# Patient Record
Sex: MALE | Race: WHITE | Employment: OTHER | URBAN - METROPOLITAN AREA
[De-identification: names, ages, dates, MRNs, and addresses within clinical notes are randomized per-mention and may not be internally consistent; named-entity substitution may affect disease eponyms.]

---

## 2017-07-17 ENCOUNTER — HOSPITAL ENCOUNTER (OUTPATIENT)
Dept: CARDIAC CATH/INVASIVE PROCEDURES | Age: 72
Discharge: HOME | End: 2017-07-17
Attending: INTERNAL MEDICINE | Admitting: INTERNAL MEDICINE
Payer: MEDICARE

## 2017-07-17 VITALS
HEIGHT: 77 IN | DIASTOLIC BLOOD PRESSURE: 66 MMHG | RESPIRATION RATE: 18 BRPM | SYSTOLIC BLOOD PRESSURE: 117 MMHG | HEART RATE: 65 BPM | OXYGEN SATURATION: 92 % | TEMPERATURE: 97.6 F | BODY MASS INDEX: 36.84 KG/M2 | WEIGHT: 312 LBS

## 2017-07-17 PROBLEM — R94.39 ABNORMAL STRESS TEST: Status: ACTIVE | Noted: 2017-07-17

## 2017-07-17 LAB
GLUCOSE BLD STRIP.AUTO-MCNC: 145 MG/DL (ref 65–100)
GLUCOSE BLD STRIP.AUTO-MCNC: 152 MG/DL (ref 65–100)
SERVICE CMNT-IMP: ABNORMAL
SERVICE CMNT-IMP: ABNORMAL

## 2017-07-17 PROCEDURE — 74011250636 HC RX REV CODE- 250/636

## 2017-07-17 PROCEDURE — 77030019569 HC BND COMPR RAD TERU -B

## 2017-07-17 PROCEDURE — C1769 GUIDE WIRE: HCPCS

## 2017-07-17 PROCEDURE — 77030008543 HC TBNG MON PRSS MRTM -A

## 2017-07-17 PROCEDURE — 93458 L HRT ARTERY/VENTRICLE ANGIO: CPT

## 2017-07-17 PROCEDURE — 77030019697 HC SYR ANGI INFL MRTM -B

## 2017-07-17 PROCEDURE — 77030004549 HC CATH ANGI DX PRF MRTM -A

## 2017-07-17 PROCEDURE — C1887 CATHETER, GUIDING: HCPCS

## 2017-07-17 PROCEDURE — 74011000250 HC RX REV CODE- 250

## 2017-07-17 PROCEDURE — 77030010221 HC SPLNT WR POS TELE -B

## 2017-07-17 PROCEDURE — 77030019698 HC SYR ANGI MDLON MRTM -A

## 2017-07-17 PROCEDURE — 74011250636 HC RX REV CODE- 250/636: Performed by: INTERNAL MEDICINE

## 2017-07-17 PROCEDURE — 74011636320 HC RX REV CODE- 636/320

## 2017-07-17 PROCEDURE — 82962 GLUCOSE BLOOD TEST: CPT

## 2017-07-17 PROCEDURE — C1894 INTRO/SHEATH, NON-LASER: HCPCS

## 2017-07-17 PROCEDURE — 74011000258 HC RX REV CODE- 258: Performed by: INTERNAL MEDICINE

## 2017-07-17 RX ORDER — HEPARIN SODIUM 1000 [USP'U]/ML
1000-10000 INJECTION, SOLUTION INTRAVENOUS; SUBCUTANEOUS ONCE
Status: COMPLETED | OUTPATIENT
Start: 2017-07-17 | End: 2017-07-17

## 2017-07-17 RX ORDER — HEPARIN SODIUM 1000 [USP'U]/ML
1000-10000 INJECTION, SOLUTION INTRAVENOUS; SUBCUTANEOUS
Status: DISCONTINUED | OUTPATIENT
Start: 2017-07-17 | End: 2017-07-17

## 2017-07-17 RX ORDER — LIDOCAINE HYDROCHLORIDE 10 MG/ML
1-30 INJECTION, SOLUTION EPIDURAL; INFILTRATION; INTRACAUDAL; PERINEURAL
Status: DISCONTINUED | OUTPATIENT
Start: 2017-07-17 | End: 2017-07-17

## 2017-07-17 RX ORDER — HEPARIN SODIUM 1000 [USP'U]/ML
INJECTION, SOLUTION INTRAVENOUS; SUBCUTANEOUS
Status: COMPLETED
Start: 2017-07-17 | End: 2017-07-17

## 2017-07-17 RX ORDER — MIDAZOLAM HYDROCHLORIDE 1 MG/ML
.5-2 INJECTION, SOLUTION INTRAMUSCULAR; INTRAVENOUS
Status: DISCONTINUED | OUTPATIENT
Start: 2017-07-17 | End: 2017-07-17

## 2017-07-17 RX ORDER — VERAPAMIL HYDROCHLORIDE 2.5 MG/ML
2.5 INJECTION, SOLUTION INTRAVENOUS ONCE
Status: COMPLETED | OUTPATIENT
Start: 2017-07-17 | End: 2017-07-17

## 2017-07-17 RX ORDER — LIDOCAINE HYDROCHLORIDE 10 MG/ML
INJECTION, SOLUTION EPIDURAL; INFILTRATION; INTRACAUDAL; PERINEURAL
Status: COMPLETED
Start: 2017-07-17 | End: 2017-07-17

## 2017-07-17 RX ORDER — GUAIFENESIN 100 MG/5ML
81 LIQUID (ML) ORAL DAILY
COMMUNITY
End: 2022-07-25

## 2017-07-17 RX ORDER — HEPARIN SODIUM 200 [USP'U]/100ML
500 INJECTION, SOLUTION INTRAVENOUS ONCE
Status: COMPLETED | OUTPATIENT
Start: 2017-07-17 | End: 2017-07-17

## 2017-07-17 RX ORDER — LISINOPRIL 10 MG/1
10 TABLET ORAL DAILY
COMMUNITY
End: 2021-08-08 | Stop reason: DRUGHIGH

## 2017-07-17 RX ORDER — MIDAZOLAM HYDROCHLORIDE 1 MG/ML
INJECTION, SOLUTION INTRAMUSCULAR; INTRAVENOUS
Status: COMPLETED
Start: 2017-07-17 | End: 2017-07-17

## 2017-07-17 RX ORDER — METFORMIN HYDROCHLORIDE 500 MG/1
500 TABLET ORAL 2 TIMES DAILY WITH MEALS
COMMUNITY

## 2017-07-17 RX ORDER — VERAPAMIL HYDROCHLORIDE 2.5 MG/ML
INJECTION, SOLUTION INTRAVENOUS
Status: COMPLETED
Start: 2017-07-17 | End: 2017-07-17

## 2017-07-17 RX ORDER — ADENOSINE 3 MG/ML
INJECTION, SOLUTION INTRAVENOUS
Status: DISCONTINUED
Start: 2017-07-17 | End: 2017-07-17

## 2017-07-17 RX ORDER — FENTANYL CITRATE 50 UG/ML
25-50 INJECTION, SOLUTION INTRAMUSCULAR; INTRAVENOUS
Status: DISCONTINUED | OUTPATIENT
Start: 2017-07-17 | End: 2017-07-17

## 2017-07-17 RX ORDER — HEPARIN SODIUM 200 [USP'U]/100ML
INJECTION, SOLUTION INTRAVENOUS
Status: COMPLETED
Start: 2017-07-17 | End: 2017-07-17

## 2017-07-17 RX ORDER — FENTANYL CITRATE 50 UG/ML
INJECTION, SOLUTION INTRAMUSCULAR; INTRAVENOUS
Status: COMPLETED
Start: 2017-07-17 | End: 2017-07-17

## 2017-07-17 RX ADMIN — HEPARIN SODIUM 1000 UNITS: 200 INJECTION, SOLUTION INTRAVENOUS at 08:55

## 2017-07-17 RX ADMIN — MIDAZOLAM HYDROCHLORIDE 2 MG: 1 INJECTION, SOLUTION INTRAMUSCULAR; INTRAVENOUS at 08:55

## 2017-07-17 RX ADMIN — FENTANYL CITRATE 50 MCG: 50 INJECTION, SOLUTION INTRAMUSCULAR; INTRAVENOUS at 08:55

## 2017-07-17 RX ADMIN — VERAPAMIL HYDROCHLORIDE 2.5 MG: 2.5 INJECTION INTRAVENOUS at 09:08

## 2017-07-17 RX ADMIN — LIDOCAINE HYDROCHLORIDE 2 ML: 10 INJECTION, SOLUTION EPIDURAL; INFILTRATION; INTRACAUDAL; PERINEURAL at 09:08

## 2017-07-17 RX ADMIN — HEPARIN SODIUM 9000 UNITS: 1000 INJECTION, SOLUTION INTRAVENOUS; SUBCUTANEOUS at 09:30

## 2017-07-17 RX ADMIN — IOPAMIDOL 70 ML: 755 INJECTION, SOLUTION INTRAVENOUS at 09:53

## 2017-07-17 RX ADMIN — SODIUM CHLORIDE 250 ML: 900 INJECTION, SOLUTION INTRAVENOUS at 09:15

## 2017-07-17 RX ADMIN — VERAPAMIL HYDROCHLORIDE 2.5 MG: 2.5 INJECTION, SOLUTION INTRAVENOUS at 09:08

## 2017-07-17 RX ADMIN — FENTANYL CITRATE 50 MCG: 50 INJECTION, SOLUTION INTRAMUSCULAR; INTRAVENOUS at 09:07

## 2017-07-17 RX ADMIN — IOPAMIDOL 30 ML: 755 INJECTION, SOLUTION INTRAVENOUS at 09:15

## 2017-07-17 RX ADMIN — ADENOSINE 140 MCG/KG/MIN: 3 INJECTION, SOLUTION INTRAVENOUS at 09:47

## 2017-07-17 RX ADMIN — HEPARIN SODIUM 5000 UNITS: 1000 INJECTION, SOLUTION INTRAVENOUS; SUBCUTANEOUS at 09:08

## 2017-07-17 RX ADMIN — NITROGLYCERIN 200 MCG: 5 INJECTION, SOLUTION INTRAVENOUS at 09:08

## 2017-07-17 RX ADMIN — MIDAZOLAM HYDROCHLORIDE 2 MG: 1 INJECTION INTRAMUSCULAR; INTRAVENOUS at 08:55

## 2017-07-17 RX ADMIN — MIDAZOLAM HYDROCHLORIDE 2 MG: 1 INJECTION, SOLUTION INTRAMUSCULAR; INTRAVENOUS at 09:07

## 2017-07-17 NOTE — PROGRESS NOTES
Brief Nutrition Note    Chart reviewed. Nutrition consulted to provide nutrition therapy for heart health and weight loss. Pt was provided with verbal education as well as a handout on TLC diet and wt loss strategies. Pt and wife live at [facility] where they are provided with 3 meals per day. Pt was encouraged to reach out to RD at facility because they are more familiar with foods provided. Pt has tried other weight loss strategies and programs in the past and has had some success.      Christianne Bailey RDN  Pager: [phone]

## 2017-07-17 NOTE — PROGRESS NOTES
Pt received discharge instructions and prescriptions. Pt states understanding of follow-up care and side effects of medications.  Odette Eason RN

## 2017-07-17 NOTE — PROCEDURES
Cardiac Cathetherization Note     PreOp Diagnosis:    []       Chest Pain   []       STEMI   []       Unstable Angina   []       NSTEMI   []       Cardiomyopathy/Heart Failure   [x]       Abnormal Stress Test   []       Valve Disease   []       Pre-Operative Evaluation   [x]       Other:   Dyspnea    Findings/PostOp Diagnosis:   1. Mild diffuse CAD  2. Patent stents in the RCA  3. Normal LVEDP  4. Hemodynamically insignificant stenosis of the LPDA    Recommendations:   1. Continue ASA 81mg indefinitely   2. Routine post procedure & access site care   3. Continue aggressive medical management and risk factor modification     I have explained the nature of cardiac catheterization and possible percutaneous coronary intervention including risks and benefits of the procedure with the patient which include at least a 1:1000 risk for diagnostic procedure and 1/100 risk for percutaneous intervention. Risks include but are not limited to risk of heart attack, stroke, vascular trauma requiring surgical repair or transfusion, abnormal heart rhythm requiring defibrillation or pacemaker, need for intraaortic balloon pump support, renal dysfunction requiring dialysis, exacerbated gastrointestinal bleeding, allergic response to medications requiring ventilatory support, emergent cardiac surgery and even death. They also understand the need for medical compliance - particularly if stenting is required - mandating continued daily consumption of aspirin and plavix or other antiplatelet therapy. Differences between medicated stent versus bare metal stent reviewed with patient. The patient expresses an understanding and verbally consents. They also understand plans for either radial or femoral access - with unique risks to both vascular beds including arterial occlusion, vascular trauma, hematoma and need for vascular surgery. I have answered all of their questions regarding the procedure and they are willing to proceed. Procedures: LHC, Cors, Cineflouroscopy     Indication:  As above    Procedure status: [x]  Elective  [] Urgent  [] Emergent    Operators:  Reza Roper DO    Assistants:     Access:   [x]  RIGHT Radial  []  LEFT Radial  [] RCFA  []  LCFA  []  RCFV  []  LCFV    Catheters: 6Fr JR4, JL3.5     Closure: [x]  TR Band  []  Angioseal  []  Perclose  []  Manual Compression    Tubes/Drains:  [x] No tubes or drains remain from this procedure  [] Other:     Estimated Blood Loss: Minimal     Specimens: None     Sedation: Moderate conscious sedation with IV fentany & versed, local anesthesia with 1% lidocaine. This was performed by non-anesthesia personnel and I provided direct supervision to a trained independent observer. Time under moderate sedation: 46  Min    Patient age:  74 y.o. Contrast:   100  cc  [x]  Isovue   []  Visipaque    Complications:  [x] None  [] Other:     Patient Condition at the end of the procedure:  [x] Stable  [] Other:      Hemodynamics: Ao: 111/69/90  LV: 111/13    Cors:     Dominance: [] Right  [] Left  [x] Mixed    LM: Large caliber vessel without significant stenosis    LAD: Moderate caliber vessel that wraps around the apex with mild diffuse disease  D1: Small caliber vessel with luminal irregularities  D2: Small caliber branching vessel with mild diffuse disease    LCX: Moderate caliber codominant vessel with luminal irregularities  OM1: Small caliber vessel with mild disease and a high takeoff  OM2: Small caliber vessel with mild disease  LPDA: Moderate caliber vessel with a 50-60% stenosis in the mid vessel    RCA: Moderate caliber, codominant vessel with patent stents in the proximal.mid vessel and distal vessel extending into the PLB  RPDA: Small caliber vessel with mild ostial disease due to the RCA-PLB stent  RPLB: Small caliber vessel without significant stenosis.        LV angiography: N/A  EF:   Wall motion:   MR:      Indication for FFR:  Indeterminate stenosis of the LPDA.    Technique:    Lesion #1:  LPDA    Anticoagulation:  Heparin was used for anticoagulation. Guiding Catheter:  A 6Fr EBU 4 guiding catheter was used to engage the Left Main. Guidewire: A Pressure was used to cross the lesion without difficulty. The pressure wire was zeroed outside of the body. The pressure wire was then introduced into the guiding catheter and into the vessel. The pressure wire was then equalized and then advanced across the indeterminate stenosis. The baseline flow rate was 0.99.  140 mcg/kg/min of adenosine was then administered intravenously for a total of three minutes. The final flow rate was 0.93 indicating a hemodynamically insignificant stenosis. Post-FFR Angiogram showed SHRUTI 3 flow without dissection or perforation. The patient tolerated the procedure well without any hemodynamic instability.

## 2017-07-17 NOTE — IP AVS SNAPSHOT
[address] [facility] 
[phone] Patient: Rosaline Chau MRN: [id number] GFL:[id number] Current Discharge Medication List  
  
CONTINUE these medications which have CHANGED Dose & Instructions Dispensing Information Comments Morning Noon Evening Bedtime  
 aspirin 81 mg chewable tablet What changed:  Another medication with the same name was removed. Continue taking this medication, and follow the directions you see here. Your last dose was: Your next dose is:    
   
   
 Dose:  81 mg Take 81 mg by mouth daily. Refills:  0 CONTINUE these medications which have NOT CHANGED Dose & Instructions Dispensing Information Comments Morning Noon Evening Bedtime CHONDROITIN SULFATE Your last dose was: Your next dose is:    
   
   
 Dose:  1200 mg Take 1,200 mg by mouth daily. Refills:  0  
     
   
   
   
  
 clopidogrel 75 mg Tab Commonly known as:  PLAVIX Your last dose was: Your next dose is:    
   
   
 Dose:  75 mg Take 1 Tab by mouth daily. Quantity:  30 Tab Refills:  12  
     
   
   
   
  
 GLUCOSAMINE 750 mg Tab Generic drug:  glucosamine sulfate Your last dose was: Your next dose is:    
   
   
 Dose:  750 mg Take 750 mg by mouth daily. Refills:  0  
     
   
   
   
  
 lisinopril 10 mg tablet Commonly known as:  Mauri Shelby Your last dose was: Your next dose is:    
   
   
 Dose:  10 mg Take 10 mg by mouth daily. Refills:  0  
     
   
   
   
  
 metFORMIN 500 mg tablet Commonly known as:  GLUCOPHAGE Your last dose was: Your next dose is:    
   
   
 Dose:  500 mg Take 500 mg by mouth two (2) times daily (with meals). Refills:  0  
     
   
   
   
  
 multivitamin tablet Commonly known as:  ONE A DAY Your last dose was: Your next dose is:    
   
   
 Dose:  1 Tab Take 1 Tab by mouth daily. Refills:  0  
     
   
   
   
  
 simvastatin 20 mg tablet Commonly known as:  ZOCOR Your last dose was: Your next dose is:    
   
   
 Dose:  40 mg Take 40 mg by mouth nightly. Indications: HYPERCHOLESTEROLEMIA Refills:  0

## 2017-07-17 NOTE — IP AVS SNAPSHOT
[address] [facility] 
[phone] Patient: Feliz Nyhan MRN: [id number] VJR:7/07/[birth year]50 You are allergic to the following Allergen Reactions Adhesive Other (comments) Blisters Advil (Ibuprofen) Rash Cleocin (Clindamycin Hcl) Rash Other Medication Unknown (comments) \"Phosphate\" Recent Documentation Height Weight BMI Smoking Status 1.956 m 141.5 kg 37 kg/m2 Former Smoker Emergency Contacts Name Discharge Info Relation Home Work Mobile [place]. [address] [place] [employer] Final CAREGIVER [3] Spouse [3] [phone] Felicitas Carter CAREGIVER [3] Son [22] [phone] About your hospitalization You were admitted on:  July 17, 2017 You last received care in the:  [facility] You were discharged on:  July 17, 2017 Unit phone number:  [phone] Why you were hospitalized Your primary diagnosis was:  Not on File Your diagnoses also included:  Abnormal Stress Test  
  
  
 
  
  
Providers Seen During Your Hospitalizations Provider Role Specialty Primary office phone Alyssia Yost DO Attending Provider Cardiology [phone] Your Primary Care Physician (PCP) Primary Care Physician Office Phone Office Fax Lisa Hoff [phone] Follow-up Information Follow up With Details Comments Contact Info Miguel Angel Cloud III, DO Schedule an appointment as soon as possible for a visit in 2 weeks  [address] [facility] 
[phone] Bhumi Martinez MD   [address] [facility] [address] [phone] Current Discharge Medication List  
  
CONTINUE these medications which have CHANGED Dose & Instructions Dispensing Information Comments Morning Noon Evening Bedtime aspirin 81 mg chewable tablet What changed:  Another medication with the same name was removed. Continue taking this medication, and follow the directions you see here. Your last dose was: Your next dose is:    
   
   
 Dose:  81 mg Take 81 mg by mouth daily. Refills:  0 CONTINUE these medications which have NOT CHANGED Dose & Instructions Dispensing Information Comments Morning Noon Evening Bedtime CHONDROITIN SULFATE Your last dose was: Your next dose is:    
   
   
 Dose:  1200 mg Take 1,200 mg by mouth daily. Refills:  0  
     
   
   
   
  
 clopidogrel 75 mg Tab Commonly known as:  PLAVIX Your last dose was: Your next dose is:    
   
   
 Dose:  75 mg Take 1 Tab by mouth daily. Quantity:  30 Tab Refills:  12  
     
   
   
   
  
 GLUCOSAMINE 750 mg Tab Generic drug:  glucosamine sulfate Your last dose was: Your next dose is:    
   
   
 Dose:  750 mg Take 750 mg by mouth daily. Refills:  0  
     
   
   
   
  
 lisinopril 10 mg tablet Commonly known as:  David Little Your last dose was: Your next dose is:    
   
   
 Dose:  10 mg Take 10 mg by mouth daily. Refills:  0  
     
   
   
   
  
 metFORMIN 500 mg tablet Commonly known as:  GLUCOPHAGE Your last dose was: Your next dose is:    
   
   
 Dose:  500 mg Take 500 mg by mouth two (2) times daily (with meals). Refills:  0  
     
   
   
   
  
 multivitamin tablet Commonly known as:  ONE A DAY Your last dose was: Your next dose is:    
   
   
 Dose:  1 Tab Take 1 Tab by mouth daily. Refills:  0  
     
   
   
   
  
 simvastatin 20 mg tablet Commonly known as:  ZOCOR Your last dose was: Your next dose is:    
   
   
 Dose:  40 mg Take 40 mg by mouth nightly. Indications: HYPERCHOLESTEROLEMIA Refills:  0 Discharge Instructions [address] [facility], [address]   [phone] [place], [address] [place] [address]    [email or web address] Patient Discharge Instructions Misti Juan / [id number] : 1945 Admitted 2017 Discharged: 2017 · It is important that you take the medication exactly as they are prescribed. · Keep your medication in the bottles provided by the pharmacist and keep a list of the medication names, dosages, and times to be taken in your wallet. · Do not take other medications without consulting your doctor. BRING ALL OF YOUR MEDICINES TO YOUR OFFICE VISIT with Masha Teixeira III, DO. Follow-up with Masha Teixeira III, DO in 2 weeks. Cardiac Catheterization  Discharge Instructions Transradial Catheterization Discharge Instructions (WRIST) Discharge instructions: Your radial artery in your wrist was used for your cardiac catheterization. This site may be slightly bruised and sore following your procedure. Expect mild tingling or the hand and tenderness at the puncture site for up to 3 days. Excess movement of the wrist used should be avoided for the next 24-48 hours. 1. No lifting over 2 pounds (approximately a ½ gallon of milk) with this arm for 24 hours. 2. Keep the site of the procedure covered with a bandage for 24 hours. 3. You may shower the day after your procedure. Do not take a tub bath or submerge the puncture site in water for 48 hours. 4. No heavy impact activity/lifting > 30 pounds for 1 week. If bleeding of the wrist occurs at home: If the site on your wrist where you had the catheterization procedure begins to bleed, do not panic. 1. Place 1 or 2 fingers over the puncture site and hold pressure to stop the bleeding. You may be able to feel your pulse as you hold pressure. 2. Lift your fingers after 5 minutes to see if the bleeding has stopped. 3. Once the bleeding has stopped, gently wipe the wrist area clean and cover with a bandage. If the bleeding from your wrist does not stop after 15 minutes, or if there is a large amount of bleeding or spurting, call 911 immediately (do not drive yourself to the hospital). Other concerns: The site may be slightly bruised and sore following your procedure. Should any of the following occur, contact your physician immediately: 1. Any cool or coldness of the arm, discoloration over a large area, ongoing numbness or any abnormal sensations , moderate to severe pain or swelling in the arm. 2. Redness, soreness, swelling, chills or fever, or colored drainage at the procedure site within 3-7 days after your procedure. If you have any further questions or concerns regarding your procedure please call the Cardiac Cath Lab office at [phone]. During regular business hours ask to speak to Dr. Raj Frey. During non-business hours the answering service will answer. Ask to speak to the physician on call for [facility].  
 
Transfemoral Catheterization Discharge Instructions (GROIN) ? Do not drive, operate any machinery, or sign any legal documents for 24 hours after your procedure. You must have someone to drive you home. ? You may take a shower 24 hours after your cardiac catheterization. Be sure to get the dressing wet and then remove it; gently wash the area with warm soapy water. Pat dry and leave open to air. To help prevent infections, be sure to keep the cath site clean and dry. No lotions, creams, powders, ointments, etc. in the cath site for approximately 1 week. ? Do not take a tub bath, get in a hot tub or swimming pool for approximately 5 days or until the cath site is completely healed. ? No strenuous activity or heavy lifting over 10 lbs. for 7 days.  
 
? Drink plenty of fluids for 24-48 hours after your cath to flush the contrast dye from your kidneys. No alcoholic beverages for 24 hours. You may resume your previous diet (low fat, low cholesterol) after your cath. ? After your cath, some bruising or discomfort is common during the healing process. Tylenol, 1-2 tablets every 6 hours as needed, is recommended if you experience any discomfort. If you experience any signs or symptoms of infection such as fever, chills, or poorly healing incision, persistent tenderness or swelling in the groin, redness and/or warmth to the touch, numbness, significant tingling or pain at the groin site or affected extremity, rash, drainage from the cath site, or if the leg feels tight or swollen, call your physician right away. ? If bleeding at the cath site occurs, take a clean gauze pad and apply direct pressure to the groin just above the puncture site. Call 911 immediately, and continue to apply direct pressure until an ambulance gets to your location. ? You may return to work  2  days after your cardiac cath if no groin bleeding. Information obtained by : 
I understand that if any problems occur once I am at home I am to contact my physician. I understand and acknowledge receipt of the instructions indicated above. R.N.'s Signature                                                                  Date/Time Patient or Representative Signature                                                          Date/Time Zia Wright III, [address] [facility], [address]    [phone] [place], [address] [facility]    [email or web address] Discharge Orders None Introducing [facility] [place] [facility]! Ronny Manning introduces [employer] patient portal. Now you can access parts of your medical record, email your doctor's office, and request medication refills online. 1. In your internet browser, go to [email or web address] 2. Click on the First Time User? Click Here link in the Sign In box. You will see the New Member Sign Up page. 3. Enter your [employer] Access Code exactly as it appears below. You will not need to use this code after youve completed the sign-up process. If you do not sign up before the expiration date, you must request a new code. · [employer] Access Code: [email or web address] Expires: 10/10/2017 10:44 AM 
 
4. Enter the last four digits of your Social Security Number (xxxx) and Date of Birth (mm/dd/yyyy) as indicated and click Submit. You will be taken to the next sign-up page. 5. Create a [employer] ID. This will be your [employer] login ID and cannot be changed, so think of one that is secure and easy to remember. 6. Create a [employer] password. You can change your password at any time. 7. Enter your Password Reset Question and Answer. This can be used at a later time if you forget your password. 8. Enter your e-mail address. You will receive e-mail notification when new information is available in [address] 9. Click Sign Up. You can now view and download portions of your medical record. 10. Click the Download Summary menu link to download a portable copy of your medical information. If you have questions, please visit the Frequently Asked Questions section of the [employer] website. Remember, [employer] is NOT to be used for urgent needs. For medical emergencies, dial 911. Now available from your iPhone and Android! General Information Please provide this summary of care documentation to your next provider. Patient Signature:  ____________________________________________________________ Date:  ____________________________________________________________  
  
Claudene Born Provider Signature:  ____________________________________________________________ Date:  ____________________________________________________________

## 2017-07-17 NOTE — DISCHARGE INSTRUCTIONS
[address], [address]   [phone]  [address]    [email or web address]    Patient Discharge Instructions    Rosaline Chau / [id number] : 1945    Admitted 2017 Discharged: 2017       · It is important that you take the medication exactly as they are prescribed. · Keep your medication in the bottles provided by the pharmacist and keep a list of the medication names, dosages, and times to be taken in your wallet. · Do not take other medications without consulting your doctor. BRING ALL OF YOUR MEDICINES TO YOUR OFFICE VISIT with Carissa Higginbotham III, DO. Follow-up with Carissa Higginbotham III, DO in 2 weeks. Cardiac Catheterization  Discharge Instructions    Transradial Catheterization Discharge Instructions (WRIST)    Discharge instructions: Your radial artery in your wrist was used for your cardiac catheterization. This site may be slightly bruised and sore following your procedure. Expect mild tingling or the hand and tenderness at the puncture site for up to 3 days. Excess movement of the wrist used should be avoided for the next 24-48 hours. 1. No lifting over 2 pounds (approximately a ½ gallon of milk) with this arm for 24 hours. 2. Keep the site of the procedure covered with a bandage for 24 hours. 3. You may shower the day after your procedure. Do not take a tub bath or submerge the puncture site in water for 48 hours. 4. No heavy impact activity/lifting > 30 pounds for 1 week. If bleeding of the wrist occurs at home:   If the site on your wrist where you had the catheterization procedure begins to bleed, do not panic. 1. Place 1 or 2 fingers over the puncture site and hold pressure to stop the bleeding. You may be able to feel your pulse as you hold pressure. 2. Lift your fingers after 5 minutes to see if the bleeding has stopped.    3. Once the bleeding has stopped, gently wipe the wrist area clean and cover with a bandage. If the bleeding from your wrist does not stop after 15 minutes, or if there is a large amount of bleeding or spurting, call 911 immediately (do not drive yourself to the hospital). Other concerns: The site may be slightly bruised and sore following your procedure. Should any of the following occur, contact your physician immediately:   1. Any cool or coldness of the arm, discoloration over a large area, ongoing numbness or any abnormal sensations , moderate to severe pain or swelling in the arm. 2. Redness, soreness, swelling, chills or fever, or colored drainage at the procedure site within 3-7 days after your procedure. If you have any further questions or concerns regarding your procedure please call the Cardiac Cath Lab office at [phone]. During regular business hours ask to speak to Dr. Maria De Jesus Wilson. During non-business hours the answering service will answer. Ask to speak to the physician on call for [facility].     Transfemoral Catheterization Discharge Instructions Flakito Richardson)     Do not drive, operate any machinery, or sign any legal documents for 24 hours after your procedure. You must have someone to drive you home.  You may take a shower 24 hours after your cardiac catheterization. Be sure to get the dressing wet and then remove it; gently wash the area with warm soapy water. Pat dry and leave open to air. To help prevent infections, be sure to keep the cath site clean and dry. No lotions, creams, powders, ointments, etc. in the cath site for approximately 1 week.  Do not take a tub bath, get in a hot tub or swimming pool for approximately 5 days or until the cath site is completely healed.  No strenuous activity or heavy lifting over 10 lbs. for 7 days.  Drink plenty of fluids for 24-48 hours after your cath to flush the contrast dye from your kidneys. No alcoholic beverages for 24 hours.   You may resume your previous diet (low fat, low cholesterol) after your cath.  After your cath, some bruising or discomfort is common during the healing process. Tylenol, 1-2 tablets every 6 hours as needed, is recommended if you experience any discomfort. If you experience any signs or symptoms of infection such as fever, chills, or poorly healing incision, persistent tenderness or swelling in the groin, redness and/or warmth to the touch, numbness, significant tingling or pain at the groin site or affected extremity, rash, drainage from the cath site, or if the leg feels tight or swollen, call your physician right away.  If bleeding at the cath site occurs, take a clean gauze pad and apply direct pressure to the groin just above the puncture site. Call 911 immediately, and continue to apply direct pressure until an ambulance gets to your location.  You may return to work  2  days after your cardiac cath if no groin bleeding. Information obtained by :  I understand that if any problems occur once I am at home I am to contact my physician. I understand and acknowledge receipt of the instructions indicated above. R.N.'s Signature                                                                  Date/Time                                                                                                                                              Patient or Representative Signature                                                          Date/Time      Daryl Dunaway III, DO             [address]    [phone]  [place], [address]    [email or web address]

## 2018-10-28 ENCOUNTER — HOSPITAL ENCOUNTER (EMERGENCY)
Age: 73
Discharge: HOME | End: 2018-10-28
Attending: EMERGENCY MEDICINE
Payer: MEDICARE

## 2018-10-28 VITALS
BODY MASS INDEX: 37.02 KG/M2 | HEART RATE: 71 BPM | SYSTOLIC BLOOD PRESSURE: 120 MMHG | DIASTOLIC BLOOD PRESSURE: 61 MMHG | HEIGHT: 76 IN | TEMPERATURE: 97.7 F | WEIGHT: 304 LBS | OXYGEN SATURATION: 95 % | RESPIRATION RATE: 19 BRPM

## 2018-10-28 DIAGNOSIS — R19.7 DIARRHEA, UNSPECIFIED TYPE: ICD-10-CM

## 2018-10-28 DIAGNOSIS — R42 ORTHOSTATIC DIZZINESS: Primary | ICD-10-CM

## 2018-10-28 LAB
ALBUMIN SERPL-MCNC: 3.3 G/DL (ref 3.5–5)
ALBUMIN/GLOB SERPL: 0.7 {RATIO} (ref 1.1–2.2)
ALP SERPL-CCNC: 67 U/L (ref 45–117)
ALT SERPL-CCNC: 18 U/L (ref 12–78)
ANION GAP SERPL CALC-SCNC: 10 MMOL/L (ref 5–15)
APPEARANCE UR: CLEAR
AST SERPL-CCNC: 13 U/L (ref 15–37)
BACTERIA URNS QL MICRO: NEGATIVE /HPF
BASOPHILS # BLD: 0.1 K/UL (ref 0–0.1)
BASOPHILS NFR BLD: 1 % (ref 0–1)
BILIRUB SERPL-MCNC: 0.4 MG/DL (ref 0.2–1)
BILIRUB UR QL: NEGATIVE
BUN SERPL-MCNC: 15 MG/DL (ref 6–20)
BUN/CREAT SERPL: 15 (ref 12–20)
CALCIUM SERPL-MCNC: 9.1 MG/DL (ref 8.5–10.1)
CHLORIDE SERPL-SCNC: 106 MMOL/L (ref 97–108)
CK MB CFR SERPL CALC: ABNORMAL % (ref 0–2.5)
CK MB SERPL-MCNC: <1 NG/ML (ref 5–25)
CK SERPL-CCNC: 37 U/L (ref 39–308)
CO2 SERPL-SCNC: 22 MMOL/L (ref 21–32)
COLOR UR: NORMAL
CREAT SERPL-MCNC: 1.01 MG/DL (ref 0.7–1.3)
DIFFERENTIAL METHOD BLD: ABNORMAL
EOSINOPHIL # BLD: 0.2 K/UL (ref 0–0.4)
EOSINOPHIL NFR BLD: 3 % (ref 0–7)
EPITH CASTS URNS QL MICRO: NORMAL /LPF
ERYTHROCYTE [DISTWIDTH] IN BLOOD BY AUTOMATED COUNT: 12.6 % (ref 11.5–14.5)
GLOBULIN SER CALC-MCNC: 4.6 G/DL (ref 2–4)
GLUCOSE SERPL-MCNC: 129 MG/DL (ref 65–100)
GLUCOSE UR STRIP.AUTO-MCNC: NEGATIVE MG/DL
HCT VFR BLD AUTO: 39 % (ref 36.6–50.3)
HGB BLD-MCNC: 13.4 G/DL (ref 12.1–17)
HGB UR QL STRIP: NEGATIVE
IMM GRANULOCYTES # BLD: 0 K/UL (ref 0–0.04)
IMM GRANULOCYTES NFR BLD AUTO: 0 % (ref 0–0.5)
KETONES UR QL STRIP.AUTO: NEGATIVE MG/DL
LEUKOCYTE ESTERASE UR QL STRIP.AUTO: NEGATIVE
LYMPHOCYTES # BLD: 1.7 K/UL (ref 0.8–3.5)
LYMPHOCYTES NFR BLD: 19 % (ref 12–49)
MCH RBC QN AUTO: 32.9 PG (ref 26–34)
MCHC RBC AUTO-ENTMCNC: 34.4 G/DL (ref 30–36.5)
MCV RBC AUTO: 95.8 FL (ref 80–99)
MONOCYTES # BLD: 1.1 K/UL (ref 0–1)
MONOCYTES NFR BLD: 12 % (ref 5–13)
NEUTS SEG # BLD: 6 K/UL (ref 1.8–8)
NEUTS SEG NFR BLD: 66 % (ref 32–75)
NITRITE UR QL STRIP.AUTO: NEGATIVE
NRBC # BLD: 0 K/UL (ref 0–0.01)
NRBC BLD-RTO: 0 PER 100 WBC
PH UR STRIP: 5.5 [PH] (ref 5–8)
PLATELET # BLD AUTO: 193 K/UL (ref 150–400)
PMV BLD AUTO: 9.3 FL (ref 8.9–12.9)
POTASSIUM SERPL-SCNC: 4.1 MMOL/L (ref 3.5–5.1)
PROT SERPL-MCNC: 7.9 G/DL (ref 6.4–8.2)
PROT UR STRIP-MCNC: NEGATIVE MG/DL
RBC # BLD AUTO: 4.07 M/UL (ref 4.1–5.7)
RBC #/AREA URNS HPF: NORMAL /HPF (ref 0–5)
SODIUM SERPL-SCNC: 138 MMOL/L (ref 136–145)
SP GR UR REFRACTOMETRY: 1.01 (ref 1–1.03)
TROPONIN I SERPL-MCNC: <0.05 NG/ML
UA: UC IF INDICATED,UAUC: NORMAL
UROBILINOGEN UR QL STRIP.AUTO: 0.2 EU/DL (ref 0.2–1)
WBC # BLD AUTO: 9 K/UL (ref 4.1–11.1)
WBC URNS QL MICRO: NORMAL /HPF (ref 0–4)

## 2018-10-28 PROCEDURE — 82550 ASSAY OF CK (CPK): CPT | Performed by: EMERGENCY MEDICINE

## 2018-10-28 PROCEDURE — 85025 COMPLETE CBC W/AUTO DIFF WBC: CPT | Performed by: EMERGENCY MEDICINE

## 2018-10-28 PROCEDURE — 96361 HYDRATE IV INFUSION ADD-ON: CPT

## 2018-10-28 PROCEDURE — 80053 COMPREHEN METABOLIC PANEL: CPT | Performed by: EMERGENCY MEDICINE

## 2018-10-28 PROCEDURE — 36415 COLL VENOUS BLD VENIPUNCTURE: CPT | Performed by: EMERGENCY MEDICINE

## 2018-10-28 PROCEDURE — 74011250636 HC RX REV CODE- 250/636: Performed by: EMERGENCY MEDICINE

## 2018-10-28 PROCEDURE — 81001 URINALYSIS AUTO W/SCOPE: CPT | Performed by: EMERGENCY MEDICINE

## 2018-10-28 PROCEDURE — 96360 HYDRATION IV INFUSION INIT: CPT

## 2018-10-28 PROCEDURE — 99285 EMERGENCY DEPT VISIT HI MDM: CPT

## 2018-10-28 PROCEDURE — 84484 ASSAY OF TROPONIN QUANT: CPT | Performed by: EMERGENCY MEDICINE

## 2018-10-28 PROCEDURE — 74011250637 HC RX REV CODE- 250/637: Performed by: EMERGENCY MEDICINE

## 2018-10-28 PROCEDURE — 93005 ELECTROCARDIOGRAM TRACING: CPT

## 2018-10-28 RX ORDER — SODIUM CHLORIDE 0.9 % (FLUSH) 0.9 %
5-10 SYRINGE (ML) INJECTION AS NEEDED
Status: DISCONTINUED | OUTPATIENT
Start: 2018-10-28 | End: 2018-10-28 | Stop reason: HOSPADM

## 2018-10-28 RX ORDER — AMOXICILLIN 250 MG/1
CAPSULE ORAL AS NEEDED
COMMUNITY
End: 2019-10-13

## 2018-10-28 RX ORDER — ONDANSETRON 4 MG/1
4 TABLET, ORALLY DISINTEGRATING ORAL
Qty: 10 TAB | Refills: 0 | Status: SHIPPED | OUTPATIENT
Start: 2018-10-28 | End: 2019-10-13

## 2018-10-28 RX ORDER — SODIUM CHLORIDE 0.9 % (FLUSH) 0.9 %
5-10 SYRINGE (ML) INJECTION EVERY 8 HOURS
Status: DISCONTINUED | OUTPATIENT
Start: 2018-10-28 | End: 2018-10-28 | Stop reason: HOSPADM

## 2018-10-28 RX ORDER — DIPHENOXYLATE HYDROCHLORIDE AND ATROPINE SULFATE 2.5; .025 MG/1; MG/1
1 TABLET ORAL
Qty: 20 TAB | Refills: 0 | Status: SHIPPED | OUTPATIENT
Start: 2018-10-28 | End: 2019-10-13

## 2018-10-28 RX ORDER — DICYCLOMINE HYDROCHLORIDE 10 MG/1
10 CAPSULE ORAL 4 TIMES DAILY
Qty: 20 CAP | Refills: 0 | Status: SHIPPED | OUTPATIENT
Start: 2018-10-28 | End: 2018-11-02

## 2018-10-28 RX ORDER — DICYCLOMINE HYDROCHLORIDE 10 MG/1
10 CAPSULE ORAL
Status: COMPLETED | OUTPATIENT
Start: 2018-10-28 | End: 2018-10-28

## 2018-10-28 RX ORDER — SODIUM CHLORIDE 9 MG/ML
1000 INJECTION, SOLUTION INTRAVENOUS CONTINUOUS
Status: DISCONTINUED | OUTPATIENT
Start: 2018-10-28 | End: 2018-10-28 | Stop reason: HOSPADM

## 2018-10-28 RX ADMIN — DICYCLOMINE HYDROCHLORIDE 10 MG: 10 CAPSULE ORAL at 14:28

## 2018-10-28 RX ADMIN — SODIUM CHLORIDE 1000 ML: 900 INJECTION, SOLUTION INTRAVENOUS at 12:23

## 2018-10-28 NOTE — DISCHARGE INSTRUCTIONS

## 2018-10-28 NOTE — ED PROVIDER NOTES
EMERGENCY DEPARTMENT HISTORY AND PHYSICAL EXAM      Date: 10/28/2018  Patient Name: Elina Main    History of Presenting Illness     Chief Complaint   Patient presents with    Dizziness     lightheaded and dizzy getting out of shower getting dressed this morning; recent change with bp medicine dose Lisinopril. History Provided By: Patient    HPI: Elina Main, 74 y.o. male with PMHx significant for CAD, HLD, and DM, presents ambulatory to the ED with cc of a sudden onset of lightheadedness which began earlier this morning. Pt endorses associated SOB, fatigue, dizziness and diarrhea. Pt denies taking any medications to alleviate his sxs, nor any modifying factors. Pt notes that he has had similar dizzy spells over the past few months, but his sxs usually resolves on its own. Pt explains that his sxs began with diarrhea and fatigue 3 days which progressed into lightheadedness and dizziness today. Pt states \" I could feel my blood pressure raising\" during the onset of his sxs. Pt states that his sxs have mainly resolved, but he is still feeling slightly lightheaded. Pt notes that his last BM was earlier this morning which was loose. Of note, pt reports that he had a recent change in his Lisinopril dosage. Pt denies recent consumption of new or unusual foods. Pt denies any recent sick contact, or recent antibiotic use. Pt specifically denies fever, N/V, abdominal pain, frequency, dysuria, chest pain, bloody stools. There are no other complaints, changes, or physical findings at this time.     PSHx: cardiac stent placement  Social Hx: +(former) tobacco, +(light) EtOH, - illicit drug use    PCP: Dalia Samuel MD    Current Facility-Administered Medications   Medication Dose Route Frequency Provider Last Rate Last Dose    sodium chloride (NS) flush 5-10 mL  5-10 mL IntraVENous Q8H Madalyn Doss, DO        sodium chloride (NS) flush 5-10 mL  5-10 mL IntraVENous PRN Madalyn Doss, DO  0.9% sodium chloride infusion 1,000 mL  1,000 mL IntraVENous CONTINUOUS Madalyn Doss, DO   1,000 mL at 10/28/18 1223     Current Outpatient Medications   Medication Sig Dispense Refill    amoxicillin (AMOXIL) 250 mg capsule Take  by mouth as needed. As needed for dental work      diphenoxylate-atropine (LOMOTIL) 2.5-0.025 mg per tablet Take 1 Tab by mouth four (4) times daily as needed for Diarrhea. Max Daily Amount: 4 Tabs. 20 Tab 0    ondansetron (ZOFRAN ODT) 4 mg disintegrating tablet Take 1 Tab by mouth every eight (8) hours as needed for Nausea. 10 Tab 0    dicyclomine (BENTYL) 10 mg capsule Take 1 Cap by mouth four (4) times daily for 5 days. 20 Cap 0    aspirin 81 mg chewable tablet Take 81 mg by mouth daily.  metFORMIN (GLUCOPHAGE) 500 mg tablet Take 500 mg by mouth two (2) times daily (with meals).  lisinopril (PRINIVIL, ZESTRIL) 10 mg tablet Take 10 mg by mouth daily.  simvastatin (ZOCOR) 20 mg tablet Take 40 mg by mouth nightly. Indications: HYPERCHOLESTEROLEMIA      CHONDROITIN SULFATE Take 1,200 mg by mouth daily.  glucosamine sulfate (GLUCOSAMINE) 750 mg Tab Take 750 mg by mouth daily.  multivitamin (ONE A DAY) tablet Take 1 Tab by mouth daily. Past History     Past Medical History:  Past Medical History:   Diagnosis Date    Arthritis     bilateral hips/knees    CAD (coronary artery disease) 6/7/13    PCI    Chronic pain     DJD; SILVIO KNEES    Diabetes ([address]     \"BORDERLINE\" PER MD    GERD (gastroesophageal reflux disease)     Morbid obesity ([address]     Other and unspecified symptoms and signs involving general sensations and perceptions     Bilat. legs \"give out\" intermittently; Bilat leg cramps; macular degenration (left).  Other ill-defined conditions(799.89)     HYPERLIPIDEMIA; LT LE DVT (2008);  MORBID OBESITY    Thromboembolus ([address] 2008    left LE       Past Surgical History:  Past Surgical History:   Procedure Laterality Date    CARDIAC SURG PROCEDURE UNLIST      STENTS TO RCA    TOTAL HIP ARTHROPLASTY  2007    right    TOTAL HIP ARTHROPLASTY  2007    left       Family History:  Family History   Problem Relation Age of Onset    Heart Disease Mother     Arrhythmia Mother     Heart Disease Father     Cancer Sister     Lung Disease Sister        Social History:  Social History     Tobacco Use    Smoking status: Former Smoker     Packs/day: 1.00     Last attempt to quit: 1983     Years since quittin.7    Smokeless tobacco: Never Used   Substance Use Topics    Alcohol use: Yes     Alcohol/week: 1.5 oz     Types: 2 Glasses of wine, 1 Cans of beer per week     Comment: 0-2 X PER WK WITH MEALS OR AFTER PLAYING GOLF    Drug use: No       Allergies: Allergies   Allergen Reactions    Adhesive Other (comments)     Blisters     Advil [Ibuprofen] Rash    Cleocin [Clindamycin Hcl] Rash    Other Medication Unknown (comments)     \"Phosphate\"         Review of Systems   Review of Systems   Constitutional: Positive for fatigue. Negative for appetite change, chills and fever. HENT: Negative. Negative for congestion, rhinorrhea, sinus pressure and sore throat. Eyes: Negative. Respiratory: Positive for shortness of breath. Negative for cough, choking, chest tightness and wheezing. Cardiovascular: Negative. Negative for chest pain, palpitations and leg swelling. Gastrointestinal: Positive for diarrhea. Negative for abdominal pain, constipation, nausea and vomiting. Endocrine: Negative. Genitourinary: Negative. Negative for difficulty urinating, dysuria, flank pain and urgency. Musculoskeletal: Negative. Skin: Negative. Neurological: Positive for dizziness and light-headedness. Negative for speech difficulty, weakness, numbness and headaches. Psychiatric/Behavioral: Negative. All other systems reviewed and are negative.       Physical Exam   Physical Exam   Constitutional: He is oriented to person, place, and time. He appears well-developed and well-nourished. No distress. HENT:   Head: Normocephalic and atraumatic. Mouth/Throat: Oropharynx is clear and moist. No oropharyngeal exudate. Eyes: Conjunctivae and EOM are normal. Pupils are equal, round, and reactive to light. Neck: Normal range of motion. Neck supple. No JVD present. No tracheal deviation present. Cardiovascular: Normal rate, regular rhythm, normal heart sounds and intact distal pulses. No murmur heard. Pulmonary/Chest: Effort normal and breath sounds normal. No stridor. No respiratory distress. He has no wheezes. He has no rales. He exhibits no tenderness. Abdominal: Soft. He exhibits no distension. There is no tenderness. There is no rebound and no guarding. Morbidly obese, ABDSNT     Musculoskeletal: Normal range of motion. He exhibits no edema or tenderness. Neurological: He is alert and oriented to person, place, and time. No cranial nerve deficit. No focal motor or sensory deficits, no nystagmus, no gait ataxia   Skin: Skin is warm and dry. He is not diaphoretic. Psychiatric: He has a normal mood and affect. His behavior is normal.   Nursing note and vitals reviewed.       Diagnostic Study Results     Labs -     Recent Results (from the past 12 hour(s))   EKG, 12 LEAD, INITIAL    Collection Time: 10/28/18 10:41 AM   Result Value Ref Range    Ventricular Rate 72 BPM    Atrial Rate 72 BPM    P-R Interval 182 ms    QRS Duration 102 ms    Q-T Interval 382 ms    QTC Calculation (Bezet) 418 ms    Calculated P Axis 35 degrees    Calculated R Axis -49 degrees    Calculated T Axis 20 degrees    Diagnosis       Normal sinus rhythm  Left anterior fascicular block  Minimal voltage criteria for LVH, may be normal variant  When compared with ECG of 08-JUN-2013 03:40,  Left anterior fascicular block is now present  Nonspecific T wave abnormality no longer evident in Lateral leads     CBC WITH AUTOMATED DIFF    Collection Time: 10/28/18 11:43 AM Result Value Ref Range    WBC 9.0 4.1 - 11.1 K/uL    RBC 4.07 (L) 4.10 - 5.70 M/uL    HGB 13.4 12.1 - 17.0 g/dL    HCT 39.0 36.6 - 50.3 %    MCV 95.8 80.0 - 99.0 FL    MCH 32.9 26.0 - 34.0 PG    MCHC 34.4 30.0 - 36.5 g/dL    RDW 12.6 11.5 - 14.5 %    PLATELET 681 873 - 649 K/uL    MPV 9.3 8.9 - 12.9 FL    NRBC 0.0 0  WBC    ABSOLUTE NRBC 0.00 0.00 - 0.01 K/uL    NEUTROPHILS 66 32 - 75 %    LYMPHOCYTES 19 12 - 49 %    MONOCYTES 12 5 - 13 %    EOSINOPHILS 3 0 - 7 %    BASOPHILS 1 0 - 1 %    IMMATURE GRANULOCYTES 0 0.0 - 0.5 %    ABS. NEUTROPHILS 6.0 1.8 - 8.0 K/UL    ABS. LYMPHOCYTES 1.7 0.8 - 3.5 K/UL    ABS. MONOCYTES 1.1 (H) 0.0 - 1.0 K/UL    ABS. EOSINOPHILS 0.2 0.0 - 0.4 K/UL    ABS. BASOPHILS 0.1 0.0 - 0.1 K/UL    ABS. IMM. GRANS. 0.0 0.00 - 0.04 K/UL    DF AUTOMATED     METABOLIC PANEL, COMPREHENSIVE    Collection Time: 10/28/18 11:43 AM   Result Value Ref Range    Sodium 138 136 - 145 mmol/L    Potassium 4.1 3.5 - 5.1 mmol/L    Chloride 106 97 - 108 mmol/L    CO2 22 21 - 32 mmol/L    Anion gap 10 5 - 15 mmol/L    Glucose 129 (H) 65 - 100 mg/dL    BUN 15 6 - 20 MG/DL    Creatinine 1.01 0.70 - 1.30 MG/DL    BUN/Creatinine ratio 15 12 - 20      GFR est AA >60 >60 ml/min/1.73m2    GFR est non-AA >60 >60 ml/min/1.73m2    Calcium 9.1 8.5 - 10.1 MG/DL    Bilirubin, total 0.4 0.2 - 1.0 MG/DL    ALT (SGPT) 18 12 - 78 U/L    AST (SGOT) 13 (L) 15 - 37 U/L    Alk.  phosphatase 67 45 - 117 U/L    Protein, total 7.9 6.4 - 8.2 g/dL    Albumin 3.3 (L) 3.5 - 5.0 g/dL    Globulin 4.6 (H) 2.0 - 4.0 g/dL    A-G Ratio 0.7 (L) 1.1 - 2.2     URINALYSIS W/ REFLEX CULTURE    Collection Time: 10/28/18 11:43 AM   Result Value Ref Range    Color YELLOW/STRAW      Appearance CLEAR CLEAR      Specific gravity 1.011 1.003 - 1.030      pH (UA) 5.5 5.0 - 8.0      Protein NEGATIVE  NEG mg/dL    Glucose NEGATIVE  NEG mg/dL    Ketone NEGATIVE  NEG mg/dL    Bilirubin NEGATIVE  NEG      Blood NEGATIVE  NEG      Urobilinogen 0.2 0.2 - 1.0 EU/dL    Nitrites NEGATIVE  NEG      Leukocyte Esterase NEGATIVE  NEG      WBC 0-4 0 - 4 /hpf    RBC 0-5 0 - 5 /hpf    Epithelial cells FEW FEW /lpf    Bacteria NEGATIVE  NEG /hpf    UA:UC IF INDICATED CULTURE NOT INDICATED BY UA RESULT CNI     CK W/ CKMB & INDEX    Collection Time: 10/28/18 11:43 AM   Result Value Ref Range    CK 37 (L) 39 - 308 U/L    CK - MB <1.0 <3.6 NG/ML    CK-MB Index Cannot be calculated 0 - 2.5     TROPONIN I    Collection Time: 10/28/18 11:43 AM   Result Value Ref Range    Troponin-I, Qt. <0.05 <0.05 ng/mL     Medical Decision Making   I am the first provider for this patient. I reviewed the vital signs, available nursing notes, past medical history, past surgical history, family history and social history. Vital Signs-Reviewed the patient's vital signs. Patient Vitals for the past 12 hrs:   Temp Pulse Resp BP SpO2   10/28/18 1205 -- 71 -- 120/61 --   10/28/18 1038 97.7 °F (36.5 °C) 75 19 128/68 95 %       Pulse Oximetry Analysis - 95% on RA    Cardiac Monitor:   Rate: 74 bpm  Rhythm: Normal Sinus Rhythm      EKG interpretation: (Preliminary)  Sinus, LAFB, rate 72, normal pr/qrs, no acute ST T changes, Stephanie Costa DO      Records Reviewed: Nursing Notes, Old Medical Records, Previous electrocardiograms and Previous Laboratory Studies    Provider Notes (Medical Decision Making):   DDx: dehydration, electrolyte abnormality, enteritis    ED Course:   Initial assessment performed. The patients presenting problems have been discussed, and they are in agreement with the care plan formulated and outlined with them. I have encouraged them to ask questions as they arise throughout their visit. Pt feeling better after IV fluids, pt able to get up from lying and ambulate asymptomatic, Stephanie Costa DO    Critical Care Time:   0    Disposition:  2:15 PM  The patient has been re-evaluated and is ready for discharge. Reviewed available results with patient.  Counseled patient on diagnosis and care plan. Patient has expressed understanding, and all questions have been answered. Patient agrees with plan and agrees to follow up as recommended, or return to the ED if their symptoms worsen. Discharge instructions have been provided and explained to the patient, along with reasons to return to the ED. PLAN:  1. Current Discharge Medication List      START taking these medications    Details   diphenoxylate-atropine (LOMOTIL) 2.5-0.025 mg per tablet Take 1 Tab by mouth four (4) times daily as needed for Diarrhea. Max Daily Amount: 4 Tabs. Qty: 20 Tab, Refills: 0    Associated Diagnoses: Diarrhea, unspecified type      ondansetron (ZOFRAN ODT) 4 mg disintegrating tablet Take 1 Tab by mouth every eight (8) hours as needed for Nausea. Qty: 10 Tab, Refills: 0      dicyclomine (BENTYL) 10 mg capsule Take 1 Cap by mouth four (4) times daily for 5 days. Qty: 20 Cap, Refills: 0           2. Follow-up Information     Follow up With Specialties Details Why Contact Info    Gayatri Newell MD [facility]  As needed [address]  [address] [address] [phone]          Return to ED if worse     Diagnosis     Clinical Impression:   1. Orthostatic dizziness    2. Diarrhea, unspecified type        Attestations: This note is prepared by Yrn Cannon, acting as Scribe for Shabnam Young, [address] DO Sabina: The scribe's documentation has been prepared under my direction and personally reviewed by me in its entirety. I confirm that the note above accurately reflects all work, treatment, procedures, and medical decision making performed by me.

## 2018-10-29 LAB
ATRIAL RATE: 72 BPM
CALCULATED P AXIS, ECG09: 35 DEGREES
CALCULATED R AXIS, ECG10: -49 DEGREES
CALCULATED T AXIS, ECG11: 20 DEGREES
DIAGNOSIS, 93000: NORMAL
P-R INTERVAL, ECG05: 182 MS
Q-T INTERVAL, ECG07: 382 MS
QRS DURATION, ECG06: 102 MS
QTC CALCULATION (BEZET), ECG08: 418 MS
VENTRICULAR RATE, ECG03: 72 BPM

## 2018-11-27 ENCOUNTER — APPOINTMENT (OUTPATIENT)
Dept: ULTRASOUND IMAGING | Age: 73
End: 2018-11-27
Attending: EMERGENCY MEDICINE
Payer: MEDICARE

## 2018-11-27 ENCOUNTER — HOSPITAL ENCOUNTER (EMERGENCY)
Age: 73
Discharge: HOME | End: 2018-11-27
Attending: EMERGENCY MEDICINE
Payer: MEDICARE

## 2018-11-27 VITALS
HEART RATE: 71 BPM | WEIGHT: 305.34 LBS | DIASTOLIC BLOOD PRESSURE: 76 MMHG | TEMPERATURE: 97.7 F | OXYGEN SATURATION: 96 % | BODY MASS INDEX: 36.05 KG/M2 | RESPIRATION RATE: 16 BRPM | HEIGHT: 77 IN | SYSTOLIC BLOOD PRESSURE: 159 MMHG

## 2018-11-27 DIAGNOSIS — M71.22 BAKER'S CYST, LEFT: ICD-10-CM

## 2018-11-27 DIAGNOSIS — M79.605 LEFT LEG PAIN: Primary | ICD-10-CM

## 2018-11-27 PROCEDURE — 99282 EMERGENCY DEPT VISIT SF MDM: CPT

## 2018-11-27 PROCEDURE — 93971 EXTREMITY STUDY: CPT

## 2018-11-27 NOTE — ED PROVIDER NOTES
EMERGENCY DEPARTMENT HISTORY AND PHYSICAL EXAM      Date: 11/27/2018  Patient Name: Ricky Lyons    History of Presenting Illness     Chief Complaint   Patient presents with    Leg Pain     pt reports pain in left leg beginning Thursday, worse since last night in calf with \"knot\" in leg       History Provided By: Patient    HPI: Ricky Lyons, 74 y.o. male with PMHx significant for DVT's, presents to the ED with cc of left calf pain/swelling that started on Thanksgiving Day (5 days ago). He notes a Hx of DVT's in the left leg twice before (most recently in 2016) that have felt the same as this Sx he is experiencing at the present. He is no longer on blood thinners. He denies recent car trips, surgeries, or sedentary periods. He notes that the first time he had a clot was after stopping coumadin following hip replacement surgery. The 2nd clot was after driving to [place]. There has been no treatment PTA. There are no other complaints, changes, or physical findings at this time. Social Hx: Tobacco (former smoker; quit many years ago), EtOH (occasional beer/wine), Illicit drug use (denies)     PCP: Gayatri Newell MD    Current Outpatient Medications   Medication Sig Dispense Refill    amoxicillin (AMOXIL) 250 mg capsule Take  by mouth as needed. As needed for dental work      aspirin 81 mg chewable tablet Take 81 mg by mouth daily.  metFORMIN (GLUCOPHAGE) 500 mg tablet Take 500 mg by mouth two (2) times daily (with meals).  lisinopril (PRINIVIL, ZESTRIL) 10 mg tablet Take 10 mg by mouth daily.  simvastatin (ZOCOR) 20 mg tablet Take 40 mg by mouth nightly. Indications: HYPERCHOLESTEROLEMIA      CHONDROITIN SULFATE Take 1,200 mg by mouth daily.  glucosamine sulfate (GLUCOSAMINE) 750 mg Tab Take 750 mg by mouth daily.  multivitamin (ONE A DAY) tablet Take 1 Tab by mouth daily.       diphenoxylate-atropine (LOMOTIL) 2.5-0.025 mg per tablet Take 1 Tab by mouth four (4) times daily as needed for Diarrhea. Max Daily Amount: 4 Tabs. 20 Tab 0    ondansetron (ZOFRAN ODT) 4 mg disintegrating tablet Take 1 Tab by mouth every eight (8) hours as needed for Nausea. 10 Tab 0       Past History     Past Medical History:  Past Medical History:   Diagnosis Date    Arthritis     bilateral hips/knees    CAD (coronary artery disease) 13    PCI    Chronic pain     DJD; SILVIO KNEES    [address]     \"BORDERLINE\" PER MD    GERD (gastroesophageal reflux disease)     Morbid obesity ([address]     Other and unspecified symptoms and signs involving general sensations and perceptions     Bilat. legs \"give out\" intermittently; Bilat leg cramps; macular degenration (left).  Other ill-defined conditions(799.89)     HYPERLIPIDEMIA; LT LE DVT (); MORBID OBESITY    Thromboembolus ([facility]     left LE       Past Surgical History:  Past Surgical History:   Procedure Laterality Date    CARDIAC SURG PROCEDURE UNLIST      STENTS TO RCA    TOTAL HIP ARTHROPLASTY  2007    right    TOTAL HIP ARTHROPLASTY  2007    left       Family History:  Family History   Problem Relation Age of Onset    Heart Disease Mother     Arrhythmia Mother     Heart Disease Father     Cancer Sister     Lung Disease Sister        Social History:  Social History     Tobacco Use    Smoking status: Former Smoker     Packs/day: 1.00     Last attempt to quit: 1983     Years since quittin.8    Smokeless tobacco: Never Used   Substance Use Topics    Alcohol use: Yes     Alcohol/week: 1.5 oz     Types: 2 Glasses of wine, 1 Cans of beer per week     Comment: 0-2 X PER WK WITH MEALS OR AFTER PLAYING GOLF    Drug use: No       Allergies:   Allergies   Allergen Reactions    Adhesive Other (comments)     Blisters     Advil [Ibuprofen] Rash    Cleocin [Clindamycin Hcl] Rash    Other Medication Unknown (comments)     \"Phosphate\"         Review of Systems   Review of Systems   Constitutional: Negative for chills, diaphoresis and fever. HENT: Negative for congestion, ear pain, rhinorrhea and sore throat. Respiratory: Negative for cough and shortness of breath. Cardiovascular: Negative for chest pain. Gastrointestinal: Negative for abdominal pain, constipation, diarrhea, nausea and vomiting. Genitourinary: Negative for difficulty urinating, dysuria, frequency and hematuria. Musculoskeletal: Positive for gait problem and myalgias. Negative for arthralgias. Neurological: Negative for headaches. All other systems reviewed and are negative. Physical Exam   Physical Exam   Constitutional: He is oriented to person, place, and time. He appears well-developed and well-nourished. No distress. Pleasant 74 y.o.  male    HENT:   Head: Normocephalic and atraumatic. Eyes: Conjunctivae are normal. Right eye exhibits no discharge. Left eye exhibits no discharge. Neck: Normal range of motion. Neck supple. Cardiovascular: Normal rate, regular rhythm and normal heart sounds. No murmur heard. Pulmonary/Chest: Effort normal and breath sounds normal. No respiratory distress. Musculoskeletal:   L LEG: No swelling, ecchymosis, erythema or deformity. TTP to the medial and posterior calf. Distal NV intact. Cap refill < 2 sec. Ambulatory with the use of a cane. Lymphadenopathy:     He has no cervical adenopathy. Neurological: He is alert and oriented to person, place, and time. Skin: Skin is warm and dry. He is not diaphoretic. Psychiatric: He has a normal mood and affect. His behavior is normal.   Nursing note and vitals reviewed. Diagnostic Study Results     Labs - None    Radiologic Studies -   DUPLEX LOWER EXT VENOUS LEFT (Final result)   Result time 11/27/18 11:24:04   Final result by Jefferson Manning Results In (11/27/18 11:23:20)                Initial Result:   Impression:    IMPRESSION:  Baker's cyst. No evidence of deep venous thrombosis. Narrative:     Indication: Left leg pain and swelling for 5 days    Duplex venous Doppler examination was performed from the inguinal ligament to  the mid-calf.  Deep venous structures were well imaged and appeared compressible  throughout.  Both wave form and color flow analysis demonstrated normal flow  patterns.  Augmentation was demonstrable. There is a 4.1 x 4.2 x 2.4 cm Baker's  cyst.                  Medical Decision Making   I am the first provider for this patient. I reviewed the vital signs, available nursing notes, past medical history, past surgical history, family history and social history. Vital Signs-Reviewed the patient's vital signs. Patient Vitals for the past 12 hrs:   Temp Pulse Resp BP SpO2   11/27/18 1020 97.7 °F (36.5 °C) 71 16 159/76 96 %     Records Reviewed: Nursing Notes    Provider Notes (Medical Decision Making):   DVT, thrombophlebitis, baker's cyst    ED Course:   Initial assessment performed. The patients presenting problems have been discussed, and they are in agreement with the care plan formulated and outlined with them. I have encouraged them to ask questions as they arise throughout their visit. Critical Care Time:   None    Disposition:  DISCHARGE NOTE:  11:33 AM  The pt is ready for discharge. The pt's signs, symptoms, diagnosis, and discharge instructions have been discussed and pt has conveyed their understanding. The pt is to follow up as recommended or return to ER should their symptoms worsen. Plan has been discussed and pt is in agreement. PLAN:  1. Current Discharge Medication List      CONTINUE these medications which have NOT CHANGED    Details   amoxicillin (AMOXIL) 250 mg capsule Take  by mouth as needed. As needed for dental work      aspirin 81 mg chewable tablet Take 81 mg by mouth daily. metFORMIN (GLUCOPHAGE) 500 mg tablet Take 500 mg by mouth two (2) times daily (with meals). lisinopril (PRINIVIL, ZESTRIL) 10 mg tablet Take 10 mg by mouth daily. simvastatin (ZOCOR) 20 mg tablet Take 40 mg by mouth nightly. Indications: HYPERCHOLESTEROLEMIA      CHONDROITIN SULFATE Take 1,200 mg by mouth daily. glucosamine sulfate (GLUCOSAMINE) 750 mg Tab Take 750 mg by mouth daily. multivitamin (ONE A DAY) tablet Take 1 Tab by mouth daily. diphenoxylate-atropine (LOMOTIL) 2.5-0.025 mg per tablet Take 1 Tab by mouth four (4) times daily as needed for Diarrhea. Max Daily Amount: 4 Tabs. Qty: 20 Tab, Refills: 0    Associated Diagnoses: Diarrhea, unspecified type      ondansetron (ZOFRAN ODT) 4 mg disintegrating tablet Take 1 Tab by mouth every eight (8) hours as needed for Nausea. Qty: 10 Tab, Refills: 0           2. Follow-up Information     Follow up With Specialties Details Why Contact Info    Reena Hodgson MD [facility] In 1 week As needed [address]  [address] [address] [phone]          Return to ED if worse     Diagnosis     Clinical Impression:   1. Left leg pain    2. Baker's cyst, left      7:28 AM  I was personally available for consultation in the emergency department. I have reviewed the chart and agree with the documentation recorded by the [facility], including the assessment, treatment plan, and disposition.   Iman Tristan MD

## 2018-11-27 NOTE — DISCHARGE INSTRUCTIONS
Baker's Cyst: Care Instructions  Your Care Instructions    A Baker's cyst is a swelling behind the knee. It may cause pain or stiffness when you bend your knee or straighten it all the way. Baker's cysts are also called popliteal cysts. If you have arthritis or another condition that is the cause of the Baker's cyst, your doctor may treat that condition. A Baker's cyst may go away on its own. If not, or if it is causing a lot of discomfort, your doctor may drain the fluid that has built up behind the knee. In some cases, a Baker's cyst is removed in surgery. There are things you can do at home, such as staying off your leg, to reduce the swelling and pain. Follow-up care is a key part of your treatment and safety. Be sure to make and go to all appointments, and call your doctor if you are having problems. It's also a good idea to know your test results and keep a list of the medicines you take. How can you care for yourself at home? · Rest your knee as much as possible. · Ask your doctor if you can take an over-the-counter pain medicine, such as acetaminophen (Tylenol), ibuprofen (Advil, Motrin), or naproxen (Aleve). Be safe with medicines. Read and follow all instructions on the label. · Use a cane, a crutch, a walker, or another device if you need help to get around. These can help rest your knees. · If you have an elastic bandage, make sure it is snug but not so tight that your leg is numb, tingles, or swells below the bandage. Ask your doctor if you can loosen the bandage if it is too tight. · Follow your doctor's instructions about how much weight you can put on your knee. · Stay at a healthy weight. Being overweight puts extra strain on your knee. When should you call for help? Call 911 anytime you think you may need emergency care.  For example, call if:    · You have chest pain, are short of breath, or you cough up blood.    Call your doctor now or seek immediate medical care if:    · You have new or worse pain.     · Your foot is cool or pale or changes color.     · You have tingling, weakness, or numbness in your foot or toes.     · You have signs of a blood clot in your leg (called a deep vein thrombosis), such as:  ? Pain in your calf, back of the knee, thigh, or groin. ? Redness or swelling in your leg.    Watch closely for changes in your health, and be sure to contact your doctor if:    · You do not get better as expected. Where can you learn more? Go to http://leonel-fer.info/. Enter J260 in the search box to learn more about \"Baker's Cyst: Care Instructions. \"  Current as of: November 29, 2017  Content Version: 11.8  © 0963-7922 [employer]. Care instructions adapted under license by [employer] (which disclaims liability or warranty for this information). If you have questions about a medical condition or this instruction, always ask your healthcare professional. [place] [address] any warranty or liability for your use of this information. Leg Pain: Care Instructions  Your Care Instructions  Many things can cause leg pain. Too much exercise or overuse can cause a muscle cramp (or charley horse). You can get leg cramps from not eating a balanced diet that has enough potassium, calcium, and other minerals. If you do not drink enough fluids or are taking certain medicines, you may develop leg cramps. Other causes of leg pain include injuries, blood flow problems, nerve damage, and twisted and enlarged veins (varicose veins). You can usually ease pain with self-care. Your doctor may recommend that you rest your leg and keep it elevated. Follow-up care is a key part of your treatment and safety. Be sure to make and go to all appointments, and call your doctor if you are having problems. It's also a good idea to know your test results and keep a list of the medicines you take. How can you care for yourself at home?   · Take pain medicines exactly as directed. ? If the doctor gave you a prescription medicine for pain, take it as prescribed. ? If you are not taking a prescription pain medicine, ask your doctor if you can take an over-the-counter medicine. · Take any other medicines exactly as prescribed. Call your doctor if you think you are having a problem with your medicine. · Rest your leg while you have pain, and avoid standing for long periods of time. · Prop up your leg at or above the level of your heart when possible. · Make sure you are eating a balanced diet that is rich in calcium, potassium, and magnesium, especially if you are pregnant. · If directed by your doctor, put ice or a cold pack on the area for 10 to 20 minutes at a time. Put a thin cloth between the ice and your skin. · Your leg may be in a splint, a brace, or an elastic bandage, and you may have crutches to help you walk. Follow your doctor's directions about how long to wear supports and how to use the crutches. When should you call for help? Call 911 anytime you think you may need emergency care. For example, call if:    · You have sudden chest pain and shortness of breath, or you cough up blood.     · Your leg is cool or pale or changes color.    Call your doctor now or seek immediate medical care if:    · You have increasing or severe pain.     · Your leg suddenly feels weak and you cannot move it.     · You have signs of a blood clot, such as:  ? Pain in your calf, back of the knee, thigh, or groin. ? Redness and swelling in your leg or groin.     · You have signs of infection, such as:  ? Increased pain, swelling, warmth, or redness. ? Red streaks leading from the sore area. ? Pus draining from a place on your leg. ? A fever.     · You cannot bear weight on your leg.    Watch closely for changes in your health, and be sure to contact your doctor if:    · You do not get better as expected. Where can you learn more?   Go to http://leonel-fer.info/. Enter R520 in the search box to learn more about \"Leg Pain: Care Instructions. \"  Current as of: November 20, 2017  Content Version: 11.8  © 0978-8825 [employer]. Care instructions adapted under license by [employer] (which disclaims liability or warranty for this information). If you have questions about a medical condition or this instruction, always ask your healthcare professional. [place] [address] any warranty or liability for your use of this information.

## 2019-10-13 ENCOUNTER — HOSPITAL ENCOUNTER (EMERGENCY)
Age: 74
Discharge: HOME | End: 2019-10-13
Attending: EMERGENCY MEDICINE
Payer: MEDICARE

## 2019-10-13 ENCOUNTER — APPOINTMENT (OUTPATIENT)
Dept: GENERAL RADIOLOGY | Age: 74
End: 2019-10-13
Attending: PHYSICIAN ASSISTANT
Payer: MEDICARE

## 2019-10-13 VITALS
OXYGEN SATURATION: 100 % | HEART RATE: 76 BPM | HEIGHT: 76 IN | TEMPERATURE: 97.6 F | RESPIRATION RATE: 16 BRPM | DIASTOLIC BLOOD PRESSURE: 99 MMHG | WEIGHT: 308 LBS | BODY MASS INDEX: 37.51 KG/M2 | SYSTOLIC BLOOD PRESSURE: 112 MMHG

## 2019-10-13 DIAGNOSIS — S93.401A SPRAIN OF RIGHT ANKLE, UNSPECIFIED LIGAMENT, INITIAL ENCOUNTER: Primary | ICD-10-CM

## 2019-10-13 PROCEDURE — 73630 X-RAY EXAM OF FOOT: CPT

## 2019-10-13 PROCEDURE — 74011250637 HC RX REV CODE- 250/637: Performed by: EMERGENCY MEDICINE

## 2019-10-13 PROCEDURE — 99283 EMERGENCY DEPT VISIT LOW MDM: CPT

## 2019-10-13 RX ORDER — ACETAMINOPHEN 500 MG
1000 TABLET ORAL ONCE
Status: COMPLETED | OUTPATIENT
Start: 2019-10-13 | End: 2019-10-13

## 2019-10-13 RX ADMIN — ACETAMINOPHEN 1000 MG: 500 TABLET ORAL at 17:23

## 2019-10-13 NOTE — ED NOTES
Pt given discharge instructions by Dr. Jd Solorio. Discharged via wheelchair. No acute distress at time of discharge. Accompanied by wife.

## 2019-10-13 NOTE — ED PROVIDER NOTES
EMERGENCY DEPARTMENT HISTORY AND PHYSICAL EXAM      Date: 10/13/2019  Patient Name: Pineda Oswald  Patient Age and Sex: 66 y.o. male     History of Presenting Illness     Chief Complaint   Patient presents with    Foot Pain     R foot pain and swelling onset today, pt denies injury       History Provided By: Patient    HPI: Pineda Oswald  Is a 17-year-old male presenting today with right-sided ankle pain. He reports that he is done some physical therapy for his left knee and was exercising 2 days ago on both of his legs. He started to have pain on the lateral aspect of the right ankle today. He has difficulty bearing weight. He denies any calf swelling or tenderness, he has elevated the leg without improvement. No trauma to the area. There are no other complaints, changes, or physical findings at this time. PCP: Brittany Hernandez MD    No current facility-administered medications on file prior to encounter. Current Outpatient Medications on File Prior to Encounter   Medication Sig Dispense Refill    CELECOXIB PO Take  by mouth.  [DISCONTINUED] amoxicillin (AMOXIL) 250 mg capsule Take  by mouth as needed. As needed for dental work      [DISCONTINUED] diphenoxylate-atropine (LOMOTIL) 2.5-0.025 mg per tablet Take 1 Tab by mouth four (4) times daily as needed for Diarrhea. Max Daily Amount: 4 Tabs. 20 Tab 0    [DISCONTINUED] ondansetron (ZOFRAN ODT) 4 mg disintegrating tablet Take 1 Tab by mouth every eight (8) hours as needed for Nausea. 10 Tab 0    aspirin 81 mg chewable tablet Take 81 mg by mouth daily.  metFORMIN (GLUCOPHAGE) 500 mg tablet Take 500 mg by mouth two (2) times daily (with meals).  lisinopril (PRINIVIL, ZESTRIL) 10 mg tablet Take 10 mg by mouth daily.  simvastatin (ZOCOR) 20 mg tablet Take 40 mg by mouth nightly. Indications: HYPERCHOLESTEROLEMIA      CHONDROITIN SULFATE Take 1,200 mg by mouth daily.       glucosamine sulfate (GLUCOSAMINE) 750 mg Tab Take 750 mg by mouth daily.  multivitamin (ONE A DAY) tablet Take 1 Tab by mouth daily. Past History     Past Medical History:  Past Medical History:   Diagnosis Date    Arthritis     bilateral hips/knees    CAD (coronary artery disease) 13    PCI    Chronic pain     DJD; SILVIO KNEES    Diabetes ([facility]     \"BORDERLINE\" PER MD    GERD (gastroesophageal reflux disease)     Morbid obesity ([facility]     Other and unspecified symptoms and signs involving general sensations and perceptions     Bilat. legs \"give out\" intermittently; Bilat leg cramps; macular degenration (left).  Other ill-defined conditions(799.89)     HYPERLIPIDEMIA; LT LE DVT (); MORBID OBESITY    Thromboembolus ([facility]     left LE       Past Surgical History:  Past Surgical History:   Procedure Laterality Date    CARDIAC SURG PROCEDURE UNLIST      STENTS TO RCA    TOTAL HIP ARTHROPLASTY  2007    right    TOTAL HIP ARTHROPLASTY  2007    left       Family History:  Family History   Problem Relation Age of Onset    Heart Disease Mother     Arrhythmia Mother     Heart Disease Father     Cancer Sister     Lung Disease Sister        Social History:  Social History     Tobacco Use    Smoking status: Former Smoker     Packs/day: 1.00     Last attempt to quit: 1983     Years since quittin.7    Smokeless tobacco: Never Used   Substance Use Topics    Alcohol use: Yes     Alcohol/week: 2.5 standard drinks     Types: 2 Glasses of wine, 1 Cans of beer per week     Comment: 0-2 X PER WK WITH MEALS OR AFTER PLAYING GOLF    Drug use: No       Allergies:   Allergies   Allergen Reactions    Adhesive Other (comments)     Blisters     Advil [Ibuprofen] Rash    Cleocin [Clindamycin Hcl] Rash    Other Medication Unknown (comments)     \"Phosphate\"         Review of Systems   Constitutional: No  fever,  No  headache  GI: No vomiting,  No  diarrhea.,  No  constipation  : No dysuria,  No  hematuria  MSK: + joint pain,  No  trauma  Neuro: No numbness, No  tingling        Physical Exam     Patient Vitals for the past 12 hrs:   Temp Pulse Resp BP SpO2   10/13/19 1612 97.6 °F (36.4 °C) 76 16 (!) 112/99 100 %       General: alert, No acute distress  Eyes: EOMI, normal conjunctiva  ENT: moist mucous membranes. Neck: Active, full ROM of neck. Skin: No rashes. no jaundice              Lungs: Equal chest expansion. no respiratory distress. Heart: regular rate     no peripheral edema    Abd:  non distended soft  Back: Full ROM  MSK: Right ankle with tenderness to palpation on the lateral aspect, intact range of motion, no erythema, no significant swelling,  Neuro: alert  Person, Place, Time and Situation; normal speech;   Psych: Cooperative with exam; Appropriate mood and affect             Diagnostic Study Results     Labs -   No results found for this or any previous visit (from the past 12 hour(s)). Radiologic Studies -   XR FOOT RT MIN 3 V   Final Result   IMPRESSION: No acute bone abnormality. CT Results  (Last 48 hours)    None        CXR Results  (Last 48 hours)    None            Medical Decision Making   Differential Diagnosis: Ligamentous strain, fracture    I reviewed the vital signs, available nursing notes, past medical history, past surgical history, family history and social history and old medical records. On my interpretation of the radiology studies no evidence of fracture    Management/ED course: Patient presented with atraumatic ankle pain, no erythema or swelling to suggest joint infection or gout. Patient has tenderness along the lateral aspect. I suspect a strain after his physical therapy. Treating conservatively with Ace wrap, and oral analgesics. I encouraged patient to follow with his primary care provider. Patient is able to ambulate without difficulty after Ace wrap is placed here in the emergency department. Dispo: Discharged.  The patient has been re-evaluated and is ready for discharge. Reviewed available results with patient. Counseled patient on diagnosis and care plan. Patient has expressed understanding, and all questions have been answered. Patient agrees with plan and agrees to follow up as recommended, or to return to the ED if their symptoms worsen. Discharge instructions have been provided and explained to the patient, along with reasons to return to the ED. PLAN:  Discharge Medication List as of 10/13/2019  6:19 PM      1.   2.     Follow-up Information     Follow up With Specialties Details Why Contact Info    Michael Snow MD [facility]  As needed [address]  [address] [address] [phone]          3. Return to ED if worse     Diagnosis     Clinical Impression:   1.  Sprain of right ankle, unspecified ligament, initial encounter        Attestations:    Paxton Moore MD

## 2021-08-07 ENCOUNTER — APPOINTMENT (OUTPATIENT)
Dept: GENERAL RADIOLOGY | Age: 76
DRG: 286 | End: 2021-08-07
Attending: EMERGENCY MEDICINE
Payer: MEDICARE

## 2021-08-07 ENCOUNTER — HOSPITAL ENCOUNTER (INPATIENT)
Age: 76
LOS: 5 days | Discharge: HOME HEALTH | DRG: 286 | End: 2021-08-13
Attending: EMERGENCY MEDICINE | Admitting: INTERNAL MEDICINE
Payer: MEDICARE

## 2021-08-07 DIAGNOSIS — R07.9 CHEST PAIN, UNSPECIFIED TYPE: Primary | ICD-10-CM

## 2021-08-07 DIAGNOSIS — I20.0 UNSTABLE ANGINA (HCC): ICD-10-CM

## 2021-08-07 LAB
BASOPHILS # BLD: 0.1 K/UL (ref 0–0.1)
BASOPHILS NFR BLD: 1 % (ref 0–1)
COMMENT, HOLDF: NORMAL
DIFFERENTIAL METHOD BLD: ABNORMAL
EOSINOPHIL # BLD: 0.4 K/UL (ref 0–0.4)
EOSINOPHIL NFR BLD: 4 % (ref 0–7)
ERYTHROCYTE [DISTWIDTH] IN BLOOD BY AUTOMATED COUNT: 13 % (ref 11.5–14.5)
HCT VFR BLD AUTO: 38.7 % (ref 36.6–50.3)
HGB BLD-MCNC: 13 G/DL (ref 12.1–17)
IMM GRANULOCYTES # BLD AUTO: 0 K/UL (ref 0–0.04)
IMM GRANULOCYTES NFR BLD AUTO: 0 % (ref 0–0.5)
LYMPHOCYTES # BLD: 3.1 K/UL (ref 0.8–3.5)
LYMPHOCYTES NFR BLD: 34 % (ref 12–49)
MCH RBC QN AUTO: 33.1 PG (ref 26–34)
MCHC RBC AUTO-ENTMCNC: 33.6 G/DL (ref 30–36.5)
MCV RBC AUTO: 98.5 FL (ref 80–99)
MONOCYTES # BLD: 1 K/UL (ref 0–1)
MONOCYTES NFR BLD: 11 % (ref 5–13)
NEUTS SEG # BLD: 4.5 K/UL (ref 1.8–8)
NEUTS SEG NFR BLD: 50 % (ref 32–75)
NRBC # BLD: 0 K/UL (ref 0–0.01)
NRBC BLD-RTO: 0 PER 100 WBC
PLATELET # BLD AUTO: 149 K/UL (ref 150–400)
PMV BLD AUTO: 9.6 FL (ref 8.9–12.9)
RBC # BLD AUTO: 3.93 M/UL (ref 4.1–5.7)
SAMPLES BEING HELD,HOLD: NORMAL
WBC # BLD AUTO: 9 K/UL (ref 4.1–11.1)

## 2021-08-07 PROCEDURE — 84484 ASSAY OF TROPONIN QUANT: CPT

## 2021-08-07 PROCEDURE — 99285 EMERGENCY DEPT VISIT HI MDM: CPT

## 2021-08-07 PROCEDURE — 36415 COLL VENOUS BLD VENIPUNCTURE: CPT

## 2021-08-07 PROCEDURE — 96375 TX/PRO/DX INJ NEW DRUG ADDON: CPT

## 2021-08-07 PROCEDURE — 93005 ELECTROCARDIOGRAM TRACING: CPT

## 2021-08-07 PROCEDURE — 85025 COMPLETE CBC W/AUTO DIFF WBC: CPT

## 2021-08-07 PROCEDURE — 71046 X-RAY EXAM CHEST 2 VIEWS: CPT

## 2021-08-07 PROCEDURE — 80053 COMPREHEN METABOLIC PANEL: CPT

## 2021-08-07 PROCEDURE — 96376 TX/PRO/DX INJ SAME DRUG ADON: CPT

## 2021-08-07 PROCEDURE — 83690 ASSAY OF LIPASE: CPT

## 2021-08-07 PROCEDURE — 96374 THER/PROPH/DIAG INJ IV PUSH: CPT

## 2021-08-07 RX ORDER — MORPHINE SULFATE 2 MG/ML
4 INJECTION, SOLUTION INTRAMUSCULAR; INTRAVENOUS
Status: DISCONTINUED | OUTPATIENT
Start: 2021-08-07 | End: 2021-08-13 | Stop reason: HOSPADM

## 2021-08-07 RX ORDER — NITROGLYCERIN 0.4 MG/1
0.4 TABLET SUBLINGUAL
Status: COMPLETED | OUTPATIENT
Start: 2021-08-07 | End: 2021-08-08

## 2021-08-07 NOTE — Clinical Note
TRANSFER - OUT REPORT:     Verbal report given to: Sophie Nuñez RN. Report consisted of patient's Situation, Background, Assessment and   Recommendations(SBAR). Opportunity for questions and clarification was provided. Patient transported to: [facility].

## 2021-08-07 NOTE — Clinical Note
Patient Class[de-identified] OBSERVATION [104]   Type of Bed: Clinical Observation Unit [36]   Cardiac Monitoring Required?: Yes   Reason for Observation: chest pain   Admitting Diagnosis: Chest pain [[id number]]   Admitting Physician: Jazmine Veliz [[id number]]   Attending Physician: Marko Mark [[id number]]

## 2021-08-08 ENCOUNTER — APPOINTMENT (OUTPATIENT)
Dept: CT IMAGING | Age: 76
DRG: 286 | End: 2021-08-08
Attending: INTERNAL MEDICINE
Payer: MEDICARE

## 2021-08-08 PROBLEM — I10 HTN (HYPERTENSION): Chronic | Status: ACTIVE | Noted: 2021-08-08

## 2021-08-08 PROBLEM — E78.5 HYPERLIPIDEMIA: Chronic | Status: ACTIVE | Noted: 2021-08-08

## 2021-08-08 PROBLEM — R07.9 CHEST PAIN: Status: ACTIVE | Noted: 2021-08-08

## 2021-08-08 LAB
ALBUMIN SERPL-MCNC: 3.2 G/DL (ref 3.5–5)
ALBUMIN/GLOB SERPL: 0.8 {RATIO} (ref 1.1–2.2)
ALP SERPL-CCNC: 66 U/L (ref 45–117)
ALT SERPL-CCNC: 22 U/L (ref 12–78)
ANION GAP SERPL CALC-SCNC: 5 MMOL/L (ref 5–15)
AST SERPL-CCNC: 16 U/L (ref 15–37)
ATRIAL RATE: 64 BPM
ATRIAL RATE: 74 BPM
ATRIAL RATE: 74 BPM
BILIRUB SERPL-MCNC: 0.4 MG/DL (ref 0.2–1)
BUN SERPL-MCNC: 16 MG/DL (ref 6–20)
BUN/CREAT SERPL: 16 (ref 12–20)
CALCIUM SERPL-MCNC: 8.4 MG/DL (ref 8.5–10.1)
CALCULATED P AXIS, ECG09: 21 DEGREES
CALCULATED P AXIS, ECG09: 42 DEGREES
CALCULATED R AXIS, ECG10: -43 DEGREES
CALCULATED R AXIS, ECG10: -46 DEGREES
CALCULATED R AXIS, ECG10: -51 DEGREES
CALCULATED T AXIS, ECG11: 12 DEGREES
CALCULATED T AXIS, ECG11: 20 DEGREES
CALCULATED T AXIS, ECG11: 24 DEGREES
CHLORIDE SERPL-SCNC: 112 MMOL/L (ref 97–108)
CO2 SERPL-SCNC: 23 MMOL/L (ref 21–32)
CREAT SERPL-MCNC: 1.03 MG/DL (ref 0.7–1.3)
DIAGNOSIS, 93000: NORMAL
GLOBULIN SER CALC-MCNC: 3.9 G/DL (ref 2–4)
GLUCOSE BLD STRIP.AUTO-MCNC: 149 MG/DL (ref 65–117)
GLUCOSE SERPL-MCNC: 135 MG/DL (ref 65–100)
LIPASE SERPL-CCNC: 435 U/L (ref 73–393)
P-R INTERVAL, ECG05: 166 MS
P-R INTERVAL, ECG05: 196 MS
POTASSIUM SERPL-SCNC: 4.5 MMOL/L (ref 3.5–5.1)
PROT SERPL-MCNC: 7.1 G/DL (ref 6.4–8.2)
Q-T INTERVAL, ECG07: 398 MS
Q-T INTERVAL, ECG07: 404 MS
Q-T INTERVAL, ECG07: 424 MS
QRS DURATION, ECG06: 102 MS
QRS DURATION, ECG06: 94 MS
QRS DURATION, ECG06: 98 MS
QTC CALCULATION (BEZET), ECG08: 420 MS
QTC CALCULATION (BEZET), ECG08: 437 MS
QTC CALCULATION (BEZET), ECG08: 448 MS
SERVICE CMNT-IMP: ABNORMAL
SODIUM SERPL-SCNC: 140 MMOL/L (ref 136–145)
TROPONIN I SERPL-MCNC: <0.05 NG/ML
VENTRICULAR RATE, ECG03: 64 BPM
VENTRICULAR RATE, ECG03: 67 BPM
VENTRICULAR RATE, ECG03: 74 BPM

## 2021-08-08 PROCEDURE — 82962 GLUCOSE BLOOD TEST: CPT

## 2021-08-08 PROCEDURE — 74011250636 HC RX REV CODE- 250/636

## 2021-08-08 PROCEDURE — 36415 COLL VENOUS BLD VENIPUNCTURE: CPT

## 2021-08-08 PROCEDURE — 74011250637 HC RX REV CODE- 250/637: Performed by: INTERNAL MEDICINE

## 2021-08-08 PROCEDURE — 77030019698 HC SYR ANGI MDLON MRTM -A: Performed by: INTERNAL MEDICINE

## 2021-08-08 PROCEDURE — 77030019569 HC BND COMPR RAD TERU -B: Performed by: INTERNAL MEDICINE

## 2021-08-08 PROCEDURE — 4A023N7 MEASUREMENT OF CARDIAC SAMPLING AND PRESSURE, LEFT HEART, PERCUTANEOUS APPROACH: ICD-10-PCS | Performed by: INTERNAL MEDICINE

## 2021-08-08 PROCEDURE — 93005 ELECTROCARDIOGRAM TRACING: CPT

## 2021-08-08 PROCEDURE — 77030010221 HC SPLNT WR POS TELE -B: Performed by: INTERNAL MEDICINE

## 2021-08-08 PROCEDURE — B2151ZZ FLUOROSCOPY OF LEFT HEART USING LOW OSMOLAR CONTRAST: ICD-10-PCS | Performed by: INTERNAL MEDICINE

## 2021-08-08 PROCEDURE — 74011000250 HC RX REV CODE- 250: Performed by: EMERGENCY MEDICINE

## 2021-08-08 PROCEDURE — 74011250636 HC RX REV CODE- 250/636: Performed by: EMERGENCY MEDICINE

## 2021-08-08 PROCEDURE — 74011000250 HC RX REV CODE- 250: Performed by: INTERNAL MEDICINE

## 2021-08-08 PROCEDURE — 74011250636 HC RX REV CODE- 250/636: Performed by: INTERNAL MEDICINE

## 2021-08-08 PROCEDURE — 77030030195 HC CATH ANGI DX PRF4 MRTM -A: Performed by: INTERNAL MEDICINE

## 2021-08-08 PROCEDURE — 74011250637 HC RX REV CODE- 250/637: Performed by: EMERGENCY MEDICINE

## 2021-08-08 PROCEDURE — 77030004549 HC CATH ANGI DX PRF MRTM -A: Performed by: INTERNAL MEDICINE

## 2021-08-08 PROCEDURE — 99152 MOD SED SAME PHYS/QHP 5/>YRS: CPT | Performed by: INTERNAL MEDICINE

## 2021-08-08 PROCEDURE — C1769 GUIDE WIRE: HCPCS | Performed by: INTERNAL MEDICINE

## 2021-08-08 PROCEDURE — 74011000636 HC RX REV CODE- 636: Performed by: INTERNAL MEDICINE

## 2021-08-08 PROCEDURE — 65660000000 HC RM CCU STEPDOWN

## 2021-08-08 PROCEDURE — 93458 L HRT ARTERY/VENTRICLE ANGIO: CPT | Performed by: INTERNAL MEDICINE

## 2021-08-08 PROCEDURE — 74011000636 HC RX REV CODE- 636: Performed by: STUDENT IN AN ORGANIZED HEALTH CARE EDUCATION/TRAINING PROGRAM

## 2021-08-08 PROCEDURE — 76937 US GUIDE VASCULAR ACCESS: CPT | Performed by: INTERNAL MEDICINE

## 2021-08-08 PROCEDURE — B2111ZZ FLUOROSCOPY OF MULTIPLE CORONARY ARTERIES USING LOW OSMOLAR CONTRAST: ICD-10-PCS | Performed by: INTERNAL MEDICINE

## 2021-08-08 PROCEDURE — C1894 INTRO/SHEATH, NON-LASER: HCPCS | Performed by: INTERNAL MEDICINE

## 2021-08-08 PROCEDURE — 77030008543 HC TBNG MON PRSS MRTM -A: Performed by: INTERNAL MEDICINE

## 2021-08-08 PROCEDURE — 84484 ASSAY OF TROPONIN QUANT: CPT

## 2021-08-08 PROCEDURE — 99153 MOD SED SAME PHYS/QHP EA: CPT | Performed by: INTERNAL MEDICINE

## 2021-08-08 PROCEDURE — 71275 CT ANGIOGRAPHY CHEST: CPT

## 2021-08-08 RX ORDER — ONDANSETRON 2 MG/ML
INJECTION INTRAMUSCULAR; INTRAVENOUS
Status: COMPLETED
Start: 2021-08-08 | End: 2021-08-08

## 2021-08-08 RX ORDER — PANTOPRAZOLE SODIUM 40 MG/1
40 TABLET, DELAYED RELEASE ORAL
Status: DISCONTINUED | OUTPATIENT
Start: 2021-08-08 | End: 2021-08-08

## 2021-08-08 RX ORDER — FENTANYL CITRATE 50 UG/ML
INJECTION, SOLUTION INTRAMUSCULAR; INTRAVENOUS AS NEEDED
Status: DISCONTINUED | OUTPATIENT
Start: 2021-08-08 | End: 2021-08-08 | Stop reason: HOSPADM

## 2021-08-08 RX ORDER — MAG HYDROX/ALUMINUM HYD/SIMETH 200-200-20
30 SUSPENSION, ORAL (FINAL DOSE FORM) ORAL
Status: DISCONTINUED | OUTPATIENT
Start: 2021-08-08 | End: 2021-08-13 | Stop reason: HOSPADM

## 2021-08-08 RX ORDER — ONDANSETRON 2 MG/ML
4 INJECTION INTRAMUSCULAR; INTRAVENOUS
Status: DISCONTINUED | OUTPATIENT
Start: 2021-08-08 | End: 2021-08-08 | Stop reason: SDUPTHER

## 2021-08-08 RX ORDER — HEPARIN SODIUM 1000 [USP'U]/ML
INJECTION, SOLUTION INTRAVENOUS; SUBCUTANEOUS AS NEEDED
Status: DISCONTINUED | OUTPATIENT
Start: 2021-08-08 | End: 2021-08-08 | Stop reason: HOSPADM

## 2021-08-08 RX ORDER — HEPARIN SODIUM 200 [USP'U]/100ML
INJECTION, SOLUTION INTRAVENOUS
Status: COMPLETED | OUTPATIENT
Start: 2021-08-08 | End: 2021-08-08

## 2021-08-08 RX ORDER — LIDOCAINE HYDROCHLORIDE 10 MG/ML
INJECTION, SOLUTION EPIDURAL; INFILTRATION; INTRACAUDAL; PERINEURAL AS NEEDED
Status: DISCONTINUED | OUTPATIENT
Start: 2021-08-08 | End: 2021-08-08 | Stop reason: HOSPADM

## 2021-08-08 RX ORDER — SIMVASTATIN 40 MG/1
40 TABLET, FILM COATED ORAL
COMMUNITY
Start: 2021-07-26

## 2021-08-08 RX ORDER — ENOXAPARIN SODIUM 150 MG/ML
1 INJECTION SUBCUTANEOUS EVERY 12 HOURS
Status: DISCONTINUED | OUTPATIENT
Start: 2021-08-08 | End: 2021-08-08

## 2021-08-08 RX ORDER — SODIUM CHLORIDE 9 MG/ML
100 INJECTION, SOLUTION INTRAVENOUS CONTINUOUS
Status: DISCONTINUED | OUTPATIENT
Start: 2021-08-08 | End: 2021-08-08

## 2021-08-08 RX ORDER — SODIUM CHLORIDE 0.9 % (FLUSH) 0.9 %
5-40 SYRINGE (ML) INJECTION AS NEEDED
Status: DISCONTINUED | OUTPATIENT
Start: 2021-08-08 | End: 2021-08-09 | Stop reason: SDUPTHER

## 2021-08-08 RX ORDER — METOPROLOL TARTRATE 25 MG/1
12.5 TABLET, FILM COATED ORAL EVERY 12 HOURS
Status: DISCONTINUED | OUTPATIENT
Start: 2021-08-08 | End: 2021-08-11

## 2021-08-08 RX ORDER — ONDANSETRON 4 MG/1
4 TABLET, ORALLY DISINTEGRATING ORAL
Status: DISCONTINUED | OUTPATIENT
Start: 2021-08-08 | End: 2021-08-13 | Stop reason: HOSPADM

## 2021-08-08 RX ORDER — HEPARIN SODIUM 5000 [USP'U]/ML
10000 INJECTION, SOLUTION INTRAVENOUS; SUBCUTANEOUS AS NEEDED
Status: ACTIVE | OUTPATIENT
Start: 2021-08-09 | End: 2021-08-09

## 2021-08-08 RX ORDER — MIDAZOLAM HYDROCHLORIDE 1 MG/ML
INJECTION, SOLUTION INTRAMUSCULAR; INTRAVENOUS AS NEEDED
Status: DISCONTINUED | OUTPATIENT
Start: 2021-08-08 | End: 2021-08-08 | Stop reason: HOSPADM

## 2021-08-08 RX ORDER — HEPARIN SODIUM 10000 [USP'U]/100ML
10-36 INJECTION, SOLUTION INTRAVENOUS
Status: DISPENSED | OUTPATIENT
Start: 2021-08-08 | End: 2021-08-09

## 2021-08-08 RX ORDER — SODIUM CHLORIDE 0.9 % (FLUSH) 0.9 %
5-40 SYRINGE (ML) INJECTION EVERY 8 HOURS
Status: DISCONTINUED | OUTPATIENT
Start: 2021-08-08 | End: 2021-08-13 | Stop reason: HOSPADM

## 2021-08-08 RX ORDER — HYDROMORPHONE HYDROCHLORIDE 1 MG/ML
1 INJECTION, SOLUTION INTRAMUSCULAR; INTRAVENOUS; SUBCUTANEOUS
Status: DISCONTINUED | OUTPATIENT
Start: 2021-08-08 | End: 2021-08-08

## 2021-08-08 RX ORDER — ACETAMINOPHEN 325 MG/1
650 TABLET ORAL
Status: DISCONTINUED | OUTPATIENT
Start: 2021-08-08 | End: 2021-08-11 | Stop reason: SDUPTHER

## 2021-08-08 RX ORDER — NALOXONE HYDROCHLORIDE 0.4 MG/ML
0.4 INJECTION, SOLUTION INTRAMUSCULAR; INTRAVENOUS; SUBCUTANEOUS AS NEEDED
Status: DISCONTINUED | OUTPATIENT
Start: 2021-08-08 | End: 2021-08-13 | Stop reason: HOSPADM

## 2021-08-08 RX ORDER — GUAIFENESIN 100 MG/5ML
81 LIQUID (ML) ORAL DAILY
Status: DISCONTINUED | OUTPATIENT
Start: 2021-08-08 | End: 2021-08-13 | Stop reason: HOSPADM

## 2021-08-08 RX ORDER — ONDANSETRON 2 MG/ML
4 INJECTION INTRAMUSCULAR; INTRAVENOUS
Status: COMPLETED | OUTPATIENT
Start: 2021-08-08 | End: 2021-08-08

## 2021-08-08 RX ORDER — LISINOPRIL 40 MG/1
40 TABLET ORAL DAILY
Status: DISCONTINUED | OUTPATIENT
Start: 2021-08-08 | End: 2021-08-08

## 2021-08-08 RX ORDER — ACETAMINOPHEN 650 MG/1
650 SUPPOSITORY RECTAL
Status: DISCONTINUED | OUTPATIENT
Start: 2021-08-08 | End: 2021-08-13 | Stop reason: HOSPADM

## 2021-08-08 RX ORDER — SODIUM CHLORIDE 0.9 % (FLUSH) 0.9 %
5-40 SYRINGE (ML) INJECTION AS NEEDED
Status: DISCONTINUED | OUTPATIENT
Start: 2021-08-08 | End: 2021-08-13 | Stop reason: HOSPADM

## 2021-08-08 RX ORDER — POLYETHYLENE GLYCOL 3350 17 G/17G
17 POWDER, FOR SOLUTION ORAL DAILY PRN
Status: DISCONTINUED | OUTPATIENT
Start: 2021-08-08 | End: 2021-08-13 | Stop reason: HOSPADM

## 2021-08-08 RX ORDER — NITROGLYCERIN 0.4 MG/1
0.4 TABLET SUBLINGUAL AS NEEDED
Status: DISCONTINUED | OUTPATIENT
Start: 2021-08-08 | End: 2021-08-13 | Stop reason: HOSPADM

## 2021-08-08 RX ORDER — LISINOPRIL 40 MG/1
TABLET ORAL
COMMUNITY
Start: 2021-05-03 | End: 2022-06-27

## 2021-08-08 RX ORDER — ONDANSETRON 2 MG/ML
4 INJECTION INTRAMUSCULAR; INTRAVENOUS
Status: DISCONTINUED | OUTPATIENT
Start: 2021-08-08 | End: 2021-08-13 | Stop reason: HOSPADM

## 2021-08-08 RX ORDER — HEPARIN SODIUM 5000 [USP'U]/ML
5000 INJECTION, SOLUTION INTRAVENOUS; SUBCUTANEOUS ONCE
Status: COMPLETED | OUTPATIENT
Start: 2021-08-08 | End: 2021-08-08

## 2021-08-08 RX ORDER — ENOXAPARIN SODIUM 150 MG/ML
1 INJECTION SUBCUTANEOUS
Status: DISCONTINUED | OUTPATIENT
Start: 2021-08-08 | End: 2021-08-08

## 2021-08-08 RX ORDER — VERAPAMIL HYDROCHLORIDE 2.5 MG/ML
INJECTION, SOLUTION INTRAVENOUS AS NEEDED
Status: DISCONTINUED | OUTPATIENT
Start: 2021-08-08 | End: 2021-08-08 | Stop reason: HOSPADM

## 2021-08-08 RX ORDER — ATORVASTATIN CALCIUM 20 MG/1
20 TABLET, FILM COATED ORAL
Status: DISCONTINUED | OUTPATIENT
Start: 2021-08-08 | End: 2021-08-13 | Stop reason: HOSPADM

## 2021-08-08 RX ORDER — SODIUM CHLORIDE 0.9 % (FLUSH) 0.9 %
5-40 SYRINGE (ML) INJECTION EVERY 8 HOURS
Status: DISCONTINUED | OUTPATIENT
Start: 2021-08-08 | End: 2021-08-09 | Stop reason: SDUPTHER

## 2021-08-08 RX ORDER — HEPARIN SODIUM 5000 [USP'U]/ML
5000 INJECTION, SOLUTION INTRAVENOUS; SUBCUTANEOUS AS NEEDED
Status: ACTIVE | OUTPATIENT
Start: 2021-08-09 | End: 2021-08-09

## 2021-08-08 RX ORDER — SODIUM CHLORIDE 9 MG/ML
100 INJECTION, SOLUTION INTRAVENOUS CONTINUOUS
Status: DISPENSED | OUTPATIENT
Start: 2021-08-08 | End: 2021-08-09

## 2021-08-08 RX ORDER — MORPHINE SULFATE 2 MG/ML
4 INJECTION, SOLUTION INTRAMUSCULAR; INTRAVENOUS
Status: COMPLETED | OUTPATIENT
Start: 2021-08-08 | End: 2021-08-08

## 2021-08-08 RX ORDER — ACETAMINOPHEN 325 MG/1
650 TABLET ORAL
Status: DISCONTINUED | OUTPATIENT
Start: 2021-08-08 | End: 2021-08-13 | Stop reason: HOSPADM

## 2021-08-08 RX ADMIN — ALUMINUM HYDROXIDE AND MAGNESIUM HYDROXIDE 40 ML: 200; 200 SUSPENSION ORAL at 03:07

## 2021-08-08 RX ADMIN — NITROGLYCERIN 0.4 MG: 0.4 TABLET, ORALLY DISINTEGRATING SUBLINGUAL at 01:12

## 2021-08-08 RX ADMIN — IOPAMIDOL 100 ML: 755 INJECTION, SOLUTION INTRAVENOUS at 15:58

## 2021-08-08 RX ADMIN — NITROGLYCERIN 0.4 MG: 0.4 TABLET, ORALLY DISINTEGRATING SUBLINGUAL at 00:03

## 2021-08-08 RX ADMIN — ACETAMINOPHEN 650 MG: 325 TABLET ORAL at 13:42

## 2021-08-08 RX ADMIN — HEPARIN SODIUM 5000 UNITS: 5000 INJECTION INTRAVENOUS; SUBCUTANEOUS at 17:40

## 2021-08-08 RX ADMIN — SODIUM CHLORIDE 100 ML/HR: 9 INJECTION, SOLUTION INTRAVENOUS at 12:10

## 2021-08-08 RX ADMIN — ONDANSETRON 4 MG: 2 INJECTION INTRAMUSCULAR; INTRAVENOUS at 03:01

## 2021-08-08 RX ADMIN — NITROGLYCERIN 0.4 MG: 0.4 TABLET, ORALLY DISINTEGRATING SUBLINGUAL at 08:19

## 2021-08-08 RX ADMIN — MORPHINE SULFATE 4 MG: 2 INJECTION, SOLUTION INTRAMUSCULAR; INTRAVENOUS at 01:09

## 2021-08-08 RX ADMIN — NITROGLYCERIN 0.4 MG: 0.4 TABLET, ORALLY DISINTEGRATING SUBLINGUAL at 08:40

## 2021-08-08 RX ADMIN — ASPIRIN 81 MG: 81 TABLET, CHEWABLE ORAL at 09:13

## 2021-08-08 RX ADMIN — MORPHINE SULFATE 4 MG: 2 INJECTION, SOLUTION INTRAMUSCULAR; INTRAVENOUS at 01:40

## 2021-08-08 RX ADMIN — NITROGLYCERIN 0.4 MG: 0.4 TABLET, ORALLY DISINTEGRATING SUBLINGUAL at 08:32

## 2021-08-08 RX ADMIN — HYDROMORPHONE HYDROCHLORIDE 1 MG: 1 INJECTION, SOLUTION INTRAMUSCULAR; INTRAVENOUS; SUBCUTANEOUS at 04:34

## 2021-08-08 RX ADMIN — NITROGLYCERIN 0.5 INCH: 20 OINTMENT TOPICAL at 12:04

## 2021-08-08 RX ADMIN — ALUMINUM HYDROXIDE, MAGNESIUM HYDROXIDE, AND SIMETHICONE 30 ML: 200; 200; 20 SUSPENSION ORAL at 09:13

## 2021-08-08 RX ADMIN — NITROGLYCERIN 0.4 MG: 0.4 TABLET, ORALLY DISINTEGRATING SUBLINGUAL at 02:15

## 2021-08-08 RX ADMIN — METOPROLOL TARTRATE 12.5 MG: 25 TABLET, FILM COATED ORAL at 12:05

## 2021-08-08 RX ADMIN — Medication 10 ML: at 20:52

## 2021-08-08 RX ADMIN — PANTOPRAZOLE SODIUM 40 MG: 40 TABLET, DELAYED RELEASE ORAL at 04:34

## 2021-08-08 RX ADMIN — Medication 10 ML: at 04:34

## 2021-08-08 RX ADMIN — METOPROLOL TARTRATE 12.5 MG: 25 TABLET, FILM COATED ORAL at 20:51

## 2021-08-08 RX ADMIN — ATORVASTATIN CALCIUM 20 MG: 20 TABLET, FILM COATED ORAL at 20:50

## 2021-08-08 RX ADMIN — HEPARIN SODIUM 10 UNITS/KG/HR: 10000 INJECTION, SOLUTION INTRAVENOUS at 17:42

## 2021-08-08 RX ADMIN — ONDANSETRON HYDROCHLORIDE 4 MG: 2 INJECTION, SOLUTION INTRAMUSCULAR; INTRAVENOUS at 03:01

## 2021-08-08 NOTE — PROGRESS NOTES
CTA with PE    Start heparin due to cath site. Change to DOAC vs. Warfarin tomorrow. Primary team notified.

## 2021-08-08 NOTE — PROGRESS NOTES
Problem: Falls - Risk of  Goal: *Absence of Falls  Description: Document [place] Fall Risk and appropriate interventions in the flowsheet.   Outcome: Progressing Towards Goal  Note: Fall Risk Interventions:            Medication Interventions: Patient to call before getting OOB, Teach patient to arise slowly                   Problem: Patient Education: Go to Patient Education Activity  Goal: Patient/Family Education  Outcome: Progressing Towards Goal     Problem: Patient Education: Go to Patient Education Activity  Goal: Patient/Family Education  Outcome: Progressing Towards Goal     Problem: Unstable angina/NSTEMI: Day of Admission/Day 1  Goal: Off Pathway (Use only if patient is Off Pathway)  Outcome: Progressing Towards Goal  Goal: Activity/Safety  Outcome: Progressing Towards Goal  Goal: Consults, if ordered  Outcome: Progressing Towards Goal  Goal: Diagnostic Test/Procedures  Outcome: Progressing Towards Goal  Goal: Nutrition/Diet  Outcome: Progressing Towards Goal  Goal: Discharge Planning  Outcome: Progressing Towards Goal  Goal: Medications  Outcome: Progressing Towards Goal  Goal: Respiratory  Outcome: Progressing Towards Goal  Goal: Treatments/Interventions/Procedures  Outcome: Progressing Towards Goal  Goal: Psychosocial  Outcome: Progressing Towards Goal  Goal: *Hemodynamically stable  Outcome: Progressing Towards Goal  Goal: *Optimal pain control at patient's stated goal  Outcome: Progressing Towards Goal  Goal: *Lungs clear or at baseline  Outcome: Progressing Towards Goal

## 2021-08-08 NOTE — PROGRESS NOTES
TRANSFER - IN REPORT:    Verbal report received from Dario(name) on Melony Jacobsen  being received from ED(unit) for routine progression of care      Report consisted of patients Situation, Background, Assessment and   Recommendations(SBAR). Information from the following report(s) SBAR, Kardex, ED Summary, Intake/Output, MAR, Recent Results and Cardiac Rhythm NSR was reviewed with the receiving nurse. Opportunity for questions and clarification was provided. Assessment completed upon patients arrival to unit and care assumed. Noted that pt has a stat medication that was not given. Medication is Lovenox. Pt is here was chest pain and has not been given any other anticoagulants. Reviewed this with Dario and asked for follow up.      0350: Pt arrived to unit. Pt moved self from stretcher to bed. Pt still having chest pain 4/10. VS obtained and admission assessment given. RRT RN reviewed chart per request of writing just for second opinion on pt condition. 0434: PRN Dilaudid given. Noted that Lovenox was never given in ED by sending nurse. 0515: resting now, says chest is feeling better at rest, pain present on exertion.

## 2021-08-08 NOTE — PROCEDURES
BRIEF OPERATIVE NOTE    Date of Procedure: 8/8/2021   Procedure: Cardiac catheterization    Preoperative Diagnosis: Coronary artery disease  Postoperative Diagnosis: Coronary artery disease     Surgeon/assistant: Rachid Ventura MD    Anesthesia: Conscious sedation  Estimated Blood Loss: <50cc  Specimens: None    Findings:   LVEDP:  22, mild AS with peak of 10 mm Hg   Left ventricular angiography: EF 55%   Coronary angiography: Moderate CAD with 50% diagonal, 50% mid LCx.    Intervention: None    Complications: None  Non-coronary Implants: None

## 2021-08-08 NOTE — H&P
Hospitalist Admission Note    NAME: Gissell Dolan   :  1945   MRN:  [id number]     Date/Time:  2021 2:54 AM    Patient PCP: Unknown, Provider, MD cardiology Dr. Italia Clancy ([facility])  ______________________________________________________________________  Given the patient's current clinical presentation, I have a high level of concern for decompensation if discharged from the emergency department. Complex decision making was performed, which includes reviewing the patient's available past medical records, laboratory results, and x-ray films. My assessment of this patient's clinical condition and my plan of care is as follows. Assessment / Plan:  Chest pain at rest POA  Suspected unstable angina POA  CAD status post stents POA  Hypertension POA  Hyperlipidemia POA  Morbid obesity POA  Troponin negative x2  EKG normal sinus rhythm 74 bpm, left axis deviation with no acute changes noted    Admit to telemetry bed  Serial troponins for now  Check 2D echo  Inpatient cardiology consult for further recommendations-patient likely would need cardiac cath.   We will keep patient n.p.o. for now till seen by the cardiology in the morning  Status post Lovenox 1 mg/kg x 1 in ED, will continue twice daily for now  Status post nitroglycerinin the ED, will continue as needed chest pain  Status post IV morphine in ED with not much relief, will change to IV Dilaudid as needed severe chest pain  IV Zofran as needed for nausea  Oxygen per chest pain protocol  Start low-dose beta-blocker in a.m. as BP allows  Continue home statin, aspirin, lisinopril  Weight loss recommended  GI cocktail to rule out confounding reflux/PUD as a cause for his symptoms        Code Status: Full code  Surrogate Decision Maker: Wife    DVT Prophylaxis: Subcu Lovenox as above  GI Prophylaxis: not indicated    Baseline: Patient is independent with ADLs at home      Subjective:   CHIEF COMPLAINT: Chest pain with diaphoresis that started on and off since yesterday noon    HISTORY OF PRESENT ILLNESS:     Ashley Diggs is a 64 y.o.  male who presents with above complaints from home with wife. Patient complains of left-sided chest pain which radiates to the center in the right and some of it to the neck and shoulder area as per the patient. As per the patient's started yesterday noon with chest pain on and off and has gotten more worse tonight. History of associated sweating/ diaphoresis/nausea. History of CAD status post stents in the past  History of taking aspirin daily along with statin. Patient was found to have negative troponins x2, unremarkable EKG and blood work in the ED. We were asked to admit for work up and evaluation of the above problems. Past Medical History:   Diagnosis Date    Arthritis     bilateral hips/knees    CAD (coronary artery disease) 13    PCI    Chronic pain     DJD; SILVIO KNEES    Diabetes ([address]     \"BORDERLINE\" PER MD    GERD (gastroesophageal reflux disease)     Morbid obesity ([address]     Other and unspecified symptoms and signs involving general sensations and perceptions     Bilat. legs \"give out\" intermittently; Bilat leg cramps; macular degenration (left).  Other ill-defined conditions(799.89)     HYPERLIPIDEMIA; LT LE DVT (); MORBID OBESITY    Thromboembolus ([address]     left LE        Past Surgical History:   Procedure Laterality Date    CARDIAC SURG PROCEDURE UNLIST      STENTS TO RCA    TOTAL HIP ARTHROPLASTY  2007    right    TOTAL HIP ARTHROPLASTY  2007    left       Social History     Tobacco Use    Smoking status: Former Smoker     Packs/day: 1.00     Quit date: 1983     Years since quittin.5    Smokeless tobacco: Never Used   Substance Use Topics    Alcohol use:  Yes     Alcohol/week: 2.5 standard drinks     Types: 2 Glasses of wine, 1 Cans of beer per week     Comment: 0-2 X PER WK WITH MEALS OR AFTER PLAYING GOLF Family History   Problem Relation Age of Onset    Heart Disease Mother     Arrhythmia Mother     Heart Disease Father     Cancer Sister     Lung Disease Sister      Allergies   Allergen Reactions    Adhesive Other (comments)     Blisters     Advil [Ibuprofen] Rash    Cleocin [Clindamycin Hcl] Rash    Other Medication Unknown (comments)     \"Phosphate\"        Prior to Admission medications    Medication Sig Start Date End Date Taking? Authorizing Provider   CELECOXIB PO Take  by mouth. Other, MD Derick   aspirin 81 mg chewable tablet Take 81 mg by mouth daily. Provider, Historical   metFORMIN (GLUCOPHAGE) 500 mg tablet Take 500 mg by mouth two (2) times daily (with meals). Provider, Historical   lisinopril (PRINIVIL, ZESTRIL) 10 mg tablet Take 10 mg by mouth daily. Provider, Historical   simvastatin (ZOCOR) 20 mg tablet Take 40 mg by mouth nightly. Indications: HYPERCHOLESTEROLEMIA    Provider, Historical   CHONDROITIN SULFATE Take 1,200 mg by mouth daily. Provider, Historical   glucosamine sulfate (GLUCOSAMINE) 750 mg Tab Take 750 mg by mouth daily. Provider, Historical   multivitamin (ONE A DAY) tablet Take 1 Tab by mouth daily.     Provider, Historical       REVIEW OF SYSTEMS:         Total of 12 systems reviewed as follows:       POSITIVE= underlined text  Negative = text not underlined  General:  fever, chills, sweats, generalized weakness, weight loss/gain,      loss of appetite   Eyes:    blurred vision, eye pain, loss of vision, double vision  ENT:    rhinorrhea, pharyngitis   Respiratory:   cough, sputum production, SOB, SHEARER, wheezing, pleuritic pain   Cardiology:   chest pain, palpitations, orthopnea, PND, edema, syncope   Gastrointestinal:  abdominal pain , N/V, diarrhea, dysphagia, constipation, bleeding   Genitourinary:  frequency, urgency, dysuria, hematuria, incontinence   Muskuloskeletal :  arthralgia, myalgia, back pain  Hematology:  easy bruising, nose or gum bleeding, lymphadenopathy   Dermatological: rash, ulceration, pruritis, color change / jaundice  Endocrine:   hot flashes or polydipsia   Neurological:  headache, dizziness, confusion, focal weakness, paresthesia,     Speech difficulties, memory loss, gait difficulty  Psychological: Feelings of anxiety, depression, agitation    Objective:   VITALS:    Visit Vitals  BP (!) 125/47   Pulse 87   Temp 98.3 °F (36.8 °C)   Resp 21   Wt 147.8 kg (325 lb 13.4 oz)   BMI 39.66 kg/m²       PHYSICAL EXAM:    General:    Alert, cooperative, no distress, appears stated age. Morbidly obese+     HEENT: Atraumatic, anicteric sclerae, pink conjunctivae     No oral ulcers, mucosa moist, throat clear, dentition fair  Neck:  Supple, symmetrical,  thyroid: non tender  Lungs:   Clear to auscultation bilaterally. No Wheezing or Rhonchi. No rales. Chest wall:  No tenderness  No Accessory muscle use. Heart:   Regular  rhythm,  No  murmur   No edema  Abdomen:   Soft, non-tender. Not distended. Bowel sounds normal  Extremities: No cyanosis. No clubbing,      Skin turgor normal, Capillary refill normal, Radial dial pulse 2+  Skin:     Not pale. Not Jaundiced  No rashes   Psych:  Good insight. Not depressed. Not anxious or agitated. Neurologic: EOMs intact. No facial asymmetry. No aphasia or slurred speech. Symmetrical strength, Sensation grossly intact.  Alert and oriented X 4.     _______________________________________________________________________  Care Plan discussed with:    Comments   Patient x    Family  x  wife at bedside in ED   RN x    Care Manager                    Consultant:  x ED MD Samantha Robins   _______________________________________________________________________  Expected  Disposition:   Home with Family x   HH/PT/OT/RN    SNF/LTC    REINALDO    ________________________________________________________________________  TOTAL TIME:  46 Minutes    Critical Care Provided     Minutes non procedure based      Comments    x Reviewed previous records   >50% of visit spent in counseling and coordination of care x Discussion with patient and family and questions answered       ________________________________________________________________________  Signed: Mikayla Montana MD    Procedures: see electronic medical records for all procedures/Xrays and details which were not copied into this note but were reviewed prior to creation of Plan. LAB DATA REVIEWED:    Recent Results (from the past 24 hour(s))   EKG, 12 LEAD, INITIAL    Collection Time: 08/07/21 11:15 PM   Result Value Ref Range    Ventricular Rate 67 BPM    Atrial Rate 74 BPM    QRS Duration 94 ms    Q-T Interval 398 ms    QTC Calculation (Bezet) 420 ms    Calculated R Axis -46 degrees    Calculated T Axis 24 degrees    Diagnosis       Accelerated Junctional rhythm  Left anterior fascicular block  Minimal voltage criteria for LVH, may be normal variant  Septal infarct , age undetermined  When compared with ECG of 28-OCT-2018 10:41,  Junctional rhythm has replaced Sinus rhythm     CBC WITH AUTOMATED DIFF    Collection Time: 08/07/21 11:33 PM   Result Value Ref Range    WBC 9.0 4.1 - 11.1 K/uL    RBC 3.93 (L) 4.10 - 5.70 M/uL    HGB 13.0 12.1 - 17.0 g/dL    HCT 38.7 36.6 - 50.3 %    MCV 98.5 80.0 - 99.0 FL    MCH 33.1 26.0 - 34.0 PG    MCHC 33.6 30.0 - 36.5 g/dL    RDW 13.0 11.5 - 14.5 %    PLATELET 703 (L) 647 - 400 K/uL    MPV 9.6 8.9 - 12.9 FL    NRBC 0.0 0  WBC    ABSOLUTE NRBC 0.00 0.00 - 0.01 K/uL    NEUTROPHILS 50 32 - 75 %    LYMPHOCYTES 34 12 - 49 %    MONOCYTES 11 5 - 13 %    EOSINOPHILS 4 0 - 7 %    BASOPHILS 1 0 - 1 %    IMMATURE GRANULOCYTES 0 0.0 - 0.5 %    ABS. NEUTROPHILS 4.5 1.8 - 8.0 K/UL    ABS. LYMPHOCYTES 3.1 0.8 - 3.5 K/UL    ABS. MONOCYTES 1.0 0.0 - 1.0 K/UL    ABS. EOSINOPHILS 0.4 0.0 - 0.4 K/UL    ABS. BASOPHILS 0.1 0.0 - 0.1 K/UL    ABS. IMM.  GRANS. 0.0 0.00 - 0.04 K/UL    DF AUTOMATED     METABOLIC PANEL, COMPREHENSIVE    Collection Time: 08/07/21 11:33 PM Result Value Ref Range    Sodium 140 136 - 145 mmol/L    Potassium 4.5 3.5 - 5.1 mmol/L    Chloride 112 (H) 97 - 108 mmol/L    CO2 23 21 - 32 mmol/L    Anion gap 5 5 - 15 mmol/L    Glucose 135 (H) 65 - 100 mg/dL    BUN 16 6 - 20 MG/DL    Creatinine 1.03 0.70 - 1.30 MG/DL    BUN/Creatinine ratio 16 12 - 20      GFR est AA >60 >60 ml/min/1.73m2    GFR est non-AA >60 >60 ml/min/1.73m2    Calcium 8.4 (L) 8.5 - 10.1 MG/DL    Bilirubin, total 0.4 0.2 - 1.0 MG/DL    ALT (SGPT) 22 12 - 78 U/L    AST (SGOT) 16 15 - 37 U/L    Alk. phosphatase 66 45 - 117 U/L    Protein, total 7.1 6.4 - 8.2 g/dL    Albumin 3.2 (L) 3.5 - 5.0 g/dL    Globulin 3.9 2.0 - 4.0 g/dL    A-G Ratio 0.8 (L) 1.1 - 2.2     TROPONIN I    Collection Time: 08/07/21 11:33 PM   Result Value Ref Range    Troponin-I, Qt. <0.05 <0.05 ng/mL   SAMPLES BEING HELD    Collection Time: 08/07/21 11:33 PM   Result Value Ref Range    SAMPLES BEING HELD RD BL     COMMENT        Add-on orders for these samples will be processed based on acceptable specimen integrity and analyte stability, which may vary by analyte.    LIPASE    Collection Time: 08/07/21 11:33 PM   Result Value Ref Range    Lipase 435 (H) 73 - 393 U/L   EKG, 12 LEAD, SUBSEQUENT    Collection Time: 08/08/21  1:30 AM   Result Value Ref Range    Ventricular Rate 74 BPM    Atrial Rate 74 BPM    P-R Interval 166 ms    QRS Duration 98 ms    Q-T Interval 404 ms    QTC Calculation (Bezet) 448 ms    Calculated P Axis 21 degrees    Calculated R Axis -51 degrees    Calculated T Axis 20 degrees    Diagnosis       Normal sinus rhythm  Left axis deviation  When compared with ECG of 07-AUG-2021 23:15,  MANUAL COMPARISON REQUIRED, DATA IS UNCONFIRMED     TROPONIN I    Collection Time: 08/08/21  1:34 AM   Result Value Ref Range    Troponin-I, Qt. <0.05 <0.05 ng/mL

## 2021-08-08 NOTE — PROGRESS NOTES
Transition of Care Plan:    RUR:  Disposition: TBD  Follow up appointments: TBD  DME needed:  Transportation at Discharge:  Jean-Paul Delcid or means to access home:        IM Medicare Letter:  Is patient a BCPI-A Bundle: If yes, was Bundle Letter given?:     Caregiver Contact:  Discharge Caregiver contacted prior to discharge?                 Reason for Admission:                       RUR Score:                     Plan for utilizing home health:          PCP: First and Last name:  Unknown, Provider, MD     Name of Practice:    Are you a current patient: Yes/No:    Approximate date of last visit:    Can you participate in a virtual visit with your PCP:                     Current Advanced Directive/Advance Care Plan: Full Code      Healthcare Decision Maker:   Click here to complete [address] including selection of the Healthcare Decision Maker Relationship (ie \"Primary\")                             Transition of Care Plan:                    TBD

## 2021-08-08 NOTE — ED NOTES
Pt has been given sl nitro and morphine, but intermittent pain is not under control. 0315: Pt given GI cocktail, Pt to be monitored more, if symptoms don't resolve then the routine cardiology consult needs to be call sooner. Nurse will notify floor Nurse regarding the same. Patient is being transferred to [facility] 2 General Surgery, Room # 78 [id number]. Report given to Sara Benson on [facility] for routine progression of care. Report consisted of the following information SBAR, Kardex, ED Summary, Procedure Summary, Intake/Output, MAR, Recent Results and Cardiac Rhythm sinus rythym. Patient transferred to receiving unit by: Tech (RN or tech name). Outstanding consults needed: No     Next labs due: No     The following personal items will be sent with the patient during transfer to the floor:     All valuables:    Cardiac monitoring ordered: Yes    The following CURRENT information was reported to the receiving RN:    Code status: Full Code at time of transfer    Last set of vital signs:  Vital Signs  Level of Consciousness: Alert (0) (08/08/21 0330)  Temp: 98.3 °F (36.8 °C) (08/08/21 0330)  Pulse (Heart Rate): 61 (08/08/21 0330)  Heart Rate Source: Monitor (08/07/21 2320)  Resp Rate: 12 (08/08/21 0330)  BP: (!) 130/57 (08/08/21 0330)  MAP (Monitor): 76 (08/08/21 0330)  MAP (Calculated): 81 (08/08/21 0330)  MEWS Score: 0 (08/08/21 0330)         Oxygen Therapy  O2 Sat (%): 100 % (08/08/21 0330)  Pulse via Oximetry: 60 beats per minute (08/08/21 0330)      Last pain assessment:         Wounds: No     Urinary catheter: voiding  Is there a bernard order: No     LDAs:       Peripheral IV 08/07/21 Left Antecubital (Active)   Site Assessment Clean, dry, & intact 08/07/21 2329   Phlebitis Assessment 0 08/07/21 2329   Infiltration Assessment 0 08/07/21 2329   Dressing Status Clean, dry, & intact 08/07/21 2329   Dressing Type Transparent 08/07/21 2329   Hub Color/Line Status Pink;Capped;Flushed;Patent 08/07/21 2329 Action Taken Blood drawn 08/07/21 2329   Alcohol Cap Used Yes 08/07/21 2329         Opportunity for questions and clarification was provided.     Ely Goldberg

## 2021-08-08 NOTE — PROGRESS NOTES
Patient seen at bedside, non billable rounding as patient was admitted aorrow. nash VASQUEZ.     Patient admitted with CP and unstable angina. He has a history of CAD with PCI. Agree with Dr. Antonio Oreilly of care. Patient continues with CP this morning that is unrelieved with nitro, troponins remain negative. Cardiology plans to take patient to the cath lab this morning. 1740 : S/p clean cardiac cath. CTA positive for PE. Started on heparin drip, will need to transition to oral anticoagulation tomorrow.      I reviewed pertinent labs and imaging, and discussed /agreed on the plan of care with Dr. Carlos Bowers.

## 2021-08-08 NOTE — ED PROVIDER NOTES
EMERGENCY DEPARTMENT HISTORY AND PHYSICAL EXAM      Date: 8/7/2021  Patient Name: Harjinder Iyer    History of Presenting Illness     No chief complaint on file. History Provided By: Patient    HPI: Harjinder Iyer, 64 y.o. male  Past medical history of CAD presenting today with chest pain. The patient says that his pain started at about noon yesterday. It has been intermittent in in its severity, and he notes that when he went to bed it became more severe and more stabbing. He notes the pain is in the substernal chest and radiates up to the upper part of the left chest.  He has had some radiation to the left neck and shoulder area. The patient denies any radiation to his arm, no diaphoresis, no nausea or vomiting associated with this. The patient has been in his usual state of health with no recent illnesses including no cough or fever. Patient had cardiac catheterization in 2017. He notes a history of 3 stents. There are no other complaints, changes, or physical findings at this time. PCP: Unknown, Provider, MD    No current facility-administered medications on file prior to encounter. Current Outpatient Medications on File Prior to Encounter   Medication Sig Dispense Refill    lisinopriL (PRINIVIL, ZESTRIL) 40 mg tablet       simvastatin (ZOCOR) 40 mg tablet       CELECOXIB PO Take  by mouth. aspirin 81 mg chewable tablet Take 81 mg by mouth daily. metFORMIN (GLUCOPHAGE) 500 mg tablet Take 500 mg by mouth two (2) times daily (with meals). [DISCONTINUED] lisinopril (PRINIVIL, ZESTRIL) 10 mg tablet Take 10 mg by mouth daily. CHONDROITIN SULFATE Take 1,200 mg by mouth daily. glucosamine sulfate (GLUCOSAMINE) 750 mg Tab Take 750 mg by mouth daily. multivitamin (ONE A DAY) tablet Take 1 Tab by mouth daily. [DISCONTINUED] simvastatin (ZOCOR) 20 mg tablet Take 40 mg by mouth nightly.  Indications: HYPERCHOLESTEROLEMIA         Past History     Past Medical History:  Past Medical History:   Diagnosis Date    Arthritis     bilateral hips/knees    CAD (coronary artery disease) 13    PCI    Chronic pain     DJD; SILVIO KNEES    Diabetes ([facility] [address]     \"BORDERLINE\" PER MD    GERD (gastroesophageal reflux disease)     Morbid obesity ([facility] [address]     Other and unspecified symptoms and signs involving general sensations and perceptions     Bilat. legs \"give out\" intermittently; Bilat leg cramps; macular degenration (left). Other ill-defined conditions(799.89)     HYPERLIPIDEMIA; LT LE DVT (); MORBID OBESITY    Thromboembolus ([facility] [address]     left LE       Past Surgical History:  Past Surgical History:   Procedure Laterality Date    CARDIAC SURG PROCEDURE UNLIST      STENTS TO RCA    TOTAL HIP ARTHROPLASTY  2007    right    TOTAL HIP ARTHROPLASTY  2007    left       Family History:  Family History   Problem Relation Age of Onset    Heart Disease Mother     Arrhythmia Mother     Heart Disease Father     Cancer Sister     Lung Disease Sister        Social History:  Social History     Tobacco Use    Smoking status: Former Smoker     Packs/day: 1.00     Quit date: 1983     Years since quittin.5    Smokeless tobacco: Never Used   Substance Use Topics    Alcohol use: Yes     Alcohol/week: 2.5 standard drinks     Types: 2 Glasses of wine, 1 Cans of beer per week     Comment: 0-2 X PER WK WITH MEALS OR AFTER PLAYING GOLF    Drug use: No       Allergies:   Allergies   Allergen Reactions    Adhesive Other (comments)     Blisters     Advil [Ibuprofen] Rash    Cleocin [Clindamycin Hcl] Rash    Other Medication Unknown (comments)     \"Phosphate\"         Review of Systems   Constitutional: No  fever  Skin: No  rash  HEENT: No  nasal congestion  Resp: No cough  CV: + chest pain  GI: No vomiting  : No dysuria  MSK: No joint pain  Neuro: No numbness  Psych: No anxiety      Physical Exam     Patient Vitals for the past 12 hrs:   Temp Pulse Resp BP SpO2   21 0245 -- (!) 51 21 (!) 93/55 92 %   08/08/21 0215 -- 69 11 (!) 128/58 97 %   08/08/21 0145 -- 88 22 117/60 99 %   08/08/21 0115 -- 84 20 (!) 127/50 99 %   08/08/21 0112 -- 87 -- (!) 125/47 --   08/08/21 0045 -- 76 13 (!) 96/54 98 %   08/08/21 0003 -- 72 -- (!) 100/58 --   08/07/21 2320 98.3 °F (36.8 °C) 73 21 131/60 --     General: alert, No acute distress  Eyes: EOMI, normal conjunctiva  ENT: moist mucous membranes. Neck: Active, full ROM of neck. Skin: No rashes. no jaundice              Lungs: Equal chest expansion. no respiratory distress. clear to auscultation bilaterally No accessory muscle usage  Heart: regular rate     no peripheral edema   2+ radial pulses and DPs bilaterally  Abd:  non distended soft, nontender. No rebound tenderness. No guarding  Back: Full ROM  MSK: Full, active ROM in all 4 extremities.    Neuro: Alert and oriented to Person, Place, Time, and Situation; normal speech;   Psych: Cooperative with exam; Appropriate mood and affect             Diagnostic Study Results     Labs -     Recent Results (from the past 12 hour(s))   EKG, 12 LEAD, INITIAL    Collection Time: 08/07/21 11:15 PM   Result Value Ref Range    Ventricular Rate 67 BPM    Atrial Rate 74 BPM    QRS Duration 94 ms    Q-T Interval 398 ms    QTC Calculation (Bezet) 420 ms    Calculated R Axis -46 degrees    Calculated T Axis 24 degrees    Diagnosis       Accelerated Junctional rhythm  Left anterior fascicular block  Minimal voltage criteria for LVH, may be normal variant  Septal infarct , age undetermined  When compared with ECG of 28-OCT-2018 10:41,  Junctional rhythm has replaced Sinus rhythm     CBC WITH AUTOMATED DIFF    Collection Time: 08/07/21 11:33 PM   Result Value Ref Range    WBC 9.0 4.1 - 11.1 K/uL    RBC 3.93 (L) 4.10 - 5.70 M/uL    HGB 13.0 12.1 - 17.0 g/dL    HCT 38.7 36.6 - 50.3 %    MCV 98.5 80.0 - 99.0 FL    MCH 33.1 26.0 - 34.0 PG    MCHC 33.6 30.0 - 36.5 g/dL    RDW 13.0 11.5 - 14.5 %    PLATELET 016 (L) 769 - 400 K/uL MPV 9.6 8.9 - 12.9 FL    NRBC 0.0 0  WBC    ABSOLUTE NRBC 0.00 0.00 - 0.01 K/uL    NEUTROPHILS 50 32 - 75 %    LYMPHOCYTES 34 12 - 49 %    MONOCYTES 11 5 - 13 %    EOSINOPHILS 4 0 - 7 %    BASOPHILS 1 0 - 1 %    IMMATURE GRANULOCYTES 0 0.0 - 0.5 %    ABS. NEUTROPHILS 4.5 1.8 - 8.0 K/UL    ABS. LYMPHOCYTES 3.1 0.8 - 3.5 K/UL    ABS. MONOCYTES 1.0 0.0 - 1.0 K/UL    ABS. EOSINOPHILS 0.4 0.0 - 0.4 K/UL    ABS. BASOPHILS 0.1 0.0 - 0.1 K/UL    ABS. IMM. GRANS. 0.0 0.00 - 0.04 K/UL    DF AUTOMATED     METABOLIC PANEL, COMPREHENSIVE    Collection Time: 08/07/21 11:33 PM   Result Value Ref Range    Sodium 140 136 - 145 mmol/L    Potassium 4.5 3.5 - 5.1 mmol/L    Chloride 112 (H) 97 - 108 mmol/L    CO2 23 21 - 32 mmol/L    Anion gap 5 5 - 15 mmol/L    Glucose 135 (H) 65 - 100 mg/dL    BUN 16 6 - 20 MG/DL    Creatinine 1.03 0.70 - 1.30 MG/DL    BUN/Creatinine ratio 16 12 - 20      GFR est AA >60 >60 ml/min/1.73m2    GFR est non-AA >60 >60 ml/min/1.73m2    Calcium 8.4 (L) 8.5 - 10.1 MG/DL    Bilirubin, total 0.4 0.2 - 1.0 MG/DL    ALT (SGPT) 22 12 - 78 U/L    AST (SGOT) 16 15 - 37 U/L    Alk. phosphatase 66 45 - 117 U/L    Protein, total 7.1 6.4 - 8.2 g/dL    Albumin 3.2 (L) 3.5 - 5.0 g/dL    Globulin 3.9 2.0 - 4.0 g/dL    A-G Ratio 0.8 (L) 1.1 - 2.2     TROPONIN I    Collection Time: 08/07/21 11:33 PM   Result Value Ref Range    Troponin-I, Qt. <0.05 <0.05 ng/mL   SAMPLES BEING HELD    Collection Time: 08/07/21 11:33 PM   Result Value Ref Range    SAMPLES BEING HELD RD BL     COMMENT        Add-on orders for these samples will be processed based on acceptable specimen integrity and analyte stability, which may vary by analyte.    LIPASE    Collection Time: 08/07/21 11:33 PM   Result Value Ref Range    Lipase 435 (H) 73 - 393 U/L   EKG, 12 LEAD, SUBSEQUENT    Collection Time: 08/08/21  1:30 AM   Result Value Ref Range    Ventricular Rate 74 BPM    Atrial Rate 74 BPM    P-R Interval 166 ms    QRS Duration 98 ms    Q-T Interval 404 ms    QTC Calculation (Bezet) 448 ms    Calculated P Axis 21 degrees    Calculated R Axis -51 degrees    Calculated T Axis 20 degrees    Diagnosis       Normal sinus rhythm  Left axis deviation  When compared with ECG of 07-AUG-2021 23:15,  MANUAL COMPARISON REQUIRED, DATA IS UNCONFIRMED     TROPONIN I    Collection Time: 08/08/21  1:34 AM   Result Value Ref Range    Troponin-I, Qt. <0.05 <0.05 ng/mL       Radiologic Studies -   XR CHEST PA LAT   Final Result   No acute abnormality                    CT Results  (Last 48 hours)      None          CXR Results  (Last 48 hours)                 08/07/21 2342  XR CHEST PA LAT Final result    Impression:  No acute abnormality                       Narrative:  EXAM:  XR CHEST PA LAT       INDICATION:   Chest Pain       COMPARISON: 2013. FINDINGS: PA and lateral radiographs of the chest demonstrate clear lungs. The   cardiac and mediastinal contours and pulmonary vascularity are normal.  The   bones and soft tissues are within normal limits. Medical Decision Making   I am the first provider for this patient. I reviewed the vital signs, available nursing notes, past medical history, past surgical history, family history and social history. Vital Signs-Reviewed the patient's vital signs. Patient Vitals for the past 12 hrs:   Temp Pulse Resp BP SpO2   08/08/21 0245 -- (!) 51 21 (!) 93/55 92 %   08/08/21 0215 -- 69 11 (!) 128/58 97 %   08/08/21 0145 -- 88 22 117/60 99 %   08/08/21 0115 -- 84 20 (!) 127/50 99 %   08/08/21 0112 -- 87 -- (!) 125/47 --   08/08/21 0045 -- 76 13 (!) 96/54 98 %   08/08/21 0003 -- 72 -- (!) 100/58 --   08/07/21 2320 98.3 °F (36.8 °C) 73 21 131/60 --         Provider Notes (Medical Decision Making):     Differential Diagnosis: ACS, electrolyte derangement, pneumonia, pneumothorax,    Initial Plan:  Will obtain EKG, laboratory studies, the patient has already taken 324 mg of aspirin at home, will treat with morphine and nitroglycerin and reassess patient's pain. The patient is hemodynamically stable. ED Course:   Initial assessment performed. The patients presenting problems have been discussed, and they are in agreement with the care plan formulated and outlined with them. I have encouraged them to ask questions as they arise throughout their visit. ED Course as of Aug 08 0310   Sat Aug 07, 2021   2332 On my interpretation of the patient's EKG rate is sixty-seven, QTC is four hundred twenty, no ST elevation or depression.    [NW]   Sun Aug 08, 2021   0138 Was called back to the patient room due to the patient complaining of pain 10/10. Will repeat EKG, treat the patient with an additional dose of morphine, and obtain repeat troponin. Given the patient's severity and duration of chest pain will admit for unstable angina. [NW]   5602 On my interpretation of the patient's repeat EKG normal sinus rhythm at a rate of 74, QTc is 448, no ST elevation or depression.    [NW]   0255 Patient's laboratory studies are largely unremarkable with mildly elevated lipase, troponins negative x2. [NW]   3714 Chest x-ray negative. [NW]   8696 Patient presenting today with chest pain in the left substernal chest with radiation up to his shoulder and across his chest.  He took 324 mg of aspirin at home, I treated him here with morphine times multiple doses, and nitroglycerin x2. He did have improvement of chest pain, but continues to have intermittent episodes. He had negative troponins x2, but I am concerned for cardiac pathology, treated with enoxaparin and patient is ultimately admitted. [NW]      ED Course User Index  [NW] Jose Kirkland MD       I, Hardik Garza MD, am the attending of record for this patient encounter.         Disposition: Admitted    Admission Note:  Patient is being admitted to the hospital by Service: Hospitalist.  The results of their tests and reasons for their admission have been discussed with them and available family. They convey agreement and understanding for the need to be admitted and for their admission diagnosis. Diagnosis     Clinical Impression:   1. Chest pain, unspecified type    2. Unstable angina (HCC)        Attestations:    Hardik Garza MD    Please note that this dictation was completed with [employer] the computer voice recognition software. Quite often unanticipated grammatical, syntax, homophones, and other interpretive errors are inadvertently transcribed by the computer software. Please disregard these errors. Please excuse any errors that have escaped final proofreading. Thank you.

## 2021-08-08 NOTE — PROGRESS NOTES
CM attempted to speak with pt. to complete assessment but patient stated he was headed to surgery. Primary CM will follow-up with pt. During the week to complete assessment.      Jasmin May, MSN, RN  Care Manager

## 2021-08-08 NOTE — PROGRESS NOTES
End of Shift Note    Bedside shift change report given to Aditya (oncoming nurse) by Марина Maurice (offgoing nurse). Report included the following information SBAR, Kardex, Intake/Output and MAR    Shift worked:  7p-7a     Shift summary and any significant changes:     Pt admitted from ED late in shift. Pt still with some intermittent dull chest pain. Dilauadid given x1, which provided relief for pt to sleep. Troponin drawn at end of shift. Cardio consult to be called. Concerns for physician to address:  Pt still with intermittent chest pain, especially exertional.  Would Nitro patch be beneficial?      Zone phone for oncoming shift:   [phone]       Activity:  Activity Level: Up with Assistance  Number times ambulated in hallways past shift: 0  Number of times OOB to chair past shift: 0    Cardiac:   Cardiac Monitoring: Yes      Cardiac Rhythm: Sinus Rhythm    Access:   Current line(s): PIV     Genitourinary:   Urinary status: voiding    Respiratory:   O2 Device: Nasal cannula  Chronic home O2 use?: NO  Incentive spirometer at bedside: NO     GI:  Last Bowel Movement Date: 08/07/21  Current diet:  DIET NPO  Passing flatus: YES  Tolerating current diet: YES       Pain Management:   Patient states pain is manageable on current regimen: NO    Skin:  Jose Guadalupe Score: 19  Interventions: increase time out of bed and PT/OT consult    Patient Safety:  Fall Score:  Total Score: 1  Interventions: gripper socks, pt to call before getting OOB and stay with me (per policy)       Length of Stay:  Expected LOS: - - -  Actual LOS: 0      Марина Maurice

## 2021-08-08 NOTE — CONSULTS
Consult    NAME: Cristian Adamson   :  1945   MRN:  [id number]     Date/Time:  2021 8:35 AM    Patient PCP: Unknown, Provider, MD  ________________________________________________________________________     Assessment:       :  Doing ok; now with a rollator. SHEARER remains; unchanged. No CP.  : Sister with CA and wife just fell and fractured her pelvis. Has some mild SHEARER if he walks very quickly. Still working with nutritionist and weight down another 4#. Weighed in at 298# at home this morning. No CP, palps, edema or syncope. Going to PT and tries to walk when he can.  :  Down 3#, still trying to lose weight. No SOB or CP. Trying to be more active. Has had several noncardiac issues of late (OA, bronchitis, dehydration). :  Doing well, down ~ 10#; working with nutritionist.  No SOB or CP.    :  Wife still falling; gets SOB/SHEARER while shopping @ [facility]; up 10#; wants to play golf for a week in October in [facility]; no CP.  :  Wife fell with several fxs and has been more active at home; has lost 10#. Has chronic SHEARER. No plans to pursue bariatric surgery. Prolems:  - CAD. Mild Diffuse CAD with patent stents in the RCA (PLB w/ 50% stenosis) by 2017 cath.   MPI  w/ EF 59% and inferior ischemia.    - XOL  - Morbid obesity  - DVT in right leg was on coumadin  No bleeding  - DM  - Former smoker  - Family hx CAD    CARDIAC HISTORY  CAD:  1  SOB, abnl stress test [PCI (PTCA and stenting of the right coronary artery with a ResoluteIntegrity drug-eluting stents, 2.5 x 14 mm, in the distal vessel; 2.75 x 30 mm in the mid and proximal vessel.) - 2013] - 2013  2  Mild difuse CAD  Patent stents in the RCA - 2017    RISK FACTORS:    Dyslipidemia  Type 2 Diabetes  Hypertension    , uses a rollator  Wife:  Heri Rodarte [phone]    Cardiologist:  Shashi Rosenthal            Plan:     Left sided chest pain unclear  Cardiac enzymes x 3 negative  ECG negative x 2  Continued pain. Some reproducible pain on chest, did lift a safe three weeks ago  No improvement with pain meds or GI meds    Patient requesting cath. All risk/benefits/alternatives reviewed with patient and wife    - Cont ASA  - Cont added metoprolol  - Hold lisinopril, add NTP  - Cont statin        [x]        High complexity decision making was performed        Subjective:   CHIEF COMPLAINT:  Chest pain    HISTORY OF PRESENT ILLNESS:       Shannan Rhodes is a 64 y.o.  male who presents with above complaints from home with wife. Patient complains of left-sided chest pain which radiates to the center in the right and some of it to the neck and shoulder area as per the patient. As per the patient's started yesterday noon with chest pain on and off and has gotten more worse tonight. History of associated sweating/ diaphoresis/nausea. History of CAD status post stents in the past  History of taking aspirin daily along with statin.     Patient was found to have negative troponins x2, unremarkable EKG and blood work in the ED. We were asked to consult for work up and evaluation of the above problems. Past Medical History:   Diagnosis Date    Arthritis     bilateral hips/knees    CAD (coronary artery disease) 6/7/13    PCI    Chronic pain     DJD; SILVIO KNEES    Diabetes ([address]     \"BORDERLINE\" PER MD    GERD (gastroesophageal reflux disease)     Morbid obesity ([address]     Other and unspecified symptoms and signs involving general sensations and perceptions     Bilat. legs \"give out\" intermittently; Bilat leg cramps; macular degenration (left).  Other ill-defined conditions(799.89)     HYPERLIPIDEMIA; LT LE DVT (2008);  MORBID OBESITY    Thromboembolus ([address] 2008    left LE      Past Surgical History:   Procedure Laterality Date    CARDIAC SURG PROCEDURE UNLIST  6/713    STENTS TO RCA    TOTAL HIP ARTHROPLASTY  6/2007    right    TOTAL HIP ARTHROPLASTY  12/2007    left Allergies   Allergen Reactions    Adhesive Other (comments)     Blisters     Advil [Ibuprofen] Rash    Cleocin [Clindamycin Hcl] Rash    Other Medication Unknown (comments)     \"Phosphate\"      Meds:  See below  Social History     Tobacco Use    Smoking status: Former Smoker     Packs/day: 1.00     Quit date: 1983     Years since quittin.5    Smokeless tobacco: Never Used   Substance Use Topics    Alcohol use: Yes     Alcohol/week: 2.5 standard drinks     Types: 2 Glasses of wine, 1 Cans of beer per week     Comment: 0-2 X PER WK WITH MEALS OR AFTER PLAYING GOLF      Family History   Problem Relation Age of Onset    Heart Disease Mother     Arrhythmia Mother     Heart Disease Father     Cancer Sister     Lung Disease Sister        REVIEW OF SYSTEMS:     []         Unable to obtain  ROS due to ---   [x]         Total of 12 systems reviewed as follows:     Total of 12 systems reviewed as follows:       POSITIVE= Bold text  Negative = normal text  General:  fever, chills, sweats, generalized weakness, weight loss/gain,      loss of appetite   Eyes:    blurred vision, eye pain, loss of vision, double vision  ENT:    rhinorrhea, pharyngitis   Respiratory:   cough, sputum production, SOB, SHEARER, wheezing, pleuritic pain   Cardiology:   chest pain, palpitations, orthopnea, PND, edema, syncope   Gastrointestinal:  abdominal pain , N/V, diarrhea, dysphagia, constipation, bleeding   Genitourinary:  frequency, urgency, dysuria, hematuria, incontinence   Muskuloskeletal :  arthralgia, myalgia, back pain  Hematology:  easy bruising, nose or gum bleeding, lymphadenopathy   Dermatological: rash, ulceration, pruritis, color change / jaundice  Endocrine:   hot flashes or polydipsia   Neurological:  headache, dizziness, confusion, focal weakness, paresthesia,     Speech difficulties, memory loss, gait difficulty  Psychological: Feelings of anxiety, depression, agitation    Objective:      Physical Exam:    Last 24hrs VS reviewed since prior progress note. Most recent are:    Visit Vitals  BP (!) 137/53   Pulse 68   Temp 98.4 °F (36.9 °C)   Resp 20   Wt 147.8 kg (325 lb 13.4 oz)   SpO2 100%   BMI 39.66 kg/m²     No intake or output data in the 24 hours ending 08/08/21 0835     General Appearance: Well developed, morbid obese, alert & oriented x 3,    no acute distress. Ears/Nose/Mouth/Throat: Pupils equal and round, Hearing grossly normal.  Neck: Supple. JVP within normal limits. Carotids good upstrokes, with no bruit. Chest: Lungs clear to auscultation bilaterally. Cardiovascular: Regular rate and rhythm, S1S2 normal, no murmur, rubs, gallops. Abdomen: Soft, non-tender, bowel sounds are active. No organomegaly. Extremities: No edema bilaterally. Femoral pulses +2, Distal Pulses +1. Skin: Warm and dry. Neuro: CN II-XII grossly intact, Strength and sensation grossly intact. Data:      Prior to Admission medications    Medication Sig Start Date End Date Taking? Authorizing Provider   lisinopriL (PRINIVIL, ZESTRIL) 40 mg tablet  5/3/21  Yes Provider, Historical   simvastatin (ZOCOR) 40 mg tablet  7/26/21  Yes Provider, Historical   CELECOXIB PO Take  by mouth. Yes Other, MD Derick   aspirin 81 mg chewable tablet Take 81 mg by mouth daily. Yes Provider, Historical   metFORMIN (GLUCOPHAGE) 500 mg tablet Take 500 mg by mouth two (2) times daily (with meals). Yes Provider, Historical   CHONDROITIN SULFATE Take 1,200 mg by mouth daily. Yes Provider, Historical   glucosamine sulfate (GLUCOSAMINE) 750 mg Tab Take 750 mg by mouth daily. Yes Provider, Historical   multivitamin (ONE A DAY) tablet Take 1 Tab by mouth daily.    Yes Provider, Historical       Recent Results (from the past 24 hour(s))   EKG, 12 LEAD, INITIAL    Collection Time: 08/07/21 11:15 PM   Result Value Ref Range    Ventricular Rate 67 BPM    Atrial Rate 74 BPM    QRS Duration 94 ms    Q-T Interval 398 ms    QTC Calculation (Bezet) 420 ms Calculated R Axis -46 degrees    Calculated T Axis 24 degrees    Diagnosis       Accelerated Junctional rhythm  Left anterior fascicular block  Minimal voltage criteria for LVH, may be normal variant  Septal infarct , age undetermined  When compared with ECG of 28-OCT-2018 10:41,  Junctional rhythm has replaced Sinus rhythm     CBC WITH AUTOMATED DIFF    Collection Time: 08/07/21 11:33 PM   Result Value Ref Range    WBC 9.0 4.1 - 11.1 K/uL    RBC 3.93 (L) 4.10 - 5.70 M/uL    HGB 13.0 12.1 - 17.0 g/dL    HCT 38.7 36.6 - 50.3 %    MCV 98.5 80.0 - 99.0 FL    MCH 33.1 26.0 - 34.0 PG    MCHC 33.6 30.0 - 36.5 g/dL    RDW 13.0 11.5 - 14.5 %    PLATELET 456 (L) 468 - 400 K/uL    MPV 9.6 8.9 - 12.9 FL    NRBC 0.0 0  WBC    ABSOLUTE NRBC 0.00 0.00 - 0.01 K/uL    NEUTROPHILS 50 32 - 75 %    LYMPHOCYTES 34 12 - 49 %    MONOCYTES 11 5 - 13 %    EOSINOPHILS 4 0 - 7 %    BASOPHILS 1 0 - 1 %    IMMATURE GRANULOCYTES 0 0.0 - 0.5 %    ABS. NEUTROPHILS 4.5 1.8 - 8.0 K/UL    ABS. LYMPHOCYTES 3.1 0.8 - 3.5 K/UL    ABS. MONOCYTES 1.0 0.0 - 1.0 K/UL    ABS. EOSINOPHILS 0.4 0.0 - 0.4 K/UL    ABS. BASOPHILS 0.1 0.0 - 0.1 K/UL    ABS. IMM. GRANS. 0.0 0.00 - 0.04 K/UL    DF AUTOMATED     METABOLIC PANEL, COMPREHENSIVE    Collection Time: 08/07/21 11:33 PM   Result Value Ref Range    Sodium 140 136 - 145 mmol/L    Potassium 4.5 3.5 - 5.1 mmol/L    Chloride 112 (H) 97 - 108 mmol/L    CO2 23 21 - 32 mmol/L    Anion gap 5 5 - 15 mmol/L    Glucose 135 (H) 65 - 100 mg/dL    BUN 16 6 - 20 MG/DL    Creatinine 1.03 0.70 - 1.30 MG/DL    BUN/Creatinine ratio 16 12 - 20      GFR est AA >60 >60 ml/min/1.73m2    GFR est non-AA >60 >60 ml/min/1.73m2    Calcium 8.4 (L) 8.5 - 10.1 MG/DL    Bilirubin, total 0.4 0.2 - 1.0 MG/DL    ALT (SGPT) 22 12 - 78 U/L    AST (SGOT) 16 15 - 37 U/L    Alk.  phosphatase 66 45 - 117 U/L    Protein, total 7.1 6.4 - 8.2 g/dL    Albumin 3.2 (L) 3.5 - 5.0 g/dL    Globulin 3.9 2.0 - 4.0 g/dL    A-G Ratio 0.8 (L) 1.1 - 2.2 TROPONIN I    Collection Time: 08/07/21 11:33 PM   Result Value Ref Range    Troponin-I, Qt. <0.05 <0.05 ng/mL   SAMPLES BEING HELD    Collection Time: 08/07/21 11:33 PM   Result Value Ref Range    SAMPLES BEING HELD RD BL     COMMENT        Add-on orders for these samples will be processed based on acceptable specimen integrity and analyte stability, which may vary by analyte.    LIPASE    Collection Time: 08/07/21 11:33 PM   Result Value Ref Range    Lipase 435 (H) 73 - 393 U/L   EKG, 12 LEAD, SUBSEQUENT    Collection Time: 08/08/21  1:30 AM   Result Value Ref Range    Ventricular Rate 74 BPM    Atrial Rate 74 BPM    P-R Interval 166 ms    QRS Duration 98 ms    Q-T Interval 404 ms    QTC Calculation (Bezet) 448 ms    Calculated P Axis 21 degrees    Calculated R Axis -51 degrees    Calculated T Axis 20 degrees    Diagnosis       Normal sinus rhythm  Left axis deviation  When compared with ECG of 07-AUG-2021 23:15,  MANUAL COMPARISON REQUIRED, DATA IS UNCONFIRMED     TROPONIN I    Collection Time: 08/08/21  1:34 AM   Result Value Ref Range    Troponin-I, Qt. <0.05 <0.05 ng/mL   TROPONIN I    Collection Time: 08/08/21  8:02 AM   Result Value Ref Range    Troponin-I, Qt. <0.05 <0.05 ng/mL

## 2021-08-09 ENCOUNTER — APPOINTMENT (OUTPATIENT)
Dept: NON INVASIVE DIAGNOSTICS | Age: 76
DRG: 286 | End: 2021-08-09
Attending: INTERNAL MEDICINE
Payer: MEDICARE

## 2021-08-09 LAB
ANION GAP SERPL CALC-SCNC: 5 MMOL/L (ref 5–15)
APTT PPP: 52.7 SEC (ref 22.1–31)
APTT PPP: 68 SEC (ref 22.1–31)
BASOPHILS # BLD: 0 K/UL (ref 0–0.1)
BASOPHILS NFR BLD: 0 % (ref 0–1)
BUN SERPL-MCNC: 20 MG/DL (ref 6–20)
BUN/CREAT SERPL: 17 (ref 12–20)
CALCIUM SERPL-MCNC: 8 MG/DL (ref 8.5–10.1)
CHLORIDE SERPL-SCNC: 111 MMOL/L (ref 97–108)
CO2 SERPL-SCNC: 21 MMOL/L (ref 21–32)
COMMENT, HOLDF: NORMAL
CREAT SERPL-MCNC: 1.17 MG/DL (ref 0.7–1.3)
DIFFERENTIAL METHOD BLD: ABNORMAL
EOSINOPHIL # BLD: 0.3 K/UL (ref 0–0.4)
EOSINOPHIL NFR BLD: 3 % (ref 0–7)
ERYTHROCYTE [DISTWIDTH] IN BLOOD BY AUTOMATED COUNT: 12.9 % (ref 11.5–14.5)
GLUCOSE SERPL-MCNC: 152 MG/DL (ref 65–100)
HCT VFR BLD AUTO: 36.1 % (ref 36.6–50.3)
HGB BLD-MCNC: 11.7 G/DL (ref 12.1–17)
IMM GRANULOCYTES # BLD AUTO: 0 K/UL (ref 0–0.04)
IMM GRANULOCYTES NFR BLD AUTO: 0 % (ref 0–0.5)
LYMPHOCYTES # BLD: 2.1 K/UL (ref 0.8–3.5)
LYMPHOCYTES NFR BLD: 21 % (ref 12–49)
MCH RBC QN AUTO: 32.3 PG (ref 26–34)
MCHC RBC AUTO-ENTMCNC: 32.4 G/DL (ref 30–36.5)
MCV RBC AUTO: 99.7 FL (ref 80–99)
MONOCYTES # BLD: 1 K/UL (ref 0–1)
MONOCYTES NFR BLD: 10 % (ref 5–13)
NEUTS SEG # BLD: 6.6 K/UL (ref 1.8–8)
NEUTS SEG NFR BLD: 66 % (ref 32–75)
NRBC # BLD: 0 K/UL (ref 0–0.01)
NRBC BLD-RTO: 0 PER 100 WBC
PLATELET # BLD AUTO: 125 K/UL (ref 150–400)
PMV BLD AUTO: 9.6 FL (ref 8.9–12.9)
POTASSIUM SERPL-SCNC: 4.5 MMOL/L (ref 3.5–5.1)
RBC # BLD AUTO: 3.62 M/UL (ref 4.1–5.7)
SAMPLES BEING HELD,HOLD: NORMAL
SODIUM SERPL-SCNC: 137 MMOL/L (ref 136–145)
THERAPEUTIC RANGE,PTTT: ABNORMAL SECS (ref 58–77)
THERAPEUTIC RANGE,PTTT: ABNORMAL SECS (ref 58–77)
WBC # BLD AUTO: 10.1 K/UL (ref 4.1–11.1)

## 2021-08-09 PROCEDURE — 85730 THROMBOPLASTIN TIME PARTIAL: CPT

## 2021-08-09 PROCEDURE — 85025 COMPLETE CBC W/AUTO DIFF WBC: CPT

## 2021-08-09 PROCEDURE — 74011250637 HC RX REV CODE- 250/637: Performed by: INTERNAL MEDICINE

## 2021-08-09 PROCEDURE — 80048 BASIC METABOLIC PNL TOTAL CA: CPT

## 2021-08-09 PROCEDURE — 74011250636 HC RX REV CODE- 250/636: Performed by: INTERNAL MEDICINE

## 2021-08-09 PROCEDURE — 93306 TTE W/DOPPLER COMPLETE: CPT

## 2021-08-09 PROCEDURE — 36415 COLL VENOUS BLD VENIPUNCTURE: CPT

## 2021-08-09 PROCEDURE — 74011250636 HC RX REV CODE- 250/636: Performed by: STUDENT IN AN ORGANIZED HEALTH CARE EDUCATION/TRAINING PROGRAM

## 2021-08-09 PROCEDURE — 65660000001 HC RM ICU INTERMED STEPDOWN

## 2021-08-09 RX ORDER — WARFARIN 7.5 MG/1
7.5 TABLET ORAL ONCE
Status: COMPLETED | OUTPATIENT
Start: 2021-08-09 | End: 2021-08-09

## 2021-08-09 RX ORDER — WARFARIN 7.5 MG/1
7.5 TABLET ORAL ONCE
Status: DISCONTINUED | OUTPATIENT
Start: 2021-08-09 | End: 2021-08-09

## 2021-08-09 RX ORDER — ENOXAPARIN SODIUM 150 MG/ML
150 INJECTION SUBCUTANEOUS EVERY 12 HOURS
Status: DISCONTINUED | OUTPATIENT
Start: 2021-08-09 | End: 2021-08-13 | Stop reason: HOSPADM

## 2021-08-09 RX ADMIN — HEPARIN SODIUM 11 UNITS/KG/HR: 10000 INJECTION, SOLUTION INTRAVENOUS at 10:37

## 2021-08-09 RX ADMIN — WARFARIN SODIUM 7.5 MG: 7.5 TABLET ORAL at 13:47

## 2021-08-09 RX ADMIN — Medication 10 ML: at 21:39

## 2021-08-09 RX ADMIN — ATORVASTATIN CALCIUM 20 MG: 20 TABLET, FILM COATED ORAL at 21:37

## 2021-08-09 RX ADMIN — ASPIRIN 81 MG: 81 TABLET, CHEWABLE ORAL at 08:10

## 2021-08-09 RX ADMIN — Medication 10 ML: at 06:00

## 2021-08-09 RX ADMIN — METOPROLOL TARTRATE 12.5 MG: 25 TABLET, FILM COATED ORAL at 08:10

## 2021-08-09 RX ADMIN — Medication 10 ML: at 17:29

## 2021-08-09 RX ADMIN — ENOXAPARIN SODIUM 150 MG: 150 INJECTION SUBCUTANEOUS at 17:28

## 2021-08-09 RX ADMIN — METOPROLOL TARTRATE 12.5 MG: 25 TABLET, FILM COATED ORAL at 21:37

## 2021-08-09 RX ADMIN — ACETAMINOPHEN 650 MG: 325 TABLET ORAL at 20:07

## 2021-08-09 NOTE — PROGRESS NOTES
1611:   TRANSFER - IN REPORT:    Verbal report received from GILBERT Ivan(name) on Polly Lashell  being received from [facility](unit) for routine progression of care      Report consisted of patients Situation, Background, Assessment and   Recommendations(SBAR). Information from the following report(s) SBAR, Kardex, Intake/Output, MAR, Recent Results and Cardiac Rhythm NSR was reviewed with the receiving nurse. Opportunity for questions and clarification was provided. Assessment completed upon patients arrival to unit and care assumed. 1732: Lovenox administered and Heparin drip D/C per Denise Spain and MD Kim.     1930:  End of Shift Note    Bedside shift change report given to [address] [place] [address] (oncoming nurse) by Linda Reynaga RN (offgoing nurse). Report included the following information SBAR, Procedure Summary, Intake/Output, MAR, Recent Results and Cardiac Rhythm NSR    Shift worked:  [id number]  (pt arrived to floor at 1620)     Shift summary and any significant changes:     Heparin drip D/C. Maintenance fluids D/C. Pt is very SOB with any movement. Concerns for physician to address:  none     Zone phone for oncoming shift:          Activity:  Activity Level:  Up with Assistance  Number times ambulated in hallways past shift: 0  Number of times OOB to chair past shift: 0    Cardiac:   Cardiac Monitoring: Yes      Cardiac Rhythm: Sinus Rhythm    Access:   Current line(s): PIV     Genitourinary:   Urinary status: voiding    Respiratory:   O2 Device: None (Room air)  Chronic home O2 use?: NO  Incentive spirometer at bedside: NO     GI:  Last Bowel Movement Date: 08/07/21  Current diet:  ADULT DIET Regular; Low Fat/Low Chol/High Fiber/2 gm Na  DIET ONE TIME MESSAGE  Passing flatus: YES  Tolerating current diet: YES       Pain Management:   Patient states pain is manageable on current regimen: YES    Skin:  Jose Guadalupe Score: 20  Interventions: increase time out of bed and limit briefs    Patient Safety:  Fall Score:  Total Score: 2  Interventions: bed/chair alarm, gripper socks, pt to call before getting OOB and stay with me (per policy)       Length of Stay:  Expected LOS: 5d 7h  Actual LOS: [address], RN

## 2021-08-09 NOTE — PROGRESS NOTES
Problem: Falls - Risk of  Goal: *Absence of Falls  Description: Document Cassie Lynn Fall Risk and appropriate interventions in the flowsheet.   Outcome: Progressing Towards Goal  Note: Fall Risk Interventions:  Mobility Interventions: Patient to call before getting OOB         Medication Interventions: Patient to call before getting OOB                   Problem: Patient Education: Go to Patient Education Activity  Goal: Patient/Family Education  Outcome: Progressing Towards Goal     Problem: Patient Education: Go to Patient Education Activity  Goal: Patient/Family Education  Outcome: Progressing Towards Goal     Problem: Unstable angina/NSTEMI: Day 2  Goal: Off Pathway (Use only if patient is Off Pathway)  Outcome: Progressing Towards Goal  Goal: Activity/Safety  Outcome: Progressing Towards Goal  Goal: Consults, if ordered  Outcome: Progressing Towards Goal  Goal: Diagnostic Test/Procedures  Outcome: Progressing Towards Goal  Goal: Nutrition/Diet  Outcome: Progressing Towards Goal  Goal: Discharge Planning  Outcome: Progressing Towards Goal  Goal: Medications  Outcome: Progressing Towards Goal  Goal: Respiratory  Outcome: Progressing Towards Goal  Goal: Treatments/Interventions/Procedures  Outcome: Progressing Towards Goal  Goal: Psychosocial  Outcome: Progressing Towards Goal  Goal: *Hemodynamically stable  Outcome: Progressing Towards Goal  Goal: *Optimal pain control at patient's stated goal  Outcome: Progressing Towards Goal  Goal: *Lungs clear or at baseline  Outcome: Progressing Towards Goal     Problem: Pulmonary Embolism Care Plan (Adult)  Goal: *Improvement of existing pulmonary embolism  Outcome: Progressing Towards Goal  Goal: *Absence of bleeding  Outcome: Progressing Towards Goal  Goal: *Labs within defined limits  Outcome: Progressing Towards Goal     Problem: Patient Education: Go to Patient Education Activity  Goal: Patient/Family Education  Outcome: Progressing Towards Goal

## 2021-08-09 NOTE — PROGRESS NOTES
Spiritual Care Partner Volunteer visited patient at [address] in [facility] on 8/9/2021     Documented by:  Gertrude Correa M.Div., M.S., .M.  Spiritual Care Provider   Paging Service [phone])

## 2021-08-09 NOTE — PROGRESS NOTES
Hospitalist Progress Note    NAME: Flavia Nur   :  1945   MRN:  [id number]       Assessment / Plan:    Acute pulmonary PE  Hx of DVT x 2  -presented with CP. EKG and trops -ve. -CTA showed Left lower lobar and lingular branch pulmonary embolism  -Initial on heparin gtt. Started on coumadin with lovenox bridging today.    -Lifelong anticoagulation  -Daily PT/INR. Pharmacy managing coumadin.   -Follow up Echo  -Appreciate cards consultation     CAD s/p stents POA  -LHC done yesterday. Moderate CAD with 50% diagonal and 50% mid LCX. No intervention.   -Continue ASA, Lipitor, and BB    Thrombocytopenia  -continue to monitor. Hypertension   Hyperlipidemia   -Continue lipitor and metoprolol  -Resume home lisinopril if BP goes up      30.0 - 39.9 Obese / Body mass index is 39.66 kg/m². -counseling about life style modification      Estimated discharge date:   Barriers:    Code Status: Full code  Surrogate Decision Maker: Wife     DVT Prophylaxis: on Coumadin  GI Prophylaxis: not indicated     Baseline: Patient is independent with ADLs at home     Subjective:     Chief Complaint / Reason for Physician Visit  Discussed with RN events overnight. Complains of left sided chest pain, worse with coughing and deep breathing    Review of Systems:  Symptom Y/N Comments  Symptom Y/N Comments   Fever/Chills    Chest Pain     Poor Appetite    Edema     Cough    Abdominal Pain     Sputum    Joint Pain     SOB/SHEARER    Pruritis/Rash     Nausea/vomit    Tolerating PT/OT     Diarrhea    Tolerating Diet     Constipation    Other       Could NOT obtain due to:      Objective:     VITALS:   Last 24hrs VS reviewed since prior progress note.  Most recent are:  Patient Vitals for the past 24 hrs:   Temp Pulse Resp BP SpO2   21 1537 98.1 °F (36.7 °C) 65 16 (!) 125/55 94 %   21 1440 -- -- -- 129/77 --   21 1101 97.7 °F (36.5 °C) 67 15 129/77 94 %   21 0733 98.8 °F (37.1 °C) 78 17 (!) 119/98 92 %   08/09/21 0400 98.6 °F (37 °C) 86 16 112/80 96 %   08/09/21 0000 98.4 °F (36.9 °C) 70 16 (!) 116/50 92 %   08/08/21 2000 98.6 °F (37 °C) 78 16 (!) 154/56 96 %   08/08/21 1800 -- 78 -- (!) 133/55 97 %   08/08/21 1700 -- 72 -- (!) 129/53 95 %     No intake or output data in the 24 hours ending 08/09/21 1622     I had a face to face encounter and independently examined this patient on 8/9/2021, as outlined below:  PHYSICAL EXAM:  General: WD, WN. Alert, cooperative, no acute distress    EENT:  EOMI. Anicteric sclerae. MMM  Resp:  CTA bilaterally, no wheezing or rales. No accessory muscle use  CV:  Regular  rhythm,  No edema  GI:  Soft, Non distended, Non tender. +Bowel sounds  Neurologic:  Alert and oriented X 3, normal speech,   Psych:   Good insight. Not anxious nor agitated  Skin:  No rashes. No jaundice    Reviewed most current lab test results and cultures  YES  Reviewed most current radiology test results   YES  Review and summation of old records today    NO  Reviewed patient's current orders and MAR    YES  PMH/ reviewed - no change compared to H&P  ________________________________________________________________________  Care Plan discussed with:    Comments   Patient x    Family  x wife   RN     Care Manager     Consultant  x                      Multidiciplinary team rounds were held today with , nursing, pharmacist and clinical coordinator. Patient's plan of care was discussed; medications were reviewed and discharge planning was addressed.      ________________________________________________________________________  Total NON critical care TIME:  45   Minutes    Total CRITICAL CARE TIME Spent:   Minutes non procedure based      Comments   >50% of visit spent in counseling and coordination of care x    ________________________________________________________________________  Jeffry Whitehead MD     Procedures: see electronic medical records for all procedures/Xrays and details which were not copied into this note but were reviewed prior to creation of Plan. LABS:  I reviewed today's most current labs and imaging studies.   Pertinent labs include:  Recent Labs     08/09/21  0006 08/07/21  2333   WBC 10.1 9.0   HGB 11.7* 13.0   HCT 36.1* 38.7   * 149*     Recent Labs     08/09/21  0006 08/07/21  2333    140   K 4.5 4.5   * 112*   CO2 21 23   * 135*   BUN 20 16   CREA 1.17 1.03   CA 8.0* 8.4*   ALB  --  3.2*   TBILI  --  0.4   ALT  --  22       Signed: Miguel Méndez MD

## 2021-08-09 NOTE — PROGRESS NOTES
TRANSFER - OUT REPORT:    Verbal report given to Sherri (name) on Karen Burger  being transferred to [facility] (unit) for routine progression of care       Report consisted of patients Situation, Background, Assessment and   Recommendations(SBAR). Information from the following report(s) SBAR, Kardex, ED Summary, Procedure Summary, Intake/Output, MAR and Recent Results was reviewed with the receiving nurse. Lines:   Peripheral IV 08/07/21 Left Antecubital (Active)   Site Assessment Clean, dry, & intact 08/09/21 1537   Phlebitis Assessment 0 08/09/21 1101   Infiltration Assessment 0 08/09/21 1101   Dressing Status Clean, dry, & intact 08/09/21 1101   Dressing Type Transparent;Tape 08/09/21 1101   Hub Color/Line Status Pink;Flushed 08/09/21 1101   Action Taken Blood drawn 08/07/21 2329   Alcohol Cap Used Yes 08/07/21 2329        Opportunity for questions and clarification was provided.       Patient transported with:   Monitor

## 2021-08-09 NOTE — PROGRESS NOTES
CM attempted to complete initial assessment however pt did not answer bed phone. Unit CM to follow up at a later date.      Anand Saha, Mario, [address]

## 2021-08-09 NOTE — PROGRESS NOTES
Progress Note      8/9/2021 9:48 AM  NAME: Leatha Alberto   MRN:  [id number]   Admit Diagnosis: Chest pain [R07.9]      Problem List:     1. PE    Prolems:  - CAD. Mild Diffuse CAD with patent stents in the RCA (PLB w/ 50% stenosis) by 7/2017 cath. MPI 7/17 w/ EF 59% and inferior ischemia.    - XOL  - Morbid obesity  - DVT in right leg was on coumadin. - DM  - Former smoker  - Family hx CAD     Assessment/Plan:   Cath 8/8 with no significant change in CAD; EF 55%    CTA showed acute PE (LLL and lingular branch)    1. Continue ASA  2. Continue statin  3. Continue heparin gtt and will need oral anticoagulation; he is concerned about cost.  Will start VKA; pharmacy consulted. Goal INR 2-3. He is interested in home monitoring. Will need lovenox for bridge. 4. Needs lifelong AC  5. OK for discharge from my standpoint when ok with primary         [x]       High complexity decision making was performed in this patient at high risk for decompensation with multiple organ involvement. Subjective:     Leatha Alberto says still has CP; breathing is the same. Discussed with RN events overnight. Review of Systems:    Symptom Y/N Comments  Symptom Y/N Comments   Fever/Chills N   Chest Pain N    Poor Appetite N   Edema N    Cough N   Abdominal Pain N    Sputum N   Joint Pain N    SOB/SHEARER N   Pruritis/Rash N    Nausea/vomit N   Tolerating PT/OT Y    Diarrhea N   Tolerating Diet Y    Constipation N   Other       Could NOT obtain due to:      Objective:      Physical Exam:    Last 24hrs VS reviewed since prior progress note. Most recent are:    Visit Vitals  BP (!) 119/98   Pulse 78   Temp 98.8 °F (37.1 °C)   Resp 17   Ht 6' 1\" (1.854 m)   Wt 147.8 kg (325 lb 13.4 oz)   SpO2 92%   BMI 42.99 kg/m²     No intake or output data in the 24 hours ending 08/09/21 0948     General Appearance: Well developed, well nourished, alert & oriented x 3,    no acute distress.   Ears/Nose/Mouth/Throat: Hearing grossly normal.  Neck: Supple. Chest: Lungs clear to auscultation bilaterally. Cardiovascular: Regular rate and rhythm, S1S2 normal, no murmur. Abdomen: Soft, non-tender, bowel sounds are active. Extremities: No edema bilaterally. Skin: Warm and dry. [x]         Post-cath site without hematoma, bruit, tenderness, or thrill. Distal pulses intact. PMH/ reviewed - no change compared to H&P    Data Review    Telemetry: sinus rhythm     EKG:   [x]  No new EKG for review    Lab Data Personally Reviewed:    Recent Labs     08/09/21  0006 08/07/21  2333   WBC 10.1 9.0   HGB 11.7* 13.0   HCT 36.1* 38.7   * 149*     Recent Labs     08/09/21  0800 08/09/21  0006   APTT 68.0* 52.7*      Recent Labs     08/09/21  0006 08/07/21  2333    140   K 4.5 4.5   * 112*   CO2 21 23   BUN 20 16   CREA 1.17 1.03   * 135*   CA 8.0* 8.4*     Recent Labs     08/08/21  0802 08/08/21  0134 08/07/21  2333   TROIQ <0.05 <0.05 <0.05     Lab Results   Component Value Date/Time    Cholesterol, total 133 06/07/2013 02:40 PM    HDL Cholesterol 43 06/07/2013 02:40 PM    LDL, calculated 73.4 06/07/2013 02:40 PM    Triglyceride 83 06/07/2013 02:40 PM    CHOL/HDL Ratio 3.1 06/07/2013 02:40 PM       Recent Labs     08/07/21  2333   AP 66   TP 7.1   ALB 3.2*   GLOB 3.9   LPSE 435*     No results for input(s): PH, PCO2, PO2 in the last 72 hours.     Medications Personally Reviewed:    Current Facility-Administered Medications   Medication Dose Route Frequency    aspirin chewable tablet 81 mg  81 mg Oral DAILY    atorvastatin (LIPITOR) tablet 20 mg  20 mg Oral QHS    sodium chloride (NS) flush 5-40 mL  5-40 mL IntraVENous Q8H    sodium chloride (NS) flush 5-40 mL  5-40 mL IntraVENous PRN    acetaminophen (TYLENOL) tablet 650 mg  650 mg Oral Q6H PRN    Or    acetaminophen (TYLENOL) suppository 650 mg  650 mg Rectal Q6H PRN    polyethylene glycol (MIRALAX) packet 17 g  17 g Oral DAILY PRN    ondansetron (ZOFRAN ODT) tablet 4 mg  4 mg Oral Q8H PRN    Or    ondansetron (ZOFRAN) injection 4 mg  4 mg IntraVENous Q6H PRN    nitroglycerin (NITROSTAT) tablet 0.4 mg  0.4 mg SubLINGual PRN    metoprolol tartrate (LOPRESSOR) tablet 12.5 mg  12.5 mg Oral Q12H    alum-mag hydroxide-simeth (MYLANTA) oral suspension 30 mL  30 mL Oral Q4H PRN    sodium chloride (NS) flush 5-40 mL  5-40 mL IntraVENous Q8H    sodium chloride (NS) flush 5-40 mL  5-40 mL IntraVENous PRN    acetaminophen (TYLENOL) tablet 650 mg  650 mg Oral Q4H PRN    naloxone (NARCAN) injection 0.4 mg  0.4 mg IntraVENous PRN    heparin 25,000 units in D5W 250 ml infusion  10-36 Units/kg/hr IntraVENous TITRATE    heparin (porcine) injection 5,000 Units  5,000 Units IntraVENous PRN    Or    heparin (porcine) injection 10,000 Units  10,000 Units IntraVENous PRN    morphine injection 4 mg  4 mg IntraVENous Q1H PRN         Jason Jennifer III, DO

## 2021-08-09 NOTE — CARDIO/PULMONARY
Cardiac Rehab Note: chart review/referral     Pt is a 63 yo admitted with chest pain, s/p cardiac cath with moderate CAD, no intervention performed. Pt plan for chest CTA. EF 55%  on 8/8/21 per cath. Smoking history assessed. Patient is a former smoker. Smoking Cessation Program link has not been added to the AVS.     Patient not seen at this time due to current condition as indicated in chart.

## 2021-08-09 NOTE — PROGRESS NOTES
Pharmacist Daily Dosing of Warfarin    Indication & Goal INR: PE, INR Goal 2-3    PTA Warfarin Dose: none    Notable concurrent conditions and medications:     Labs:  Recent Labs     08/09/21  0006 08/07/21  2333 08/07/21  2333   HGB 11.7*   < > 13.0   *  --  149*   TBILI  --   --  0.4   ALB  --   --  3.2*    < > = values in this interval not displayed. Impression/Plan:   · Transition from heparin infusion to VKA 8/9  · Plan is life long AC  · Warfarin 7.5mg 8/9 now then next Warfarin dose 8/10 18:00  · Continue heparin infusion (Cards not indicates Lovenox bridge so continue heparin infusion until lovenox bridge started - recommend 6pm to have on toleratble q12h schedule-ok per Dr. Jose Manuel Hancock)  · Daily INR has been ordered  · CBC w/o differential every other day has been ordered     Pharmacy will follow daily and adjust the dose as appropriate.     Thank you,  Barney Soulier, [facility]

## 2021-08-09 NOTE — PROGRESS NOTES
Transition of Care Plan:    RUR: 9% - low risk  Disposition: Home ([address])  Follow up appointments: PCP, Specialists  DME needed: No needs identified. Has cane and rollator. Transportation at Discharge: family member (wife or son-in-law)  Marcatalinaa Bras or means to access home: yes  IM Medicare Letter: 2nd IM Medicare letter to receive prior to discharge. Is patient a BCPI-A Bundle:  No         If yes, was Bundle Letter given?:   N/A  Caregiver Contact: Jesus Manuel Sierra, wife, [phone]  Discharge Caregiver contacted prior to discharge? CM to contact prior to discharge, if pt request.     Reason for Admission:  Chest pain                 RUR Score:   9% - low risk                  Plan for utilizing home health:  No needs identified        PCP: First and Last name:  Dr. Mira Stephen (#: [phone])   Name of Practice:  [facility]   Are you a current patient: Yes/No: yes   Approximate date of last visit:    Can you participate in a virtual visit with your PCP:                     Current Advanced Directive/Advance Care Plan: Full Code   Stew 13 (ACP) Conversation      Date of Conversation: 8/9/2021  Conducted with: Patient with Decision Making Capacity    Healthcare Decision Maker:    Jesus Manuel Sierra, wife, [phone]    Today we documented Decision Maker(s) consistent with Legal Next of Kin hierarchy. Pt states he has an ACP at home. His wife is his primary decision maker. Content/Action Overview: Has ACP document(s) NOT on file - requested patient to provide  Reviewed DNR/DNI and patient elects Full Code (Attempt Resuscitation)    Length of Voluntary ACP Conversation in minutes:  <16 minutes (Non-Billable)    Healthcare Decision Maker:    Jesus Manuel Sierra, wife, [phone]                  Transition of Care Plan:    Home    CM met with patient at the bedside to complete initial assessment.  CM introduced role, verified demographics, and discussed discharge planning. Mr. Ivelisse Craft is a 64year old male admitted for chest pain. He is insured by Medicare A/B and [employer]. He uses the [facility] on [address] [employer]. Pt lives with his wife at [facility] ([place] [address]). Their residence is on the 2nd floor and there is an elevator. He is independent with ADLs and IADLs. Pt drives. He reports he has a rollator and cane. Pt denies hx of HH and SNF but has been in [address] [place] [address] before. At time of discharge, pt will be transported home by a family member (either his wife or son in law). Unit CM will continue to follow. Care Management Interventions  PCP Verified by CM: Yes (Dr. Suly Merrtit )  Mode of Transport at Discharge:  Other (see comment) (family member (wife or son-in-law))  Transition of Care Consult (CM Consult): Discharge Planning  Discharge Durable Medical Equipment: No  Physical Therapy Consult: No  Occupational Therapy Consult: No  Speech Therapy Consult: No  Current Support Network: Lives with Spouse (lives at [facility] ([facility]))  Confirm Follow Up Transport: Self  Discharge Location  Discharge Placement: Home ([facility])    Itzel Escoto RN, BSN, [address] [place] [address] Care Manager  [phone]

## 2021-08-10 ENCOUNTER — APPOINTMENT (OUTPATIENT)
Dept: GENERAL RADIOLOGY | Age: 76
DRG: 286 | End: 2021-08-10
Attending: INTERNAL MEDICINE
Payer: MEDICARE

## 2021-08-10 ENCOUNTER — APPOINTMENT (OUTPATIENT)
Dept: ULTRASOUND IMAGING | Age: 76
DRG: 286 | End: 2021-08-10
Attending: INTERNAL MEDICINE
Payer: MEDICARE

## 2021-08-10 LAB
APTT PPP: 33.9 SEC (ref 22.1–31)
BASOPHILS # BLD: 0 K/UL (ref 0–0.1)
BASOPHILS NFR BLD: 0 % (ref 0–1)
DIFFERENTIAL METHOD BLD: ABNORMAL
ECHO AO ROOT DIAM: 3.58 CM
ECHO AV AREA PEAK VELOCITY: 1.82 CM2
ECHO AV AREA PEAK VELOCITY: 1.83 CM2
ECHO AV AREA PEAK VELOCITY: 1.84 CM2
ECHO AV AREA PEAK VELOCITY: 1.84 CM2
ECHO AV AREA VTI: 2.15 CM2
ECHO AV AREA/BSA VTI: 0.8 CM2/M2
ECHO AV CUSP MM: 2.07 CM
ECHO AV MEAN GRADIENT: 9.32 MMHG
ECHO AV PEAK GRADIENT: 17.27 MMHG
ECHO AV PEAK GRADIENT: 17.55 MMHG
ECHO AV PEAK VELOCITY: 207.74 CM/S
ECHO AV PEAK VELOCITY: 209.34 CM/S
ECHO AV VTI: 37.61 CM
ECHO EST RA PRESSURE: 3 MMHG
ECHO LA AREA 4C: 17.45 CM2
ECHO LA MAJOR AXIS: 3.97 CM
ECHO LA MINOR AXIS: 1.45 CM
ECHO LA TO AORTIC ROOT RATIO: 0.99
ECHO LA TO AORTIC ROOT RATIO: 0.99
ECHO LA VOL 2C: 67.68 ML (ref 18–58)
ECHO LA VOL 4C: 41.6 ML (ref 18–58)
ECHO LA VOL BP: 59.79 ML (ref 18–58)
ECHO LA VOL/BSA BIPLANE: 21.9 ML/M2 (ref 16–28)
ECHO LA VOLUME INDEX A2C: 24.79 ML/M2 (ref 16–28)
ECHO LA VOLUME INDEX A4C: 15.24 ML/M2 (ref 16–28)
ECHO LV E' LATERAL VELOCITY: 11.43 CM/S
ECHO LV E' SEPTAL VELOCITY: 9.88 CM/S
ECHO LV INTERNAL DIMENSION DIASTOLIC: 5.11 CM (ref 4.2–5.9)
ECHO LV INTERNAL DIMENSION SYSTOLIC: 3.08 CM
ECHO LV IVSD: 1.63 CM (ref 0.6–1)
ECHO LV IVSS: 1.91 CM
ECHO LV MASS 2D: 386.2 G (ref 88–224)
ECHO LV MASS INDEX 2D: 141.5 G/M2 (ref 49–115)
ECHO LV POSTERIOR WALL DIASTOLIC: 1.68 CM (ref 0.6–1)
ECHO LV POSTERIOR WALL SYSTOLIC: 1.7 CM
ECHO LVOT DIAM: 2.06 CM
ECHO LVOT PEAK GRADIENT: 5.28 MMHG
ECHO LVOT PEAK GRADIENT: 5.29 MMHG
ECHO LVOT PEAK VELOCITY: 114.77 CM/S
ECHO LVOT PEAK VELOCITY: 114.97 CM/S
ECHO LVOT SV: 80.7 ML
ECHO LVOT VTI: 24.28 CM
ECHO MV A VELOCITY: 75.14 CM/S
ECHO MV E DECELERATION TIME (DT): 245.13 MS
ECHO MV E VELOCITY: 65.1 CM/S
ECHO MV E/A RATIO: 0.87
ECHO MV E/E' LATERAL: 5.7
ECHO MV E/E' RATIO (AVERAGED): 6.14
ECHO MV E/E' SEPTAL: 6.59
ECHO PV MAX VELOCITY: 90.39 CM/S
ECHO PV PEAK INSTANTANEOUS GRADIENT SYSTOLIC: 3.27 MMHG
ECHO RIGHT VENTRICULAR SYSTOLIC PRESSURE (RVSP): 17.97 MMHG
ECHO RV INTERNAL DIMENSION: 3.74 CM
ECHO TV REGURGITANT MAX VELOCITY: 178.6 CM/S
ECHO TV REGURGITANT MAX VELOCITY: 193.44 CM/S
ECHO TV REGURGITANT PEAK GRADIENT: 14.97 MMHG
EOSINOPHIL # BLD: 0.4 K/UL (ref 0–0.4)
EOSINOPHIL NFR BLD: 5 % (ref 0–7)
ERYTHROCYTE [DISTWIDTH] IN BLOOD BY AUTOMATED COUNT: 12.6 % (ref 11.5–14.5)
HCT VFR BLD AUTO: 35.3 % (ref 36.6–50.3)
HGB BLD-MCNC: 11.6 G/DL (ref 12.1–17)
IMM GRANULOCYTES # BLD AUTO: 0 K/UL (ref 0–0.04)
IMM GRANULOCYTES NFR BLD AUTO: 0 % (ref 0–0.5)
INR PPP: 1.1 (ref 0.9–1.1)
LVOT MG: 3.14 MMHG
LYMPHOCYTES # BLD: 2.4 K/UL (ref 0.8–3.5)
LYMPHOCYTES NFR BLD: 26 % (ref 12–49)
MCH RBC QN AUTO: 32.3 PG (ref 26–34)
MCHC RBC AUTO-ENTMCNC: 32.9 G/DL (ref 30–36.5)
MCV RBC AUTO: 98.3 FL (ref 80–99)
MONOCYTES # BLD: 0.9 K/UL (ref 0–1)
MONOCYTES NFR BLD: 10 % (ref 5–13)
NEUTS SEG # BLD: 5.2 K/UL (ref 1.8–8)
NEUTS SEG NFR BLD: 59 % (ref 32–75)
NRBC # BLD: 0 K/UL (ref 0–0.01)
NRBC BLD-RTO: 0 PER 100 WBC
PLATELET # BLD AUTO: 125 K/UL (ref 150–400)
PMV BLD AUTO: 9.5 FL (ref 8.9–12.9)
PROTHROMBIN TIME: 11.2 SEC (ref 9–11.1)
RBC # BLD AUTO: 3.59 M/UL (ref 4.1–5.7)
THERAPEUTIC RANGE,PTTT: ABNORMAL SECS (ref 58–77)
URATE SERPL-MCNC: 5 MG/DL (ref 3.5–7.2)
WBC # BLD AUTO: 8.9 K/UL (ref 4.1–11.1)

## 2021-08-10 PROCEDURE — 2709999900 HC NON-CHARGEABLE SUPPLY

## 2021-08-10 PROCEDURE — 65660000001 HC RM ICU INTERMED STEPDOWN

## 2021-08-10 PROCEDURE — 85730 THROMBOPLASTIN TIME PARTIAL: CPT

## 2021-08-10 PROCEDURE — 84550 ASSAY OF BLOOD/URIC ACID: CPT

## 2021-08-10 PROCEDURE — 85025 COMPLETE CBC W/AUTO DIFF WBC: CPT

## 2021-08-10 PROCEDURE — 77030029684 HC NEB SM VOL KT MONA -A

## 2021-08-10 PROCEDURE — 74011250637 HC RX REV CODE- 250/637: Performed by: INTERNAL MEDICINE

## 2021-08-10 PROCEDURE — 36415 COLL VENOUS BLD VENIPUNCTURE: CPT

## 2021-08-10 PROCEDURE — 74011000250 HC RX REV CODE- 250: Performed by: INTERNAL MEDICINE

## 2021-08-10 PROCEDURE — 85610 PROTHROMBIN TIME: CPT

## 2021-08-10 PROCEDURE — 73560 X-RAY EXAM OF KNEE 1 OR 2: CPT

## 2021-08-10 PROCEDURE — 93970 EXTREMITY STUDY: CPT

## 2021-08-10 PROCEDURE — 74011250636 HC RX REV CODE- 250/636: Performed by: STUDENT IN AN ORGANIZED HEALTH CARE EDUCATION/TRAINING PROGRAM

## 2021-08-10 RX ORDER — BENZONATATE 100 MG/1
100 CAPSULE ORAL
Status: DISCONTINUED | OUTPATIENT
Start: 2021-08-10 | End: 2021-08-13 | Stop reason: HOSPADM

## 2021-08-10 RX ORDER — LIDOCAINE 4 G/100G
1 PATCH TOPICAL EVERY 24 HOURS
Status: DISCONTINUED | OUTPATIENT
Start: 2021-08-10 | End: 2021-08-13 | Stop reason: HOSPADM

## 2021-08-10 RX ORDER — IPRATROPIUM BROMIDE AND ALBUTEROL SULFATE 2.5; .5 MG/3ML; MG/3ML
3 SOLUTION RESPIRATORY (INHALATION)
Status: DISCONTINUED | OUTPATIENT
Start: 2021-08-10 | End: 2021-08-13 | Stop reason: HOSPADM

## 2021-08-10 RX ADMIN — ACETAMINOPHEN 650 MG: 325 TABLET ORAL at 21:49

## 2021-08-10 RX ADMIN — Medication 10 ML: at 14:17

## 2021-08-10 RX ADMIN — ENOXAPARIN SODIUM 150 MG: 150 INJECTION SUBCUTANEOUS at 06:10

## 2021-08-10 RX ADMIN — ATORVASTATIN CALCIUM 20 MG: 20 TABLET, FILM COATED ORAL at 21:48

## 2021-08-10 RX ADMIN — IPRATROPIUM BROMIDE AND ALBUTEROL SULFATE 3 ML: .5; 3 SOLUTION RESPIRATORY (INHALATION) at 23:46

## 2021-08-10 RX ADMIN — METOPROLOL TARTRATE 12.5 MG: 25 TABLET, FILM COATED ORAL at 08:22

## 2021-08-10 RX ADMIN — ENOXAPARIN SODIUM 150 MG: 150 INJECTION SUBCUTANEOUS at 17:17

## 2021-08-10 RX ADMIN — WARFARIN SODIUM 7.5 MG: 5 TABLET ORAL at 17:18

## 2021-08-10 RX ADMIN — Medication 10 ML: at 06:11

## 2021-08-10 RX ADMIN — ASPIRIN 81 MG: 81 TABLET, CHEWABLE ORAL at 08:22

## 2021-08-10 RX ADMIN — POLYETHYLENE GLYCOL 3350 17 G: 17 POWDER, FOR SOLUTION ORAL at 08:21

## 2021-08-10 RX ADMIN — METOPROLOL TARTRATE 12.5 MG: 25 TABLET, FILM COATED ORAL at 21:48

## 2021-08-10 RX ADMIN — BENZONATATE 100 MG: 100 CAPSULE ORAL at 15:52

## 2021-08-10 RX ADMIN — Medication 10 ML: at 21:49

## 2021-08-10 NOTE — PROGRESS NOTES
Hospitalist Progress Note    NAME: Harjinder Iyer   :  1945   MRN:  [id number]       Assessment / Plan:    Acute pulmonary PE  Hx of DVT x 2  Right knee pain and swelling  -presented with CP. EKG and trops -ve. -CTA showed Left lower lobar and lingular branch pulmonary embolism  -Initial on heparin gtt. Started on coumadin with lovenox bridging   -Lifelong anticoagulation  -Daily PT/INR. Pharmacy managing coumadin.   -Follow up Echo  -Appreciate cards consultation   08/10  Lower extremity Doppler showed  . Left leg DVT. 2. No right leg DVT. 3. Right knee joint effusion and Baker's cyst.  Patient is already on Coumadin, INR not therapeutic yet  Will apply lidocaine patch, consult Ortho for the knee effusion     CAD s/p stents POA  -LHC done: Lawanda Turner Moderate CAD with 50% diagonal and 50% mid LCX. No intervention.   -Continue ASA, Lipitor, and BB    Thrombocytopenia  -continue to monitor. Hypertension   Hyperlipidemia   -Continue lipitor and metoprolol  -Resume home lisinopril if BP goes up      30.0 - 39.9 Obese / Body mass index is 39.66 kg/m². -counseling about life style modification      Estimated discharge date:   Barriers: Need a therapeutic INR    Code Status: Full code  Surrogate Decision Maker: Wife     DVT Prophylaxis: on Coumadin  GI Prophylaxis: not indicated     Baseline: Patient is independent with ADLs at home     Subjective:     Chief Complaint / Reason for Physician Visit  Discussed with RN events overnight. Complain of right knee pain and swelling    Review of Systems:  Symptom Y/N Comments  Symptom Y/N Comments   Fever/Chills n   Chest Pain n    Poor Appetite    Edema     Cough    Abdominal Pain n    Sputum    Joint Pain y    SOB/SHEARER    Pruritis/Rash     Nausea/vomit n   Tolerating PT/OT     Diarrhea    Tolerating Diet y    Constipation    Other       Could NOT obtain due to:      Objective:     VITALS:   Last 24hrs VS reviewed since prior progress note.  Most recent are:  Patient Vitals for the past 24 hrs:   Temp Pulse Resp BP SpO2   08/10/21 0816 98 °F (36.7 °C) 84 20 139/74 91 %   08/10/21 0252 97.9 °F (36.6 °C) 90 20 134/69 94 %   08/09/21 2313 97.5 °F (36.4 °C) 68 23 (!) 134/55 91 %   08/09/21 2137 -- 71 -- (!) 140/61 --   08/09/21 1959 98 °F (36.7 °C) 74 22 136/60 91 %   08/09/21 1624 98.2 °F (36.8 °C) 67 16 (!) 149/63 95 %   08/09/21 1537 98.1 °F (36.7 °C) 65 16 (!) 125/55 94 %   08/09/21 1440 -- -- -- 129/77 --   08/09/21 1101 97.7 °F (36.5 °C) 67 15 129/77 94 %       Intake/Output Summary (Last 24 hours) at 8/10/2021 0819  Last data filed at 8/10/2021 0444  Gross per 24 hour   Intake 863.79 ml   Output 1525 ml   Net -661.21 ml        I had a face to face encounter and independently examined this patient on 8/10/2021, as outlined below:  PHYSICAL EXAM:  General: WD, WN. Alert, cooperative, no acute distress    EENT:  EOMI. Anicteric sclerae. MMM  Resp:  CTA bilaterally, no wheezing or rales. No accessory muscle use  CV:  Regular  rhythm,  No edema  GI:  Soft, Non distended, Non tender. +Bowel sounds  Neurologic:  Alert and oriented X 3, normal speech,   Psych:   Good insight. Not anxious nor agitated  Skin:  No rashes. No jaundice    Reviewed most current lab test results and cultures  YES  Reviewed most current radiology test results   YES  Review and summation of old records today    NO  Reviewed patient's current orders and MAR    YES  PMH/SH reviewed - no change compared to H&P  ________________________________________________________________________  Care Plan discussed with:    Comments   Patient x    Family  x wife   RN     Care Manager     Consultant  x                      Multidiciplinary team rounds were held today with , nursing, pharmacist and clinical coordinator. Patient's plan of care was discussed; medications were reviewed and discharge planning was addressed. ________________________________________________________________________  Total NON critical care TIME:  45   Minutes    Total CRITICAL CARE TIME Spent:   Minutes non procedure based      Comments   >50% of visit spent in counseling and coordination of care x    ________________________________________________________________________  Lisbeth Cheema MD     Procedures: see electronic medical records for all procedures/Xrays and details which were not copied into this note but were reviewed prior to creation of Plan. LABS:  I reviewed today's most current labs and imaging studies.   Pertinent labs include:  Recent Labs     08/10/21  0131 08/09/21  0006 08/07/21  2333   WBC 8.9 10.1 9.0   HGB 11.6* 11.7* 13.0   HCT 35.3* 36.1* 38.7   * 125* 149*     Recent Labs     08/10/21  0131 08/09/21  0006 08/07/21  2333   NA  --  137 140   K  --  4.5 4.5   CL  --  111* 112*   CO2  --  21 23   GLU  --  152* 135*   BUN  --  20 16   CREA  --  1.17 1.03   CA  --  8.0* 8.4*   ALB  --   --  3.2*   TBILI  --   --  0.4   ALT  --   --  22   INR 1.1  --   --        Signed: Lisbeth Cheema MD

## 2021-08-10 NOTE — PROGRESS NOTES
Problem: Falls - Risk of  Goal: *Absence of Falls  Description: Document Laurence Kanchan Fall Risk and appropriate interventions in the flowsheet.   Outcome: Progressing Towards Goal  Note: Fall Risk Interventions:  Mobility Interventions: Bed/chair exit alarm, Communicate number of staff needed for ambulation/transfer, Patient to call before getting OOB         Medication Interventions: Bed/chair exit alarm, Evaluate medications/consider consulting pharmacy, Patient to call before getting OOB         History of Falls Interventions: Bed/chair exit alarm

## 2021-08-10 NOTE — PROGRESS NOTES
Pharmacist Daily Dosing of Warfarin    Indication & Goal INR: PE, INR Goal 2-3    PTA Warfarin Dose: none    Notable concurrent conditions and medications: Aspirin, Enoxaparin 150 mg every 12 hours    Labs:  Recent Labs     08/10/21  0131 08/09/21  0006 08/09/21  0006 08/07/21  2333 08/07/21  2333   INR 1.1  --   --   --   --    HGB 11.6*   < > 11.7*   < > 13.0   *  --  125*  --  149*   TBILI  --   --   --   --  0.4   ALB  --   --   --   --  3.2*    < > = values in this interval not displayed. Impression/Plan:   Will order warfarin 7.5 mg x 1 dose  Will continue current regimen for enoxaparin as appropriate for indication, weight, and renal function. Daily INR has been ordered  CBC w/o differential every other day has been ordered     Pharmacy will follow daily and adjust the dose as appropriate.     Thank you,  Marie Hurtado, PHARMD

## 2021-08-10 NOTE — PROGRESS NOTES
Physical Therapy  Referral received and chart reviewed. Noted patient pending RLE doppler to r/o DVT secondary to increased right knee/LE pain. Will defer PT evaluation until post results.    Jhonathan May, PT, DPT

## 2021-08-10 NOTE — PROGRESS NOTES
6114: Patient had six beats of v tach. He is asymptomatic. Notified NP, will continue to monitor. 4585: Patient complaining of RLE pain. His RLE is hotter to the touch than his left. Notified NP, will continue to monitor. 0700: End of Shift Note    Bedside shift change report given to GILBERT Luther (oncoming nurse) by Patricia Hogue (offgoing nurse). Report included the following information SBAR, Kardex, Intake/Output, MAR, Recent Results and Cardiac Rhythm NSR    Shift worked:  Night     Shift summary and any significant changes:     See note above     Concerns for physician to address:       Zone phone for oncoming shift:          Activity:  Activity Level: Up with Assistance  Number times ambulated in hallways past shift: 0  Number of times OOB to chair past shift: 0    Cardiac:   Cardiac Monitoring: Yes      Cardiac Rhythm: Sinus Rhythm    Access:   Current line(s): PIV     Genitourinary:   Urinary status: voiding    Respiratory:   O2 Device: None (Room air)  Chronic home O2 use?: NO  Incentive spirometer at bedside: NO     GI:  Last Bowel Movement Date: 08/07/21  Current diet:  ADULT DIET Regular; Low Fat/Low Chol/High Fiber/2 gm Na  DIET ONE TIME MESSAGE  Passing flatus: YES  Tolerating current diet: YES       Pain Management:   Patient states pain is manageable on current regimen: YES    Skin:  Jose Guadalupe Score: 20  Interventions: increase time out of bed and PT/OT consult    Patient Safety:  Fall Score:  Total Score: 2  Interventions: bed/chair alarm, gripper socks and pt to call before getting OOB  High Fall Risk: Yes    Length of Stay:  Expected LOS: 5d 7h  Actual LOS: 2      Manisha Ndiaye

## 2021-08-10 NOTE — PROGRESS NOTES
5469: Bedside shift change report given to Homa RN (oncoming nurse) by [facility], RN (offgoing nurse). Report included the following information SBAR, Intake/Output, MAR, Recent Results and Cardiac Rhythm NSR.     0888: [employer] to Dr. Olivia Rhodes regarding concerns for breathing - wheezing and unable to cough anything up. Will see if she would like mucinex and neb treatments. Pt does not have PT/OT orders so asked for orders for PT/OT. Also, pt's R knee is more painful and warm to touch, will see if she would like to do RLE doppler.

## 2021-08-10 NOTE — PROGRESS NOTES
Problem: Falls - Risk of  Goal: *Absence of Falls  Description: Document Blanka Chavira Fall Risk and appropriate interventions in the flowsheet.   Outcome: Progressing Towards Goal  Note: Fall Risk Interventions:  Mobility Interventions: Bed/chair exit alarm, OT consult for ADLs, Patient to call before getting OOB, PT Consult for mobility concerns, PT Consult for assist device competence, Strengthening exercises (ROM-active/passive), Utilize walker, cane, or other assistive device         Medication Interventions: Bed/chair exit alarm, Patient to call before getting OOB, Teach patient to arise slowly    Elimination Interventions: Bed/chair exit alarm, Call light in reach, Patient to call for help with toileting needs, Toilet paper/wipes in reach, Toileting schedule/hourly rounds, Urinal in reach    History of Falls Interventions: Bed/chair exit alarm, Door open when patient unattended, Room close to nurse's station         Problem: Patient Education: Go to Patient Education Activity  Goal: Patient/Family Education  Outcome: Progressing Towards Goal     Problem: Patient Education: Go to Patient Education Activity  Goal: Patient/Family Education  Outcome: Progressing Towards Goal     Problem: Unstable angina/NSTEMI: Day 2  Goal: Off Pathway (Use only if patient is Off Pathway)  Outcome: Progressing Towards Goal  Goal: Activity/Safety  Outcome: Progressing Towards Goal  Goal: Consults, if ordered  Outcome: Progressing Towards Goal  Goal: Diagnostic Test/Procedures  Outcome: Progressing Towards Goal  Goal: Nutrition/Diet  Outcome: Progressing Towards Goal  Goal: Discharge Planning  Outcome: Progressing Towards Goal  Goal: Medications  Outcome: Progressing Towards Goal  Goal: Respiratory  Outcome: Progressing Towards Goal  Goal: Treatments/Interventions/Procedures  Outcome: Progressing Towards Goal  Goal: Psychosocial  Outcome: Progressing Towards Goal  Goal: *Hemodynamically stable  Outcome: Progressing Towards Goal  Goal: *Optimal pain control at patient's stated goal  Outcome: Progressing Towards Goal  Goal: *Lungs clear or at baseline  Outcome: Progressing Towards Goal     Problem: Pulmonary Embolism Care Plan (Adult)  Goal: *Improvement of existing pulmonary embolism  Outcome: Progressing Towards Goal  Goal: *Absence of bleeding  Outcome: Progressing Towards Goal  Goal: *Labs within defined limits  Outcome: Progressing Towards Goal     Problem: Patient Education: Go to Patient Education Activity  Goal: Patient/Family Education  Outcome: Progressing Towards Goal

## 2021-08-10 NOTE — PROGRESS NOTES
Progress Note      8/10/2021 9:48 AM  NAME: Maria Del Carmen Irene   MRN:  [id number]   Admit Diagnosis: Chest pain [R07.9]      Problem List:     1. PE    Prolems:  - CAD. Mild Diffuse CAD with patent stents in the RCA (PLB w/ 50% stenosis) by 7/2017 cath. MPI 7/17 w/ EF 59% and inferior ischemia.    - XOL  - Morbid obesity  - DVT in right leg was on coumadin. - DM  - Former smoker  - Family hx CAD     Assessment/Plan:   Cath 8/8 with no significant change in CAD; EF 55%    CTA showed acute PE (LLL and lingular branch)    Pt says he cannot go home today. Congestion, leg pain, ABD pain, CP, and SOB all continue. 1. Continue ASA  2. Continue statin  3. Continue VKA; pharmacy to manage while in house. Goal INR 2-3. He is interested in home monitoring. Will need lovenox for bridge. 4. Needs lifelong AC  5. OK for discharge from my standpoint when ok with primary  6. No new recs         [x]       High complexity decision making was performed in this patient at high risk for decompensation with multiple organ involvement. Subjective:     Maria Del Carmen Irene says still has CP; breathing is the same. Discussed with RN events overnight. Review of Systems:    Symptom Y/N Comments  Symptom Y/N Comments   Fever/Chills N   Chest Pain N    Poor Appetite N   Edema N    Cough N   Abdominal Pain N    Sputum N   Joint Pain N    SOB/SHEARER N   Pruritis/Rash N    Nausea/vomit N   Tolerating PT/OT Y    Diarrhea N   Tolerating Diet Y    Constipation N   Other       Could NOT obtain due to:      Objective:      Physical Exam:    Last 24hrs VS reviewed since prior progress note.  Most recent are:    Visit Vitals  /69   Pulse 90   Temp 97.9 °F (36.6 °C)   Resp 20   Ht 6' 4\" (1.93 m)   Wt 147.8 kg (325 lb 13.4 oz)   SpO2 94%   BMI 39.66 kg/m²       Intake/Output Summary (Last 24 hours) at 8/10/2021 0707  Last data filed at 8/10/2021 0444  Gross per 24 hour   Intake 863.79 ml   Output 1525 ml   Net -661.21 ml General Appearance: Well developed, well nourished, alert & oriented x 3,    no acute distress. Ears/Nose/Mouth/Throat: Hearing grossly normal.  Neck: Supple. Chest: Lungs clear to auscultation bilaterally. Cardiovascular: Regular rate and rhythm, S1S2 normal, no murmur. Abdomen: Soft, non-tender, bowel sounds are active. Extremities: No edema bilaterally. Skin: Warm and dry. [x]         Post-cath site without hematoma, bruit, tenderness, or thrill. Distal pulses intact. PMH/ reviewed - no change compared to H&P    Data Review    Telemetry: sinus rhythm     EKG:   [x]  No new EKG for review    Lab Data Personally Reviewed:    Recent Labs     08/10/21  0131 08/09/21  0006   WBC 8.9 10.1   HGB 11.6* 11.7*   HCT 35.3* 36.1*   * 125*     Recent Labs     08/10/21  0131 08/09/21  0800 08/09/21  0006   INR 1.1  --   --    PTP 11.2*  --   --    APTT 33.9* 68.0* 52.7*      Recent Labs     08/09/21  0006 08/07/21  2333    140   K 4.5 4.5   * 112*   CO2 21 23   BUN 20 16   CREA 1.17 1.03   * 135*   CA 8.0* 8.4*     Recent Labs     08/08/21  0802 08/08/21  0134 08/07/21  2333   TROIQ <0.05 <0.05 <0.05     Lab Results   Component Value Date/Time    Cholesterol, total 133 06/07/2013 02:40 PM    HDL Cholesterol 43 06/07/2013 02:40 PM    LDL, calculated 73.4 06/07/2013 02:40 PM    Triglyceride 83 06/07/2013 02:40 PM    CHOL/HDL Ratio 3.1 06/07/2013 02:40 PM       Recent Labs     08/07/21  2333   AP 66   TP 7.1   ALB 3.2*   GLOB 3.9   LPSE 435*     No results for input(s): PH, PCO2, PO2 in the last 72 hours.     Medications Personally Reviewed:    Current Facility-Administered Medications   Medication Dose Route Frequency    WARFARIN INFORMATION NOTE (COUMADIN)   Other QPM    enoxaparin (LOVENOX) injection 150 mg  150 mg SubCUTAneous Q12H    aspirin chewable tablet 81 mg  81 mg Oral DAILY    atorvastatin (LIPITOR) tablet 20 mg  20 mg Oral QHS    acetaminophen (TYLENOL) tablet 650 mg 650 mg Oral Q6H PRN    Or    acetaminophen (TYLENOL) suppository 650 mg  650 mg Rectal Q6H PRN    polyethylene glycol (MIRALAX) packet 17 g  17 g Oral DAILY PRN    ondansetron (ZOFRAN ODT) tablet 4 mg  4 mg Oral Q8H PRN    Or    ondansetron (ZOFRAN) injection 4 mg  4 mg IntraVENous Q6H PRN    nitroglycerin (NITROSTAT) tablet 0.4 mg  0.4 mg SubLINGual PRN    metoprolol tartrate (LOPRESSOR) tablet 12.5 mg  12.5 mg Oral Q12H    alum-mag hydroxide-simeth (MYLANTA) oral suspension 30 mL  30 mL Oral Q4H PRN    sodium chloride (NS) flush 5-40 mL  5-40 mL IntraVENous Q8H    sodium chloride (NS) flush 5-40 mL  5-40 mL IntraVENous PRN    acetaminophen (TYLENOL) tablet 650 mg  650 mg Oral Q4H PRN    naloxone (NARCAN) injection 0.4 mg  0.4 mg IntraVENous PRN    morphine injection 4 mg  4 mg IntraVENous Q1H PRN         Kameron Chavez III, DO

## 2021-08-11 LAB
APTT PPP: 31.3 SEC (ref 22.1–31)
APTT PPP: 35.9 SEC (ref 22.1–31)
ERYTHROCYTE [DISTWIDTH] IN BLOOD BY AUTOMATED COUNT: 12.6 % (ref 11.5–14.5)
HCT VFR BLD AUTO: 35.2 % (ref 36.6–50.3)
HGB BLD-MCNC: 11.7 G/DL (ref 12.1–17)
INR PPP: 1.1 (ref 0.9–1.1)
MCH RBC QN AUTO: 32.9 PG (ref 26–34)
MCHC RBC AUTO-ENTMCNC: 33.2 G/DL (ref 30–36.5)
MCV RBC AUTO: 98.9 FL (ref 80–99)
NRBC # BLD: 0 K/UL (ref 0–0.01)
NRBC BLD-RTO: 0 PER 100 WBC
PLATELET # BLD AUTO: 144 K/UL (ref 150–400)
PMV BLD AUTO: 9.8 FL (ref 8.9–12.9)
PROTHROMBIN TIME: 11.6 SEC (ref 9–11.1)
RBC # BLD AUTO: 3.56 M/UL (ref 4.1–5.7)
THERAPEUTIC RANGE,PTTT: ABNORMAL SECS (ref 58–77)
THERAPEUTIC RANGE,PTTT: ABNORMAL SECS (ref 58–77)
WBC # BLD AUTO: 8.6 K/UL (ref 4.1–11.1)

## 2021-08-11 PROCEDURE — 74011250636 HC RX REV CODE- 250/636: Performed by: PHYSICIAN ASSISTANT

## 2021-08-11 PROCEDURE — 74011000250 HC RX REV CODE- 250: Performed by: INTERNAL MEDICINE

## 2021-08-11 PROCEDURE — 85730 THROMBOPLASTIN TIME PARTIAL: CPT

## 2021-08-11 PROCEDURE — 36415 COLL VENOUS BLD VENIPUNCTURE: CPT

## 2021-08-11 PROCEDURE — 85610 PROTHROMBIN TIME: CPT

## 2021-08-11 PROCEDURE — 74011250637 HC RX REV CODE- 250/637: Performed by: INTERNAL MEDICINE

## 2021-08-11 PROCEDURE — 74011250636 HC RX REV CODE- 250/636: Performed by: STUDENT IN AN ORGANIZED HEALTH CARE EDUCATION/TRAINING PROGRAM

## 2021-08-11 PROCEDURE — 97530 THERAPEUTIC ACTIVITIES: CPT

## 2021-08-11 PROCEDURE — 97535 SELF CARE MNGMENT TRAINING: CPT

## 2021-08-11 PROCEDURE — 0S9C3ZX DRAINAGE OF RIGHT KNEE JOINT, PERCUTANEOUS APPROACH, DIAGNOSTIC: ICD-10-PCS | Performed by: PHYSICIAN ASSISTANT

## 2021-08-11 PROCEDURE — 85027 COMPLETE CBC AUTOMATED: CPT

## 2021-08-11 PROCEDURE — 74011000250 HC RX REV CODE- 250: Performed by: PHYSICIAN ASSISTANT

## 2021-08-11 PROCEDURE — 97166 OT EVAL MOD COMPLEX 45 MIN: CPT

## 2021-08-11 PROCEDURE — 65660000001 HC RM ICU INTERMED STEPDOWN

## 2021-08-11 RX ORDER — METHYLPREDNISOLONE ACETATE 40 MG/ML
40 INJECTION, SUSPENSION INTRA-ARTICULAR; INTRALESIONAL; INTRAMUSCULAR; SOFT TISSUE ONCE
Status: COMPLETED | OUTPATIENT
Start: 2021-08-11 | End: 2021-08-11

## 2021-08-11 RX ORDER — METOPROLOL TARTRATE 25 MG/1
6.25 TABLET, FILM COATED ORAL EVERY 12 HOURS
Status: DISCONTINUED | OUTPATIENT
Start: 2021-08-11 | End: 2021-08-13 | Stop reason: HOSPADM

## 2021-08-11 RX ORDER — LIDOCAINE HYDROCHLORIDE 10 MG/ML
10 INJECTION, SOLUTION EPIDURAL; INFILTRATION; INTRACAUDAL; PERINEURAL ONCE
Status: COMPLETED | OUTPATIENT
Start: 2021-08-11 | End: 2021-08-11

## 2021-08-11 RX ADMIN — Medication 10 ML: at 05:22

## 2021-08-11 RX ADMIN — METOPROLOL TARTRATE 6.25 MG: 25 TABLET, FILM COATED ORAL at 21:33

## 2021-08-11 RX ADMIN — LIDOCAINE HYDROCHLORIDE 10 ML: 10 INJECTION, SOLUTION EPIDURAL; INFILTRATION; INTRACAUDAL; PERINEURAL at 15:37

## 2021-08-11 RX ADMIN — ENOXAPARIN SODIUM 150 MG: 150 INJECTION SUBCUTANEOUS at 05:22

## 2021-08-11 RX ADMIN — METHYLPREDNISOLONE ACETATE 40 MG: 40 INJECTION, SUSPENSION INTRA-ARTICULAR; INTRALESIONAL; INTRAMUSCULAR; SOFT TISSUE at 15:37

## 2021-08-11 RX ADMIN — Medication 5 ML: at 14:00

## 2021-08-11 RX ADMIN — METOPROLOL TARTRATE 12.5 MG: 25 TABLET, FILM COATED ORAL at 11:30

## 2021-08-11 RX ADMIN — ENOXAPARIN SODIUM 150 MG: 150 INJECTION SUBCUTANEOUS at 18:53

## 2021-08-11 RX ADMIN — ATORVASTATIN CALCIUM 20 MG: 20 TABLET, FILM COATED ORAL at 21:34

## 2021-08-11 RX ADMIN — ASPIRIN 81 MG: 81 TABLET, CHEWABLE ORAL at 11:30

## 2021-08-11 RX ADMIN — BENZONATATE 100 MG: 100 CAPSULE ORAL at 00:42

## 2021-08-11 RX ADMIN — Medication 10 ML: at 21:36

## 2021-08-11 RX ADMIN — WARFARIN SODIUM 7.5 MG: 5 TABLET ORAL at 18:53

## 2021-08-11 NOTE — CONSULTS
ORTHOPAEDIC CONSULT NOTE    Subjective:     Date of Consultation:  2021      Lorin Calixto is a 64 y.o. male who is being seen for right knee pain and swelling. Three days of pain and swelling, worse over the lateral aspect, worse with WB and ambulation. Pt states he has difficulty lifting he thigh, \"no strength in it\". Hx of DJD and prior knee arthritic flair-ups. Seen in the past by Dr Bennie Kruse and Dr Arnel Flores). Ambulates at baseline with rollator. Pt. Denies injury or trauma. Lives at home with wife(at bedside)      Patient Active Problem List    Diagnosis Date Noted    Chest pain 2021    HTN (hypertension) 2021    Hyperlipidemia 2021    Abnormal stress test 2017    Unstable angina ([facility] [address] 2013    Morbid obesity ([facility] [address] 2013     Family History   Problem Relation Age of Onset    Heart Disease Mother     Arrhythmia Mother     Heart Disease Father     Cancer Sister     Lung Disease Sister       Social History     Tobacco Use    Smoking status: Former Smoker     Packs/day: 1.00     Quit date: 1983     Years since quittin.5    Smokeless tobacco: Never Used   Substance Use Topics    Alcohol use: Yes     Alcohol/week: 2.5 standard drinks     Types: 2 Glasses of wine, 1 Cans of beer per week     Comment: 0-2 X PER WK WITH MEALS OR AFTER PLAYING GOLF     Past Medical History:   Diagnosis Date    Arthritis     bilateral hips/knees    CAD (coronary artery disease) 13    PCI    Chronic pain     DJD; SILVIO KNEES    Diabetes ([facility] [address]     \"BORDERLINE\" PER MD    GERD (gastroesophageal reflux disease)     Morbid obesity (HCC)     Other and unspecified symptoms and signs involving general sensations and perceptions     Bilat. legs \"give out\" intermittently; Bilat leg cramps; macular degenration (left).  Other ill-defined conditions(799.89)     HYPERLIPIDEMIA; LT LE DVT ();  MORBID OBESITY    Thromboembolus ([facility] [address]     left LE Past Surgical History:   Procedure Laterality Date    CARDIAC SURG PROCEDURE UNLIST  6/713    STENTS TO RCA    TOTAL HIP ARTHROPLASTY  6/2007    right    TOTAL HIP ARTHROPLASTY  12/2007    left      Prior to Admission medications    Medication Sig Start Date End Date Taking? Authorizing Provider   lisinopriL (PRINIVIL, ZESTRIL) 40 mg tablet  5/3/21  Yes Provider, Historical   simvastatin (ZOCOR) 40 mg tablet  7/26/21  Yes Provider, Historical   CELECOXIB PO Take  by mouth. Yes Other, MD Derick   aspirin 81 mg chewable tablet Take 81 mg by mouth daily. Yes Provider, Historical   metFORMIN (GLUCOPHAGE) 500 mg tablet Take 500 mg by mouth two (2) times daily (with meals). Yes Provider, Historical   CHONDROITIN SULFATE Take 1,200 mg by mouth daily. Yes Provider, Historical   glucosamine sulfate (GLUCOSAMINE) 750 mg Tab Take 750 mg by mouth daily. Yes Provider, Historical   multivitamin (ONE A DAY) tablet Take 1 Tab by mouth daily.    Yes Provider, Historical     Current Facility-Administered Medications   Medication Dose Route Frequency    warfarin (COUMADIN) tablet 7.5 mg  7.5 mg Oral ONCE    metoprolol tartrate (LOPRESSOR) tablet 6.25 mg  6.25 mg Oral Q12H    lidocaine 4 % patch 1 Patch  1 Patch TransDERmal Q24H    albuterol-ipratropium (DUO-NEB) 2.5 MG-0.5 MG/3 ML  3 mL Nebulization Q6H PRN    benzonatate (TESSALON) capsule 100 mg  100 mg Oral TID PRN    WARFARIN INFORMATION NOTE (COUMADIN)   Other QPM    enoxaparin (LOVENOX) injection 150 mg  150 mg SubCUTAneous Q12H    aspirin chewable tablet 81 mg  81 mg Oral DAILY    atorvastatin (LIPITOR) tablet 20 mg  20 mg Oral QHS    acetaminophen (TYLENOL) tablet 650 mg  650 mg Oral Q6H PRN    Or    acetaminophen (TYLENOL) suppository 650 mg  650 mg Rectal Q6H PRN    polyethylene glycol (MIRALAX) packet 17 g  17 g Oral DAILY PRN    ondansetron (ZOFRAN ODT) tablet 4 mg  4 mg Oral Q8H PRN    Or    ondansetron (ZOFRAN) injection 4 mg  4 mg IntraVENous Q6H PRN    nitroglycerin (NITROSTAT) tablet 0.4 mg  0.4 mg SubLINGual PRN    alum-mag hydroxide-simeth (MYLANTA) oral suspension 30 mL  30 mL Oral Q4H PRN    sodium chloride (NS) flush 5-40 mL  5-40 mL IntraVENous Q8H    sodium chloride (NS) flush 5-40 mL  5-40 mL IntraVENous PRN    naloxone (NARCAN) injection 0.4 mg  0.4 mg IntraVENous PRN    morphine injection 4 mg  4 mg IntraVENous Q1H PRN      Allergies   Allergen Reactions    Adhesive Other (comments)     Blisters     Advil [Ibuprofen] Rash    Cleocin [Clindamycin Hcl] Rash    Other Medication Unknown (comments)     \"Phosphate\"        Review of Systems:  A comprehensive review of systems was negative except for that written in the HPI. Mental Status: no dementia    Objective:     Patient Vitals for the past 8 hrs:   BP Temp Pulse Resp SpO2   21 1222 (!) 149/79 -- 63 16 92 %   21 0948 (!) 144/53 -- 71 17 --   21 0942 113/61 -- 78 17 --   21 0937 (!) 136/56 -- 79 20 95 %   21 0924 108/74 -- 81 26 96 %   21 0922 (!) 75/63 -- 79 (!) 33 94 %   21 0920 (!) 147/70 -- 77 -- 95 %   21 0743 (!) 137/97 97.5 °F (36.4 °C) 66 17 95 %     Temp (24hrs), Av.8 °F (36.6 °C), Min:97.3 °F (36.3 °C), Max:98.3 °F (36.8 °C)    Body mass index is 39.66 kg/m². Gen: Well-developed,  in no acute distress, obese   HEENT: Pink conjunctivae, hearing intact to voice, moist mucous membranes   Neck: Supple  Resp: No respiratory distress   Card: RRR, palpable distal pulse-equal bilaterally, birsk cap refill all distal digits   Abd:  non-distended  Musc: right knee 2+ effusion intact ext mech(difficulty initiating SLR), No collateral instability. 5/5 plant/dorsiflexion/EHL/FHL. Right quad/hip flex 4/5 on the RLE v/s 5/5 LLE  Skin: No skin breakdown noted.  Skin warm, pink, dry  Neuro: Cranial nerves are grossly intact,  follows commands appropriately   Psych: Good insight, oriented to person, place and time, alert    Imaging Review: EXAM: XR KNEE RT MAX 2 VWS   INDICATION: R knee swollen.  COMPARISON: None.   FINDINGS: Two views of the right knee demonstrate a small quantity of joint  effusion. There is osteoarthritis. There is no acute fracture. Arterial  calcification is noted.   IMPRESSION--Osteoarthritis. Small effusion. No acute fracture. Labs:   Recent Results (from the past 24 hour(s))   PTT    Collection Time: 08/11/21  1:11 AM   Result Value Ref Range    aPTT 35.9 (H) 22.1 - 31.0 sec    aPTT, therapeutic range     58.0 - 77.0 SECS   PROTHROMBIN TIME + INR    Collection Time: 08/11/21  1:11 AM   Result Value Ref Range    INR 1.1 0.9 - 1.1      Prothrombin time 11.6 (H) 9.0 - 11.1 sec   CBC W/O DIFF    Collection Time: 08/11/21  1:11 AM   Result Value Ref Range    WBC 8.6 4.1 - 11.1 K/uL    RBC 3.56 (L) 4.10 - 5.70 M/uL    HGB 11.7 (L) 12.1 - 17.0 g/dL    HCT 35.2 (L) 36.6 - 50.3 %    MCV 98.9 80.0 - 99.0 FL    MCH 32.9 26.0 - 34.0 PG    MCHC 33.2 30.0 - 36.5 g/dL    RDW 12.6 11.5 - 14.5 %    PLATELET 184 (L) 033 - 400 K/uL    MPV 9.8 8.9 - 12.9 FL    NRBC 0.0 0  WBC    ABSOLUTE NRBC 0.00 0.00 - 0.01 K/uL         Impression:     Patient Active Problem List    Diagnosis Date Noted    Chest pain 08/08/2021    HTN (hypertension) 08/08/2021    Hyperlipidemia 08/08/2021    Abnormal stress test 07/17/2017    Unstable angina ([address] 06/07/2013    Morbid obesity ([address] 06/07/2013     Principal Problem:    Unstable angina ([address] (6/7/2013)    Active Problems: Morbid obesity ([address] (6/7/2013)      Chest pain (8/8/2021)      HTN (hypertension) (8/8/2021)      Hyperlipidemia (8/8/2021)    Ortho:  Right knee joint aspiration and injection; procedure described to pt and wife, risk and benefits reviewed. Consent signed by pt. Under sterile condition 3cc lidocaine injected into sub-q tissues right knee, via superior lateral patellar approach  Joint aspirated with 18g on a 60cc syringe. 70cc clear yellow fluid removed. Depo-mederol and 1%lidocaine (40mml) injected into right knee. Band-aid applied  There were no complication pt tolerated it well  Vitals stable before and after procedure    GILBERT Gramajo present and assisted throughout procedure    Plan:     Right knee DJD, sympathetic effusion. NO concern for right knee septic arthiris  WBAT with walker  Mobilize with assistance due to orthostatic vitals noted earlier today      aware and agrees with plan as above.         Arnav Morin PA-C  [employer]
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
## 2021-08-11 NOTE — PROGRESS NOTES
Pharmacist Daily Dosing of Warfarin    Indication & Goal INR: PE, INR Goal 2-3    PTA Warfarin Dose: none    Notable concurrent conditions and medications: Aspirin, Enoxaparin 150 mg every 12 hours    Labs:  Recent Labs     08/11/21  0111 08/10/21  0131 08/10/21  0131 08/09/21  0006 08/09/21  0006   INR 1.1  --  1.1  --   --    HGB 11.7*   < > 11.6*   < > 11.7*   *  --  125*  --  125*    < > = values in this interval not displayed. Impression/Plan:   Will order warfarin 7.5 mg x 1 dose  Will continue current regimen for enoxaparin as appropriate for indication, weight, and renal function. Daily INR has been ordered  CBC w/o differential every other day has been ordered     Pharmacy will follow daily and adjust the dose as appropriate.     Thank you,  Simon Munguia, PHARMD

## 2021-08-11 NOTE — PROGRESS NOTES
0700: End of Shift Note    Bedside shift change report given to Linda Mccarthy RN (oncoming nurse) by Naeem Duke (offgoing nurse). Report included the following information SBAR, Kardex, Intake/Output, MAR, Recent Results and Cardiac Rhythm NSR    Shift worked:  Night     Shift summary and any significant changes:     Patient got PRN breathing treatment and tessalon once. Concerns for physician to address:       Zone phone for oncoming shift:          Activity:  Activity Level: Up with Assistance  Number times ambulated in hallways past shift: 0  Number of times OOB to chair past shift: 2    Cardiac:   Cardiac Monitoring: Yes      Cardiac Rhythm: Sinus Rhythm    Access:   Current line(s): PIV     Genitourinary:   Urinary status: voiding    Respiratory:   O2 Device: None (Room air)  Chronic home O2 use?: NO  Incentive spirometer at bedside: NO     GI:  Last Bowel Movement Date: 08/07/21  Current diet:  ADULT DIET Regular; Low Fat/Low Chol/High Fiber/2 gm Na  DIET ONE TIME MESSAGE  Passing flatus: YES  Tolerating current diet: YES       Pain Management:   Patient states pain is manageable on current regimen: YES    Skin:  Jose Guadalupe Score: 20  Interventions: increase time out of bed and PT/OT consult    Patient Safety:  Fall Score:  Total Score: 3  Interventions: bed/chair alarm, gripper socks and pt to call before getting OOB  High Fall Risk: Yes    Length of Stay:  Expected LOS: 5d 7h  Actual LOS: 2      Manisha Ray

## 2021-08-11 NOTE — PROGRESS NOTES
Hospitalist Progress Note    NAME: Koby Hernadez   :  1945   MRN:  [id number]       Assessment / Plan:  orthostatic hypotension on   Acute pulmonary PE  Hx of DVT x 2  Right knee pain and swelling  -presented with CP. EKG and trops -ve. -CTA showed Left lower lobar and lingular branch pulmonary embolism  -Initial on heparin gtt. Started on coumadin with lovenox bridging   -Lifelong anticoagulation  -Daily PT/INR. Pharmacy managing coumadin.   -Follow up Echo  -Appreciate cards consultation     Patient was orthostatic this morning while working with PT, his blood pressure dropped down to the 70s  Will decrease metoprolol dose from 12.5 mg to 6.25. Continue warfarin dosing  She reported that his knee swelling is better  Waiting Ortho consult    08/10  Lower extremity Doppler showed  . Left leg DVT. 2. No right leg DVT. 3. Right knee joint effusion and Baker's cyst.  Patient is already on Coumadin, INR not therapeutic yet  Will apply lidocaine patch, consult Ortho for the knee effusion     CAD s/p stents POA  -LHC done: L[facility] Finn Moderate CAD with 50% diagonal and 50% mid LCX. No intervention.   -Continue ASA, Lipitor, and BB    Thrombocytopenia  Improving. Hypertension   Hyperlipidemia   -Continue lipitor and metoprolol      30.0 - 39.9 Obese / Body mass index is 39.66 kg/m². -counseling about life style modification      Estimated discharge date:   Barriers: Need a therapeutic INR    Code Status: Full code  Surrogate Decision Maker: Wife     DVT Prophylaxis: on Coumadin  GI Prophylaxis: not indicated     Baseline: Patient is independent with ADLs at home     Subjective:     Chief Complaint / Reason for Physician Visit  Discussed with RN events overnight. Patient was orthostatic this morning with systolic blood pressure dropping in the 70s, did improve to the 110's after he sat down.   He reported no symptoms  Review of Systems:  Symptom Y/N Comments  Symptom Y/N Comments Fever/Chills n   Chest Pain n    Poor Appetite    Edema     Cough    Abdominal Pain n    Sputum    Joint Pain y    SOB/SHEARER    Pruritis/Rash     Nausea/vomit n   Tolerating PT/OT     Diarrhea    Tolerating Diet y    Constipation    Other       Could NOT obtain due to:      Objective:     VITALS:   Last 24hrs VS reviewed since prior progress note. Most recent are:  Patient Vitals for the past 24 hrs:   Temp Pulse Resp BP SpO2   08/11/21 0743 97.5 °F (36.4 °C) 66 17 (!) 137/97 95 %   08/11/21 0230 97.3 °F (36.3 °C) 64 19 114/63 93 %   08/10/21 2232 98 °F (36.7 °C) 78 19 (!) 145/64 94 %   08/10/21 1942 98.3 °F (36.8 °C) 72 18 138/72 95 %   08/10/21 1542 98.1 °F (36.7 °C) 73 20 136/63 96 %   08/10/21 1037 98 °F (36.7 °C) 67 20 (!) 124/58 94 %       Intake/Output Summary (Last 24 hours) at 8/11/2021 0839  Last data filed at 8/11/2021 0230  Gross per 24 hour   Intake 600 ml   Output 500 ml   Net 100 ml        I had a face to face encounter and independently examined this patient on 8/11/2021, as outlined below:  PHYSICAL EXAM:  General: WD, WN. Alert, cooperative, no acute distress    EENT:  EOMI. Anicteric sclerae. MMM  Resp:  CTA bilaterally, no wheezing or rales. No accessory muscle use  CV:  Regular  rhythm,  No edema  GI:  Soft, Non distended, Non tender. +Bowel sounds  Neurologic:  Alert and oriented X 3, normal speech,   Psych:   Good insight. Not anxious nor agitated  Skin:  No rashes.   No jaundice    Reviewed most current lab test results and cultures  YES  Reviewed most current radiology test results   YES  Review and summation of old records today    NO  Reviewed patient's current orders and MAR    YES  PMH/SH reviewed - no change compared to H&P  ________________________________________________________________________  Care Plan discussed with:    Comments   Patient x    Family  x wife   RN     Care Manager     Consultant  x                     x Multidiciplinary team rounds were held today with case manager, nursing, pharmacist and clinical coordinator. Patient's plan of care was discussed; medications were reviewed and discharge planning was addressed. ________________________________________________________________________  Total NON critical care TIME:35Minutes    Total CRITICAL CARE TIME Spent:   Minutes non procedure based      Comments   >50% of visit spent in counseling and coordination of care x    ________________________________________________________________________  Ty Mack MD     Procedures: see electronic medical records for all procedures/Xrays and details which were not copied into this note but were reviewed prior to creation of Plan. LABS:  I reviewed today's most current labs and imaging studies.   Pertinent labs include:  Recent Labs     08/11/21  0111 08/10/21  0131 08/09/21  0006   WBC 8.6 8.9 10.1   HGB 11.7* 11.6* 11.7*   HCT 35.2* 35.3* 36.1*   * 125* 125*     Recent Labs     08/11/21  0111 08/10/21  0131 08/09/21  0006   NA  --   --  137   K  --   --  4.5   CL  --   --  111*   CO2  --   --  21   GLU  --   --  152*   BUN  --   --  20   CREA  --   --  1.17   CA  --   --  8.0*   INR 1.1 1.1  --        Signed: Ty Mack MD

## 2021-08-11 NOTE — PROGRESS NOTES
0920 Pt hypotensive while working with OT. Sitting bp 147/70 to 75/63 standing BP. MD notified.      1222: resting bp 149/ 79, sitting 147/77, standing 124/82

## 2021-08-11 NOTE — PROGRESS NOTES
Problem: Falls - Risk of  Goal: *Absence of Falls  Description: Document Figueroa Flores Fall Risk and appropriate interventions in the flowsheet.   Outcome: Progressing Towards Goal  Note: Fall Risk Interventions:  Mobility Interventions: Bed/chair exit alarm, Patient to call before getting OOB         Medication Interventions: Bed/chair exit alarm, Evaluate medications/consider consulting pharmacy, Patient to call before getting OOB    Elimination Interventions: Bed/chair exit alarm, Call light in reach, Patient to call for help with toileting needs    History of Falls Interventions: Bed/chair exit alarm

## 2021-08-11 NOTE — PROGRESS NOTES
Physical Therapy Note    Patient with PE and L leg DVT. INR goal is between 2-3. Patient is currently 1.1. Will hold PT evaluation of functional mobility until patient is within therapeutic range.      Cherylene Reins DPT

## 2021-08-12 LAB
INR PPP: 1.4 (ref 0.9–1.1)
PROTHROMBIN TIME: 14.3 SEC (ref 9–11.1)

## 2021-08-12 PROCEDURE — 74011250637 HC RX REV CODE- 250/637: Performed by: INTERNAL MEDICINE

## 2021-08-12 PROCEDURE — 74011250636 HC RX REV CODE- 250/636: Performed by: STUDENT IN AN ORGANIZED HEALTH CARE EDUCATION/TRAINING PROGRAM

## 2021-08-12 PROCEDURE — 85610 PROTHROMBIN TIME: CPT

## 2021-08-12 PROCEDURE — 65660000001 HC RM ICU INTERMED STEPDOWN

## 2021-08-12 PROCEDURE — 2709999900 HC NON-CHARGEABLE SUPPLY

## 2021-08-12 PROCEDURE — 36415 COLL VENOUS BLD VENIPUNCTURE: CPT

## 2021-08-12 RX ORDER — WARFARIN SODIUM 5 MG/1
10 TABLET ORAL ONCE
Status: COMPLETED | OUTPATIENT
Start: 2021-08-12 | End: 2021-08-12

## 2021-08-12 RX ADMIN — METOPROLOL TARTRATE 6.25 MG: 25 TABLET, FILM COATED ORAL at 21:19

## 2021-08-12 RX ADMIN — ENOXAPARIN SODIUM 150 MG: 150 INJECTION SUBCUTANEOUS at 06:05

## 2021-08-12 RX ADMIN — Medication 10 ML: at 21:20

## 2021-08-12 RX ADMIN — ENOXAPARIN SODIUM 150 MG: 150 INJECTION SUBCUTANEOUS at 17:43

## 2021-08-12 RX ADMIN — ASPIRIN 81 MG: 81 TABLET, CHEWABLE ORAL at 10:05

## 2021-08-12 RX ADMIN — Medication 10 ML: at 06:00

## 2021-08-12 RX ADMIN — Medication 10 ML: at 17:44

## 2021-08-12 RX ADMIN — ATORVASTATIN CALCIUM 20 MG: 20 TABLET, FILM COATED ORAL at 21:19

## 2021-08-12 RX ADMIN — BENZONATATE 100 MG: 100 CAPSULE ORAL at 17:43

## 2021-08-12 RX ADMIN — WARFARIN SODIUM 10 MG: 5 TABLET ORAL at 17:43

## 2021-08-12 RX ADMIN — METOPROLOL TARTRATE 6.25 MG: 25 TABLET, FILM COATED ORAL at 10:23

## 2021-08-12 NOTE — PROGRESS NOTES
Hospitalist Progress Note    NAME: Francine Little   :  1945   MRN:  [id number]       Assessment / Plan:  orthostatic hypotension on   Acute pulmonary PE  Hx of DVT x 2  Right knee pain and swelling status post aspiration on   -presented with CP. EKG and trops -ve. -CTA showed Left lower lobar and lingular branch pulmonary embolism  -Initial on heparin gtt. Started on coumadin with lovenox bridging   -Lifelong anticoagulation  -Daily PT/INR. Pharmacy managing coumadin.   -Follow up Echo  -Appreciate cards consultation     inr 1.4, continue with bridging Lovenox and warfarin, his insurance does not cover NOAC. If your INR still not therapeutic tomorrow, most likely can be discharged with Lovenox and warfarin to home and monitor INR by primary care physician  Seen by Ortho yesterday, underwent aspiration of his right knee effusion      Patient was orthostatic this morning while working with PT, his blood pressure dropped down to the 70s  Will decrease metoprolol dose from 12.5 mg to 6.25. Continue warfarin dosing  She reported that his knee swelling is better  Waiting Ortho consult    08/10  Lower extremity Doppler showed  . Left leg DVT. 2. No right leg DVT. 3. Right knee joint effusion and Baker's cyst.  Patient is already on Coumadin, INR not therapeutic yet  Will apply lidocaine patch, consult Ortho for the knee effusion     CAD s/p stents POA  -LHC done: Giselle Sharp Moderate CAD with 50% diagonal and 50% mid LCX. No intervention.   -Continue ASA, Lipitor, and BB    Thrombocytopenia  Improving. Hypertension   Hyperlipidemia   -Continue lipitor and metoprolol      30.0 - 39.9 Obese / Body mass index is 39.66 kg/m².    -counseling about life style modification      Estimated discharge date:   Barriers: Need a therapeutic INR    Code Status: Full code  Surrogate Decision Maker: Wife     DVT Prophylaxis: on Coumadin  GI Prophylaxis: not indicated     Baseline: Patient is independent with ADLs at home     Subjective:     Chief Complaint / Reason for Physician Visit  Discussed with RN events overnight. Patient is eager to go home. His insurance does not cover NOAC   he reported no symptoms  Review of Systems:  Symptom Y/N Comments  Symptom Y/N Comments   Fever/Chills n   Chest Pain n    Poor Appetite    Edema     Cough    Abdominal Pain n    Sputum    Joint Pain y    SOB/SHEARER    Pruritis/Rash     Nausea/vomit n   Tolerating PT/OT     Diarrhea    Tolerating Diet y    Constipation    Other       Could NOT obtain due to:      Objective:     VITALS:   Last 24hrs VS reviewed since prior progress note. Most recent are:  Patient Vitals for the past 24 hrs:   Temp Pulse Resp BP SpO2   08/12/21 0722 98.1 °F (36.7 °C) 79 16 (!) 155/74 96 %   08/12/21 0300 97.9 °F (36.6 °C) 74 16 (!) 142/73 94 %   08/11/21 2257 97.3 °F (36.3 °C) 74 25 129/89 92 %   08/11/21 1927 98.3 °F (36.8 °C) 69 29 128/64 94 %   08/11/21 1222 -- 63 16 (!) 149/79 92 %   08/11/21 0948 -- 71 17 (!) 144/53 --   08/11/21 0942 -- 78 17 113/61 --   08/11/21 0937 -- 79 20 (!) 136/56 95 %   08/11/21 0924 -- 81 26 108/74 96 %   08/11/21 0922 -- 79 (!) 33 (!) 75/63 94 %   08/11/21 0920 -- 77 -- (!) 147/70 95 %       Intake/Output Summary (Last 24 hours) at 8/12/2021 8610  Last data filed at 8/11/2021 1927  Gross per 24 hour   Intake --   Output 450 ml   Net -450 ml        I had a face to face encounter and independently examined this patient on 8/12/2021, as outlined below:  PHYSICAL EXAM:  General: WD, WN. Alert, cooperative, no acute distress    EENT:  EOMI. Anicteric sclerae. MMM  Resp:  CTA bilaterally, no wheezing or rales. No accessory muscle use  CV:  Regular  rhythm,  No edema  GI:  Soft, Non distended, Non tender. +Bowel sounds  Neurologic:  Alert and oriented X 3, normal speech,   Psych:   Good insight. Not anxious nor agitated  Skin:  No rashes.   No jaundice    Reviewed most current lab test results and cultures YES  Reviewed most current radiology test results   YES  Review and summation of old records today    NO  Reviewed patient's current orders and MAR    YES  PMH/SH reviewed - no change compared to H&P  ________________________________________________________________________  Care Plan discussed with:    Comments   Patient x    Family  x wife   RN     Care Manager     Consultant  x                     x Multidiciplinary team rounds were held today with , nursing, pharmacist and clinical coordinator. Patient's plan of care was discussed; medications were reviewed and discharge planning was addressed. ________________________________________________________________________  Total NON critical care TIME:35Minutes    Total CRITICAL CARE TIME Spent:   Minutes non procedure based      Comments   >50% of visit spent in counseling and coordination of care x    ________________________________________________________________________  Mimi Weaver MD     Procedures: see electronic medical records for all procedures/Xrays and details which were not copied into this note but were reviewed prior to creation of Plan. LABS:  I reviewed today's most current labs and imaging studies.   Pertinent labs include:  Recent Labs     08/11/21  0111 08/10/21  0131   WBC 8.6 8.9   HGB 11.7* 11.6*   HCT 35.2* 35.3*   * 125*     Recent Labs     08/11/21  0111 08/10/21  0131   INR 1.1 1.1       Signed: Mimi Weaver MD

## 2021-08-12 NOTE — PROGRESS NOTES
Physical Therapy Note    Patient with INR of 2-3. Prior to admission patient was maintained on Coumadin. Patient with Dx of PE and DVT. He has been doing necessary mobility with RN staff at this time. Will complete PT evaluation when patients INR is within therapeutic range.      Jon CORDEROT

## 2021-08-12 NOTE — PROGRESS NOTES
Ortho:    Right knee  S/p aspiration and injection 8/11    Pt seated in chair, states he knee is back to nml  No complaints of right knee pain    Right knee with trace effusion  Intact SLR, AROM flex/ext      SIGN OFF  Follow- up with Dr Kassi Liang as out-pt as needed      Arnav Morin PA-C

## 2021-08-12 NOTE — PROGRESS NOTES
Pharmacist Daily Dosing of Warfarin    Indication & Goal INR: PE, INR Goal 2-3    PTA Warfarin Dose: none    Notable concurrent conditions and medications: Aspirin, Enoxaparin 150 mg every 12 hours    Labs:  Recent Labs     08/12/21  1022 08/11/21  0111 08/10/21  0131 08/10/21  0131   INR 1.4* 1.1  --  1.1   HGB  --  11.7*   < > 11.6*   PLT  --  144*  --  125*    < > = values in this interval not displayed. Impression/Plan:   Will order warfarin 10 mg x 1 dose  Will continue current regimen for enoxaparin as appropriate for indication, weight, and renal function. May change to Xarelto if insurance will cover  Daily INR has been ordered  CBC w/o differential every other day has been ordered     Pharmacy will follow daily and adjust the dose as appropriate.     Thank you,  Carrie De Luna, PHARMD

## 2021-08-12 NOTE — PROGRESS NOTES
Occupational Therapy  Patient with INR 1.4. Prior to admission patient was maintained on Coumadin. Patient with Dx of PE and DVT. He has been doing necessary mobility with RN staff at this time to and from bathroom. Will defer therapy until INR in therapeutic range.  Edson Shepherd OTR/L

## 2021-08-12 NOTE — PROGRESS NOTES
Problem: Falls - Risk of  Goal: *Absence of Falls  Description: Document Cachorro Burgess Fall Risk and appropriate interventions in the flowsheet.   Outcome: Progressing Towards Goal  Note: Fall Risk Interventions:  Mobility Interventions: Bed/chair exit alarm         Medication Interventions: Bed/chair exit alarm    Elimination Interventions: Bed/chair exit alarm    History of Falls Interventions: Bed/chair exit alarm         Problem: Patient Education: Go to Patient Education Activity  Goal: Patient/Family Education  Outcome: Progressing Towards Goal     Problem: Patient Education: Go to Patient Education Activity  Goal: Patient/Family Education  Outcome: Progressing Towards Goal     Problem: Unstable angina/NSTEMI: Day 2  Goal: Off Pathway (Use only if patient is Off Pathway)  Outcome: Progressing Towards Goal  Goal: Activity/Safety  Outcome: Progressing Towards Goal  Goal: Consults, if ordered  Outcome: Progressing Towards Goal  Goal: Diagnostic Test/Procedures  Outcome: Progressing Towards Goal  Goal: Nutrition/Diet  Outcome: Progressing Towards Goal  Goal: Discharge Planning  Outcome: Progressing Towards Goal  Goal: Medications  Outcome: Progressing Towards Goal  Goal: Respiratory  Outcome: Progressing Towards Goal  Goal: Treatments/Interventions/Procedures  Outcome: Progressing Towards Goal  Goal: Psychosocial  Outcome: Progressing Towards Goal  Goal: *Hemodynamically stable  Outcome: Progressing Towards Goal  Goal: *Optimal pain control at patient's stated goal  Outcome: Progressing Towards Goal  Goal: *Lungs clear or at baseline  Outcome: Progressing Towards Goal     Problem: Unstable Angina/NSTEMI: Discharge Outcomes  Goal: *Hemodynamically stable  Outcome: Progressing Towards Goal  Goal: *Stable cardiac rhythm  Outcome: Progressing Towards Goal  Goal: *Lungs clear or at baseline  Outcome: Progressing Towards Goal  Goal: *Optimal pain control at patient's stated goal  Outcome: Progressing Towards Goal  Goal: *Identifies cardiac risk factors  Outcome: Progressing Towards Goal  Goal: *Verbalizes home exercise program, activity guidelines, cardiac precautions  Outcome: Progressing Towards Goal  Goal: *Verbalizes understanding and describes prescribed diet  Outcome: Progressing Towards Goal  Goal: *Verbalizes name, dosage, time, side effects, and number of days to continue medications  Outcome: Progressing Towards Goal  Goal: *Anxiety reduced or absent  Outcome: Progressing Towards Goal  Goal: *Understands and describes signs and symptoms to report to providers(Stroke Metric)  Outcome: Progressing Towards Goal  Goal: *Describes follow-up/return visits to physicians  Outcome: Progressing Towards Goal  Goal: *Describes available resources and support systems  Outcome: Progressing Towards Goal  Goal: *Influenza immunization  Outcome: Progressing Towards Goal  Goal: *Pneumococcal immunization  Outcome: Progressing Towards Goal  Goal: *Describes smoking cessation resources  Outcome: Progressing Towards Goal     Problem: Pulmonary Embolism Care Plan (Adult)  Goal: *Improvement of existing pulmonary embolism  Outcome: Progressing Towards Goal  Goal: *Absence of bleeding  Outcome: Progressing Towards Goal  Goal: *Labs within defined limits  Outcome: Progressing Towards Goal     Problem: Patient Education: Go to Patient Education Activity  Goal: Patient/Family Education  Outcome: Progressing Towards Goal     Problem: Patient Education: Go to Patient Education Activity  Goal: Patient/Family Education  Outcome: Progressing Towards Goal

## 2021-08-12 NOTE — PROGRESS NOTES
Transition of Care Plan:     RUR: 9% - low risk  Disposition: Home ([address])  Follow up appointments: PCP, Specialists  DME needed: No needs identified. Has cane and rollator. Transportation at Discharge: family member (wife or son-in-law)  [place] or means to access home: yes  IM Medicare Letter: Given  Is patient a BCPI-A Bundle:  No                    If yes, was Bundle Letter given?:   N/A  Caregiver Contact: Cosme Pereira, wife, [phone]  Discharge Caregiver contacted prior to discharge? CM to contact prior to discharge, if pt request.     4:40pm- CM met with pt at bedside to discuss discharge plan. CM inquired with pt about home health. Pt was in agreement with home health. CM provided pt with a home health list. Pt selected [facility] document signed by pt and placed in pt's bedside chart. Referral was sent to [facility] via [facility]. Medicare pt has received, reviewed, and signed 2nd  letter informing them of their right to appeal the discharge. Signed copy has been placed on pt bedside chart. CM will continue to follow patient for discharge planning needs and arrange for services as deemed necessary.     Steve Amaro [address]  [phone]

## 2021-08-13 VITALS
SYSTOLIC BLOOD PRESSURE: 178 MMHG | HEIGHT: 76 IN | BODY MASS INDEX: 38.36 KG/M2 | TEMPERATURE: 97.5 F | DIASTOLIC BLOOD PRESSURE: 73 MMHG | WEIGHT: 315 LBS | HEART RATE: 58 BPM | OXYGEN SATURATION: 98 % | RESPIRATION RATE: 16 BRPM

## 2021-08-13 LAB
ERYTHROCYTE [DISTWIDTH] IN BLOOD BY AUTOMATED COUNT: 12.6 % (ref 11.5–14.5)
HCT VFR BLD AUTO: 36.6 % (ref 36.6–50.3)
HGB BLD-MCNC: 12.3 G/DL (ref 12.1–17)
INR PPP: 1.7 (ref 0.9–1.1)
MCH RBC QN AUTO: 33.2 PG (ref 26–34)
MCHC RBC AUTO-ENTMCNC: 33.6 G/DL (ref 30–36.5)
MCV RBC AUTO: 98.7 FL (ref 80–99)
NRBC # BLD: 0.03 K/UL (ref 0–0.01)
NRBC BLD-RTO: 0.4 PER 100 WBC
PLATELET # BLD AUTO: 210 K/UL (ref 150–400)
PMV BLD AUTO: 9.5 FL (ref 8.9–12.9)
PROTHROMBIN TIME: 16.9 SEC (ref 9–11.1)
RBC # BLD AUTO: 3.71 M/UL (ref 4.1–5.7)
WBC # BLD AUTO: 8.4 K/UL (ref 4.1–11.1)

## 2021-08-13 PROCEDURE — 74011250637 HC RX REV CODE- 250/637: Performed by: INTERNAL MEDICINE

## 2021-08-13 PROCEDURE — 97116 GAIT TRAINING THERAPY: CPT

## 2021-08-13 PROCEDURE — 36415 COLL VENOUS BLD VENIPUNCTURE: CPT

## 2021-08-13 PROCEDURE — 85610 PROTHROMBIN TIME: CPT

## 2021-08-13 PROCEDURE — 97530 THERAPEUTIC ACTIVITIES: CPT

## 2021-08-13 PROCEDURE — 97535 SELF CARE MNGMENT TRAINING: CPT

## 2021-08-13 PROCEDURE — 85027 COMPLETE CBC AUTOMATED: CPT

## 2021-08-13 PROCEDURE — 74011250636 HC RX REV CODE- 250/636: Performed by: STUDENT IN AN ORGANIZED HEALTH CARE EDUCATION/TRAINING PROGRAM

## 2021-08-13 PROCEDURE — 97161 PT EVAL LOW COMPLEX 20 MIN: CPT

## 2021-08-13 RX ORDER — METOPROLOL SUCCINATE 25 MG/1
12.5 TABLET, EXTENDED RELEASE ORAL DAILY
Qty: 30 TABLET | Refills: 1 | Status: SHIPPED | OUTPATIENT
Start: 2021-08-13 | End: 2022-06-27

## 2021-08-13 RX ORDER — WARFARIN SODIUM 5 MG/1
10 TABLET ORAL ONCE
Status: DISCONTINUED | OUTPATIENT
Start: 2021-08-13 | End: 2021-08-13 | Stop reason: HOSPADM

## 2021-08-13 RX ORDER — ENOXAPARIN SODIUM 150 MG/ML
150 INJECTION SUBCUTANEOUS EVERY 12 HOURS
Qty: 3 ML | Refills: 0 | Status: SHIPPED | OUTPATIENT
Start: 2021-08-13 | End: 2021-08-15

## 2021-08-13 RX ORDER — WARFARIN 2.5 MG/1
7.5 TABLET ORAL DAILY
Qty: 15 TABLET | Refills: 0 | Status: SHIPPED | OUTPATIENT
Start: 2021-08-13 | End: 2021-08-17

## 2021-08-13 RX ADMIN — Medication 10 ML: at 06:19

## 2021-08-13 RX ADMIN — ASPIRIN 81 MG: 81 TABLET, CHEWABLE ORAL at 10:00

## 2021-08-13 RX ADMIN — MULTIPLE VITAMINS W/ MINERALS TAB 1 TABLET: TAB at 10:00

## 2021-08-13 RX ADMIN — ENOXAPARIN SODIUM 150 MG: 150 INJECTION SUBCUTANEOUS at 06:19

## 2021-08-13 RX ADMIN — METOPROLOL TARTRATE 6.25 MG: 25 TABLET, FILM COATED ORAL at 10:00

## 2021-08-13 NOTE — PROGRESS NOTES
Problem: Mobility Impaired (Adult and Pediatric)  Goal: *Acute Goals and Plan of Care (Insert Text)  Description: FUNCTIONAL STATUS PRIOR TO ADMISSION: Patient was modified independent using a rolling walker and/or rollator for functional mobility. HOME SUPPORT PRIOR TO ADMISSION: The patient lived with spouse but did not require assist.    Physical Therapy Goals  Initiated 8/13/2021  1. Patient will move from supine to sit and sit to supine , scoot up and down, and roll side to side in bed with modified independence within 7 day(s). 2.  Patient will ambulate with modified independence for 300 feet with the least restrictive device within 7 day(s). Outcome: Progressing Towards Goal     PHYSICAL THERAPY EVALUATION  Patient: Evan Singht (60 y.o. male)  Date: 8/13/2021  Primary Diagnosis: Chest pain [R07.9]  Procedure(s) (LRB):  LEFT HEART CATH / CORONARY ANGIOGRAPHY (N/A)  Ultrasound Guided Vascular Access (N/A)  Left Ventriculography (N/A) 5 Days Post-Op   Precautions:   Fall, Other (comment) (monitor orthostatics)      ASSESSMENT  Nurse clears pt for mobility, and pt is agreeable. Based on the objective data described below, the patient presents with generalized weakness and activity tolerance decline. He is with mild SOB, increased HR with minimal exertional efforts, and decline in BP with sit to stand but BP recovery with gait and SOB recovery with seated rest. Pt is asymptomatic with BP decrease. Pt feels he does not need follow-up with PT at this time. .. still recommended due to hx of falls and current continued monitoring of vitals with activity recommended for pt safety. Pt reports discharge later today. Continue to follow.     Patient Vitals for the past 4 hrs:   Temp Pulse Resp BP SpO2   08/13/21 1108 -- (!) 58 -- -- --   08/13/21 1106 -- (!) 115 -- (!) 178/73 98 %   08/13/21 1101 -- 98 -- 128/73 96 %   08/13/21 1056 -- 66 -- (!) 141/73 96 %     Current Level of Function Impacting Discharge (mobility/balance): mod. I transfers ; SBA with RW for gait, no LOB    Functional Outcome Measure: The patient scored Total Score: 19/28 on the Tinetti outcome measure which is indicative of moderate fall risk. Other factors to consider for discharge: pt with falls in the past     Patient will benefit from skilled therapy intervention to address the above noted impairments. PLAN :  Recommendations and Planned Interventions: bed mobility training, gait training, patient and family training/education, and therapeutic activities      Frequency/Duration: Patient will be followed by physical therapy:  3 times a week to address goals. Recommendation for discharge: (in order for the patient to meet his/her long term goals)  Physical therapy at least 2 days/week in the home AND ensure assist and/or supervision for safety with mobility (pt likely to decline HHPT)    This discharge recommendation:  Has been made in collaboration with the attending provider and/or case management    IF patient discharges home will need the following DME: patient owns DME required for discharge         SUBJECTIVE:   Patient stated I just am stubborn, I know what to do, and I just need to do it.     OBJECTIVE DATA SUMMARY:   HISTORY:    Past Medical History:   Diagnosis Date    Arthritis     bilateral hips/knees    CAD (coronary artery disease) 6/7/13    PCI    Chronic pain     DJD; SILVIO KNEES    Diabetes ([address]     \"BORDERLINE\" PER MD    GERD (gastroesophageal reflux disease)     Morbid obesity ([address]     Other and unspecified symptoms and signs involving general sensations and perceptions     Bilat. legs \"give out\" intermittently; Bilat leg cramps; macular degenration (left). Other ill-defined conditions(799.89)     HYPERLIPIDEMIA; LT LE DVT (2008);  MORBID OBESITY    Thromboembolus ([address] 2008    left LE     Past Surgical History:   Procedure Laterality Date    CARDIAC SURG PROCEDURE UNLIST  6/713    STENTS TO RCA    TOTAL HIP ARTHROPLASTY  6/2007    right    TOTAL HIP ARTHROPLASTY  12/2007    left       Personal factors and/or comorbidities impacting plan of care:     Home Situation  Home Environment: Independent living ([facility])  One/Two Story Residence: Other (Comment) (2nd floor- has elevator)  Living Alone: No (lives with spouse)  Support Systems: Spouse/Significant Other/Partner, Other (comments) (prison community)  Patient Expects to be Discharged to[de-identified] Independent living facility  Current DME Used/Available at Home: Montour Crisp, straight, Walker, rollator, Grab bars, Walker  Tub or Shower Type: Shower    EXAMINATION/PRESENTATION/DECISION MAKING:   Critical Behavior:  Neurologic State: Alert  Orientation Level: Oriented X4  Cognition: Appropriate decision making, Appropriate for age attention/concentration, Appropriate safety awareness, Follows commands     Hearing: Auditory  Auditory Impairment: None  Skin:  Intact  Edema: none  Range Of Motion:  AROM: Within functional limits           PROM: Within functional limits           Strength:    Strength: Generally decreased, functional (BLE grossly 3+/5)                    Tone & Sensation:   Tone: Normal              Sensation: Intact               Coordination:  Coordination: Within functional limits  Vision:    Glasses  Functional Mobility:  Bed Mobility:      Pt sitting up in chair on arrival and s/p tx session        Transfers:  Sit to Stand: Modified independent  Stand to Sit: Modified independent                       Balance:   Sitting: Intact  Standing: Intact; With support  Standing - Static: Constant support;Good (RW)  Standing - Dynamic : Constant support;Good (RW)  Ambulation/Gait Training:  Distance (ft): 200 Feet (ft)  Assistive Device: Gait belt;Walker, rolling  Ambulation - Level of Assistance: Stand-by assistance     Gait Description (WDL): Exceptions to WDL  Gait Abnormalities: Decreased step clearance;Trunk sway increased        Base of Support: Widened     Speed/Beatriz: Pace decreased (<100 feet/min)  Step Length: Left shortened;Right shortened        Interventions: Verbal cues            Functional Measure:  Tinetti test:    Sitting Balance: 1  Arises: 1  Attempts to Rise: 2  Immediate Standing Balance: 1  Standing Balance: 1  Nudged: 2  Eyes Closed: 1  Turn 360 Degrees - Continuous/Discontinuous: 0  Turn 360 Degrees - Steady/Unsteady: 1  Sitting Down: 1  Balance Score: 11 Balance total score  Indication of Gait: 1  R Step Length/Height: 1  L Step Length/Height: 1  R Foot Clearance: 1  L Foot Clearance: 1  Step Symmetry: 1  Step Continuity: 1  Path: 1  Trunk: 0  Walking Time: 0  Gait Score: 8 Gait total score  Total Score: 19/28 Overall total score         Tinetti Tool Score Risk of Falls  <19 = High Fall Risk  19-24 = Moderate Fall Risk  25-28 = Low Fall Risk  Tinetti ME. Performance-Oriented Assessment of Mobility Problems in Elderly Patients. Ron 66; [id number].  (Scoring Description: PT Bulletin Feb. 10, 1993)    Older adults: Kamryn Ramirez et al, 2009; n = 1000 [facility] elderly evaluated with ABC, TITA, ADL, and IADL)  · Mean TITA score for males aged 69-68 years = 26.21(3.40)  · Mean TITA score for females age 69-68 years = 25.16(4.30)  · Mean TITA score for males over 80 years = 23.29(6.02)  · Mean TITA score for females over 80 years = 17.20(8.32)        Physical Therapy Evaluation Charge Determination   History Examination Presentation Decision-Making   HIGH Complexity :3+ comorbidities / personal factors will impact the outcome/ POC  LOW Complexity : 1-2 Standardized tests and measures addressing body structure, function, activity limitation and / or participation in recreation  LOW Complexity : Stable, uncomplicated  Other outcome measures Tinetti  LOW       Based on the above components, the patient evaluation is determined to be of the following complexity level: LOW     Pain Rating:  No c/o pain    Activity Tolerance:   Fair, SpO2 stable on RA, requires rest breaks, and observed SOB with activity      After treatment patient left in no apparent distress:   Sitting in chair, Call bell within reach, and nurse notified     COMMUNICATION/EDUCATION:   The patients plan of care was discussed with: Occupational therapist and Registered nurse. Fall prevention education was provided and the patient/caregiver indicated understanding., Patient/family have participated as able in goal setting and plan of care. , and Patient/family agree to work toward stated goals and plan of care.     Thank you for this referral.  Corean Landau, PT, DPT   Time Calculation: 40 mins

## 2021-08-13 NOTE — PROGRESS NOTES
0700 End of Shift Note    Bedside shift change report given to Fidelia Snow RN (oncoming nurse) by J Carlos Akins (offgoing nurse). Report included the following information SBAR, Kardex, Intake/Output, MAR, Recent Results and Cardiac Rhythm SR, SB    Shift worked:  [id number]     Shift summary and any significant changes:     none     Concerns for physician to address:  d/c? Zone phone for oncoming shift:          Activity:  Activity Level: Up with Assistance  Number times ambulated in hallways past shift: 0  Number of times OOB to chair past shift: 0    Cardiac:   Cardiac Monitoring: Yes      Cardiac Rhythm: Sinus Rhythm    Access:   Current line(s): PIV     Genitourinary:   Urinary status: voiding    Respiratory:   O2 Device: None (Room air)  Chronic home O2 use?: NO  Incentive spirometer at bedside: NO     GI:  Last Bowel Movement Date: 08/07/21  Current diet:  ADULT DIET Regular; Low Fat/Low Chol/High Fiber/2 gm Na  DIET ONE TIME MESSAGE  Passing flatus: YES  Tolerating current diet: YES       Pain Management:   Patient states pain is manageable on current regimen: YES    Skin:  Jose Guadalupe Score: 20  Interventions: increase time out of bed and PT/OT consult    Patient Safety:  Fall Score:  Total Score: 3  Interventions: assistive device (walker, cane, etc), gripper socks and pt to call before getting OOB  High Fall Risk: Yes    Length of Stay:  Expected LOS: 5d 7h  Actual LOS: [address] [facility]

## 2021-08-13 NOTE — PROGRESS NOTES
Problem: Falls - Risk of  Goal: *Absence of Falls  Description: Document Maxx Nuno Fall Risk and appropriate interventions in the flowsheet.   Outcome: Resolved/Met     Problem: Patient Education: Go to Patient Education Activity  Goal: Patient/Family Education  Outcome: Resolved/Met     Problem: Patient Education: Go to Patient Education Activity  Goal: Patient/Family Education  Outcome: Resolved/Met     Problem: Unstable angina/NSTEMI: Day 2  Goal: Off Pathway (Use only if patient is Off Pathway)  Outcome: Resolved/Met  Goal: Activity/Safety  Outcome: Resolved/Met  Goal: Consults, if ordered  Outcome: Resolved/Met  Goal: Diagnostic Test/Procedures  Outcome: Resolved/Met  Goal: Nutrition/Diet  Outcome: Resolved/Met  Goal: Discharge Planning  Outcome: Resolved/Met  Goal: Medications  Outcome: Resolved/Met  Goal: Respiratory  Outcome: Resolved/Met  Goal: Treatments/Interventions/Procedures  Outcome: Resolved/Met  Goal: Psychosocial  Outcome: Resolved/Met  Goal: *Hemodynamically stable  Outcome: Resolved/Met  Goal: *Optimal pain control at patient's stated goal  Outcome: Resolved/Met  Goal: *Lungs clear or at baseline  Outcome: Resolved/Met     Problem: Unstable Angina/NSTEMI: Discharge Outcomes  Goal: *Hemodynamically stable  Outcome: Resolved/Met  Goal: *Stable cardiac rhythm  Outcome: Resolved/Met  Goal: *Lungs clear or at baseline  Outcome: Resolved/Met  Goal: *Optimal pain control at patient's stated goal  Outcome: Resolved/Met  Goal: *Identifies cardiac risk factors  Outcome: Resolved/Met  Goal: *Verbalizes home exercise program, activity guidelines, cardiac precautions  Outcome: Resolved/Met  Goal: *Verbalizes understanding and describes prescribed diet  Outcome: Resolved/Met  Goal: *Verbalizes name, dosage, time, side effects, and number of days to continue medications  Outcome: Resolved/Met  Goal: *Anxiety reduced or absent  Outcome: Resolved/Met  Goal: *Understands and describes signs and symptoms to report to providers(Stroke Metric)  Outcome: Resolved/Met  Goal: *Describes follow-up/return visits to physicians  Outcome: Resolved/Met  Goal: *Describes available resources and support systems  Outcome: Resolved/Met  Goal: *Influenza immunization  Outcome: Resolved/Met  Goal: *Pneumococcal immunization  Outcome: Resolved/Met  Goal: *Describes smoking cessation resources  Outcome: Resolved/Met     Problem: Pulmonary Embolism Care Plan (Adult)  Goal: *Improvement of existing pulmonary embolism  Outcome: Resolved/Met  Goal: *Absence of bleeding  Outcome: Resolved/Met  Goal: *Labs within defined limits  Outcome: Resolved/Met     Problem: Patient Education: Go to Patient Education Activity  Goal: Patient/Family Education  Outcome: Resolved/Met     Problem: Patient Education: Go to Patient Education Activity  Goal: Patient/Family Education  Outcome: Resolved/Met     Problem: Patient Education: Go to Patient Education Activity  Goal: Patient/Family Education  Outcome: Resolved/Met

## 2021-08-13 NOTE — PROGRESS NOTES
Problem: Self Care Deficits Care Plan (Adult)  Goal: *Acute Goals and Plan of Care (Insert Text)  Description:   FUNCTIONAL STATUS PRIOR TO ADMISSION: Mod I using Rollator for functional mobility. Drives. Denies Hx of falls. HOME SUPPORT: Lived with wife who provided assist with IADL. Occupational Therapy Goals  Initiated 8/11/2021  1. Patient will perform grooming tasks standing at the sink with modified independence within 7 day(s). 2.  Patient will perform upper body dressing with modified independence within 7 day(s). 3.  Patient will perform lower body dressing with modified independence within 7 day(s). 4.  Patient will perform toilet transfers with modified independence using least restrictive device within 7 day(s). 5.  Patient will perform all aspects of toileting with modified independence within 7 day(s). Outcome: Progressing Towards Goal   OCCUPATIONAL THERAPY TREATMENT  Patient: Melony Jacobsen (83 y.o. male)  Date: 8/13/2021  Diagnosis: Chest pain [R07.9] Unstable angina (HCC)  Procedure(s) (LRB):  LEFT HEART CATH / CORONARY ANGIOGRAPHY (N/A)  Ultrasound Guided Vascular Access (N/A)  Left Ventriculography (N/A) 5 Days Post-Op  Precautions: Fall, Other (comment) (monitor orthostatics)  Chart, occupational therapy assessment, plan of care, and goals were reviewed. ASSESSMENT  Patient continues with skilled OT services and is progressing towards goals. Patient with INR 1.7 (outside of goal 2-3), however RN clearing patient for therapy as anticipate discharge home later today. Patient received on room air, agreeable to session. With functional distance ambulation, patient slightly short of breath, however SpO2 maintaining in mid-upper 90s throughout. Note wide variation in HR with HR initially 66 at rest, increasing up to 115 with ambulation (RN notified). With return to seated, patient HR recovering quickly.  Overall, patient tolerated session well with no reports of dizziness or lightheadedness. Patient able to stand at the sink for serial grooming ADLs, reaching outside base of support for ADL materials with no LOB. Patient extremely talkative and motivated, benefiting from occasional cues for pacing or increased attention to breathing. Patient reports feeling confident discharging home - encouraged patient to monitor vitals to better grade activity / mobility participation. Per discussion with PT, plan is for [facility] PT to follow-up. At this time, no further OT services recommended at discharge. Temp Pulse Resp BP SpO2   08/13/21 1108 -- (!) 58 -- -- --   08/13/21 1106 -- (!) 115 -- (!) 178/73 98 %   08/13/21 1101 -- 98 -- 128/73 96 %   08/13/21 1056 -- 66 -- (!) 141/73 96 %        Current Level of Function Impacting Discharge (ADLs): CGA    Other factors to consider for discharge:          PLAN :  Patient continues to benefit from skilled intervention to address the above impairments. Continue treatment per established plan of care to address goals. Recommend with staff: OOB to chair and toileting in bathroom pending BP with RW and assist x1    Recommendation for discharge: (in order for the patient to meet his/her long term goals)  No skilled occupational therapy/ follow up rehabilitation needs identified at this time. Agree with  PT to continue addressing education, home safety, activity tolerance, and monitoring vitals with activity/mobility. 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  In reference to rehab.     OBJECTIVE DATA SUMMARY:   Cognitive/Behavioral Status:  Neurologic State: Alert  Orientation Level: Oriented X4  Cognition: Follows commands;Decreased attention/concentration  Perception: Appears intact  Perseveration: Perseverates during conversation  Safety/Judgement: Awareness of environment; Fall prevention;Home safety    Functional Mobility and Transfers for ADLs:  Transfers:  Sit to Stand: Stand-by assistance  Functional Transfers  Bathroom Mobility: Stand-by assistance  Cues: Verbal cues provided     Balance:  Sitting: Intact  Standing: Intact; With support  Standing - Static: Constant support;Good (RW)  Standing - Dynamic : Constant support;Good (RW)    ADL Intervention:  Grooming  Grooming Assistance: Set-up; Supervision  Position Performed: Standing  Washing Hands: Supervision  Brushing Teeth: Supervision  Brushing/Combing Hair: Supervision  Cues: Verbal cues provided    Cognitive Retraining  Safety/Judgement: Awareness of environment; Fall prevention;Home safety    Pain:  No reports. Activity Tolerance:   Fair, requires rest breaks, and observed SOB with activity    After treatment patient left in no apparent distress:   Sitting in chair and Call bell within reach    COMMUNICATION/COLLABORATION:   The patients plan of care was discussed with: Physical therapist and Registered nurse.      Janay Brooks OT  Time Calculation: 35 mins

## 2021-08-13 NOTE — PROGRESS NOTES
Progress Note      8/13/2021 9:48 AM  NAME: Hardy Ch   MRN:  [id number]   Admit Diagnosis: Chest pain [R07.9]      Problem List:     1. PE  2. R knee pain s/p aspiration    Prolems:  - CAD. Mild Diffuse CAD with patent stents in the RCA (PLB w/ 50% stenosis) by 7/2017 cath. MPI 7/17 w/ EF 59% and inferior ischemia.    - XOL  - Morbid obesity  - DVT in right leg was on coumadin. - DM  - Former smoker  - Family hx CAD     Assessment/Plan:   Cath 8/8 with no significant change in CAD; EF 55%    CTA showed acute PE (LLL and lingular branch). LLE DVT. 1. Continue ASA  2. Continue statin  3. Continue VKA; pharmacy to manage while in house. Goal INR 2-3. He is interested in home monitoring. Will need lovenox for bridge. 4. Needs lifelong AC  5. OK for discharge from my standpoint when ok with primary  6. No new recs  7. Will be available as needed  8. Can see me in two weeks         [x]       High complexity decision making was performed in this patient at high risk for decompensation with multiple organ involvement. Subjective:     Hardy Ch says he is feeling more like himself. Discussed with RN events overnight. Review of Systems:    Symptom Y/N Comments  Symptom Y/N Comments   Fever/Chills N   Chest Pain N    Poor Appetite N   Edema N    Cough N   Abdominal Pain N    Sputum N   Joint Pain N    SOB/SHEARER N   Pruritis/Rash N    Nausea/vomit N   Tolerating PT/OT Y    Diarrhea N   Tolerating Diet Y    Constipation N   Other       Could NOT obtain due to:      Objective:      Physical Exam:    Last 24hrs VS reviewed since prior progress note.  Most recent are:    Visit Vitals  /87 (BP 1 Location: Right upper arm, BP Patient Position: At rest)   Pulse 66   Temp 97.6 °F (36.4 °C)   Resp 16   Ht 6' 4\" (1.93 m)   Wt 147.8 kg (325 lb 13.4 oz)   SpO2 94%   BMI 39.66 kg/m²       Intake/Output Summary (Last 24 hours) at 8/13/2021 0949  Last data filed at 8/13/2021 5020  Gross per 24 hour   Intake --   Output 600 ml   Net -600 ml        General Appearance: Well developed, well nourished, alert & oriented x 3,    no acute distress. Ears/Nose/Mouth/Throat: Hearing grossly normal.  Neck: Supple. Chest: Lungs clear to auscultation bilaterally. Cardiovascular: Regular rate and rhythm, S1S2 normal, no murmur. Abdomen: Soft, non-tender, bowel sounds are active. Extremities: No edema bilaterally. Skin: Warm and dry. [x]         Post-cath site without hematoma, bruit, tenderness, or thrill. Distal pulses intact. PMH/ reviewed - no change compared to H&P    Data Review    Telemetry: sinus rhythm     EKG:   [x]  No new EKG for review    Lab Data Personally Reviewed:    Recent Labs     08/13/21 0317 08/11/21  0111   WBC 8.4 8.6   HGB 12.3 11.7*   HCT 36.6 35.2*    144*     Recent Labs     08/13/21  0317 08/12/21  1022 08/11/21  1851 08/11/21  0111   INR 1.7* 1.4*  --  1.1   PTP 16.9* 14.3*  --  11.6*   APTT  --   --  31.3* 35.9*      No results for input(s): NA, K, CL, CO2, BUN, CREA, GLU, CA, MG in the last 72 hours. No results for input(s): CPK, CKNDX, TROIQ in the last 72 hours. No lab exists for component: CPKMB  Lab Results   Component Value Date/Time    Cholesterol, total 133 06/07/2013 02:40 PM    HDL Cholesterol 43 06/07/2013 02:40 PM    LDL, calculated 73.4 06/07/2013 02:40 PM    Triglyceride 83 06/07/2013 02:40 PM    CHOL/HDL Ratio 3.1 06/07/2013 02:40 PM       No results for input(s): AP, TBIL, TP, ALB, GLOB, GGT, AML, LPSE in the last 72 hours. No lab exists for component: SGOT, GPT, AMYP, HLPSE  No results for input(s): PH, PCO2, PO2 in the last 72 hours.     Medications Personally Reviewed:    Current Facility-Administered Medications   Medication Dose Route Frequency    multivitamin, tx-iron-ca-min (THERA-M w/ IRON) tablet 1 Tablet  1 Tablet Oral DAILY    metoprolol tartrate (LOPRESSOR) tablet 6.25 mg  6.25 mg Oral Q12H    lidocaine 4 % patch 1 Patch  1 Patch TransDERmal Q24H    albuterol-ipratropium (DUO-NEB) 2.5 MG-0.5 MG/3 ML  3 mL Nebulization Q6H PRN    benzonatate (TESSALON) capsule 100 mg  100 mg Oral TID PRN    WARFARIN INFORMATION NOTE (COUMADIN)   Other QPM    enoxaparin (LOVENOX) injection 150 mg  150 mg SubCUTAneous Q12H    aspirin chewable tablet 81 mg  81 mg Oral DAILY    atorvastatin (LIPITOR) tablet 20 mg  20 mg Oral QHS    acetaminophen (TYLENOL) tablet 650 mg  650 mg Oral Q6H PRN    Or    acetaminophen (TYLENOL) suppository 650 mg  650 mg Rectal Q6H PRN    polyethylene glycol (MIRALAX) packet 17 g  17 g Oral DAILY PRN    ondansetron (ZOFRAN ODT) tablet 4 mg  4 mg Oral Q8H PRN    Or    ondansetron (ZOFRAN) injection 4 mg  4 mg IntraVENous Q6H PRN    nitroglycerin (NITROSTAT) tablet 0.4 mg  0.4 mg SubLINGual PRN    alum-mag hydroxide-simeth (MYLANTA) oral suspension 30 mL  30 mL Oral Q4H PRN    sodium chloride (NS) flush 5-40 mL  5-40 mL IntraVENous Q8H    sodium chloride (NS) flush 5-40 mL  5-40 mL IntraVENous PRN    naloxone (NARCAN) injection 0.4 mg  0.4 mg IntraVENous PRN    morphine injection 4 mg  4 mg IntraVENous Q1H PRN         Adah Lines III, DO

## 2021-08-13 NOTE — PROGRESS NOTES
Transition of Care Plan:     RUR: 8% - low risk  Disposition: Home ([address]) with Home Health ([facility])  Follow up appointments: PCP, Specialists  DME needed: None  Transportation at Discharge:Pt's son will transport at d/c.   Marletta Bras or means to access home: yes  IM Medicare Letter: Given  Is patient a BCPI-A Bundle:  No                    If yes, was Bundle Letter given?:   N/A  [address], wife, [phone]  Discharge Caregiver contacted prior to discharge?  Yes    11:32am-No further CM needs identified. CM notified pt's nurse of d/c.    11:16am- CM called Dr. Pelon Hair, Cardiologist office to schedule pt's follow-up appointment. Appointment scheduled for 8/18/2021 at 9:15am with Edgardo Benavidez NP.    11:15am- CM called pt's PCP to reschedule follow-up appointment. Appointment rescheduled for for 8/17/2021 at 9:30am for I&R check. 10:31am-No further CM needs identified. CM notified pt's nurse of d/c.    10:15am-CM met with pt at bedside to discuss d/c plan. CM informed pt that [facility] has been arranged with [facility]. Pt stated that his son will transport at d/c. Care Management Interventions  PCP Verified by CM: Yes  Palliative Care Criteria Met (RRAT>21 & CHF Dx)?: No  Mode of Transport at Discharge:  Other (see comment) (Pt's son will transport at d/c. )  Transition of Care Consult (CM Consult): Discharge Planning, [address] (Home ([address]) [facility]  [address]: No  Discharge Durable Medical Equipment: No  Physical Therapy Consult: Yes  Occupational Therapy Consult: Yes  Speech Therapy Consult: No  Current Support Network: Lives with Spouse, Own Home  Confirm Follow Up Transport: Self  The Plan for Transition of Care is Related to the Following Treatment Goals : Home Health   The Patient and/or Patient Representative was Provided with a Choice of Provider and Agrees with the Discharge Plan?: Yes  Name of the Patient Representative Who was Provided with a Choice of Provider and Agrees with the Discharge Plan: Self  Freedom of Choice List was Provided with Basic Dialogue that Supports the Patient's Individualized Plan of Care/Goals, Treatment Preferences and Shares the Quality Data Associated with the Providers?: Yes  [place] Resource Information Provided?: No  Discharge Location  Discharge Placement: Home with home health (Home ([address]) with [facility] ([facility]    Steve Rocha [address]  [phone]

## 2021-08-13 NOTE — PROGRESS NOTES
DISCHARGE SUMMARY FROM [facility] NURSE    The patient is stable for discharge. I have reviewed the discharge instructions with the patient and spouse. The patient and spouse verbalized understanding. All questions were fully answered. The patient and spouse verbalized no complaints. Scripts sent to pharmacy and medication handouts were given and reviewed with the patient and spouse. Appropriate educational materials and medication side effects teaching were provided also provided. Cardiac monitor and IV line(s) were removed. The following personal items collected during admission were returned to the patient/family  Home medications:   Dental Appliance: Dental Appliances: None  Vision: Visual Aid: None  Hearing Aid:    Jewelry: Jewelry: None  Clothing: Clothing: Undergarments, Shirt, Pants  Other Valuables: Other Valuables: None  Valuables sent to safe: There were no personal belongings, valuables or home medications left at patient's bedside,  or safe.

## 2021-08-13 NOTE — DISCHARGE SUMMARY
Hospitalist Discharge Summary     Patient ID:  Jerilyn Zapata  [id number]  14 y.o.  1945 8/7/2021    PCP on record: Angelito Hernandez MD    Admit date: 8/7/2021  Discharge date and time: 8/13/2021    DISCHARGE DIAGNOSIS:  Acute PE  CAD    CONSULTATIONS:  IP CONSULT TO CARDIOLOGY  IP CONSULT TO ORTHOPEDIC SURGERY    Excerpted HPI from H&P of Carlos Crain MD:  Jerilyn Zapata is a 64 y.o.  male who presents with above complaints from home with wife. Patient complains of left-sided chest pain which radiates to the center in the right and some of it to the neck and shoulder area as per the patient. As per the patient's started yesterday noon with chest pain on and off and has gotten more worse tonight. History of associated sweating/ diaphoresis/nausea. History of CAD status post stents in the past  History of taking aspirin daily along with statin.______________________________________________________________  DISCHARGE SUMMARY/HOSPITAL COURSE:  for full details see H&P, daily progress notes, labs, consult notes. Acute pulmonary PE  Hx of DVT x 2  Right knee pain and swelling status post aspiration on 08/11  Insurance not covering NOAC  So on lovenox bridging with coumadin  INR 1.7 today, will d/c home today with lovenox until tomorrow (will complete 5 day of lovenox). Will send on coumadin 7.5 daily until 8/17 when he will f/u with pmd and have INR checked. Orthostatic hypotension on 8/11, resolved today    Right knee effusion:  S/p arthrocentesis and injection by Ortho  F/u ortho as outpatient as needed     CAD s/p stents POA  -LHC done: [place] [facility] Moderate CAD with 50% diagonal and 50% mid LCX.  No intervention.   -Continue ASA, Lipitor, and BB     Thrombocytopenia  Improving.       Hypertension   Hyperlipidemia   -Continue lipitor and metoprolol        D/c home today        _______________________________________________________________________  Patient seen and examined by me on discharge day. Pertinent Findings:  Gen:    Not in distress  Chest: Clear lungs  CVS:   Regular rhythm. No edema  Abd:  Soft, not distended, not tender  Neuro:  Alert,   _______________________________________________________________________  DISCHARGE MEDICATIONS:   Current Discharge Medication List      START taking these medications    Details   enoxaparin (LOVENOX) injection 150 mg by SubCUTAneous route every twelve (12) hours every twelve (12) hours for 3 doses. Indications: acute blood clot in a blood vessel supplying the lungs  Qty: 3 mL, Refills: 0  Start date: 8/13/2021, End date: 8/15/2021      metoprolol succinate (TOPROL-XL) 25 mg XL tablet Take 0.5 Tablets by mouth daily. Qty: 30 Tablet, Refills: 1  Start date: 8/13/2021      warfarin (COUMADIN) 2.5 mg tablet Take 3 Tablets by mouth daily for 4 days. Take 7.5 mg until you go for repeat INR. Further dosing needs to be modified based on INR  Qty: 15 Tablet, Refills: 0  Start date: 8/13/2021, End date: 8/17/2021         CONTINUE these medications which have NOT CHANGED    Details   lisinopriL (PRINIVIL, ZESTRIL) 40 mg tablet       simvastatin (ZOCOR) 40 mg tablet       CELECOXIB PO Take  by mouth. aspirin 81 mg chewable tablet Take 81 mg by mouth daily. metFORMIN (GLUCOPHAGE) 500 mg tablet Take 500 mg by mouth two (2) times daily (with meals). CHONDROITIN SULFATE Take 1,200 mg by mouth daily. glucosamine sulfate (GLUCOSAMINE) 750 mg Tab Take 750 mg by mouth daily. multivitamin (ONE A DAY) tablet Take 1 Tab by mouth daily. Patient Follow Up Instructions: Activity: Activity as tolerated  Diet: Cardiac Diet  Wound Care: None needed    Follow-up with your pmd    Follow-up Information     Follow up With Specialties Details Why [address] [facility] [address]  This is your home health agency. If you have not heard from them in 2-3 days, please give them a call.   [address] Frances[address] Braden Going, DO Cardiology   [address]  [address]  [address] [address]  [phone]      Oleg Tian NP Nurse Practitioner Go on 8/17/2021 For hospital follow up and I&R check at 9:30AM. [address] [facility], [facility] [address]   [facility] [address]  [facility]  [phone]          ________________________________________________________________    Risk of deterioration: Moderate    Condition at Discharge:  Stable  __________________________________________________________________    Disposition  Home with family, no needs    ____________________________________________________________________    Code Status: Full Code  ___________________________________________________________________      Total time in minutes spent coordinating this discharge (includes going over instructions, follow-up, prescriptions, and preparing report for sign off to her PCP) :  >30 minutes    Signed:  Kj Chavarria MD

## 2021-08-13 NOTE — DISCHARGE INSTRUCTIONS
HOSPITALIST DISCHARGE INSTRUCTIONS    NAME: Hardy Ch   :  1945   MRN:  [id number]     Date/Time:  2021 11:26 AM    ADMIT DATE: 2021     DISCHARGE DATE: 2021     DISCHARGE DIAGNOSIS:   Acute PE  CAD  Orthostatic hypotension    Follow-up Information     Follow up With Specialties Details Why [address] [facility] [address]  This is your home health agency. If you have not heard from them in 2-3 days, please give them a call. [address] [place] [address] Amee Biggs,  Cardiology   [address]  [address]  [address] [address] [phone]      Tay Laird NP Nurse Practitioner Go on 2021 For hospital follow up and I&R check at 9:30AM. [address] [facility]  [address]  [address] [address] [phone]            MEDICATIONS:  As per medication reconciliation  list  · It is important that you take the medication exactly as they are prescribed. · Keep your medication in the bottles provided by the pharmacist and keep a list of the medication names, dosages, and times to be taken in your wallet. · Do not take other medications without consulting your doctor. Pain Management: per above medications    What to do at Home    Recommended diet:  Cardiac Diet    Recommended activity: Activity as tolerated    If you have questions regarding the hospital related prescriptions or hospital related issues please call [facility] at [phone].     If you experience any of the following symptoms then please call your primary care physician or return to the emergency room if you cannot get hold of your doctor:  Fever, chills, nausea, vomiting, diarrhea, change in mentation, falling, bleeding, shortness of breath    Follow Up:  Dr. Mildred Ashley MD  you are to call and set up an appointment to see them in 7-10 days. Information obtained by :  I understand that if any problems occur once I am at home I am to contact my physician. I understand and acknowledge receipt of the instructions indicated above.                                                                                                                                            Physician's or R.N.'s Signature                                                                  Date/Time                                                                                                                                              Patient or Representative Signature                                                          Date/Time

## 2021-08-13 NOTE — PROGRESS NOTES
Problem: Falls - Risk of  Goal: *Absence of Falls  Description: Document Hilaria Pillow Fall Risk and appropriate interventions in the flowsheet.   Outcome: Progressing Towards Goal  Note: Fall Risk Interventions:  Mobility Interventions: Bed/chair exit alarm, Patient to call before getting OOB, Utilize walker, cane, or other assistive device         Medication Interventions: Bed/chair exit alarm, Teach patient to arise slowly    Elimination Interventions: Bed/chair exit alarm, Call light in reach, Patient to call for help with toileting needs, Urinal in reach    History of Falls Interventions: Bed/chair exit alarm         Problem: Pulmonary Embolism Care Plan (Adult)  Goal: *Improvement of existing pulmonary embolism  Outcome: Progressing Towards Goal  Goal: *Absence of bleeding  Outcome: Progressing Towards Goal  Goal: *Labs within defined limits  Outcome: Progressing Towards Goal

## 2022-03-18 PROBLEM — R94.39 ABNORMAL STRESS TEST: Status: ACTIVE | Noted: 2017-07-17

## 2022-03-18 PROBLEM — E78.5 HYPERLIPIDEMIA: Status: ACTIVE | Noted: 2021-08-08

## 2022-03-19 PROBLEM — I10 HTN (HYPERTENSION): Status: ACTIVE | Noted: 2021-08-08

## 2022-03-20 PROBLEM — R07.9 CHEST PAIN: Status: ACTIVE | Noted: 2021-08-08

## 2022-06-27 ENCOUNTER — APPOINTMENT (OUTPATIENT)
Dept: ULTRASOUND IMAGING | Age: 77
DRG: 981 | End: 2022-06-27
Attending: NURSE PRACTITIONER
Payer: MEDICARE

## 2022-06-27 ENCOUNTER — HOSPITAL ENCOUNTER (INPATIENT)
Age: 77
LOS: 28 days | Discharge: NURSING, REHAB OR LONG-TERM CARE | DRG: 981 | End: 2022-07-25
Attending: EMERGENCY MEDICINE | Admitting: HOSPITALIST
Payer: MEDICARE

## 2022-06-27 DIAGNOSIS — D69.3 CHRONIC ITP (IDIOPATHIC THROMBOCYTOPENIA) (HCC): ICD-10-CM

## 2022-06-27 DIAGNOSIS — K56.600 PARTIAL INTESTINAL OBSTRUCTION, UNSPECIFIED CAUSE (HCC): ICD-10-CM

## 2022-06-27 DIAGNOSIS — K43.9 VENTRAL HERNIA WITHOUT OBSTRUCTION OR GANGRENE: ICD-10-CM

## 2022-06-27 DIAGNOSIS — K92.2 GASTROINTESTINAL HEMORRHAGE, UNSPECIFIED GASTROINTESTINAL HEMORRHAGE TYPE: ICD-10-CM

## 2022-06-27 DIAGNOSIS — D69.6 THROMBOCYTOPENIA (HCC): Primary | ICD-10-CM

## 2022-06-27 DIAGNOSIS — R58 MUCOSAL BLEEDING: ICD-10-CM

## 2022-06-27 DIAGNOSIS — R23.3 PETECHIAE: ICD-10-CM

## 2022-06-27 PROBLEM — N17.9 AKI (ACUTE KIDNEY INJURY) (HCC): Status: ACTIVE | Noted: 2022-06-27

## 2022-06-27 LAB
ALBUMIN SERPL-MCNC: 3.2 G/DL (ref 3.5–5)
ALBUMIN/GLOB SERPL: 0.8 {RATIO} (ref 1.1–2.2)
ALP SERPL-CCNC: 61 U/L (ref 45–117)
ALT SERPL-CCNC: 24 U/L (ref 12–78)
ANION GAP SERPL CALC-SCNC: 7 MMOL/L (ref 5–15)
AST SERPL-CCNC: 18 U/L (ref 15–37)
BASOPHILS # BLD: 0.1 K/UL (ref 0–0.1)
BASOPHILS NFR BLD: 1 % (ref 0–1)
BILIRUB SERPL-MCNC: 0.5 MG/DL (ref 0.2–1)
BUN SERPL-MCNC: 28 MG/DL (ref 6–20)
BUN/CREAT SERPL: 19 (ref 12–20)
CALCIUM SERPL-MCNC: 9 MG/DL (ref 8.5–10.1)
CHLORIDE SERPL-SCNC: 107 MMOL/L (ref 97–108)
CO2 SERPL-SCNC: 22 MMOL/L (ref 21–32)
CREAT SERPL-MCNC: 1.47 MG/DL (ref 0.7–1.3)
DIFFERENTIAL METHOD BLD: ABNORMAL
EOSINOPHIL # BLD: 0.5 K/UL (ref 0–0.4)
EOSINOPHIL NFR BLD: 6 % (ref 0–7)
ERYTHROCYTE [DISTWIDTH] IN BLOOD BY AUTOMATED COUNT: 13.1 % (ref 11.5–14.5)
FOLATE SERPL-MCNC: 15.6 NG/ML (ref 5–21)
GLOBULIN SER CALC-MCNC: 4 G/DL (ref 2–4)
GLUCOSE SERPL-MCNC: 159 MG/DL (ref 65–100)
HCT VFR BLD AUTO: 39.1 % (ref 36.6–50.3)
HEMOCCULT STL QL: POSITIVE
HGB BLD-MCNC: 12.9 G/DL (ref 12.1–17)
IMM GRANULOCYTES # BLD AUTO: 0 K/UL (ref 0–0.04)
IMM GRANULOCYTES NFR BLD AUTO: 0 % (ref 0–0.5)
INR PPP: 1.1 (ref 0.9–1.1)
INR PPP: 1.1 (ref 0.9–1.1)
LYMPHOCYTES # BLD: 2.1 K/UL (ref 0.8–3.5)
LYMPHOCYTES NFR BLD: 25 % (ref 12–49)
MCH RBC QN AUTO: 32.6 PG (ref 26–34)
MCHC RBC AUTO-ENTMCNC: 33 G/DL (ref 30–36.5)
MCV RBC AUTO: 98.7 FL (ref 80–99)
MONOCYTES # BLD: 0.8 K/UL (ref 0–1)
MONOCYTES NFR BLD: 10 % (ref 5–13)
NEUTS SEG # BLD: 4.7 K/UL (ref 1.8–8)
NEUTS SEG NFR BLD: 58 % (ref 32–75)
NRBC # BLD: 0 K/UL (ref 0–0.01)
NRBC BLD-RTO: 0 PER 100 WBC
PERIPHERAL SMEAR,PSM: NORMAL
PLATELET # BLD AUTO: <3 K/UL (ref 150–400)
PLATELET COMMENTS,PCOM: ABNORMAL
PMV BLD AUTO: ABNORMAL FL (ref 8.9–12.9)
POTASSIUM SERPL-SCNC: 4.5 MMOL/L (ref 3.5–5.1)
PROT SERPL-MCNC: 7.2 G/DL (ref 6.4–8.2)
PROTHROMBIN TIME: 11.8 SEC (ref 9–11.1)
PROTHROMBIN TIME: 11.8 SEC (ref 9–11.1)
RBC # BLD AUTO: 3.96 M/UL (ref 4.1–5.7)
RBC MORPH BLD: ABNORMAL
RETICS # AUTO: 0.06 M/UL (ref 0.03–0.1)
RETICS/RBC NFR AUTO: 1.7 % (ref 0.7–2.1)
SODIUM SERPL-SCNC: 136 MMOL/L (ref 136–145)
VIT B12 SERPL-MCNC: 128 PG/ML (ref 193–986)
WBC # BLD AUTO: 8.2 K/UL (ref 4.1–11.1)
WBC MORPH BLD: ABNORMAL

## 2022-06-27 PROCEDURE — 74011000250 HC RX REV CODE- 250: Performed by: EMERGENCY MEDICINE

## 2022-06-27 PROCEDURE — 36415 COLL VENOUS BLD VENIPUNCTURE: CPT

## 2022-06-27 PROCEDURE — 74011000250 HC RX REV CODE- 250: Performed by: HOSPITALIST

## 2022-06-27 PROCEDURE — 86900 BLOOD TYPING SEROLOGIC ABO: CPT

## 2022-06-27 PROCEDURE — 82272 OCCULT BLD FECES 1-3 TESTS: CPT

## 2022-06-27 PROCEDURE — 85610 PROTHROMBIN TIME: CPT

## 2022-06-27 PROCEDURE — 99223 1ST HOSP IP/OBS HIGH 75: CPT | Performed by: INTERNAL MEDICINE

## 2022-06-27 PROCEDURE — 85025 COMPLETE CBC W/AUTO DIFF WBC: CPT

## 2022-06-27 PROCEDURE — C9113 INJ PANTOPRAZOLE SODIUM, VIA: HCPCS | Performed by: EMERGENCY MEDICINE

## 2022-06-27 PROCEDURE — P9073 PLATELETS PHERESIS PATH REDU: HCPCS

## 2022-06-27 PROCEDURE — 65270000046 HC RM TELEMETRY

## 2022-06-27 PROCEDURE — 74011250636 HC RX REV CODE- 250/636: Performed by: NURSE PRACTITIONER

## 2022-06-27 PROCEDURE — 99285 EMERGENCY DEPT VISIT HI MDM: CPT

## 2022-06-27 PROCEDURE — 74011250637 HC RX REV CODE- 250/637: Performed by: HOSPITALIST

## 2022-06-27 PROCEDURE — 93005 ELECTROCARDIOGRAM TRACING: CPT

## 2022-06-27 PROCEDURE — 85045 AUTOMATED RETICULOCYTE COUNT: CPT

## 2022-06-27 PROCEDURE — 86923 COMPATIBILITY TEST ELECTRIC: CPT

## 2022-06-27 PROCEDURE — 74011250636 HC RX REV CODE- 250/636: Performed by: EMERGENCY MEDICINE

## 2022-06-27 PROCEDURE — C9113 INJ PANTOPRAZOLE SODIUM, VIA: HCPCS | Performed by: HOSPITALIST

## 2022-06-27 PROCEDURE — 82607 VITAMIN B-12: CPT

## 2022-06-27 PROCEDURE — 36430 TRANSFUSION BLD/BLD COMPNT: CPT

## 2022-06-27 PROCEDURE — 74011250636 HC RX REV CODE- 250/636: Performed by: HOSPITALIST

## 2022-06-27 PROCEDURE — 82746 ASSAY OF FOLIC ACID SERUM: CPT

## 2022-06-27 PROCEDURE — 93970 EXTREMITY STUDY: CPT

## 2022-06-27 PROCEDURE — 80053 COMPREHEN METABOLIC PANEL: CPT

## 2022-06-27 RX ORDER — ONDANSETRON 4 MG/1
4 TABLET, ORALLY DISINTEGRATING ORAL
Status: DISCONTINUED | OUTPATIENT
Start: 2022-06-27 | End: 2022-07-25 | Stop reason: HOSPADM

## 2022-06-27 RX ORDER — ACETAMINOPHEN 650 MG/1
650 SUPPOSITORY RECTAL
Status: DISCONTINUED | OUTPATIENT
Start: 2022-06-27 | End: 2022-07-25 | Stop reason: HOSPADM

## 2022-06-27 RX ORDER — ATORVASTATIN CALCIUM 20 MG/1
20 TABLET, FILM COATED ORAL
Status: DISCONTINUED | OUTPATIENT
Start: 2022-06-27 | End: 2022-07-25 | Stop reason: HOSPADM

## 2022-06-27 RX ORDER — SODIUM CHLORIDE 0.9 % (FLUSH) 0.9 %
5-40 SYRINGE (ML) INJECTION AS NEEDED
Status: DISCONTINUED | OUTPATIENT
Start: 2022-06-27 | End: 2022-07-25 | Stop reason: HOSPADM

## 2022-06-27 RX ORDER — ACETAMINOPHEN 325 MG/1
650 TABLET ORAL
Status: DISCONTINUED | OUTPATIENT
Start: 2022-06-27 | End: 2022-07-25 | Stop reason: HOSPADM

## 2022-06-27 RX ORDER — SODIUM CHLORIDE 9 MG/ML
250 INJECTION, SOLUTION INTRAVENOUS AS NEEDED
Status: DISCONTINUED | OUTPATIENT
Start: 2022-06-27 | End: 2022-06-30

## 2022-06-27 RX ORDER — SULFAMETHOXAZOLE AND TRIMETHOPRIM 800; 160 MG/1; MG/1
1 TABLET ORAL 2 TIMES DAILY
COMMUNITY
End: 2022-07-25

## 2022-06-27 RX ORDER — ONDANSETRON 2 MG/ML
4 INJECTION INTRAMUSCULAR; INTRAVENOUS
Status: DISCONTINUED | OUTPATIENT
Start: 2022-06-27 | End: 2022-07-25 | Stop reason: HOSPADM

## 2022-06-27 RX ORDER — MULTIVITAMIN/IRON/FOLIC ACID 18MG-0.4MG
1 TABLET ORAL 2 TIMES DAILY
COMMUNITY

## 2022-06-27 RX ORDER — SODIUM CHLORIDE 0.9 % (FLUSH) 0.9 %
5-40 SYRINGE (ML) INJECTION EVERY 8 HOURS
Status: DISCONTINUED | OUTPATIENT
Start: 2022-06-27 | End: 2022-07-25 | Stop reason: HOSPADM

## 2022-06-27 RX ORDER — AMOXICILLIN 500 MG/1
500 CAPSULE ORAL AS NEEDED
COMMUNITY
End: 2022-07-25

## 2022-06-27 RX ORDER — POLYETHYLENE GLYCOL 3350 17 G/17G
17 POWDER, FOR SOLUTION ORAL DAILY PRN
Status: DISCONTINUED | OUTPATIENT
Start: 2022-06-27 | End: 2022-07-25 | Stop reason: HOSPADM

## 2022-06-27 RX ORDER — ACETAMINOPHEN 500 MG
500 TABLET ORAL
COMMUNITY

## 2022-06-27 RX ORDER — METOPROLOL SUCCINATE 25 MG/1
12.5 TABLET, EXTENDED RELEASE ORAL DAILY
Status: DISCONTINUED | OUTPATIENT
Start: 2022-06-27 | End: 2022-06-27

## 2022-06-27 RX ADMIN — SODIUM CHLORIDE 1000 ML: 9 INJECTION, SOLUTION INTRAVENOUS at 07:49

## 2022-06-27 RX ADMIN — SODIUM CHLORIDE, PRESERVATIVE FREE 10 ML: 5 INJECTION INTRAVENOUS at 18:08

## 2022-06-27 RX ADMIN — SODIUM CHLORIDE 80 MG: 9 INJECTION, SOLUTION INTRAMUSCULAR; INTRAVENOUS; SUBCUTANEOUS at 09:55

## 2022-06-27 RX ADMIN — ATORVASTATIN CALCIUM 20 MG: 20 TABLET, FILM COATED ORAL at 22:03

## 2022-06-27 RX ADMIN — IMMUNE GLOBULIN (HUMAN) 0.1 G: 10 INJECTION INTRAVENOUS; SUBCUTANEOUS at 17:57

## 2022-06-27 RX ADMIN — WATER 10 ML: 1 INJECTION INTRAMUSCULAR; INTRAVENOUS; SUBCUTANEOUS at 08:41

## 2022-06-27 RX ADMIN — SODIUM CHLORIDE 1000 ML: 9 INJECTION, SOLUTION INTRAVENOUS at 08:34

## 2022-06-27 RX ADMIN — SODIUM CHLORIDE, PRESERVATIVE FREE 40 MG: 5 INJECTION INTRAVENOUS at 22:02

## 2022-06-27 RX ADMIN — SODIUM CHLORIDE, PRESERVATIVE FREE 10 ML: 5 INJECTION INTRAVENOUS at 22:03

## 2022-06-27 NOTE — CONSULTS
[address], [address], [address]  [phone]      Hematology/ Oncology Consult Note      Reason for consult:     Aguilar Worley is a 76 y.o. male who we have been asked to see by Dr. Keira Borjas for acute thrombocytopenia. Subjective:     Aguilar Worley is a 76year old male who presented to the ED at [id number] [place] [address] for fatigue, scattered petechiae to all extremities, purpura, hematuria and rectal bleeding. He has a PMH of several DVT and PE since 2008 and has been on eliquis OP. He also has a PMH of CAD with PCI, diabetes type II, obesity and bilateral hip repair. He was recently started on bactrim OP for a UTI. Patient seen at bedside today in the ED with wife. Discussed potential causes of acute severe thrombocytopenia, work up and treatment. He stated that petechiae appeared over last 24 hours after a hot shower. Review of Systems:  Pertinent items are noted in the History of Present Illness. Past Medical History:   Diagnosis Date    Arthritis     bilateral hips/knees    CAD (coronary artery disease) 6/7/13    PCI    Chronic pain     DJD; SILVIO KNEES    Diabetes ([address]     \"BORDERLINE\" PER MD    GERD (gastroesophageal reflux disease)     Macular degeneration     Morbid obesity ([address]     Other and unspecified symptoms and signs involving general sensations and perceptions     Bilat. legs \"give out\" intermittently; Bilat leg cramps; macular degenration (left).  Other ill-defined conditions(799.89)     HYPERLIPIDEMIA; LT LE DVT (2008);  MORBID OBESITY    Pulmonary embolism ([address] 08/2021    Thromboembolus [facility]) 2008    left LE     Past Surgical History:   Procedure Laterality Date    OR CARDIAC SURG PROCEDURE UNLIST  6/713    STENTS TO RCA    OR TOTAL HIP ARTHROPLASTY  6/2007    right    OR TOTAL HIP ARTHROPLASTY  12/2007    left      Family History   Problem Relation Age of Onset    Heart Disease Mother     Arrhythmia Mother     Heart Disease Father     Cancer Sister     Lung Disease Sister      Social History     Tobacco Use    Smoking status: Former Smoker     Packs/day: 1.00     Quit date: 1983     Years since quittin.4    Smokeless tobacco: Never Used   Substance Use Topics    Alcohol use:  Yes     Alcohol/week: 2.5 standard drinks     Types: 2 Glasses of wine, 1 Cans of beer per week     Comment: 0-2 X PER WK WITH MEALS OR AFTER PLAYING GOLF      Current Facility-Administered Medications   Medication Dose Route Frequency Provider Last Rate Last Admin    0.9% sodium chloride infusion 250 mL  250 mL IntraVENous PRN Hemanth Casillas MD        dexamethasone (DECADRON) 40 mg in 0.9% sodium chloride 50 mL IVPB  40 mg IntraVENous DAILY Vinay Pedraza NP        atorvastatin (LIPITOR) tablet 20 mg  20 mg Oral QHS Euel Lusty, MD  Ulysses Pacer ON 2022] vitamin X-E-R-lutein-minerals (OCUVITE) tablet 1 Tablet  1 Tablet Oral DAILY Floyd Hansen MD        pantoprazole (PROTONIX) 40 mg in 0.9% sodium chloride 10 mL injection  40 mg IntraVENous Q12H Floyd Hansen MD        sodium chloride (NS) flush 5-40 mL  5-40 mL IntraVENous Q8H Floyd Hansen MD        sodium chloride (NS) flush 5-40 mL  5-40 mL IntraVENous PRN Floyd Hansen MD        acetaminophen (TYLENOL) tablet 650 mg  650 mg Oral Q6H PRN Floyd Hansen MD        Or    acetaminophen (TYLENOL) suppository 650 mg  650 mg Rectal Q6H PRN Floyd Hansen MD        polyethylene glycol (MIRALAX) packet 17 g  17 g Oral DAILY PRN Floyd Hansen MD        ondansetron (ZOFRAN ODT) tablet 4 mg  4 mg Oral Q8H PRN Floyd Hansen MD        Or    ondansetron [facility]) injection 4 mg  4 mg IntraVENous Q6H PRN Folyd Hansen MD        immune globulin 10% infusion 8.68 g  100 mg/kg (Ideal) IntraVENous DAILY Vinay Pedraza NP         Current Outpatient Medications   Medication Sig Dispense Refill    apixaban (Eliquis) 5 mg tablet Take 5 mg by mouth two (2) times a day.  trimethoprim-sulfamethoxazole (Bactrim DS) 160-800 mg per tablet Take 1 Tablet by mouth two (2) times a day.  amoxicillin (AMOXIL) 500 mg capsule Take 500 mg by mouth as needed. 4 capsules before dental procedure      acetaminophen (Tylenol Extra Strength) 500 mg tablet Take 500 mg by mouth every six (6) hours as needed for Pain.  vit A/vit C/vit E/zinc/copper (ICAPS AREDS PO) Take 1 Tablet by mouth two (2) times a day.  omega 3-dha-epa-fish oil 100-160-1,000 mg cap Take 1 Capsule by mouth daily.  glucosamine-chondroitin (ELATION) 1,500-1,200 mg/30 mL liqd Take 1 Tablet by mouth two (2) times a day.  COVID-19 mRNA Vacc (MODERNA) 100 mcg/0.5 mL susp injection 0.5 mL by IntraMUSCular route once.  simvastatin (ZOCOR) 40 mg tablet Take 40 mg by mouth nightly.  aspirin 81 mg chewable tablet Take 81 mg by mouth daily.  metFORMIN (GLUCOPHAGE) 500 mg tablet Take 500 mg by mouth two (2) times daily (with meals).  multivitamin (ONE A DAY) tablet Take 1 Tab by mouth daily.           Allergies   Allergen Reactions    Adhesive Other (comments)     Blisters     Advil [Ibuprofen] Rash     Rash all over, itching, trouble breathing     Celebrex [Celecoxib] Other (comments)     Swelling in legs, no rash, no itching, no trouble breathing    Cleocin [Clindamycin Hcl] Rash    Other Medication Unknown (comments)     \"Phosphate\"          Objective:     Patient Vitals for the past 8 hrs:   BP Temp Pulse Resp SpO2 Height Weight   06/27/22 1200 128/67 -- 69 23 95 % -- --   06/27/22 1049 (!) 111/58 -- 70 17 97 % -- --   06/27/22 1034 (!) 133/55 -- 66 21 95 % -- --   06/27/22 0843 -- -- -- -- -- 6' 4\" (1.93 m) 282 lb 13.6 oz (128.3 kg)   06/27/22 0800 (!) 142/79 -- -- -- -- -- --   06/27/22 0738 -- 97.5 °F (36.4 °C) 86 20 98 % -- --       Lab Results   Component Value Date/Time    WBC 8.2 06/27/2022 07:41 AM    HGB 12.9 06/27/2022 07:41 AM    HCT 39.1 06/27/2022 07:41 AM    PLATELET <3 (LL) 16/67/4004 07:41 AM    MCV 98.7 06/27/2022 07:41 AM       Physical Exam:   Visit Vitals  /67   Pulse 69   Temp 97.5 °F (36.4 °C)   Resp 23   Ht 6' 4\" (1.93 m)   Wt 282 lb 13.6 oz (128.3 kg)   SpO2 95%   BMI 34.43 kg/m²     General appearance: alert, cooperative, no distress, appears stated age, moderately obese  Head: Normocephalic, without obvious abnormality, atraumatic  Throat: abnormal findings: multiple purpura   Abdomen: soft, non-tender.  Bowel sounds normal. No masses,  no organomegaly  Extremities: extremities normal, atraumatic, no cyanosis or edema  Skin: petechiae - scattered, to all extremities   Neurologic: Grossly normal    Assessment:     Severe Thrombocytopenia  Sudden onset of scattered petechiae to all extremities and purpura, also melena to stool   Platelets found to be <3 in ED   Transfusing platelets in ED   Hgb stable   Recently started bactrim for UTI, stated has taken before with no issues   On Eliquis PTA for DVT/PE, last DVT in 8/2021    GI Bleed   Melena in ED, occult stool positive   Receiving IV Protonix     History of DVT and PE   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 car trips x2  PE of unknown origin   DVT after hip replacement   Last DVT 8/2021   Patient reports his in immobile much of the time d/t debility   Has not had hypercoagulable work up     Acute Kidney Injury   Managed by primary team       Plan:     > Suspect severe thrombocytopenia r/t ITP, rule out causes r/t bactrim   > Agree with transfusion of platelets in ED, goal to keep above 50 with GIB  > Start on Dexamethasone 40 mg daily x 4 days   > Give IVIG x 3 days   > Follow CBC closely   > Agree with holding eliquis and ASA for now, will need to reconsider starting once stable as patient has a history of recurrent DVT/PE   > Obtain BLE for assess for DVT resolution      Discussed with Dr. Aleah Joel    Thank you for allowing us to participate in the care of Mr. Joseline Lai, will continue to follow closely.      Willy Bello NP

## 2022-06-27 NOTE — PROGRESS NOTES
Patient  Alert and responsive patient notes no distress at the time, patient in room with family. Patient tolerates bag of immune globulin, patient bag hung by primary nurse Antoni Villa  and witnessed by [employer]. Patient given dose as per pharmacy, patient immune globin scheduled to increase if patient tolerates in 30 min. Will reassess patient at time prior to increase.  Patient current v/s stable BP: 128/78, HR: 76, resp: 20, temp: 98.6 on PO2: 99% on room air

## 2022-06-27 NOTE — PROGRESS NOTES
Transition of Care Plan   RUR: 9%   Disposition: The disposition plan is likely return home with his wife to independent living at [address] [facility] [facility] F/U with PCP/Specialist     Transport: son    Reason for Admission: Thrombocytopenia, GI bleed, UGO                     RUR Score:    9%                 Plan for utilizing home health:   Not recommended       PCP: First and Last name:  Shanell Cisse MD     Name of Practice:    Are you a current patient: Yes/No:    Approximate date of last visit:    Can you participate in a virtual visit with your PCP:                     Current Advanced Directive/Advance Care Plan: Full Code      Healthcare Decision Maker:     Transition of Care Plan:                        CRM spoke with patient's wife, introduced self, explained role, verified demographics, and offered assistance. The patient lives in independent living with his wife. The patient requires some assistance with his ADL's/IADL's and does have a rollator, walker and cane in the home. The [facility] uses [facility] [address] for his prescriptions. Care Management Interventions  PCP Verified by CM: Yes  Palliative Care Criteria Met (RRAT>21 & CHF Dx)?: No  Mode of Transport at Discharge:  Other (see comment) (son)  Transition of Care Consult (CM Consult): Discharge Planning  MyChart Signup: No  Discharge Durable Medical Equipment: No  Health Maintenance Reviewed: Yes  Physical Therapy Consult: No  Occupational Therapy Consult: No  Speech Therapy Consult: No  Support Systems: Spouse/Significant Other,Child(henry)  Confirm Follow Up Transport: Family  [employer] Information Provided?: No  Discharge Location  Patient Expects to be Discharged to[de-identified] Home with family assistance    5:30 PM  ROMI Ulloa

## 2022-06-27 NOTE — PROGRESS NOTES
Pharmacy Medication Reconciliation     Recommendations/Findings: The following amendments were made to the patient's active medication list on file at [facility]:   1) Additions: none    2) Deletions:   - Celebrex (causes swelling in legs)  - metoprolol (because over sensitive to this and lisinopril, MD stopped these)    3) Changes:   - none      Pertinent Findings:   - Patient received Rosaura Dos Santos booster on May 16, 2022  - Prescribed Bactrim for 14 days, took 8 days of therapy thus far    Clarified PTA med list with rx query, patient, and med list wife had. PTA medication list was corrected to the following:       Prior to Admission Medications   Prescriptions Last Dose Informant Taking? COVID-19 mRNA Vacc (MODERNA) 100 mcg/0.5 mL susp injection 2022  Yes   Si.5 mL by IntraMUSCular route once. acetaminophen (Tylenol Extra Strength) 500 mg tablet   Yes   Sig: Take 500 mg by mouth every six (6) hours as needed for Pain.   amoxicillin (AMOXIL) 500 mg capsule 2022 at Unknown time  Yes   Sig: Take 500 mg by mouth as needed. 4 capsules before dental procedure   apixaban (Eliquis) 5 mg tablet 2022 at Unknown time  Yes   Sig: Take 5 mg by mouth two (2) times a day. aspirin 81 mg chewable tablet 2022 at Unknown time  Yes   Sig: Take 81 mg by mouth daily. glucosamine-chondroitin (ELATION) 1,500-1,200 mg/30 mL liqd   Yes   Sig: Take 1 Tablet by mouth two (2) times a day. metFORMIN (GLUCOPHAGE) 500 mg tablet 2022 at Unknown time  Yes   Sig: Take 500 mg by mouth two (2) times daily (with meals). multivitamin (ONE A DAY) tablet 2022 at Unknown time  Yes   Sig: Take 1 Tab by mouth daily. omega 3-dha-epa-fish oil 100-160-1,000 mg cap   Yes   Sig: Take 1 Capsule by mouth daily. simvastatin (ZOCOR) 40 mg tablet 2022 at Unknown time  Yes   Sig: Take 40 mg by mouth nightly.    trimethoprim-sulfamethoxazole (Bactrim DS) 160-800 mg per tablet 2022 at Unknown time  Yes   Sig: Take 1 Tablet by mouth two (2) times a day. vit A/vit C/vit E/zinc/copper (ICAPS AREDS PO) 6/26/2022 at Unknown time  Yes   Sig: Take 1 Tablet by mouth two (2) times a day.       Facility-Administered Medications: None        Thank you,  Truman Caba, PHARMD

## 2022-06-27 NOTE — ED TRIAGE NOTES
Pt is also reporting \"blood spots\" that have been \"popping up\" on his skin. Patient has a hx of blood clots and is on eliquis.

## 2022-06-27 NOTE — PROGRESS NOTES
Problem: Discharge Planning  Goal: *Discharge to safe environment  Outcome: Progressing Towards Goal     Problem: Falls - Risk of  Goal: *Absence of Falls  Description: Document Jonel Sol Fall Risk and appropriate interventions in the flowsheet.   Outcome: Progressing Towards Goal  Note: Fall Risk Interventions:  Mobility Interventions: Communicate number of staff needed for ambulation/transfer         Medication Interventions: Patient to call before getting OOB    Elimination Interventions: Call light in reach    History of Falls Interventions: Door open when patient unattended,Investigate reason for fall         Problem: Patient Education: Go to Patient Education Activity  Goal: Patient/Family Education  Outcome: Progressing Towards Goal     Problem: Patient Education: Go to Patient Education Activity  Goal: Patient/Family Education  Outcome: Progressing Towards Goal     Problem: Nutrition Deficit  Goal: *Optimize nutritional status  Outcome: Progressing Towards Goal

## 2022-06-27 NOTE — CONSENT
Informed Consent for Blood Component Transfusion Note    I have discussed with the patient the rationale for blood component transfusion; its benefits in treating or preventing fatigue, organ damage, or death; and its risk which includes mild transfusion reactions, rare risk of blood borne infection, or more serious but rare reactions. I have discussed the alternatives to transfusion, including the risk and consequences of not receiving transfusion. The patient had an opportunity to ask questions and had agreed to proceed with transfusion of blood components.     Electronically signed by Marisela Gao MD on 6/27/22 at 8:20 AM

## 2022-06-27 NOTE — ED PROVIDER NOTES
EMERGENCY DEPARTMENT HISTORY AND PHYSICAL EXAM      Date: 6/27/2022  Patient Name: Aguilar Worley    History of Presenting Illness     Chief Complaint   Patient presents with    Melena     patient states that he had 3 BM this morning which consisted of \"large clots of blood\"    Epistaxis     patient states the he woke up with blood on his pillow, reporting it coming from his nose       History Provided By: Patient    HPI: Aguilar Worley, 76 y.o. male presents to the ED with cc of melena. 78-year-old male with a history of DVT on Eliquis, CAD status post PCI, hypertension presents emergency department with a chief complaint of blood in stool. Patient reports he has been struggling with blood in his urine for the past week and has seen his PCP for this. Currently on antibiotics. Reports has noted some \"dots\" on his skin. Reports this evening noted some abdominal discomfort and cramping with dark red blood and clots in stool. Patient also reports he has noted some mouth lesions. And spitting up blood with blood on pillow. Patient denies any chest pain but does report some shortness of breath and lightheadedness. He is on Eliquis. Denies any hematemesis. There are no other complaints, changes, or physical findings at this time. PCP: Melva Adams MD    No current facility-administered medications on file prior to encounter. Current Outpatient Medications on File Prior to Encounter   Medication Sig Dispense Refill    apixaban (Eliquis) 5 mg tablet Take 5 mg by mouth two (2) times a day.  trimethoprim-sulfamethoxazole (Bactrim DS) 160-800 mg per tablet Take 1 Tablet by mouth two (2) times a day.  amoxicillin (AMOXIL) 500 mg capsule Take 500 mg by mouth as needed. 4 capsules before dental procedure      acetaminophen (Tylenol Extra Strength) 500 mg tablet Take 500 mg by mouth every six (6) hours as needed for Pain.       vit A/vit C/vit E/zinc/copper (ICAPS AREDS PO) Take 1 Tablet by mouth two (2) times a day.  omega 3-dha-epa-fish oil 100-160-1,000 mg cap Take 1 Capsule by mouth daily.  glucosamine-chondroitin (ELATION) 1,500-1,200 mg/30 mL liqd Take 1 Tablet by mouth two (2) times a day.  COVID-19 mRNA Vacc (MODERNA) 100 mcg/0.5 mL susp injection 0.5 mL by IntraMUSCular route once.  simvastatin (ZOCOR) 40 mg tablet Take 40 mg by mouth nightly.  aspirin 81 mg chewable tablet Take 81 mg by mouth daily.  metFORMIN (GLUCOPHAGE) 500 mg tablet Take 500 mg by mouth two (2) times daily (with meals).  multivitamin (ONE A DAY) tablet Take 1 Tab by mouth daily.  [DISCONTINUED] metoprolol succinate (TOPROL-XL) 25 mg XL tablet Take 0.5 Tablets by mouth daily. 30 Tablet 1    [DISCONTINUED] lisinopriL (PRINIVIL, ZESTRIL) 40 mg tablet       [DISCONTINUED] CELECOXIB PO Take  by mouth.  [DISCONTINUED] CHONDROITIN SULFATE Take 1,200 mg by mouth daily.  [DISCONTINUED] glucosamine sulfate (GLUCOSAMINE) 750 mg Tab Take 750 mg by mouth daily. Past History     Past Medical History:  Past Medical History:   Diagnosis Date    Arthritis     bilateral hips/knees    CAD (coronary artery disease) 6/7/13    PCI    Chronic pain     DJD; SILVIO KNEES    Diabetes ([address]     \"BORDERLINE\" PER MD    GERD (gastroesophageal reflux disease)     Macular degeneration     Morbid obesity ([address]     Other and unspecified symptoms and signs involving general sensations and perceptions     Bilat. legs \"give out\" intermittently; Bilat leg cramps; macular degenration (left).  Other ill-defined conditions(799.89)     HYPERLIPIDEMIA; LT LE DVT (2008);  MORBID OBESITY    Pulmonary embolism ([address] 08/2021    Thromboembolus ([address] 2008    left LE       Past Surgical History:  Past Surgical History:   Procedure Laterality Date    MD CARDIAC SURG PROCEDURE UNLIST  6/713    STENTS TO RCA    MD TOTAL HIP ARTHROPLASTY  6/2007    right    MD TOTAL HIP ARTHROPLASTY 2007    left       Family History:  Family History   Problem Relation Age of Onset    Heart Disease Mother     Arrhythmia Mother     Heart Disease Father     Cancer Sister     Lung Disease Sister        Social History:  Social History     Tobacco Use    Smoking status: Former Smoker     Packs/day: 1.00     Quit date: 1983     Years since quittin.4    Smokeless tobacco: Never Used   Substance Use Topics    Alcohol use: Yes     Alcohol/week: 2.5 standard drinks     Types: 2 Glasses of wine, 1 Cans of beer per week     Comment: 0-2 X PER WK WITH MEALS OR AFTER PLAYING GOLF    Drug use: No       Allergies: Allergies   Allergen Reactions    Adhesive Other (comments)     Blisters     Advil [Ibuprofen] Rash     Rash all over, itching, trouble breathing     Celebrex [Celecoxib] Other (comments)     Swelling in legs, no rash, no itching, no trouble breathing    Cleocin [Clindamycin Hcl] Rash    Other Medication Unknown (comments)     \"Phosphate\"         Review of Systems   Review of Systems   Constitutional: Negative for fever. HENT: Positive for mouth sores. Negative for voice change. Eyes: Negative for pain and redness. Respiratory: Positive for shortness of breath. Negative for cough and chest tightness. Cardiovascular: Negative for chest pain and leg swelling. Gastrointestinal: Positive for abdominal pain and blood in stool. Negative for diarrhea, nausea and vomiting. Genitourinary: Negative for hematuria. Musculoskeletal: Negative for gait problem. Skin: Positive for rash. Negative for color change, pallor and wound. Neurological: Positive for dizziness and light-headedness. Negative for facial asymmetry, weakness and headaches. Hematological: Does not bruise/bleed easily. Psychiatric/Behavioral: Negative for behavioral problems. All other systems reviewed and are negative. Physical Exam   Physical Exam  Vitals and nursing note reviewed.  Exam conducted with a chaperone present. Constitutional:       Comments: 76 YOM, resting in bed, no distress   HENT:      Head: Normocephalic and atraumatic. Nose: Nose normal.      Mouth/Throat:      Comments: There are scattered oral lesions with adherent clots along the mucosa. There is no pharyngeal bleeding. No nasal bleeding. Eyes:      Pupils: Pupils are equal, round, and reactive to light. Cardiovascular:      Rate and Rhythm: Normal rate and regular rhythm. Pulses: Normal pulses. Heart sounds: No murmur heard. No friction rub. No gallop. Pulmonary:      Effort: Pulmonary effort is normal.      Breath sounds: Normal breath sounds. No wheezing, rhonchi or rales. Abdominal:      General: Abdomen is flat. There is no distension. Palpations: Abdomen is soft. Tenderness: There is no abdominal tenderness. Genitourinary:     Comments: Black stool, no hemorrhoids. Musculoskeletal:         General: Normal range of motion. Cervical back: Normal range of motion. Right lower leg: No edema. Left lower leg: No edema. Skin:     General: Skin is warm and dry. Capillary Refill: Capillary refill takes less than 2 seconds. Coloration: Skin is pale. Findings: Rash (Petechial) present. Neurological:      General: No focal deficit present. Mental Status: He is alert.    Psychiatric:         Mood and Affect: Mood normal.         Diagnostic Study Results     Labs -     Recent Results (from the past 12 hour(s))   CBC WITH AUTOMATED DIFF    Collection Time: 06/27/22  7:41 AM   Result Value Ref Range    WBC 8.2 4.1 - 11.1 K/uL    RBC 3.96 (L) 4.10 - 5.70 M/uL    HGB 12.9 12.1 - 17.0 g/dL    HCT 39.1 36.6 - 50.3 %    MCV 98.7 80.0 - 99.0 FL    MCH 32.6 26.0 - 34.0 PG    MCHC 33.0 30.0 - 36.5 g/dL    RDW 13.1 11.5 - 14.5 %    PLATELET <3 (LL) 026 - 400 K/uL    MPV ABNORMAL 8.9 - 12.9 FL    NRBC 0.0 0  WBC    ABSOLUTE NRBC 0.00 0.00 - 0.01 K/uL    NEUTROPHILS 58 32 - 75 % LYMPHOCYTES 25 12 - 49 %    MONOCYTES 10 5 - 13 %    EOSINOPHILS 6 0 - 7 %    BASOPHILS 1 0 - 1 %    IMMATURE GRANULOCYTES 0 0.0 - 0.5 %    ABS. NEUTROPHILS 4.7 1.8 - 8.0 K/UL    ABS. LYMPHOCYTES 2.1 0.8 - 3.5 K/UL    ABS. MONOCYTES 0.8 0.0 - 1.0 K/UL    ABS. EOSINOPHILS 0.5 (H) 0.0 - 0.4 K/UL    ABS. BASOPHILS 0.1 0.0 - 0.1 K/UL    ABS. IMM. GRANS. 0.0 0.00 - 0.04 K/UL    DF SMEAR SCANNED      PLATELET COMMENTS DECREASED PLATELETS      RBC COMMENTS NORMOCYTIC, NORMOCHROMIC      WBC COMMENTS REACTIVE LYMPHS     TYPE & SCREEN    Collection Time: 06/27/22  7:41 AM   Result Value Ref Range    Crossmatch Expiration 06/30/2022,2359     ABO/Rh(D) O POSITIVE     Antibody screen NEG    METABOLIC PANEL, COMPREHENSIVE    Collection Time: 06/27/22  7:41 AM   Result Value Ref Range    Sodium 136 136 - 145 mmol/L    Potassium 4.5 3.5 - 5.1 mmol/L    Chloride 107 97 - 108 mmol/L    CO2 22 21 - 32 mmol/L    Anion gap 7 5 - 15 mmol/L    Glucose 159 (H) 65 - 100 mg/dL    BUN 28 (H) 6 - 20 MG/DL    Creatinine 1.47 (H) 0.70 - 1.30 MG/DL    BUN/Creatinine ratio 19 12 - 20      GFR est AA 56 (L) >60 ml/min/1.73m2    GFR est non-AA 46 (L) >60 ml/min/1.73m2    Calcium 9.0 8.5 - 10.1 MG/DL    Bilirubin, total 0.5 0.2 - 1.0 MG/DL    ALT (SGPT) 24 12 - 78 U/L    AST (SGOT) 18 15 - 37 U/L    Alk.  phosphatase 61 45 - 117 U/L    Protein, total 7.2 6.4 - 8.2 g/dL    Albumin 3.2 (L) 3.5 - 5.0 g/dL    Globulin 4.0 2.0 - 4.0 g/dL    A-G Ratio 0.8 (L) 1.1 - 2.2     OCCULT BLOOD, STOOL    Collection Time: 06/27/22  7:50 AM   Result Value Ref Range    Occult blood, stool Positive (A) NEG     PROTHROMBIN TIME + INR    Collection Time: 06/27/22  8:32 AM   Result Value Ref Range    INR 1.1 0.9 - 1.1      Prothrombin time 11.8 (H) 9.0 - 11.1 sec   PROTHROMBIN TIME + INR    Collection Time: 06/27/22 10:04 AM   Result Value Ref Range    INR 1.1 0.9 - 1.1      Prothrombin time 11.8 (H) 9.0 - 11.1 sec   PERIPHERAL SMEAR    Collection Time: 06/27/22 10:04 AM Result Value Ref Range    PERIPHERAL SMEAR        Pathologic examination results can be viewed in [facility] Chart Review under the Pathology tab. Radiologic Studies -   DUPLEX LOWER EXT VENOUS BILAT    (Results Pending)     CT Results  (Last 48 hours)    None        CXR Results  (Last 48 hours)    None          Medical Decision Making   I am the first provider for this patient. I reviewed the vital signs, available nursing notes, past medical history, past surgical history, family history and social history. Vital Signs-Reviewed the patient's vital signs. Patient Vitals for the past 12 hrs:   Temp Pulse Resp BP SpO2   06/27/22 1200 -- 69 23 128/67 95 %   06/27/22 1049 -- 70 17 (!) 111/58 97 %   06/27/22 1034 -- 66 21 (!) 133/55 95 %   06/27/22 0800 -- -- -- Farooqestella Jiménez 142/79 --   06/27/22 0738 97.5 °F (36.4 °C) 86 20 -- 98 %     Records Reviewed: Nursing Notes and Old Medical Records    Provider Notes (Medical Decision Making):     61-year-old male presents with blood in stool on Eliquis. Half vitals are unremarkable. I am actually concerned for thrombocytopenia given his petechial rash and mucosal bleeding. No active ENT bleeding on exam.  Occult stool is positive with large bowel movement of blood in ED. Given possible upper GI source we will bolus fluids and give Protonix. ED Course:   Initial assessment performed. The patients presenting problems have been discussed, and they are in agreement with the care plan formulated and outlined with them. I have encouraged them to ask questions as they arise throughout their visit. ED Course as of 06/27/22 1428   Mon Jun 27, 2022   0820 Patient's platelets are 0 which explains his mucosal bleeding, his hemoglobin is otherwise normal, his white count is normal with normal differential.  Risk and benefits of transfusion discussed, will transfuse platelets prophylactically. Send DIC panel and coags. [MB]   [id number] Discussed with hospitalist for admission. Hematology to come evaluate the patient. [MB]      ED Course User Index  [MB] Bart Holland MD     CBC otherwise unremarkable with normal red blood cell count and no evidence of pancytopenia. I will send a DIC screen and coags. Unclear cause, no fever or altered mental status to suggest TTP. We will also give Protonix and TXA swish and spit and transfuse platelets empirically. Critical Care Time:   CRITICAL CARE NOTE :    8:07 AM    IMPENDING DETERIORATION -Metabolic  ASSOCIATED RISK FACTORS - Metabolic changes  MANAGEMENT- Bedside Assessment  INTERPRETATION -  Blood Pressure  INTERVENTIONS - hemodynamic mngmt (transfusion) and Metobolic interventions  CASE REVIEW - Hospitalist/Intensivist, Medical Sub-Specialist and Nursing  TREATMENT RESPONSE -Stable  PERFORMED BY - Self    NOTES   :  I have spent 45 minutes of critical care time involved in lab review, consultations with specialist, family decision- making, bedside attention and documentation. This time excludes time spent in any separate billed procedures. During this entire length of time I was immediately available to the patient . Vamsi Cote MD      Disposition:    Admitted    Diagnosis     Clinical Impression:   1. Thrombocytopenia ([address]    2. Gastrointestinal hemorrhage, unspecified gastrointestinal hemorrhage type    3. Mucosal bleeding    4. Petechiae        Attestations:    Vamsi Cote MD    Please note that this dictation was completed with [employer], the computer voice recognition software. Quite often unanticipated grammatical, syntax, homophones, and other interpretive errors are inadvertently transcribed by the computer software. Please disregard these errors. Please excuse any errors that have escaped final proofreading. Thank you.

## 2022-06-27 NOTE — H&P
Hospitalist Admission Note    NAME: Betty Teixeira   :  1945   MRN:  [id number]     Date/Time:  2022 9:26 AM    Patient PCP: Terrence Hilliard MD  Cardiology:  Dr. Camron Brown    Please note that this dictation was completed with Mod[employer], the computer voice recognition software. Quite often unanticipated grammatical, syntax, homophones, and other interpretive errors are inadvertently transcribed by the computer software. Please disregard these errors. Please excuse any errors that have escaped final proofreading  ______________________________________________________________________   Assessment & Plan:  Severe acute thrombocytopenia, POA  Platelets <3  Diffuse petechiae, POA  Oral blood blisters on tongue and cheek and dried red blood on corners of mouth, POA  --suspect due to Bactrim DS or ITP  --2 units platelets ordered by ER  --stat hematology consult; discussed with NP Catalino Martini  --consider steroid  --hold aspirin and eliquis. --oral tranexamic acid swish and spit for oral bleeding given    With acute GI bleed, POA with melena, dark stools with tinge dark red blood when mixed with liquid, suspect UGIB due to severe thrombocytopenia  --IV protonix. Monitor H&H q8h and transfuse prn hgb <7 or if hypotension    Recent hematuria and urinary retention due to blood clots in bladder, resolved  --been on Bactrim DS x 8 days. Will hold since concern it is causing thrombocytopenia    Hx left PE 2021 and prior DVT   --hold eliquis due to severe thrombocytopenia    CAD s/p stents x 3  --hold aspirin  --cont Toprol XL    DM 2  --hold metformin due to ugo. Moderate SSI    Mild UGO Cr 1.47, baseline 1.1 2021    Obesity  Body mass index is 34.43 kg/m².     Code: discussed, full  DVT prophylaxis: none -- cannot do SCD, lovenox/heparin/eliquis due to severe thrombocytopenia  Surrogate decision maker:  wife        Subjective:   CHIEF COMPLAINT:  GI bleeding, oral bleeding    HISTORY OF PRESENT ILLNESS:     Vee Wilson is a 76 y.o. male from [facility] presents with GI bleed and oral bleeding noted today. History from patient: had hematuria x 3 weeks with urinary retention from clots. On Bactrim DS x 8 days, hematuria resolved. Noticed petechiae yesterday, thought from hot shower. This AM had rectal bleeding with very dark stool from rectum that was diarrhea like x 3. Also woke up with blood on pillow and noticed blood blisters on tongue and inside bottom of mouth.  +mild SOB and lightheaded. No chest pain, cough. In ER, platelets <3. No hx cancer, not on chemo. No fever, chills, abdominal pain. Noted to have diffuse petechiae. Rectal exam by Dr. Casey Ruvalcaba with melena. Chart reviewed. Last admission  Admit date: 8/7/2021  Discharge date and time: 8/13/2021   DISCHARGE DIAGNOSIS:  Acute PE  CAD  Acute pulmonary PE  Hx of DVT x 2  Right knee pain and swelling status post aspiration on 08/11  Insurance not covering NOAC  So on lovenox bridging with coumadin  INR 1.7 today, will d/c home today with lovenox until tomorrow (will complete 5 day of lovenox). Will send on coumadin 7.5 daily until 8/17 when he will f/u with pmd and have INR checked. Orthostatic hypotension on 8/11, resolved today     Right knee effusion:  S/p arthrocentesis and injection by Ortho  F/u ortho as outpatient as needed     CAD s/p stents POA  -LHC done: Tahir Santos Moderate CAD with 50% diagonal and 50% mid LCX. No intervention.   -Continue ASA, Lipitor, and BB     Thrombocytopenia  Improving, lowest platelet count 188.       We were asked to admit for work up and evaluation of the above problems.      Past Medical History:   Diagnosis Date    Arthritis     bilateral hips/knees    CAD (coronary artery disease) 6/7/13    PCI    Chronic pain     DJD; SILVIO KNEES    Diabetes ([facility]     \"BORDERLINE\" PER MD    GERD (gastroesophageal reflux disease)     Morbid obesity ([facility] [address]     Other and unspecified symptoms and signs involving general sensations and perceptions     Bilat. legs \"give out\" intermittently; Bilat leg cramps; macular degenration (left).  Other ill-defined conditions(799.89)     HYPERLIPIDEMIA; LT LE DVT (); MORBID OBESITY    Thromboembolus ([facility] [address]     left LE      Past Surgical History:   Procedure Laterality Date    [facility] CARDIAC SURG PROCEDURE UNLIST      STENTS TO RCA    [facility] TOTAL HIP ARTHROPLASTY  2007    right    [place] TOTAL HIP ARTHROPLASTY  2007    left     Social History     Tobacco Use    Smoking status: Former Smoker     Packs/day: 1.00     Quit date: 1983     Years since quittin.4    Smokeless tobacco: Never Used   Substance Use Topics    Alcohol use: Yes     Alcohol/week: 2.5 standard drinks     Types: 2 Glasses of wine, 1 Cans of beer per week     Comment: 0-2 X PER WK WITH MEALS OR AFTER PLAYING GOLF        Family History   Problem Relation Age of Onset    Heart Disease Mother     Arrhythmia Mother     Heart Disease Father     Cancer Sister     Lung Disease Sister      Allergies   Allergen Reactions    Adhesive Other (comments)     Blisters     Advil [Ibuprofen] Rash    Cleocin [Clindamycin Hcl] Rash    Other Medication Unknown (comments)     \"Phosphate\"        Prior to Admission medications    Medication Sig Start Date End Date Taking? Authorizing Provider   apixaban (Eliquis) 5 mg tablet Take 5 mg by mouth two (2) times a day. Yes Provider, Historical   trimethoprim-sulfamethoxazole (Bactrim DS) 160-800 mg per tablet Take 1 Tablet by mouth two (2) times a day. Yes Provider, Historical   amoxicillin (AMOXIL) 500 mg capsule Take 500 mg by mouth as needed. 4 capsules before dental procedure   Yes Provider, Historical   acetaminophen (Tylenol Extra Strength) 500 mg tablet Take 500 mg by mouth every six (6) hours as needed for Pain.    Yes Provider, Historical   vit A/vit C/vit E/zinc/copper (ICAPS AREDS PO) Take 1 Tablet by mouth two (2) times a day. Yes Provider, Historical   omega 3-dha-epa-fish oil 100-160-1,000 mg cap Take 1 Capsule by mouth daily. Yes Provider, Historical   glucosamine-chondroitin (ELATION) 1,500-1,200 mg/30 mL liqd Take 1 Tablet by mouth two (2) times a day. Yes Provider, Historical   COVID-19 mRNA Vacc (MODERNA) 100 mcg/0.5 mL susp injection 0.5 mL by IntraMUSCular route once. 22  Yes Provider, Historical   simvastatin (ZOCOR) 40 mg tablet Take 40 mg by mouth nightly. 21  Yes Provider, Historical   aspirin 81 mg chewable tablet Take 81 mg by mouth daily. Yes Provider, Historical   metFORMIN (GLUCOPHAGE) 500 mg tablet Take 500 mg by mouth two (2) times daily (with meals). Yes Provider, Historical   multivitamin (ONE A DAY) tablet Take 1 Tab by mouth daily. Yes Provider, Historical     REVIEW OF SYSTEMS:  POSITIVE= Bold.   Negative = normal text  General:  fever, chills, sweats, generalized weakness, weight loss/gain, loss of appetite  Eyes:  blurred vision, eye pain, loss of vision, diplopia  Ear Nose and Throat:  rhinorrhea, pharyngitis  Respiratory:   cough, sputum production, SOB, wheezing, SHEARER, pleuritic pain  Cardiology:  chest pain, palpitations, orthopnea, PND, edema, syncope   Gastrointestinal:  abdominal pain, N/V, dysphagia, diarrhea, constipation, bleeding  Genitourinary:  frequency, urgency, dysuria, hematuria, incontinence  Muskuloskeletal :  arthralgia, myalgia  Hematology:  easy bruising, bleeding, lymphadenopathy  Dermatological:  rash, ulceration, pruritis  Endocrine:  hot flashes or polydipsia  Neurological:  headache, dizziness, confusion, focal weakness, paresthesia, memory loss, gait disturbance  Psychological: anxiety, depression, agitation      Objective:   VITALS:    Visit Vitals  BP (!) 142/79   Pulse 86   Temp 97.5 °F (36.4 °C)   Resp 20   Ht 6' 4\" (1.93 m)   Wt 128.3 kg (282 lb 13.6 oz)   SpO2 98%   BMI 34.43 kg/m²     Temp (24hrs), Av.5 °F (36.4 °C), Min:97.5 °F (36.4 °C), Max:97.5 °F (36.4 °C)    Body mass index is 34.43 kg/m². PHYSICAL EXAM:    General:    Alert, overweight, talkative, cooperative, no distress, appears stated age. Dark brown to black stools in bedside commode with maroon tinged liquid. HEENT: Atraumatic, anicteric sclerae, pink conjunctivae     No oral ulcers, mucosa moist, dark purple blister on tongue and on inside lower mouth, throat clear. Hearing intact. Corners of mouth with dried red blood          Neck:  Supple, symmetrical,  thyroid: non tender  Lungs:   Clear to auscultation bilaterally. No Wheezing or Rhonchi. No rales. Chest wall:  No tenderness  No Accessory muscle use. Heart:   Regular  rhythm,  No  murmur   No gallop. No edema. Abdomen:   Soft, non-tender. Not distended. Bowel sounds normal. No masses  Extremities: No cyanosis. No clubbing  Skin:     Not pale Not Jaundiced  Diffuse petechiae on arms, trunk, legs. Psych:  Good insight. Not depressed. Not anxious or agitated. Neurologic: EOMs intact. No facial asymmetry. No aphasia or slurred speech. Symmetrical strength, Alert and oriented X 3.      IMAGING RESULTS:   []       I have personally reviewed the actual   []     CXR  []     CT scan  CXR:  CT :  EKG:   ________________________________________________________________________  Care Plan discussed with:    Comments   Patient y    Family      RN     Care Manager                    Consultant:  josephine Simmons, ER pharmacist   ________________________________________________________________________  Prophylaxis:  GI PPI   DVT No eliquis, heparin/lovenox or SCD due to severe thrombocytopenia   ________________________________________________________________________  Recommended Disposition:   Home with Family y   HH/PT/OT/RN y   SNF/LTC    REINALDO    ________________________________________________________________________  Code Status:  Full Code y   DNR/DNI ________________________________________________________________________  TOTAL TIME: 60 minutes      Comments    y Reviewed previous records   >50% of visit spent in counseling and coordination of care  Discussion with patient and/or family and questions answered         ______________________________________________________________________  Jeanne Rodriguez MD      Procedures: see electronic medical records for all procedures/Xrays and details which were not copied into this note but were reviewed prior to creation of Plan. LAB DATA REVIEWED:    Recent Results (from the past 24 hour(s))   CBC WITH AUTOMATED DIFF    Collection Time: 06/27/22  7:41 AM   Result Value Ref Range    WBC 8.2 4.1 - 11.1 K/uL    RBC 3.96 (L) 4.10 - 5.70 M/uL    HGB 12.9 12.1 - 17.0 g/dL    HCT 39.1 36.6 - 50.3 %    MCV 98.7 80.0 - 99.0 FL    MCH 32.6 26.0 - 34.0 PG    MCHC 33.0 30.0 - 36.5 g/dL    RDW 13.1 11.5 - 14.5 %    PLATELET <3 (LL) 743 - 400 K/uL    MPV ABNORMAL 8.9 - 12.9 FL    NRBC 0.0 0  WBC    ABSOLUTE NRBC 0.00 0.00 - 0.01 K/uL    NEUTROPHILS 58 32 - 75 %    LYMPHOCYTES 25 12 - 49 %    MONOCYTES 10 5 - 13 %    EOSINOPHILS 6 0 - 7 %    BASOPHILS 1 0 - 1 %    IMMATURE GRANULOCYTES 0 0.0 - 0.5 %    ABS. NEUTROPHILS 4.7 1.8 - 8.0 K/UL    ABS. LYMPHOCYTES 2.1 0.8 - 3.5 K/UL    ABS. MONOCYTES 0.8 0.0 - 1.0 K/UL    ABS. EOSINOPHILS 0.5 (H) 0.0 - 0.4 K/UL    ABS. BASOPHILS 0.1 0.0 - 0.1 K/UL    ABS. IMM.  GRANS. 0.0 0.00 - 0.04 K/UL    DF SMEAR SCANNED      PLATELET COMMENTS DECREASED PLATELETS      RBC COMMENTS NORMOCYTIC, NORMOCHROMIC      WBC COMMENTS REACTIVE LYMPHS     TYPE & SCREEN    Collection Time: 06/27/22  7:41 AM   Result Value Ref Range    Crossmatch Expiration 06/30/2022,2359     ABO/Rh(D) O POSITIVE     Antibody screen NEG    METABOLIC PANEL, COMPREHENSIVE    Collection Time: 06/27/22  7:41 AM   Result Value Ref Range    Sodium 136 136 - 145 mmol/L    Potassium 4.5 3.5 - 5.1 mmol/L    Chloride 107 97 - 108 mmol/L    CO2 22 21 - 32 mmol/L    Anion gap 7 5 - 15 mmol/L    Glucose 159 (H) 65 - 100 mg/dL    BUN 28 (H) 6 - 20 MG/DL    Creatinine 1.47 (H) 0.70 - 1.30 MG/DL    BUN/Creatinine ratio 19 12 - 20      GFR est AA 56 (L) >60 ml/min/1.73m2    GFR est non-AA 46 (L) >60 ml/min/1.73m2    Calcium 9.0 8.5 - 10.1 MG/DL    Bilirubin, total 0.5 0.2 - 1.0 MG/DL    ALT (SGPT) 24 12 - 78 U/L    AST (SGOT) 18 15 - 37 U/L    Alk.  phosphatase 61 45 - 117 U/L    Protein, total 7.2 6.4 - 8.2 g/dL    Albumin 3.2 (L) 3.5 - 5.0 g/dL    Globulin 4.0 2.0 - 4.0 g/dL    A-G Ratio 0.8 (L) 1.1 - 2.2     OCCULT BLOOD, STOOL    Collection Time: 06/27/22  7:50 AM   Result Value Ref Range    Occult blood, stool Positive (A) NEG     PROTHROMBIN TIME + INR    Collection Time: 06/27/22  8:32 AM   Result Value Ref Range    INR 1.1 0.9 - 1.1      Prothrombin time 11.8 (H) 9.0 - 11.1 sec

## 2022-06-28 LAB
ANION GAP SERPL CALC-SCNC: 8 MMOL/L (ref 5–15)
ATRIAL RATE: 75 BPM
BUN SERPL-MCNC: 48 MG/DL (ref 6–20)
BUN/CREAT SERPL: 43 (ref 12–20)
CALCIUM SERPL-MCNC: 8.1 MG/DL (ref 8.5–10.1)
CALCULATED P AXIS, ECG09: 48 DEGREES
CALCULATED R AXIS, ECG10: -53 DEGREES
CALCULATED T AXIS, ECG11: 65 DEGREES
CHLORIDE SERPL-SCNC: 113 MMOL/L (ref 97–108)
CO2 SERPL-SCNC: 19 MMOL/L (ref 21–32)
CREAT SERPL-MCNC: 1.12 MG/DL (ref 0.7–1.3)
DIAGNOSIS, 93000: NORMAL
ERYTHROCYTE [DISTWIDTH] IN BLOOD BY AUTOMATED COUNT: 13.2 % (ref 11.5–14.5)
ERYTHROCYTE [DISTWIDTH] IN BLOOD BY AUTOMATED COUNT: 14.2 % (ref 11.5–14.5)
GLUCOSE BLD STRIP.AUTO-MCNC: 164 MG/DL (ref 65–117)
GLUCOSE BLD STRIP.AUTO-MCNC: 202 MG/DL (ref 65–117)
GLUCOSE BLD STRIP.AUTO-MCNC: 217 MG/DL (ref 65–117)
GLUCOSE BLD STRIP.AUTO-MCNC: 254 MG/DL (ref 65–117)
GLUCOSE BLD STRIP.AUTO-MCNC: 298 MG/DL (ref 65–117)
GLUCOSE SERPL-MCNC: 128 MG/DL (ref 65–100)
HCT VFR BLD AUTO: 23.2 % (ref 36.6–50.3)
HCT VFR BLD AUTO: 25.8 % (ref 36.6–50.3)
HGB BLD-MCNC: 7.8 G/DL (ref 12.1–17)
HGB BLD-MCNC: 8.8 G/DL (ref 12.1–17)
HISTORY CHECKED?,CKHIST: NORMAL
MCH RBC QN AUTO: 32.5 PG (ref 26–34)
MCH RBC QN AUTO: 33.1 PG (ref 26–34)
MCHC RBC AUTO-ENTMCNC: 33.6 G/DL (ref 30–36.5)
MCHC RBC AUTO-ENTMCNC: 34.1 G/DL (ref 30–36.5)
MCV RBC AUTO: 95.2 FL (ref 80–99)
MCV RBC AUTO: 98.3 FL (ref 80–99)
NRBC # BLD: 0 K/UL (ref 0–0.01)
NRBC # BLD: 0 K/UL (ref 0–0.01)
NRBC BLD-RTO: 0 PER 100 WBC
NRBC BLD-RTO: 0 PER 100 WBC
P-R INTERVAL, ECG05: 174 MS
PLATELET # BLD AUTO: <3 K/UL (ref 150–400)
PLATELET # BLD AUTO: <3 K/UL (ref 150–400)
PMV BLD AUTO: ABNORMAL FL (ref 8.9–12.9)
PMV BLD AUTO: ABNORMAL FL (ref 8.9–12.9)
POTASSIUM SERPL-SCNC: 4.4 MMOL/L (ref 3.5–5.1)
Q-T INTERVAL, ECG07: 388 MS
QRS DURATION, ECG06: 110 MS
QTC CALCULATION (BEZET), ECG08: 433 MS
RBC # BLD AUTO: 2.36 M/UL (ref 4.1–5.7)
RBC # BLD AUTO: 2.71 M/UL (ref 4.1–5.7)
SERVICE CMNT-IMP: ABNORMAL
SODIUM SERPL-SCNC: 140 MMOL/L (ref 136–145)
VENTRICULAR RATE, ECG03: 75 BPM
WBC # BLD AUTO: 7.8 K/UL (ref 4.1–11.1)
WBC # BLD AUTO: 8.1 K/UL (ref 4.1–11.1)

## 2022-06-28 PROCEDURE — 99232 SBSQ HOSP IP/OBS MODERATE 35: CPT | Performed by: INTERNAL MEDICINE

## 2022-06-28 PROCEDURE — 83605 ASSAY OF LACTIC ACID: CPT

## 2022-06-28 PROCEDURE — 83880 ASSAY OF NATRIURETIC PEPTIDE: CPT

## 2022-06-28 PROCEDURE — 36415 COLL VENOUS BLD VENIPUNCTURE: CPT

## 2022-06-28 PROCEDURE — 74011250636 HC RX REV CODE- 250/636: Performed by: NURSE PRACTITIONER

## 2022-06-28 PROCEDURE — 2709999900 HC NON-CHARGEABLE SUPPLY

## 2022-06-28 PROCEDURE — 83735 ASSAY OF MAGNESIUM: CPT

## 2022-06-28 PROCEDURE — 85027 COMPLETE CBC AUTOMATED: CPT

## 2022-06-28 PROCEDURE — 74011250636 HC RX REV CODE- 250/636: Performed by: HOSPITALIST

## 2022-06-28 PROCEDURE — 94760 N-INVAS EAR/PLS OXIMETRY 1: CPT

## 2022-06-28 PROCEDURE — 86923 COMPATIBILITY TEST ELECTRIC: CPT

## 2022-06-28 PROCEDURE — 86900 BLOOD TYPING SEROLOGIC ABO: CPT

## 2022-06-28 PROCEDURE — P9016 RBC LEUKOCYTES REDUCED: HCPCS

## 2022-06-28 PROCEDURE — 74011250637 HC RX REV CODE- 250/637: Performed by: HOSPITALIST

## 2022-06-28 PROCEDURE — 82962 GLUCOSE BLOOD TEST: CPT

## 2022-06-28 PROCEDURE — 82550 ASSAY OF CK (CPK): CPT

## 2022-06-28 PROCEDURE — P9073 PLATELETS PHERESIS PATH REDU: HCPCS

## 2022-06-28 PROCEDURE — 74011250637 HC RX REV CODE- 250/637: Performed by: NURSE PRACTITIONER

## 2022-06-28 PROCEDURE — 74011000250 HC RX REV CODE- 250: Performed by: HOSPITALIST

## 2022-06-28 PROCEDURE — C9113 INJ PANTOPRAZOLE SODIUM, VIA: HCPCS | Performed by: HOSPITALIST

## 2022-06-28 PROCEDURE — 74011000258 HC RX REV CODE- 258: Performed by: NURSE PRACTITIONER

## 2022-06-28 PROCEDURE — 84484 ASSAY OF TROPONIN QUANT: CPT

## 2022-06-28 PROCEDURE — 80076 HEPATIC FUNCTION PANEL: CPT

## 2022-06-28 PROCEDURE — 80048 BASIC METABOLIC PNL TOTAL CA: CPT

## 2022-06-28 PROCEDURE — 85384 FIBRINOGEN ACTIVITY: CPT

## 2022-06-28 PROCEDURE — 93005 ELECTROCARDIOGRAM TRACING: CPT

## 2022-06-28 PROCEDURE — 36430 TRANSFUSION BLD/BLD COMPNT: CPT

## 2022-06-28 PROCEDURE — 85610 PROTHROMBIN TIME: CPT

## 2022-06-28 PROCEDURE — 85730 THROMBOPLASTIN TIME PARTIAL: CPT

## 2022-06-28 PROCEDURE — 65270000046 HC RM TELEMETRY

## 2022-06-28 RX ORDER — SODIUM CHLORIDE 9 MG/ML
250 INJECTION, SOLUTION INTRAVENOUS AS NEEDED
Status: DISCONTINUED | OUTPATIENT
Start: 2022-06-28 | End: 2022-06-30

## 2022-06-28 RX ORDER — DILTIAZEM HYDROCHLORIDE 5 MG/ML
10 INJECTION INTRAVENOUS ONCE
Status: DISPENSED | OUTPATIENT
Start: 2022-06-28 | End: 2022-06-29

## 2022-06-28 RX ORDER — DIPHENHYDRAMINE HYDROCHLORIDE 50 MG/ML
INJECTION, SOLUTION INTRAMUSCULAR; INTRAVENOUS
Status: DISPENSED
Start: 2022-06-28 | End: 2022-06-29

## 2022-06-28 RX ORDER — MORPHINE SULFATE 2 MG/ML
2 INJECTION, SOLUTION INTRAMUSCULAR; INTRAVENOUS ONCE
Status: COMPLETED | OUTPATIENT
Start: 2022-06-29 | End: 2022-06-28

## 2022-06-28 RX ORDER — DIPHENHYDRAMINE HYDROCHLORIDE 50 MG/ML
25 INJECTION, SOLUTION INTRAMUSCULAR; INTRAVENOUS ONCE
Status: COMPLETED | OUTPATIENT
Start: 2022-06-29 | End: 2022-06-28

## 2022-06-28 RX ORDER — MAGNESIUM SULFATE HEPTAHYDRATE 40 MG/ML
2 INJECTION, SOLUTION INTRAVENOUS ONCE
Status: COMPLETED | OUTPATIENT
Start: 2022-06-29 | End: 2022-06-29

## 2022-06-28 RX ORDER — FENTANYL CITRATE 50 UG/ML
50 INJECTION, SOLUTION INTRAMUSCULAR; INTRAVENOUS ONCE
Status: COMPLETED | OUTPATIENT
Start: 2022-06-29 | End: 2022-06-28

## 2022-06-28 RX ORDER — NITROGLYCERIN 0.4 MG/1
0.4 TABLET SUBLINGUAL AS NEEDED
Status: DISCONTINUED | OUTPATIENT
Start: 2022-06-28 | End: 2022-07-25 | Stop reason: HOSPADM

## 2022-06-28 RX ORDER — DIPHENHYDRAMINE HYDROCHLORIDE 50 MG/ML
12.5 INJECTION, SOLUTION INTRAMUSCULAR; INTRAVENOUS ONCE
Status: DISCONTINUED | OUTPATIENT
Start: 2022-06-29 | End: 2022-06-29

## 2022-06-28 RX ORDER — MORPHINE SULFATE 2 MG/ML
2 INJECTION, SOLUTION INTRAMUSCULAR; INTRAVENOUS ONCE
Status: COMPLETED | OUTPATIENT
Start: 2022-06-28 | End: 2022-06-28

## 2022-06-28 RX ORDER — HYDROMORPHONE HYDROCHLORIDE 1 MG/ML
0.5 INJECTION, SOLUTION INTRAMUSCULAR; INTRAVENOUS; SUBCUTANEOUS ONCE
Status: ACTIVE | OUTPATIENT
Start: 2022-06-29 | End: 2022-06-29

## 2022-06-28 RX ADMIN — SODIUM CHLORIDE, PRESERVATIVE FREE 10 ML: 5 INJECTION INTRAVENOUS at 14:11

## 2022-06-28 RX ADMIN — ACETAMINOPHEN 325MG 650 MG: 325 TABLET ORAL at 20:25

## 2022-06-28 RX ADMIN — SODIUM CHLORIDE, PRESERVATIVE FREE 40 MG: 5 INJECTION INTRAVENOUS at 20:25

## 2022-06-28 RX ADMIN — SODIUM CHLORIDE, PRESERVATIVE FREE 10 ML: 5 INJECTION INTRAVENOUS at 06:00

## 2022-06-28 RX ADMIN — MAGNESIUM SULFATE HEPTAHYDRATE 2 G: 40 INJECTION, SOLUTION INTRAVENOUS at 23:30

## 2022-06-28 RX ADMIN — DEXAMETHASONE SODIUM PHOSPHATE 40 MG: 4 INJECTION, SOLUTION INTRAMUSCULAR; INTRAVENOUS at 09:01

## 2022-06-28 RX ADMIN — MORPHINE SULFATE 2 MG: 2 INJECTION, SOLUTION INTRAMUSCULAR; INTRAVENOUS at 23:03

## 2022-06-28 RX ADMIN — IMMUNE GLOBULIN (HUMAN) 85 G: 10 INJECTION INTRAVENOUS; SUBCUTANEOUS at 18:01

## 2022-06-28 RX ADMIN — AMIODARONE HYDROCHLORIDE 1 MG/MIN: 50 INJECTION, SOLUTION INTRAVENOUS at 23:50

## 2022-06-28 RX ADMIN — ACETAMINOPHEN 325MG 650 MG: 325 TABLET ORAL at 02:02

## 2022-06-28 RX ADMIN — FENTANYL CITRATE 50 MCG: 50 INJECTION, SOLUTION INTRAMUSCULAR; INTRAVENOUS at 23:37

## 2022-06-28 RX ADMIN — I-VITE, TAB 1000-60-2MG (60/BT) 1 TABLET: TAB at 14:11

## 2022-06-28 RX ADMIN — SODIUM CHLORIDE, PRESERVATIVE FREE 10 ML: 5 INJECTION INTRAVENOUS at 22:00

## 2022-06-28 RX ADMIN — AMIODARONE HYDROCHLORIDE 150 MG: 1.5 INJECTION, SOLUTION INTRAVENOUS at 23:40

## 2022-06-28 RX ADMIN — IMMUNE GLOBULIN (HUMAN) 85 G: 10 INJECTION INTRAVENOUS; SUBCUTANEOUS at 19:33

## 2022-06-28 RX ADMIN — SODIUM CHLORIDE, PRESERVATIVE FREE 40 MG: 5 INJECTION INTRAVENOUS at 09:00

## 2022-06-28 RX ADMIN — NITROGLYCERIN 0.4 MG: 0.4 TABLET, ORALLY DISINTEGRATING SUBLINGUAL at 23:22

## 2022-06-28 RX ADMIN — ATORVASTATIN CALCIUM 20 MG: 20 TABLET, FILM COATED ORAL at 21:46

## 2022-06-28 RX ADMIN — DIPHENHYDRAMINE HYDROCHLORIDE 25 MG: 50 INJECTION, SOLUTION INTRAMUSCULAR; INTRAVENOUS at 23:21

## 2022-06-28 RX ADMIN — MORPHINE SULFATE 2 MG: 2 INJECTION, SOLUTION INTRAMUSCULAR; INTRAVENOUS at 23:23

## 2022-06-28 NOTE — PROGRESS NOTES
Transition of Care Plan  · RUR: 9%  · Disposition: The disposition plan is likely return home with his wife to independent living at [facility]   · F/U with PCP/Specialist    · Transport: son      CM attended IDR. Pt's RENE is tomorrow. CM will continue to follow for discharge needs.     CASIE Galindo  Care Management, [place]

## 2022-06-28 NOTE — PROGRESS NOTES
Received notification from bedside RN about patient with regards to: platelet <3 s/p 2 units platelet transfusion  VS: /60, HR 92, RR 20, O2 sat 96% on RA    Intervention given: Transfuse 2 units platelet, daily CBC ordered x 3.

## 2022-06-28 NOTE — PROGRESS NOTES
[address], [address] [place] [address] Cristobal Rodriguez, [address] [facility]  [phone]      Hematology/ Oncology Progress Note      Reason for consult:     Amy Hensley is a 76 y.o. male who we have been asked to see by Dr. Jessica Joseph for acute thrombocytopenia. Subjective:     Amy Hensley is a 76year old male who [facility] [address] for fatigue, scattered petechiae to all extremities, purpura, hematuria and rectal bleeding. He has a PMH of several DVT and PE since 2008 and has been on eliquis OP. He also has a PMH of CAD with PCI, diabetes type II, obesity and bilateral hip repair. He was recently started on bactrim OP for a UTI. Patient seen at bedside today in the ED with wife. Discussed potential causes of acute severe thrombocytopenia, work up and treatment. He stated that petechiae appeared over last 24 hours after a hot shower. Interval History:     6/28/2022 Patient seen at bedside, discussed another transfusion of platelets. Review of Systems:  Pertinent items are noted in the History of Present Illness. Past Medical History:   Diagnosis Date    Arthritis     bilateral hips/knees    CAD (coronary artery disease) 6/7/13    PCI    Chronic pain     DJD; SILVIO KNEES    Diabetes ([address]     \"BORDERLINE\" PER MD    GERD (gastroesophageal reflux disease)     Macular degeneration     Morbid obesity ([address]     Other and unspecified symptoms and signs involving general sensations and perceptions     Bilat. legs \"give out\" intermittently; Bilat leg cramps; macular degenration (left).  Other ill-defined conditions(799.89)     HYPERLIPIDEMIA; LT LE DVT (2008);  MORBID OBESITY    Pulmonary embolism ([address] 08/2021    Thromboembolus ([address] 2008    left LE     Past Surgical History:   Procedure Laterality Date    [place] CARDIAC SURG PROCEDURE UNLIST  6/713    STENTS TO RCA    [place] TOTAL HIP ARTHROPLASTY 2007    right    [place] TOTAL HIP ARTHROPLASTY  2007    left      Family History   Problem Relation Age of Onset    Heart Disease Mother     Arrhythmia Mother     Heart Disease Father     Cancer Sister     Lung Disease Sister      Social History     Tobacco Use    Smoking status: Former Smoker     Packs/day: 1.00     Quit date: 1983     Years since quittin.4    Smokeless tobacco: Never Used   Substance Use Topics    Alcohol use:  Yes     Alcohol/week: 2.5 standard drinks     Types: 2 Glasses of wine, 1 Cans of beer per week     Comment: 0-2 X PER WK WITH MEALS OR AFTER PLAYING GOLF      Current Facility-Administered Medications   Medication Dose Route Frequency Provider Last Rate Last Admin    0.9% sodium chloride infusion 250 mL  250 mL IntraVENous PRN Simona Melton NP        0.9% sodium chloride infusion 250 mL  250 mL IntraVENous PRN Lincoln Ulrich MD        0.9% sodium chloride infusion 250 mL  250 mL IntraVENous PRN Saige Martinez MD        dexamethasone (DECADRON) 40 mg in 0.9% sodium chloride 50 mL IVPB  40 mg IntraVENous DAILY Winsome VINSON NP   40 mg at 22 0901    atorvastatin (LIPITOR) tablet 20 mg  20 mg Oral QHS Mavis Bell MD   20 mg at 22 2203    vitamin Y-U-X-lutein-minerals (OCUVITE) tablet 1 Tablet  1 Tablet Oral DAILY Mavis Bell MD   1 Tablet at 22 1411    pantoprazole (PROTONIX) 40 mg in 0.9% sodium chloride 10 mL injection  40 mg IntraVENous Q12H Mavis Bell MD   40 mg at 22 0900    sodium chloride (NS) flush 5-40 mL  5-40 mL IntraVENous Q8H Mavis Bell MD   10 mL at 22 1411    sodium chloride (NS) flush 5-40 mL  5-40 mL IntraVENous PRN Mavis Bell MD        acetaminophen (TYLENOL) tablet 650 mg  650 mg Oral Q6H PRN Mavis Bell MD   650 mg at 22 0202    Or    acetaminophen (TYLENOL) suppository 650 mg  650 mg Rectal Q6H PRN Mavis Bell MD        polyethylene glycol [facility]) packet 17 g  17 g Oral DAILY PRN Miguel Motta MD        ondansetron [facility]) tablet 4 mg  4 mg Oral Q8H PRN Miguel Motta MD        Or    ondansetron [facility]) injection 4 mg  4 mg IntraVENous Q6H PRN Miguel Motta MD        immune globulin 10% infusion 85 g  85 g IntraVENous Q24H Balaji Gank L,  mL/hr at 06/27/22 1757 0.1 g at 06/27/22 1757    0.9% sodium chloride infusion 250 mL  250 mL IntraVENous PRN Miguel Motta MD            Allergies   Allergen Reactions    Adhesive Other (comments)     Blisters     Advil [Ibuprofen] Rash     Rash all over, itching, trouble breathing     Celebrex [Celecoxib] Other (comments)     Swelling in legs, no rash, no itching, no trouble breathing    Cleocin [Clindamycin Hcl] Rash    Other Medication Unknown (comments)     \"Phosphate\"          Objective:     Patient Vitals for the past 8 hrs:   BP Temp Pulse Resp SpO2   06/28/22 1209 (!) 142/77 98.4 °F (36.9 °C) 81 18 100 %   06/28/22 1148 138/80 97.8 °F (36.6 °C) 75 18 94 %   06/28/22 1115 130/72 97.9 °F (36.6 °C) 75 18 97 %   06/28/22 1102 125/63 98.4 °F (36.9 °C) 74 18 90 %   06/28/22 1045 135/76 97.8 °F (36.6 °C) 74 20 97 %   06/28/22 1002 139/70 98.4 °F (36.9 °C) 81 20 97 %   06/28/22 0708 129/74 97.9 °F (36.6 °C) 77 20 99 %       Lab Results   Component Value Date/Time    WBC 7.8 06/28/2022 04:46 AM    HGB 7.8 (L) 06/28/2022 04:46 AM    HCT 23.2 (L) 06/28/2022 04:46 AM    PLATELET <3 (LL) 48/81/9095 04:46 AM    MCV 98.3 06/28/2022 04:46 AM       Physical Exam:   Visit Vitals  BP (!) 142/77   Pulse 81   Temp 98.4 °F (36.9 °C)   Resp 18   Ht 6' 4\" (1.93 m)   Wt 282 lb 13.6 oz (128.3 kg)   SpO2 100%   BMI 34.43 kg/m²     General appearance: alert, cooperative, no distress, appears stated age, moderately obese  Head: Normocephalic, without obvious abnormality, atraumatic  Throat: abnormal findings: multiple purpura   Abdomen: soft, non-tender.  Bowel sounds normal. No masses,  no organomegaly  Extremities: extremities normal, atraumatic, no cyanosis or edema  Skin: petechiae - scattered, to all extremities   Neurologic: Grossly normal    Assessment:     Severe Thrombocytopenia  Sudden onset of scattered petechiae to all extremities and purpura, also melena to stool   Platelets found to be <3 in ED   Transfusing platelets in ED   Hgb stable   Recently started bactrim for UTI, stated has taken before with no issues   On Eliquis PTA for DVT/PE, last DVT in 8/2021    GI Bleed   Melena in ED, occult stool positive   Receiving IV Protonix   Appreciate GI evaluation     History of DVT and PE   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 car trips x2  PE of unknown origin   DVT after hip replacement   Last DVT 8/2021 - BLE dopplers confirm resolution   Patient reports his in immobile much of the time d/t debility   Has not had hypercoagulable work up     Acute Kidney Injury - improving   Managed by primary team       Plan:     > Suspect severe thrombocytopenia r/t ITP  > Agree with additional transfusion of 2 units platelets, goal to keep above 50 with GIB  > Recheck platelets after transfusion today   > Continue Dexamethasone 40 mg daily x 4 days - Day 2   > Continue IVIG x 3 days - Day 2   > Follow CBC closely   > Hgb stable today with GIB   > Appreciate GI input - cannot pursue testing until patient is hemodynamically stable   > Agree with holding eliquis and ASA for now, will need to reconsider starting once stable as patient has a history of recurrent DVT/PE   > BLE dopplers confirmed resolution of DVTs diagnosed in August 2021     Thank you for allowing us to participate in the care of Mr. Norm Green, will continue to follow closely.      Yoni Miguel NP

## 2022-06-28 NOTE — PROGRESS NOTES
Hospitalist Progress Note    NAME: Alda Ramos   :  1945   MRN:  [id number]           Subjective:     Chief Complaint / Reason for Physician Visit  Patient seen and evaluated at bedside, overnight events reviewed, patient currently had 1 bloody bowel movement. Discussed with RN events overnight. Review of Systems:  Symptom Y/N Comments  Symptom Y/N Comments   Fever/Chills N   Chest Pain N    Poor Appetite N   Edema N    Cough N   Abdominal Pain N    Sputum N   Joint Pain N    SOB/SHEARER N   Pruritis/Rash Y    Nausea/vomit N   Tolerating PT/OT NA    Diarrhea N   Tolerating Diet Y    Constipation N   Other       Could NOT obtain due to:      Objective:     VITALS:   Last 24hrs VS reviewed since prior progress note. Most recent are:  Patient Vitals for the past 24 hrs:   Temp Pulse Resp BP SpO2   22 0708 97.9 °F (36.6 °C) 77 20 129/74 99 %   22 0230 98.4 °F (36.9 °C) 92 20 116/60 96 %   22 0140 98.4 °F (36.9 °C) 94 20 118/67 96 %   22 0119 97.8 °F (36.6 °C) (!) 106 20 110/66 96 %   22 2100 98.4 °F (36.9 °C) 91 20 139/84 99 %   22 2035 98.4 °F (36.9 °C) 92 20 (!) 140/80 98 %   22 2019 98.2 °F (36.8 °C) 91 18 (!) 144/83 99 %   22 1816 98.6 °F (37 °C) 76 20 128/78 99 %   22 1519 98 °F (36.7 °C) 72 20 130/61 99 %   22 1200 -- 69 23 128/67 95 %   22 1049 -- 70 17 (!) 111/58 97 %   22 1034 -- 66 21 (!) 133/55 95 %       Intake/Output Summary (Last 24 hours) at 2022 0816  Last data filed at 2022 3022  Gross per 24 hour   Intake 3099 ml   Output 850 ml   Net 2249 ml        PHYSICAL EXAM:  General: Patient appears comfortable   EENT:  EOMI. Anicteric sclerae. MMM  Resp:  CTA bilaterally, no wheezing or rales. No accessory muscle use  CV:  Regular  Rhythm, s1/s2 no m/r/g  No edema  GI:  Soft, Non distended, Non tender. +Bowel sounds  Neurologic:  Alert and oriented X 3, normal speech,   Psych:   Good insight.  Not anxious nor agitated  Skin:  + petechial rashes. No jaundice    Procedures: see electronic medical records for all procedures/Xrays and details which were not copied into this note but were reviewed prior to creation of Plan. LABS:  I reviewed today's most current labs and imaging studies. Pertinent labs include:  Recent Labs     06/28/22  0446 06/27/22  0832 06/27/22  0741   WBC 7.8  --  8.2   HGB 7.8*  --  12.9   HCT 23.2*  --  39.1   PLT <3* PENDING <3*     Recent Labs     06/28/22  0446 06/27/22  1004 06/27/22  0832 06/27/22  0741     --   --  136   K 4.4  --   --  4.5   *  --   --  107   CO2 19*  --   --  22   *  --   --  159*   BUN 48*  --   --  28*   CREA 1.12  --   --  1.47*   CA 8.1*  --   --  9.0   ALB  --   --   --  3.2*   TBILI  --   --   --  0.5   ALT  --   --   --  24   INR  --  1.1 1.1  1.1  --        Signed: Jo Ann Mooney MD    · Bilateral LE duplex:No evidence of acute deep vein thrombosis in the right common femoral, femoral, popliteal, posterior tibial, and peroneal veins. The veins were imaged in the transverse and longitudinal planes. The vessels showed normal color filling and compressibility. Doppler interrogation showed phasic and spontaneous flow. · No evidence of deep vein thrombosis in the right lower extremity.         Reviewed most current lab test results and cultures  YES  Reviewed most current radiology test results   YES  Review and summation of old records today    NO  Reviewed patient's current orders and MAR    YES  PMH/ reviewed - no change compared to H&P      Assessment / Plan:  Severe thrombocytopenia-of note patient presents with severe thrombocytopenia, currently having bloody bowel movements, unclear as to etiology of severe thrombocytopenia, could be secondary to medication side effect versus ITP, patient is status post 2 unit platelet transfusion with minimal response  Transfuse additional 2 units of platelets given active bleeding  Continue Decadron once daily  Continue IVIG for 3 days  Continue to monitor for signs and symptoms of bleeding  Hematology consult appreciated, continue to follow recommendations    GI Bleed-of note patient presented with significant GI bleed in the setting of severe thrombocytopenia, patient has had significant hemoglobin drop and had a significant bloody bowel movement just prior to my evaluation  Continue Protonix 40 mg IV twice daily  Transfuse 1 unit packed RBCs given significant hemoglobin drop and active bleeding  Obtain posttransfusion CBC to assess for appropriate response  Management of severe thrombocytopenia as documented above  Obtain gastroenterology consult further evaluation    Hyperlipidemia-continue statin    History of pulmonary embolism- holding Eliquis in the setting of active bleeding as well as severe thrombocytopenia    CAD status post stents-currently holding antiplatelets in the setting of active GI bleed  Obtain cardiology consult is once patient's GI bleed and thrombocytopenia resolved decision will need to be made with regards to antiplatelets    Type 2 diabetes-continue insulin sliding scale    Prophylaxis-SCDs  FEN- cardiac diet, replete potassium and magnesium  Full code, will clarify about surrogate decision-maker  Disposition- pending clinical improvement, resolution of thrombocytopenia/GI bleed, greater than 48 hours, eventually home      30.0 - 39.9 Obese / Body mass index is 34.43 kg/m². Code status: Full  Prophylaxis: SCD's  Recommended Disposition: Home w/Family     ________________________________________________________________________  Care Plan discussed with:    Comments   Patient x    Family      RN x    Care Manager x    Consultant  x                     x Multidiciplinary team rounds were held today with , nursing, pharmacist and clinical coordinator. Patient's plan of care was discussed; medications were reviewed and discharge planning was addressed. ________________________________________________________________________  Total NON critical care TIME:  35   Minutes      Comments   >50% of visit spent in counseling and coordination of care x    ________________________________________________________________________  Kvng Edil, MD

## 2022-06-28 NOTE — PROGRESS NOTES
Bedside shift change report given to Campbell Turner (oncoming nurse) by Kendra Reza LPN  (offgoing nurse). Report included the following information SBAR, Kardex.

## 2022-06-28 NOTE — PROGRESS NOTES
Night shift RN was told by day shift RN that ordered Q8hr H&H sample was collected when pt arrived to [facility] around 1500 from ED 6/27/22. RN went through patient chart and results and can see a different sample collected for 1513 for reticulocyte counts but no H&H. Called the lab to see why they think H&H was not collected and lab stated it may have been that H&H label was not in the specimen bag separately from the Reticulocyte labeled specimen or the lab could have possibly not seen the label in the bag and therefore, the sample was never collected. Patient has already received first bag of platelets awaiting the second so at this point, RN will draw H&H one hour after second bag platelets have completed. There was a major issue with this pt's care from the start because an order by the MD to transfuse platelets at 7270 in the ED was never completed. [facility] day shift RN was in contact with both blood bank and MD in regards to this matter when pt arrived to unit because blood bank was stating they never received an order. Night shift RN noted no current order for transfusion coming on at 1900 but a new order was placed at 1900 and first platelet bag administered promptly.

## 2022-06-28 NOTE — CONSULTS
GI CONSULTATION NOTE  Marily Le NP  [phone] NP in-hospital cell phone M-F until 4:30  After 5pm or on weekends, please call  for physician on call    NAME: Skip Conde   :  1945   MRN:  [id number]   Attending:  Dr. Annalise Ash  Primary GI:  Dr. Rizwan Samaniego  Date/Time:  2022 11:26 AM  Assessment:   Rectal bleeding   Acute on chronic anemia   Severe thrombocytopenia   · Hgb 7.8; was 12.9 on 22  · PLT <3; was 210 on 2021  · FOBT +    Hx of PE  · Eliquis on hold     Plan:   · Patient will likely need endoscopic procedures during this admission d/t rectal bleeding and anemia. Unable to complete at this time secondary to PLT <3.  Will continue for timing. · Serial H&H; goal for hgb >7.0  · Monitor for s/s of bleeding; transfuse as clinically indicated  · Agree with platelet transfusion   · Appreciate hematology input   · Clear liquid diet   · Continue to hold Eliquis   · Continue PPI   · Symptomatic care per primary team   Plan discussed with Dr. Rizwan Samaniego    Subjective:     HISTORY OF PRESENT ILLNESS:     Skip Conde is an 76 y.o.  male who we are asked to see for complaint of anemia. Patient presents with complaints of having hematuria x 3 weeks with urinary retention from clots. On Bactrim DS x 8 days, hematuria resolved. Noticed petechiae yesterday, thought from hot shower. This AM had rectal bleeding with very dark stool from rectum that was diarrhea like x 3. Also woke up with blood on pillow and noticed blood blisters on tongue and inside bottom of mouth.  +mild SOB and lightheaded.       Past Medical History:   Diagnosis Date    Arthritis     bilateral hips/knees    CAD (coronary artery disease) 13    PCI    Chronic pain     DJD; SILVIO KNEES    Diabetes ([address]     \"BORDERLINE\" PER MD    GERD (gastroesophageal reflux disease)     Macular degeneration     Morbid obesity ([facility] [address]     Other and unspecified symptoms and signs involving general sensations and perceptions     Bilat. legs \"give out\" intermittently; Bilat leg cramps; macular degenration (left).  Other ill-defined conditions(799.89)     HYPERLIPIDEMIA; LT LE DVT (); MORBID OBESITY    Pulmonary embolism ([facility] [address] 2021    Thromboembolus ([facility] [address]     left LE      Past Surgical History:   Procedure Laterality Date    [place] CARDIAC SURG PROCEDURE UNLIST      STENTS TO RCA    [place] TOTAL HIP ARTHROPLASTY  2007    right    [place] TOTAL HIP ARTHROPLASTY  2007    left     Social History     Tobacco Use    Smoking status: Former Smoker     Packs/day: 1.00     Quit date: 1983     Years since quittin.4    Smokeless tobacco: Never Used   Substance Use Topics    Alcohol use: Yes     Alcohol/week: 2.5 standard drinks     Types: 2 Glasses of wine, 1 Cans of beer per week     Comment: 0-2 X PER WK WITH MEALS OR AFTER PLAYING GOLF      Family History   Problem Relation Age of Onset    Heart Disease Mother     Arrhythmia Mother     Heart Disease Father     Cancer Sister     Lung Disease Sister       Allergies   Allergen Reactions    Adhesive Other (comments)     Blisters     Advil [Ibuprofen] Rash     Rash all over, itching, trouble breathing     Celebrex [Celecoxib] Other (comments)     Swelling in legs, no rash, no itching, no trouble breathing    Cleocin [Clindamycin Hcl] Rash    Other Medication Unknown (comments)     \"Phosphate\"      Prior to Admission medications    Medication Sig Start Date End Date Taking? Authorizing Provider   apixaban (Eliquis) 5 mg tablet Take 5 mg by mouth two (2) times a day. Yes Provider, Historical   trimethoprim-sulfamethoxazole (Bactrim DS) 160-800 mg per tablet Take 1 Tablet by mouth two (2) times a day. Yes Provider, Historical   amoxicillin (AMOXIL) 500 mg capsule Take 500 mg by mouth as needed.  4 capsules before dental procedure   Yes Provider, Historical   acetaminophen (Tylenol Extra Strength) 500 mg tablet Take 500 mg by mouth every six (6) hours as needed for Pain. Yes Provider, Historical   vit A/vit C/vit E/zinc/copper (ICAPS AREDS PO) Take 1 Tablet by mouth two (2) times a day. Yes Provider, Historical   omega 3-dha-epa-fish oil 100-160-1,000 mg cap Take 1 Capsule by mouth daily. Yes Provider, Historical   glucosamine-chondroitin (ELATION) 1,500-1,200 mg/30 mL liqd Take 1 Tablet by mouth two (2) times a day. Yes Provider, Historical   COVID-19 mRNA Vacc (MODERNA) 100 mcg/0.5 mL susp injection 0.5 mL by IntraMUSCular route once. 5/16/22  Yes Provider, Historical   simvastatin (ZOCOR) 40 mg tablet Take 40 mg by mouth nightly. 7/26/21  Yes Provider, Historical   aspirin 81 mg chewable tablet Take 81 mg by mouth daily. Yes Provider, Historical   metFORMIN (GLUCOPHAGE) 500 mg tablet Take 500 mg by mouth two (2) times daily (with meals). Yes Provider, Historical   multivitamin (ONE A DAY) tablet Take 1 Tab by mouth daily.    Yes Provider, Historical       Patient Active Problem List   Diagnosis Code    Unstable angina (HCC) I20.0    Morbid obesity ([facility] [address] E66.01    Abnormal stress test R94.39    Chest pain R07.9    HTN (hypertension) I10    Hyperlipidemia E78.5    Thrombocytopenia (HCC) D69.6    GI bleed K92.2    UGO (acute kidney injury) ([facility] [address] N17.9       REVIEW OF SYSTEMS:    Constitutional: negative fever, negative chills, negative weight loss  Eyes:   negative visual changes  ENT:   negative sore throat, tongue or lip swelling   Respiratory:  negative cough, negative dyspnea  Cards:  negative for chest pain, palpitations, lower extremity edema  GI:   See HPI  :  negative for frequency, dysuria  Integument:  negative for rash and pruritus  Heme:  negative for easy bruising and gum/nose bleeding  Musculoskel: negative for myalgias,  back pain and muscle weakness  Neuro: negative for headaches, dizziness, vertigo  Psych: negative for feelings of anxiety, depression     Objective:   VITALS:    Visit Vitals  /63   Pulse 74   Temp 98.4 °F (36.9 °C)   Resp 18   Ht 6' 4\" (1.93 m)   Wt 128.3 kg (282 lb 13.6 oz)   SpO2 90%   BMI 34.43 kg/m²       PHYSICAL EXAM:   General:           Pleasant elderly  male. NAD   Head:               Normocephalic, without obvious abnormality, atraumatic. Eyes:               Conjunctivae clear and pale, anicteric sclerae. Pupils are equal  Nose:               Nares normal. No drainage or sinus tenderness. Throat:             Bloody vesicles to inside of mouth   Neck:               Supple, symmetrical,  no adenopathy, thyroid: non tender  Back:               Symmetric,  No CVA tenderness. Lungs:             CTA bilaterally. No wheezing/rhonchi/rales. Chest wall:      No tenderness or deformity. No Accessory muscle use. Heart:              Regular rate and rhythm,  no murmur, rub or gallop. Abdomen:        Soft, non-tender. Not distended. Bowel sounds normal. No masses  Extremities:     Atraumatic, No cyanosis. No edema. No clubbing  Skin:                Scattered petechiae to entire body   Psych:             Good insight. Not depressed. Not anxious or agitated. Neurologic:      EOMs intact. No facial asymmetry. No aphasia or slurred speech. A/O X 3.      LAB DATA REVIEWED:    Recent Results (from the past 24 hour(s))   RETICULOCYTE COUNT    Collection Time: 06/27/22  3:13 PM   Result Value Ref Range    Reticulocyte count 1.7 0.7 - 2.1 %    Absolute Retic Cnt. 0.0559 0.0260 - 0.0950 M/ul   PLATELETS, ALLOCATE    Collection Time: 06/27/22  7:15 PM   Result Value Ref Range    Unit number [id number]     Blood component type PLP,LR PSTC2     Unit division 00     Status of unit TRANSFUSED     Unit number [id number]     Blood component type PLP,LR PSTC1     Unit division 00     Status of unit ISSUED    CBC W/O DIFF    Collection Time: 06/28/22  4:46 AM   Result Value Ref Range    WBC 7.8 4.1 - 11.1 K/uL    RBC 2.36 (L) 4.10 - 5.70 M/uL    HGB 7.8 (L) 12.1 - 17.0 g/dL    HCT 23.2 (L) 36.6 - 50.3 %    MCV 98.3 80.0 - 99.0 FL    MCH 33.1 26.0 - 34.0 PG    MCHC 33.6 30.0 - 36.5 g/dL    RDW 13.2 11.5 - 14.5 %    PLATELET <3 (LL) 491 - 400 K/uL    MPV ABNORMAL 8.9 - 12.9 FL    NRBC 0.0 0  WBC    ABSOLUTE NRBC 0.00 0.00 - 5.86 K/uL   METABOLIC PANEL, BASIC    Collection Time: 06/28/22  4:46 AM   Result Value Ref Range    Sodium 140 136 - 145 mmol/L    Potassium 4.4 3.5 - 5.1 mmol/L    Chloride 113 (H) 97 - 108 mmol/L    CO2 19 (L) 21 - 32 mmol/L    Anion gap 8 5 - 15 mmol/L    Glucose 128 (H) 65 - 100 mg/dL    BUN 48 (H) 6 - 20 MG/DL    Creatinine 1.12 0.70 - 1.30 MG/DL    BUN/Creatinine ratio 43 (H) 12 - 20      GFR est AA >60 >60 ml/min/1.73m2    GFR est non-AA >60 >60 ml/min/1.73m2    Calcium 8.1 (L) 8.5 - 10.1 MG/DL   PLATELETS, ALLOCATE    Collection Time: 06/28/22  5:45 AM   Result Value Ref Range    Unit number [id number]     Blood component type PLP,LR PSTC1     Unit division 00     Status of unit ISSUED     Unit number [id number]     Blood component type PLP,LR PSTC2     Unit division 00     Status of unit ISSUED    GLUCOSE, POC    Collection Time: 06/28/22  7:50 AM   Result Value Ref Range    Glucose (POC) 164 (H) 65 - 117 mg/dL    Performed by Radha BEEBE    RBC, ALLOCATE    Collection Time: 06/28/22  8:30 AM   Result Value Ref Range    HISTORY CHECKED?  Historical check performed        IMAGING RESULTS:  I have personally reviewed the imaging reports      Total time spent with patient: 25 minutes ________________________________________________________________________  Care Plan discussed with:  Patient x   Family     RN x              Consultant:       CT  6/28/2022:  ________________________________________________________________________    ___________________________________________________  Consulting Provider: Ale Zhang NP      6/28/2022  11:26 AM

## 2022-06-29 ENCOUNTER — APPOINTMENT (OUTPATIENT)
Dept: CT IMAGING | Age: 77
DRG: 981 | End: 2022-06-29
Attending: NURSE PRACTITIONER
Payer: MEDICARE

## 2022-06-29 ENCOUNTER — APPOINTMENT (OUTPATIENT)
Dept: GENERAL RADIOLOGY | Age: 77
DRG: 981 | End: 2022-06-29
Attending: NURSE PRACTITIONER
Payer: MEDICARE

## 2022-06-29 LAB
ABO + RH BLD: NORMAL
ALBUMIN SERPL-MCNC: 2.9 G/DL (ref 3.5–5)
ALBUMIN/GLOB SERPL: 0.6 {RATIO} (ref 1.1–2.2)
ALP SERPL-CCNC: 47 U/L (ref 45–117)
ALT SERPL-CCNC: 18 U/L (ref 12–78)
ANION GAP SERPL CALC-SCNC: 9 MMOL/L (ref 5–15)
ANION GAP SERPL CALC-SCNC: 9 MMOL/L (ref 5–15)
APPEARANCE UR: CLEAR
APTT PPP: 22.8 SEC (ref 22.1–31)
AST SERPL-CCNC: 24 U/L (ref 15–37)
B PERT DNA SPEC QL NAA+PROBE: NOT DETECTED
BACTERIA URNS QL MICRO: NEGATIVE /HPF
BASOPHILS # BLD: 0 K/UL (ref 0–0.1)
BASOPHILS NFR BLD: 0 % (ref 0–1)
BILIRUB DIRECT SERPL-MCNC: 0.2 MG/DL (ref 0–0.2)
BILIRUB SERPL-MCNC: 0.5 MG/DL (ref 0.2–1)
BILIRUB UR QL: NEGATIVE
BLD PROD TYP BPU: NORMAL
BLOOD GROUP ANTIBODIES SERPL: NORMAL
BNP SERPL-MCNC: 117 PG/ML
BORDETELLA PARAPERTUSSIS PCR, BORPAR: NOT DETECTED
BPU ID: NORMAL
BUN SERPL-MCNC: 41 MG/DL (ref 6–20)
BUN SERPL-MCNC: 43 MG/DL (ref 6–20)
BUN/CREAT SERPL: 29 (ref 12–20)
BUN/CREAT SERPL: 34 (ref 12–20)
C PNEUM DNA SPEC QL NAA+PROBE: NOT DETECTED
CALCIUM SERPL-MCNC: 8.3 MG/DL (ref 8.5–10.1)
CALCIUM SERPL-MCNC: 8.7 MG/DL (ref 8.5–10.1)
CALCULATED R AXIS, ECG10: -43 DEGREES
CALCULATED T AXIS, ECG11: 133 DEGREES
CHLORIDE SERPL-SCNC: 111 MMOL/L (ref 97–108)
CHLORIDE SERPL-SCNC: 111 MMOL/L (ref 97–108)
CK SERPL-CCNC: 208 U/L (ref 39–308)
CO2 SERPL-SCNC: 18 MMOL/L (ref 21–32)
CO2 SERPL-SCNC: 18 MMOL/L (ref 21–32)
COLOR UR: ABNORMAL
COVID-19 RAPID TEST, COVR: NOT DETECTED
CREAT SERPL-MCNC: 1.26 MG/DL (ref 0.7–1.3)
CREAT SERPL-MCNC: 1.41 MG/DL (ref 0.7–1.3)
CROSSMATCH RESULT,%XM: NORMAL
DIAGNOSIS, 93000: NORMAL
DIFFERENTIAL METHOD BLD: ABNORMAL
EOSINOPHIL # BLD: 0 K/UL (ref 0–0.4)
EOSINOPHIL NFR BLD: 0 % (ref 0–7)
EPITH CASTS URNS QL MICRO: ABNORMAL /LPF
ERYTHROCYTE [DISTWIDTH] IN BLOOD BY AUTOMATED COUNT: 14.2 % (ref 11.5–14.5)
ERYTHROCYTE [DISTWIDTH] IN BLOOD BY AUTOMATED COUNT: 14.3 % (ref 11.5–14.5)
FIBRINOGEN PPP-MCNC: 347 MG/DL (ref 200–475)
FLUAV H1 2009 PAND RNA SPEC QL NAA+PROBE: NOT DETECTED
FLUAV H1 RNA SPEC QL NAA+PROBE: NOT DETECTED
FLUAV H3 RNA SPEC QL NAA+PROBE: NOT DETECTED
FLUAV SUBTYP SPEC NAA+PROBE: NOT DETECTED
FLUBV RNA SPEC QL NAA+PROBE: NOT DETECTED
GLOBULIN SER CALC-MCNC: 5.1 G/DL (ref 2–4)
GLUCOSE BLD STRIP.AUTO-MCNC: 196 MG/DL (ref 65–117)
GLUCOSE BLD STRIP.AUTO-MCNC: 232 MG/DL (ref 65–117)
GLUCOSE BLD STRIP.AUTO-MCNC: 239 MG/DL (ref 65–117)
GLUCOSE BLD STRIP.AUTO-MCNC: 240 MG/DL (ref 65–117)
GLUCOSE BLD STRIP.AUTO-MCNC: 295 MG/DL (ref 65–117)
GLUCOSE SERPL-MCNC: 222 MG/DL (ref 65–100)
GLUCOSE SERPL-MCNC: 231 MG/DL (ref 65–100)
GLUCOSE UR STRIP.AUTO-MCNC: NEGATIVE MG/DL
HADV DNA SPEC QL NAA+PROBE: NOT DETECTED
HCOV 229E RNA SPEC QL NAA+PROBE: NOT DETECTED
HCOV HKU1 RNA SPEC QL NAA+PROBE: NOT DETECTED
HCOV NL63 RNA SPEC QL NAA+PROBE: NOT DETECTED
HCOV OC43 RNA SPEC QL NAA+PROBE: NOT DETECTED
HCT VFR BLD AUTO: 19.5 % (ref 36.6–50.3)
HCT VFR BLD AUTO: 21.2 % (ref 36.6–50.3)
HCT VFR BLD AUTO: 26 % (ref 36.6–50.3)
HGB BLD-MCNC: 6.6 G/DL (ref 12.1–17)
HGB BLD-MCNC: 7 G/DL (ref 12.1–17)
HGB BLD-MCNC: 8.8 G/DL (ref 12.1–17)
HGB UR QL STRIP: ABNORMAL
HISTORY CHECKED?,CKHIST: NORMAL
HMPV RNA SPEC QL NAA+PROBE: NOT DETECTED
HPIV1 RNA SPEC QL NAA+PROBE: NOT DETECTED
HPIV2 RNA SPEC QL NAA+PROBE: NOT DETECTED
HPIV3 RNA SPEC QL NAA+PROBE: NOT DETECTED
HPIV4 RNA SPEC QL NAA+PROBE: NOT DETECTED
HYALINE CASTS URNS QL MICRO: ABNORMAL /LPF (ref 0–2)
IMM GRANULOCYTES # BLD AUTO: 0 K/UL (ref 0–0.04)
IMM GRANULOCYTES NFR BLD AUTO: 0 % (ref 0–0.5)
INR PPP: 1.1 (ref 0.9–1.1)
KETONES UR QL STRIP.AUTO: NEGATIVE MG/DL
LACTATE SERPL-SCNC: 2.1 MMOL/L (ref 0.4–2)
LACTATE SERPL-SCNC: 3.8 MMOL/L (ref 0.4–2)
LEUKOCYTE ESTERASE UR QL STRIP.AUTO: NEGATIVE
LYMPHOCYTES # BLD: 1.5 K/UL (ref 0.8–3.5)
LYMPHOCYTES NFR BLD: 13 % (ref 12–49)
M PNEUMO DNA SPEC QL NAA+PROBE: NOT DETECTED
MAGNESIUM SERPL-MCNC: 2 MG/DL (ref 1.6–2.4)
MAGNESIUM SERPL-MCNC: 2.4 MG/DL (ref 1.6–2.4)
MCH RBC QN AUTO: 32.1 PG (ref 26–34)
MCH RBC QN AUTO: 32.7 PG (ref 26–34)
MCHC RBC AUTO-ENTMCNC: 33 G/DL (ref 30–36.5)
MCHC RBC AUTO-ENTMCNC: 33.8 G/DL (ref 30–36.5)
MCV RBC AUTO: 94.9 FL (ref 80–99)
MCV RBC AUTO: 99.1 FL (ref 80–99)
MONOCYTES # BLD: 0.1 K/UL (ref 0–1)
MONOCYTES NFR BLD: 1 % (ref 5–13)
NEUTS BAND NFR BLD MANUAL: 2 %
NEUTS SEG # BLD: 9.7 K/UL (ref 1.8–8)
NEUTS SEG NFR BLD: 84 % (ref 32–75)
NITRITE UR QL STRIP.AUTO: NEGATIVE
NRBC # BLD: 0 K/UL (ref 0–0.01)
NRBC # BLD: 0.02 K/UL (ref 0–0.01)
NRBC BLD-RTO: 0 PER 100 WBC
NRBC BLD-RTO: 0.2 PER 100 WBC
PH UR STRIP: 5 [PH] (ref 5–8)
PHOSPHATE SERPL-MCNC: 2.8 MG/DL (ref 2.6–4.7)
PLATELET # BLD AUTO: 5 K/UL (ref 150–400)
PLATELET # BLD AUTO: <3 K/UL (ref 150–400)
PLATELET COMMENTS,PCOM: ABNORMAL
PMV BLD AUTO: 10.6 FL (ref 8.9–12.9)
PMV BLD AUTO: ABNORMAL FL (ref 8.9–12.9)
POTASSIUM SERPL-SCNC: 4 MMOL/L (ref 3.5–5.1)
POTASSIUM SERPL-SCNC: 4.2 MMOL/L (ref 3.5–5.1)
PROCALCITONIN SERPL-MCNC: 0.13 NG/ML
PROT SERPL-MCNC: 8 G/DL (ref 6.4–8.2)
PROT UR STRIP-MCNC: ABNORMAL MG/DL
PROTHROMBIN TIME: 11.3 SEC (ref 9–11.1)
Q-T INTERVAL, ECG07: 340 MS
QRS DURATION, ECG06: 150 MS
QTC CALCULATION (BEZET), ECG08: 529 MS
RBC # BLD AUTO: 2.14 M/UL (ref 4.1–5.7)
RBC # BLD AUTO: 2.74 M/UL (ref 4.1–5.7)
RBC #/AREA URNS HPF: ABNORMAL /HPF (ref 0–5)
RBC MORPH BLD: ABNORMAL
RSV RNA SPEC QL NAA+PROBE: NOT DETECTED
RV+EV RNA SPEC QL NAA+PROBE: NOT DETECTED
SARS-COV-2 PCR, COVPCR: NOT DETECTED
SERVICE CMNT-IMP: ABNORMAL
SODIUM SERPL-SCNC: 138 MMOL/L (ref 136–145)
SODIUM SERPL-SCNC: 138 MMOL/L (ref 136–145)
SOURCE, COVRS: NORMAL
SP GR UR REFRACTOMETRY: 1.01 (ref 1–1.03)
SPECIMEN EXP DATE BLD: NORMAL
STATUS OF UNIT,%ST: NORMAL
THERAPEUTIC RANGE,PTTT: NORMAL SECS (ref 58–77)
TROPONIN-HIGH SENSITIVITY: 1903 NG/L (ref 0–76)
TROPONIN-HIGH SENSITIVITY: 8 NG/L (ref 0–76)
UA: UC IF INDICATED,UAUC: ABNORMAL
UNIT DIVISION, %UDIV: 0
UROBILINOGEN UR QL STRIP.AUTO: 0.2 EU/DL (ref 0.2–1)
VENTRICULAR RATE, ECG03: 146 BPM
WBC # BLD AUTO: 11.3 K/UL (ref 4.1–11.1)
WBC # BLD AUTO: 11.8 K/UL (ref 4.1–11.1)
WBC URNS QL MICRO: ABNORMAL /HPF (ref 0–4)

## 2022-06-29 PROCEDURE — 77010033678 HC OXYGEN DAILY

## 2022-06-29 PROCEDURE — 74011000258 HC RX REV CODE- 258: Performed by: NURSE PRACTITIONER

## 2022-06-29 PROCEDURE — 83735 ASSAY OF MAGNESIUM: CPT

## 2022-06-29 PROCEDURE — 74011250637 HC RX REV CODE- 250/637: Performed by: NURSE PRACTITIONER

## 2022-06-29 PROCEDURE — 74011636637 HC RX REV CODE- 636/637: Performed by: NURSE PRACTITIONER

## 2022-06-29 PROCEDURE — 74011250637 HC RX REV CODE- 250/637: Performed by: INTERNAL MEDICINE

## 2022-06-29 PROCEDURE — 74174 CTA ABD&PLVS W/CONTRAST: CPT

## 2022-06-29 PROCEDURE — 65610000006 HC RM INTENSIVE CARE

## 2022-06-29 PROCEDURE — 84484 ASSAY OF TROPONIN QUANT: CPT

## 2022-06-29 PROCEDURE — 99232 SBSQ HOSP IP/OBS MODERATE 35: CPT | Performed by: INTERNAL MEDICINE

## 2022-06-29 PROCEDURE — 0202U NFCT DS 22 TRGT SARS-COV-2: CPT

## 2022-06-29 PROCEDURE — 84100 ASSAY OF PHOSPHORUS: CPT

## 2022-06-29 PROCEDURE — 71275 CT ANGIOGRAPHY CHEST: CPT

## 2022-06-29 PROCEDURE — 87070 CULTURE OTHR SPECIMN AEROBIC: CPT

## 2022-06-29 PROCEDURE — 74011250637 HC RX REV CODE- 250/637: Performed by: HOSPITALIST

## 2022-06-29 PROCEDURE — 74011250636 HC RX REV CODE- 250/636: Performed by: INTERNAL MEDICINE

## 2022-06-29 PROCEDURE — 84145 PROCALCITONIN (PCT): CPT

## 2022-06-29 PROCEDURE — 74011250636 HC RX REV CODE- 250/636: Performed by: NURSE PRACTITIONER

## 2022-06-29 PROCEDURE — 82962 GLUCOSE BLOOD TEST: CPT

## 2022-06-29 PROCEDURE — P9073 PLATELETS PHERESIS PATH REDU: HCPCS

## 2022-06-29 PROCEDURE — 81001 URINALYSIS AUTO W/SCOPE: CPT

## 2022-06-29 PROCEDURE — 36415 COLL VENOUS BLD VENIPUNCTURE: CPT

## 2022-06-29 PROCEDURE — 85025 COMPLETE CBC W/AUTO DIFF WBC: CPT

## 2022-06-29 PROCEDURE — P9040 RBC LEUKOREDUCED IRRADIATED: HCPCS

## 2022-06-29 PROCEDURE — 93005 ELECTROCARDIOGRAM TRACING: CPT

## 2022-06-29 PROCEDURE — 71045 X-RAY EXAM CHEST 1 VIEW: CPT

## 2022-06-29 PROCEDURE — 74011000636 HC RX REV CODE- 636

## 2022-06-29 PROCEDURE — 74011000250 HC RX REV CODE- 250: Performed by: HOSPITALIST

## 2022-06-29 PROCEDURE — 80048 BASIC METABOLIC PNL TOTAL CA: CPT

## 2022-06-29 PROCEDURE — 94760 N-INVAS EAR/PLS OXIMETRY 1: CPT

## 2022-06-29 PROCEDURE — 74011250636 HC RX REV CODE- 250/636: Performed by: HOSPITALIST

## 2022-06-29 PROCEDURE — 74011636637 HC RX REV CODE- 636/637: Performed by: INTERNAL MEDICINE

## 2022-06-29 PROCEDURE — C9113 INJ PANTOPRAZOLE SODIUM, VIA: HCPCS | Performed by: HOSPITALIST

## 2022-06-29 PROCEDURE — 36430 TRANSFUSION BLD/BLD COMPNT: CPT

## 2022-06-29 PROCEDURE — 83605 ASSAY OF LACTIC ACID: CPT

## 2022-06-29 PROCEDURE — 87635 SARS-COV-2 COVID-19 AMP PRB: CPT

## 2022-06-29 PROCEDURE — 85018 HEMOGLOBIN: CPT

## 2022-06-29 RX ORDER — FAMOTIDINE 20 MG/1
40 TABLET, FILM COATED ORAL ONCE
Status: COMPLETED | OUTPATIENT
Start: 2022-06-29 | End: 2022-06-29

## 2022-06-29 RX ORDER — INSULIN GLARGINE 100 [IU]/ML
40 INJECTION, SOLUTION SUBCUTANEOUS DAILY
Status: DISCONTINUED | OUTPATIENT
Start: 2022-06-29 | End: 2022-06-30

## 2022-06-29 RX ORDER — HYDROMORPHONE HYDROCHLORIDE 1 MG/ML
0.5 INJECTION, SOLUTION INTRAMUSCULAR; INTRAVENOUS; SUBCUTANEOUS ONCE
Status: COMPLETED | OUTPATIENT
Start: 2022-06-29 | End: 2022-06-29

## 2022-06-29 RX ORDER — ACETAMINOPHEN 325 MG/1
650 TABLET ORAL ONCE
Status: CANCELLED | OUTPATIENT
Start: 2022-06-29 | End: 2022-06-30

## 2022-06-29 RX ORDER — DIPHENHYDRAMINE HCL 25 MG
25 CAPSULE ORAL ONCE
Status: CANCELLED | OUTPATIENT
Start: 2022-06-29 | End: 2022-06-30

## 2022-06-29 RX ORDER — INSULIN LISPRO 100 [IU]/ML
INJECTION, SOLUTION INTRAVENOUS; SUBCUTANEOUS
Status: DISCONTINUED | OUTPATIENT
Start: 2022-06-29 | End: 2022-07-01

## 2022-06-29 RX ORDER — SODIUM CHLORIDE 9 MG/ML
250 INJECTION, SOLUTION INTRAVENOUS AS NEEDED
Status: DISCONTINUED | OUTPATIENT
Start: 2022-06-29 | End: 2022-06-30

## 2022-06-29 RX ORDER — DIPHENHYDRAMINE HCL 25 MG
50 CAPSULE ORAL ONCE
Status: COMPLETED | OUTPATIENT
Start: 2022-06-29 | End: 2022-06-29

## 2022-06-29 RX ORDER — MAGNESIUM SULFATE 100 %
4 CRYSTALS MISCELLANEOUS AS NEEDED
Status: DISCONTINUED | OUTPATIENT
Start: 2022-06-29 | End: 2022-07-25 | Stop reason: HOSPADM

## 2022-06-29 RX ORDER — INSULIN LISPRO 100 [IU]/ML
INJECTION, SOLUTION INTRAVENOUS; SUBCUTANEOUS EVERY 6 HOURS
Status: DISCONTINUED | OUTPATIENT
Start: 2022-06-29 | End: 2022-06-29

## 2022-06-29 RX ADMIN — Medication 4 UNITS: at 06:20

## 2022-06-29 RX ADMIN — ATORVASTATIN CALCIUM 20 MG: 20 TABLET, FILM COATED ORAL at 22:46

## 2022-06-29 RX ADMIN — SODIUM CHLORIDE, PRESERVATIVE FREE 30 ML: 5 INJECTION INTRAVENOUS at 22:44

## 2022-06-29 RX ADMIN — HYDROMORPHONE HYDROCHLORIDE 0.5 MG: 1 INJECTION, SOLUTION INTRAMUSCULAR; INTRAVENOUS; SUBCUTANEOUS at 01:29

## 2022-06-29 RX ADMIN — Medication 4 UNITS: at 22:43

## 2022-06-29 RX ADMIN — AMIODARONE HYDROCHLORIDE 1 MG/MIN: 50 INJECTION, SOLUTION INTRAVENOUS at 05:29

## 2022-06-29 RX ADMIN — Medication 1 AMPULE: at 10:47

## 2022-06-29 RX ADMIN — DIPHENHYDRAMINE HYDROCHLORIDE 50 MG: 25 CAPSULE ORAL at 16:55

## 2022-06-29 RX ADMIN — IOPAMIDOL 200 ML: 755 INJECTION, SOLUTION INTRAVENOUS at 00:36

## 2022-06-29 RX ADMIN — SODIUM CHLORIDE, PRESERVATIVE FREE 10 ML: 5 INJECTION INTRAVENOUS at 05:32

## 2022-06-29 RX ADMIN — SODIUM CHLORIDE, PRESERVATIVE FREE 40 MG: 5 INJECTION INTRAVENOUS at 09:27

## 2022-06-29 RX ADMIN — AMIODARONE HYDROCHLORIDE 150 MG: 1.5 INJECTION, SOLUTION INTRAVENOUS at 00:53

## 2022-06-29 RX ADMIN — FAMOTIDINE 40 MG: 20 TABLET ORAL at 16:55

## 2022-06-29 RX ADMIN — Medication 3 UNITS: at 12:12

## 2022-06-29 RX ADMIN — DEXAMETHASONE SODIUM PHOSPHATE 40 MG: 4 INJECTION, SOLUTION INTRAMUSCULAR; INTRAVENOUS at 09:26

## 2022-06-29 RX ADMIN — SODIUM CHLORIDE, PRESERVATIVE FREE 40 MG: 5 INJECTION INTRAVENOUS at 22:35

## 2022-06-29 RX ADMIN — INSULIN GLARGINE 40 UNITS: 100 INJECTION, SOLUTION SUBCUTANEOUS at 10:47

## 2022-06-29 RX ADMIN — Medication 1 AMPULE: at 22:35

## 2022-06-29 RX ADMIN — I-VITE, TAB 1000-60-2MG (60/BT) 1 TABLET: TAB at 10:47

## 2022-06-29 RX ADMIN — IMMUNE GLOBULIN (HUMAN) 85 G: 10 INJECTION INTRAVENOUS; SUBCUTANEOUS at 18:44

## 2022-06-29 RX ADMIN — Medication 4 UNITS: at 16:55

## 2022-06-29 RX ADMIN — AMIODARONE HYDROCHLORIDE 0.5 MG/MIN: 50 INJECTION, SOLUTION INTRAVENOUS at 18:18

## 2022-06-29 RX ADMIN — DIPHENHYDRAMINE HYDROCHLORIDE 50 MG: 25 CAPSULE ORAL at 23:14

## 2022-06-29 RX ADMIN — Medication 7 UNITS: at 01:26

## 2022-06-29 RX ADMIN — ACETAMINOPHEN 325MG 650 MG: 325 TABLET ORAL at 05:48

## 2022-06-29 RX ADMIN — SODIUM CHLORIDE 2.5 MG/HR: 900 INJECTION, SOLUTION INTRAVENOUS at 01:06

## 2022-06-29 RX ADMIN — SODIUM CHLORIDE, PRESERVATIVE FREE 10 ML: 5 INJECTION INTRAVENOUS at 14:06

## 2022-06-29 NOTE — PROGRESS NOTES
Rapid Response Team Note    Called by RN at 10:51 PM to see patient for severe chest pain, rapid afib with RVR stat. Upon entering the room the patient was complaining of severe chest pain very restless, currently on Immunoglobulin therapy and rate was increased around 2000. Events as described by RN caring for patient noted. Visit Vitals  /80 (BP 1 Location: Right upper arm)   Pulse (!) 159   Temp (!) 96.6 °F (35.9 °C)   Resp 22   Ht 6' 4\" (1.93 m)   Wt 128.3 kg (282 lb 13.6 oz)   SpO2 98%   BMI 34.43 kg/m²       Gen: awake, alert, in moderate distress  HEENT: wnl  Chest: symmetrical  Abd: soft, non distended  Neuro: AA, anxious  Ext: HALL    Pertinent Recent Labs and Xrays:  Recent Labs     06/28/22  1754 06/28/22  0446   WBC 8.1 7.8   HGB 8.8* 7.8*   HCT 25.8* 23.2*   PLT <3* <3*     Recent Labs     06/28/22  0446 06/27/22  0741    136   K 4.4 4.5   * 107   CO2 19* 22   BUN 48* 28*   CREA 1.12 1.47*   * 159*   CA 8.1* 9.0     Recent Labs     06/27/22  1004 06/27/22  0832   INR 1.1 1.1  1.1      No results for input(s): PH, PCO2, PO2 in the last 72 hours. No results for input(s): PHI, PO2I, PCO2I in the last 72 hours. No results for input(s): CPK, CKNDX, TROIQ in the last 72 hours. No lab exists for component: CPKMB  Lab Results   Component Value Date/Time    Glucose (POC) 217 (H) 06/28/2022 10:59 PM    Glucose (POC) 254 (H) 06/28/2022 08:58 PM    Glucose (POC) 298 (H) 06/28/2022 06:12 PM    Glucose (POC) 202 (H) 06/28/2022 11:34 AM    Glucose (POC) 164 (H) 06/28/2022 07:50 AM       A/P: Chest pain  Afib with RVR  - Cardizem 10 mg IVP x 1, Cardizem drip to follow  - Morphine 2 mg IV x 1 dose  - Nitro SL PRN, 1 dose now  - Benadryl 25 mg IV x 1  - EKG:  Afib with RVR, new LBBB  - Troponin, CK, Magnesium, pro BNP  - Hold Immunoglobulin infusion for now, notify hematologist on call for recent event, suspect treatment reaction and obtain further orders  - Intensivist consultation for persistently chest pain not responding to medication  -  Stat CT abdomen and chest   - ICU taking over, consulted cardiologist    Mayank Steven NP  Critical care time unrelated to prior notes: 35 minutes

## 2022-06-29 NOTE — CONSULTS
Consult    NAME: Fredo Gerard   :  1945   MRN:  [id number]     Date/Time:  2022 9:11 AM    Patient PCP: Huy Brand MD  ________________________________________________________________________     Assessment:     1. Idiopathic thrombocytopenia  2. Anemia  3. Rectal bleeding  4. Lactic acidosis  5. Chest pain  6. Paroxysmal atrial fibrillation  7. Remote PE and DVT (separate events)  8. CAD w/ mild diffuse disease w/ patent stent in the RCA  cath. 9. XOL  10. Morbid obesity  11. DM  12. Former smoker        Plan:   PLT 5  HgB 6s    CP w/ AFib; resolved in SR    1. Continue amio gtt; transition to oral at some point  2. Hold eliquis  3. Hold ASA  4. No plans for invasive procedures  5. Resume eliquis as able; currently in sinus  6. GI following  7. H/O following    Thank you for this consult and allowing me to take part in this patients care. Please call with questions. [x]        High complexity decision making was performed        Subjective:   CHIEF COMPLAINT: Rectal bleeding    HISTORY OF PRESENT ILLNESS:     Yaritza Yung is a 76 y.o.  male who \"presented to the ED at [facility] from assisted living on  with melena and recent hematuria. He also reported development of petechia in the 24h leading up to admission. He was found to have severe thrombocytopenia with platelet count <3. GI and hematology were consulted.     There was concern for ITP, possibly due to bactrim. He received platelet transfusions, but platelet count remains <3 at the time of this note. He was started on IVIG and dexamethasone 40mg x4 days. Eliquis and aspirin were held.     GI saw the patient and he was placed on a PPI and managed conservatively due to thrombocytopenia.     The patient was undergoing his second IVIG treatment when he developed acute onset atrial fibrillation with RVR.   He developed associated 10/10 L sided chest pain that was sharp, did not change with inspiration and radiated to his jaw. The pain was not relieved with morphine or nitroglycerin. Rate did not respond to IV push diltiazem. ICU was called at 2306 for consultation. Amiodarone bolus and infusion ordered as rates as high as 170s, though no sustained. ECG showed new LBBB in addition to afib with RVR. CXR ordered. Additional analgesics administered. The patient continued to have 10/10 chest pain the he said began to radiate across the midline of his chest. Placed a cardiology consult at 2317. Spoke with Dr Camron Brown at . He explained the patient would not be a candidate for cardiac cath due to thrombocytopenia.     The patient reported the pain was very similar to that he experienced during 2018 when he had a PE. Trop 8, , LA 3.8. He continued to have 10/10 chest pain despite additional analgesics. Stat CTA chest/abdomen ordered to exclude PE or dissection.     Upon arrival to the ICU, the patient continued to have 10/10 chest pain. A second amiodarone bolus was administered and the infusion was continued. He converted to NSR. His chest pain greatly improved. \"      We were asked to consult for work up and evaluation of the above problems. Past Medical History:   Diagnosis Date    Arthritis     bilateral hips/knees    CAD (coronary artery disease) 6/7/13    PCI    Chronic pain     DJD; SILVIO KNEES    Diabetes ([address]     \"BORDERLINE\" PER MD    GERD (gastroesophageal reflux disease)     Macular degeneration     Morbid obesity ([address]     Other and unspecified symptoms and signs involving general sensations and perceptions     Bilat. legs \"give out\" intermittently; Bilat leg cramps; macular degenration (left).  Other ill-defined conditions(799.89)     HYPERLIPIDEMIA; LT LE DVT (2008);  MORBID OBESITY    Pulmonary embolism ([address] 08/2021    Thromboembolus [facility]) 2008    left LE      Past Surgical History:   Procedure Laterality Date    [place] CARDIAC SURG PROCEDURE UNLIST  6/713    STENTS TO RCA  DC TOTAL HIP ARTHROPLASTY  2007    right    DC TOTAL HIP ARTHROPLASTY  2007    left     Allergies   Allergen Reactions    Adhesive Other (comments)     Blisters     Advil [Ibuprofen] Rash     Rash all over, itching, trouble breathing     Celebrex [Celecoxib] Other (comments)     Swelling in legs, no rash, no itching, no trouble breathing    Cleocin [Clindamycin Hcl] Rash    Other Medication Unknown (comments)     \"Phosphate\"      Meds:  See below  Social History     Tobacco Use    Smoking status: Former Smoker     Packs/day: 1.00     Quit date: 1983     Years since quittin.4    Smokeless tobacco: Never Used   Substance Use Topics    Alcohol use: Yes     Alcohol/week: 2.5 standard drinks     Types: 2 Glasses of wine, 1 Cans of beer per week     Comment: 0-2 X PER WK WITH MEALS OR AFTER PLAYING GOLF      Family History   Problem Relation Age of Onset    Heart Disease Mother     Arrhythmia Mother     Heart Disease Father     Cancer Sister     Lung Disease Sister        REVIEW OF SYSTEMS:     []         Unable to obtain  ROS due to ---   [x]         Total of 12 systems reviewed as follows:    Constitutional: negative fever, negative chills, negative weight loss  Eyes:   negative visual changes  ENT:   negative sore throat, tongue or lip swelling  Respiratory:  negative cough, negative dyspnea  Cards:  negative for chest pain, palpitations, lower extremity edema  GI:   negative for nausea, vomiting, diarrhea, and abdominal pain  Genitourinary: negative for frequency, dysuria  Integument:  negative for rash   Hematologic:  negative for easy bruising and gum/nose bleeding  Musculoskel: negative for myalgias,  back pain  Neurological:  negative for headaches, dizziness, vertigo, weakness  Behavl/Psych: negative for feelings of anxiety, depression     Pertinent Positives include :    Objective:      Physical Exam:    Last 24hrs VS reviewed since prior progress note.  Most recent are:    Visit Vitals  BP (!) 118/57 (BP 1 Location: Right upper arm, BP Patient Position: At rest)   Pulse 61   Temp 97.8 °F (36.6 °C)   Resp 19   Ht 6' 4\" (1.93 m)   Wt 128.3 kg (282 lb 13.6 oz)   SpO2 92%   BMI 34.43 kg/m²       Intake/Output Summary (Last 24 hours) at 6/29/2022 0911  Last data filed at 6/29/2022 0600  Gross per 24 hour   Intake 1259.9 ml   Output 1735 ml   Net -475.1 ml        General Appearance: Well developed, well nourished, alert & oriented x 3,    no acute distress. Obese. Ears/Nose/Mouth/Throat: Pupils equal and round, Hearing grossly normal.  Neck: Supple. JVP within normal limits. Carotids good upstrokes, with no bruit. Chest: Lungs clear to auscultation bilaterally. Cardiovascular: Regular rate and rhythm, S1S2 normal, no murmur, rubs, gallops. Abdomen: Soft, non-tender, bowel sounds are active. No organomegaly. Extremities: No edema bilaterally. Femoral pulses +2, Distal Pulses +1. Skin: Warm and dry. Diffuse petechiae. Neuro: CN II-XII grossly intact, Strength and sensation grossly intact. []         Post-cath site without hematoma, bruit, tenderness, or thrill. Distal pulses intact. Data:      Telemetry:  SR    EKG:  NSR  []  No new EKG for review. Prior to Admission medications    Medication Sig Start Date End Date Taking? Authorizing Provider   apixaban (Eliquis) 5 mg tablet Take 5 mg by mouth two (2) times a day. Yes Provider, Historical   trimethoprim-sulfamethoxazole (Bactrim DS) 160-800 mg per tablet Take 1 Tablet by mouth two (2) times a day. Yes Provider, Historical   amoxicillin (AMOXIL) 500 mg capsule Take 500 mg by mouth as needed. 4 capsules before dental procedure   Yes Provider, Historical   acetaminophen (Tylenol Extra Strength) 500 mg tablet Take 500 mg by mouth every six (6) hours as needed for Pain. Yes Provider, Historical   vit A/vit C/vit E/zinc/copper (ICAPS AREDS PO) Take 1 Tablet by mouth two (2) times a day.    Yes Provider, Historical omega 3-dha-epa-fish oil 100-160-1,000 mg cap Take 1 Capsule by mouth daily. Yes Provider, Historical   glucosamine-chondroitin (ELATION) 1,500-1,200 mg/30 mL liqd Take 1 Tablet by mouth two (2) times a day. Yes Provider, Historical   COVID-19 mRNA Vacc (MODERNA) 100 mcg/0.5 mL susp injection 0.5 mL by IntraMUSCular route once. 5/16/22  Yes Provider, Historical   simvastatin (ZOCOR) 40 mg tablet Take 40 mg by mouth nightly. 7/26/21  Yes Provider, Historical   aspirin 81 mg chewable tablet Take 81 mg by mouth daily. Yes Provider, Historical   metFORMIN (GLUCOPHAGE) 500 mg tablet Take 500 mg by mouth two (2) times daily (with meals). Yes Provider, Historical   multivitamin (ONE A DAY) tablet Take 1 Tab by mouth daily.    Yes Provider, Historical       Recent Results (from the past 24 hour(s))   GLUCOSE, POC    Collection Time: 06/28/22 11:34 AM   Result Value Ref Range    Glucose (POC) 202 (H) 65 - 117 mg/dL    Performed by Nevaeh Wright PCT    CBC W/O DIFF    Collection Time: 06/28/22  5:54 PM   Result Value Ref Range    WBC 8.1 4.1 - 11.1 K/uL    RBC 2.71 (L) 4.10 - 5.70 M/uL    HGB 8.8 (L) 12.1 - 17.0 g/dL    HCT 25.8 (L) 36.6 - 50.3 %    MCV 95.2 80.0 - 99.0 FL    MCH 32.5 26.0 - 34.0 PG    MCHC 34.1 30.0 - 36.5 g/dL    RDW 14.2 11.5 - 14.5 %    PLATELET <3 (LL) 813 - 400 K/uL    MPV ABNORMAL 8.9 - 12.9 FL    NRBC 0.0 0  WBC    ABSOLUTE NRBC 0.00 0.00 - 0.01 K/uL   GLUCOSE, POC    Collection Time: 06/28/22  6:12 PM   Result Value Ref Range    Glucose (POC) 298 (H) 65 - 117 mg/dL    Performed by Gregor Hodgkin PCT    GLUCOSE, POC    Collection Time: 06/28/22  8:58 PM   Result Value Ref Range    Glucose (POC) 254 (H) 65 - 117 mg/dL    Performed by Matt Millan (PCT)    PLATELETS, ALLOCATE    Collection Time: 06/28/22 10:30 PM   Result Value Ref Range    Unit number [id number]     Blood component type PLP,LR PSTC3     Unit division 00     Status of unit ISSUED     Unit number [id number]     Blood component type PLP,LR PSTC2     Unit division 00     Status of unit ISSUED    GLUCOSE, POC    Collection Time: 06/28/22 10:59 PM   Result Value Ref Range    Glucose (POC) 217 (H) 65 - 117 mg/dL    Performed by Litzy Toledo (ANI RN)    CBC W/O DIFF    Collection Time: 06/28/22 11:31 PM   Result Value Ref Range    WBC 11.8 (H) 4.1 - 11.1 K/uL    RBC 2.74 (L) 4.10 - 5.70 M/uL    HGB 8.8 (L) 12.1 - 17.0 g/dL    HCT 26.0 (L) 36.6 - 50.3 %    MCV 94.9 80.0 - 99.0 FL    MCH 32.1 26.0 - 34.0 PG    MCHC 33.8 30.0 - 36.5 g/dL    RDW 14.2 11.5 - 14.5 %    PLATELET <3 (LL) 621 - 400 K/uL    MPV ABNORMAL 8.9 - 12.9 FL    NRBC 0.2 (H) 0  WBC    ABSOLUTE NRBC 0.02 (H) 0.00 - 0.01 K/uL   TROPONIN-HIGH SENSITIVITY    Collection Time: 06/28/22 11:31 PM   Result Value Ref Range    Troponin-High Sensitivity 8 0 - 76 ng/L   CK    Collection Time: 06/28/22 11:31 PM   Result Value Ref Range     39 - 308 U/L   NT-PRO BNP    Collection Time: 06/28/22 11:31 PM   Result Value Ref Range    NT pro- <450 PG/ML   MAGNESIUM    Collection Time: 06/28/22 11:31 PM   Result Value Ref Range    Magnesium 2.0 1.6 - 2.4 mg/dL   HEPATIC FUNCTION PANEL    Collection Time: 06/28/22 11:31 PM   Result Value Ref Range    Protein, total 8.0 6.4 - 8.2 g/dL    Albumin 2.9 (L) 3.5 - 5.0 g/dL    Globulin 5.1 (H) 2.0 - 4.0 g/dL    A-G Ratio 0.6 (L) 1.1 - 2.2      Bilirubin, total 0.5 0.2 - 1.0 MG/DL    Bilirubin, direct 0.2 0.0 - 0.2 MG/DL    Alk.  phosphatase 47 45 - 117 U/L    AST (SGOT) 24 15 - 37 U/L    ALT (SGPT) 18 12 - 78 U/L   METABOLIC PANEL, BASIC    Collection Time: 06/28/22 11:31 PM   Result Value Ref Range    Sodium 138 136 - 145 mmol/L    Potassium 4.2 3.5 - 5.1 mmol/L    Chloride 111 (H) 97 - 108 mmol/L    CO2 18 (L) 21 - 32 mmol/L    Anion gap 9 5 - 15 mmol/L    Glucose 222 (H) 65 - 100 mg/dL    BUN 41 (H) 6 - 20 MG/DL    Creatinine 1.41 (H) 0.70 - 1.30 MG/DL    BUN/Creatinine ratio 29 (H) 12 - 20      GFR est AA 59 (L) >60 ml/min/1.73m2    GFR est non-AA 48 (L) >60 ml/min/1.73m2    Calcium 8.7 8.5 - 10.1 MG/DL   LACTIC ACID    Collection Time: 06/28/22 11:33 PM   Result Value Ref Range    Lactic acid 3.8 (HH) 0.4 - 2.0 MMOL/L   TYPE & SCREEN    Collection Time: 06/28/22 11:33 PM   Result Value Ref Range    Crossmatch Expiration 07/01/2022,2359     ABO/Rh(D) Aishwarya Antony POSITIVE     Antibody screen NEG    FIBRINOGEN    Collection Time: 06/28/22 11:33 PM   Result Value Ref Range    Fibrinogen 347 200 - 475 mg/dL   PROTHROMBIN TIME + INR    Collection Time: 06/28/22 11:33 PM   Result Value Ref Range    INR 1.1 0.9 - 1.1      Prothrombin time 11.3 (H) 9.0 - 11.1 sec   PTT    Collection Time: 06/28/22 11:33 PM   Result Value Ref Range    aPTT 22.8 22.1 - 31.0 sec    aPTT, therapeutic range     58.0 - 77.0 SECS   GLUCOSE, POC    Collection Time: 06/29/22  1:10 AM   Result Value Ref Range    Glucose (POC) 295 (H) 65 - 117 mg/dL    Performed by Trish WHITE RN    LACTIC ACID    Collection Time: 06/29/22  5:12 AM   Result Value Ref Range    Lactic acid 2.1 (HH) 0.4 - 2.0 MMOL/L   CBC WITH AUTOMATED DIFF    Collection Time: 06/29/22  5:12 AM   Result Value Ref Range    WBC 11.3 (H) 4.1 - 11.1 K/uL    RBC 2.14 (L) 4.10 - 5.70 M/uL    HGB 7.0 (L) 12.1 - 17.0 g/dL    HCT 21.2 (L) 36.6 - 50.3 %    MCV 99.1 (H) 80.0 - 99.0 FL    MCH 32.7 26.0 - 34.0 PG    MCHC 33.0 30.0 - 36.5 g/dL    RDW 14.3 11.5 - 14.5 %    PLATELET 5 (LL) 047 - 400 K/uL    MPV 10.6 8.9 - 12.9 FL    NRBC 0.0 0  WBC    ABSOLUTE NRBC 0.00 0.00 - 0.01 K/uL    NEUTROPHILS 84 (H) 32 - 75 %    BAND NEUTROPHILS 2 %    LYMPHOCYTES 13 12 - 49 %    MONOCYTES 1 (L) 5 - 13 %    EOSINOPHILS 0 0 - 7 %    BASOPHILS 0 0 - 1 %    IMMATURE GRANULOCYTES 0 0.0 - 0.5 %    ABS. NEUTROPHILS 9.7 (H) 1.8 - 8.0 K/UL    ABS. LYMPHOCYTES 1.5 0.8 - 3.5 K/UL    ABS. MONOCYTES 0.1 0.0 - 1.0 K/UL    ABS. EOSINOPHILS 0.0 0.0 - 0.4 K/UL    ABS. BASOPHILS 0.0 0.0 - 0.1 K/UL    ABS. IMM.  GRANS. 0.0 0.00 - 0.04 K/UL DF MANUAL      PLATELET COMMENTS DECREASED PLATELETS      RBC COMMENTS NORMOCYTIC, NORMOCHROMIC     METABOLIC PANEL, BASIC    Collection Time: 06/29/22  5:12 AM   Result Value Ref Range    Sodium 138 136 - 145 mmol/L    Potassium 4.0 3.5 - 5.1 mmol/L    Chloride 111 (H) 97 - 108 mmol/L    CO2 18 (L) 21 - 32 mmol/L    Anion gap 9 5 - 15 mmol/L    Glucose 231 (H) 65 - 100 mg/dL    BUN 43 (H) 6 - 20 MG/DL    Creatinine 1.26 0.70 - 1.30 MG/DL    BUN/Creatinine ratio 34 (H) 12 - 20      GFR est AA >60 >60 ml/min/1.73m2    GFR est non-AA 55 (L) >60 ml/min/1.73m2    Calcium 8.3 (L) 8.5 - 10.1 MG/DL   MAGNESIUM    Collection Time: 06/29/22  5:12 AM   Result Value Ref Range    Magnesium 2.4 1.6 - 2.4 mg/dL   PHOSPHORUS    Collection Time: 06/29/22  5:12 AM   Result Value Ref Range    Phosphorus 2.8 2.6 - 4.7 MG/DL   GLUCOSE, POC    Collection Time: 06/29/22  5:58 AM   Result Value Ref Range    Glucose (POC) 240 (H) 65 - 117 mg/dL    Performed by Rodrigo Cheung RN         [facility], DO

## 2022-06-29 NOTE — PROGRESS NOTES
0100: Pt admitted to ICU, in Afib rvr. AOx4. Dual skin performed with Laila Chester RN. Expiratory wheeze noted on exam.    0120: Pt is in NSR, HR 89. Relief from chest pain. 0200: Allowed family to visit room to see pt, updated on plan of care. Therapeutic to pt, very respectful. 0400: Pt reassessed, no changes. Remains NSR. Sleeping well. 56: Second unit of platelets hung. Critical value at 5, NP aware. Tylenol given for headache.    0630: Informed by blood bank that there is a potentially available unit of platelets at [facility] that will  at midnight tonight. 0700: Report given to GILBERT Mcdonald. Problem: Discharge Planning  Goal: *Discharge to safe environment  Outcome: Progressing Towards Goal     Problem: Falls - Risk of  Goal: *Absence of Falls  Description: Document Shira Minium Fall Risk and appropriate interventions in the flowsheet.   Outcome: Progressing Towards Goal  Note: Fall Risk Interventions:  Mobility Interventions: Bed/chair exit alarm         Medication Interventions: Assess postural VS orthostatic hypotension,Bed/chair exit alarm    Elimination Interventions: Bed/chair exit alarm,Call light in reach    History of Falls Interventions: Bed/chair exit alarm         Problem: Patient Education: Go to Patient Education Activity  Goal: Patient/Family Education  Outcome: Progressing Towards Goal     Problem: Patient Education: Go to Patient Education Activity  Goal: Patient/Family Education  Outcome: Progressing Towards Goal     Problem: Nutrition Deficit  Goal: *Optimize nutritional status  Outcome: Progressing Towards Goal

## 2022-06-29 NOTE — PROGRESS NOTES
[address] [facility]  [address], [address] [facility] Cristobal Rodriguez, [address]  [phone]      Hematology/ Oncology Progress Note      Reason for consult:     Nighat Munguia is a 76 y.o. male who we have been asked to see by Dr. Kelley Piña for acute thrombocytopenia. Subjective:     Nighat Munguia is a 76year old male who presented to the ED at [facility] for fatigue, scattered petechiae to all extremities, purpura, hematuria and rectal bleeding. He has a PMH of several DVT and PE since 2008 and has been on eliquis OP. He also has a PMH of CAD with PCI, diabetes type II, obesity and bilateral hip repair. He was recently started on bactrim OP for a UTI. Patient seen at bedside today in the ED with wife. Discussed potential causes of acute severe thrombocytopenia, work up and treatment. He stated that petechiae appeared over last 24 hours after a hot shower. Interval History:     6/28/2022 Patient seen at bedside, discussed another transfusion of platelets. 6/29/2022 Patient seen at bedside in ICU, transferred overnight with CP and afib. Reports he feels better, no longer in afib. Review of Systems:  Pertinent items are noted in the History of Present Illness. Past Medical History:   Diagnosis Date    Arthritis     bilateral hips/knees    CAD (coronary artery disease) 6/7/13    PCI    Chronic pain     DJD; SILVIO KNEES    Diabetes ([address]     \"BORDERLINE\" PER MD    GERD (gastroesophageal reflux disease)     Macular degeneration     Morbid obesity ([address]     Other and unspecified symptoms and signs involving general sensations and perceptions     Bilat. legs \"give out\" intermittently; Bilat leg cramps; macular degenration (left).  Other ill-defined conditions(799.89)     HYPERLIPIDEMIA; LT LE DVT (2008);  MORBID OBESITY    Pulmonary embolism ([address] 08/2021    Thromboembolus [facility]) 2008    left LE     Past Surgical History:   Procedure Laterality Date    MA CARDIAC SURG PROCEDURE UNLIST      STENTS TO RCA    MA TOTAL HIP ARTHROPLASTY  2007    right    MA TOTAL HIP ARTHROPLASTY  2007    left      Family History   Problem Relation Age of Onset    Heart Disease Mother     Arrhythmia Mother     Heart Disease Father     Cancer Sister     Lung Disease Sister      Social History     Tobacco Use    Smoking status: Former Smoker     Packs/day: 1.00     Quit date: 1983     Years since quittin.4    Smokeless tobacco: Never Used   Substance Use Topics    Alcohol use:  Yes     Alcohol/week: 2.5 standard drinks     Types: 2 Glasses of wine, 1 Cans of beer per week     Comment: 0-2 X PER WK WITH MEALS OR AFTER PLAYING GOLF      Current Facility-Administered Medications   Medication Dose Route Frequency Provider Last Rate Last Admin    glucose chewable tablet 16 g  4 Tablet Oral PRN Mehul Nash NP        glucagon (GLUCAGEN) injection 1 mg  1 mg IntraMUSCular PRN Mehul Nash NP        dextrose 10% infusion 0-250 mL  0-250 mL IntraVENous PRN Mehul Nash NP        alcohol 62% (NOZIN) nasal  1 Ampule  1 Ampule Topical Q12H Rehan Bustillo MD   1 Ampule at 22 1047    insulin glargine (LANTUS) injection 40 Units  40 Units SubCUTAneous DAILY Rehan Bustillo MD   40 Units at 22 1047    insulin lispro (HUMALOG) injection   SubCUTAneous AC&HS Rehan Bustillo MD   3 Units at 22 1212    0.9% sodium chloride infusion 250 mL  250 mL IntraVENous PRN Aggie VINSON NP        0.9% sodium chloride infusion 250 mL  250 mL IntraVENous PRN Simona Melton NP        0.9% sodium chloride infusion 250 mL  250 mL IntraVENous PRN Kristi Mancilla MD        immune globulin 10% 5 g, immune globulin 10% (GAMUNEX-C) 80 g  85 g IntraVENous ONCE TITR Zafar James  mL/hr at 22 85 g at 22    immune globulin 10% 5 g, immune globulin 10% (GAMUNEX-C) 40 g, immune globulin 10% (GAMUNEX-C) 40 g  85 g IntraVENous ONCE TITR Zafar James L, NP        0.9% sodium chloride infusion 250 mL  250 mL IntraVENous PRN Horris Glenn, NP        nitroglycerin (NITROSTAT) tablet 0.4 mg  0.4 mg SubLINGual PRN Justice Main, Simona, NP   0.4 mg at 06/28/22 2322    amiodarone (CORDARONE) 375 mg/250 mL D5W infusion  0.5-1 mg/min IntraVENous TITRATE Jefry Night, NP 20 mL/hr at 06/29/22 0601 0.5 mg/min at 06/29/22 0601    0.9% sodium chloride infusion 250 mL  250 mL IntraVENous PRN Shanta Childers MD        dexamethasone (DECADRON) 40 mg in 0.9% sodium chloride 50 mL IVPB  40 mg IntraVENous DAILY Blake Aimee L, NP   40 mg at 06/29/22 3334    atorvastatin (LIPITOR) tablet 20 mg  20 mg Oral QHS Santhosh Wolfe MD   20 mg at 06/28/22 2146    vitamin Q-X-Q-lutein-minerals (OCUVITE) tablet 1 Tablet  1 Tablet Oral DAILY Santhosh Wolfe MD   1 Tablet at 06/29/22 1047    pantoprazole (PROTONIX) 40 mg in 0.9% sodium chloride 10 mL injection  40 mg IntraVENous Q12H Santhosh Wolfe MD   40 mg at 06/29/22 9510    sodium chloride (NS) flush 5-40 mL  5-40 mL IntraVENous Q8H Santhosh Wolfe MD   10 mL at 06/29/22 1406    sodium chloride (NS) flush 5-40 mL  5-40 mL IntraVENous PRN Santhosh Wolfe MD        acetaminophen (TYLENOL) tablet 650 mg  650 mg Oral Q6H PRN Santhosh Wolfe MD   650 mg at 06/29/22 1243    Or    acetaminophen (TYLENOL) suppository 650 mg  650 mg Rectal Q6H PRN Santhosh Wolfe MD        polyethylene glycol [facility]) packet 17 g  17 g Oral DAILY PRN Santhosh Wolfe MD        ondansetron (ZOFRAN ODT) tablet 4 mg  4 mg Oral Q8H PRN Santhosh Wolfe MD        Or    ondansetron [facility] injection 4 mg  4 mg IntraVENous Q6H PRN Santhosh Wolfe MD        0.9% sodium chloride infusion 250 mL  250 mL IntraVENous PRN Santhosh Wolfe MD            Allergies   Allergen Reactions    Adhesive Other (comments)     Blisters     Advil [Ibuprofen] Rash     Rash all over, itching, trouble breathing     Celebrex [Celecoxib] Other (comments)     Swelling in legs, no rash, no itching, no trouble breathing    Cleocin [Clindamycin Hcl] Rash    Other Medication Unknown (comments)     \"Phosphate\"          Objective:     Patient Vitals for the past 8 hrs:   BP Temp Pulse Resp SpO2   06/29/22 1400 (!) 116/57 -- 62 22 93 %   06/29/22 1300 (!) 126/59 -- 65 28 93 %   06/29/22 1210 (!) 124/51 98 °F (36.7 °C) 65 20 91 %   06/29/22 1000 (!) 119/58 -- 62 19 92 %   06/29/22 0900 (!) 123/58 -- 67 21 94 %   06/29/22 0815 (!) 109/57 97.8 °F (36.6 °C) 64 18 91 %   06/29/22 0800 (!) 118/57 97.8 °F (36.6 °C) 61 19 92 %   06/29/22 0700 (!) 114/56 -- 61 17 95 %   06/29/22 0624 -- 97.7 °F (36.5 °C) 68 17 90 %       Lab Results   Component Value Date/Time    WBC 11.3 (H) 06/29/2022 05:12 AM    HGB 6.6 (L) 06/29/2022 08:24 AM    HCT 19.5 (L) 06/29/2022 08:24 AM    PLATELET 5 (LL) 99/48/5651 05:12 AM    MCV 99.1 (H) 06/29/2022 05:12 AM       Physical Exam:   Visit Vitals  BP (!) 116/57   Pulse 62   Temp 98 °F (36.7 °C)   Resp 22   Ht 6' 4\" (1.93 m)   Wt 282 lb 13.6 oz (128.3 kg)   SpO2 93%   BMI 34.43 kg/m²     General appearance: alert, cooperative, no distress, appears stated age, moderately obese  Head: Normocephalic, without obvious abnormality, atraumatic  Throat: abnormal findings: multiple purpura   Abdomen: soft, non-tender.  Bowel sounds normal. No masses,  no organomegaly  Extremities: extremities normal, atraumatic, no cyanosis or edema  Skin: petechiae - scattered, to all extremities   Neurologic: Grossly normal    Assessment:     Severe Thrombocytopenia  Sudden onset of scattered petechiae to all extremities and purpura, also melena to stool   Platelets found to be <3 in ED   Transfusing platelets in ED   Hgb stable   Recently started bactrim for UTI, stated has taken before with no issues   On Eliquis PTA for DVT/PE, last DVT in 8/2021    GI Bleed   Melena in ED, occult stool positive Receiving IV Protonix   Appreciate GI evaluation     History of DVT and PE   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 car trips x2  PE of unknown origin   DVT after hip replacement   Last DVT 8/2021 - BLE dopplers confirm resolution   Patient reports his in immobile much of the time d/t debility   Has not had hypercoagulable work up     Acute Kidney Injury - improving   Managed by primary team     Atrial Fibrillation   Episode of A fib   Transferred to ICU and started on amiodarone drip  Now in NSR   Evaluation by Cardiology       Plan:     > Suspect severe thrombocytopenia r/t ITP  > Platelets are 5 today despite transfusion yesterday. Continues with GIB, plan to transfuse with 2 additional units today and recheck CBC.   > Continue Dexamethasone 40 mg daily x 4 days - Day 3   > Continue IVIG x 3 days - Day 3  > Experienced CP during second IVIG infusion, plan to pre-medicate with pepcid and benadryl    > Follow hgb with GIB, would transfuse to keep hgb > 7   > Appreciate GI input - cannot pursue testing until patient is hemodynamically stable   > Agree with holding eliquis and ASA for now, will need to reconsider starting once stable as patient has a history of recurrent DVT/PE   > BLE dopplers confirmed resolution of DVTs diagnosed in August 2021     Discussed with Dr. Matthias Ren    Thank you for allowing us to participate in the care of Mr. Joseline Lai, will continue to follow closely.      Willy Bello NP

## 2022-06-29 NOTE — PROGRESS NOTES
Received notification from bedside RN about patient with regards to: platelet <3 despite total of 4 units platelet  Transfusion.  Goal is >50  VS: /68, HR 76, RR 19, O2 sat 97% on RA    Intervention given: Transfuse 3 units platelets ordered

## 2022-06-29 NOTE — PROGRESS NOTES
I saw and evaluated the patient, performing the key elements of the service the morning of 6/29. I discussed the overnight findings, assessment and plan with the NP in the morning of 6/29 and agree with the NP's findings and plan as documented in the NP's note. In addition    Chest pain concerning for myocardial ischemia vs reaction to IVIG. Troponin elevated. Cardiology consulted. Will follow up with recommendations of cardiology as well as oncology, if IVIG is needed again. Patient also has worsening cough over a few days with mild GGO on CT. He states he had \"a cold\" 3 weeks prior. He also could not finish his bactrim course. _ Repeat UA  - send procal  - monitor for infectious etiology   - wean FIO2  - respiratory pathogen panel  - Start lantus per DM management recs. Pretty Alcala MD [address] [place] [address],# [phone]  I personally spent 35 minutes of critical care time. This is time spent actively involved in patient care as well as the coordination of care and/or discussions with the patient's family. This does not include any procedural time which has been billed separately.

## 2022-06-29 NOTE — PROGRESS NOTES
0- Shift change report given to [place] (oncoming nurse) by Kayla Peoples RN (offgoing nurse). Report included the following information SBAR, Kardex, Intake/Output, Recent Results, Cardiac Rhythm NSR and Alarm Parameters     0815- Pt assessed. Pt alert and oriented x4. NSR on monitor. Amiodarone gtt infusing. Lungs clear and diminished. 0845- PCR Viral Panel sent to lab. Rapid COVID test sent to lab. 36- Interdisciplinary Rounds held. 1200- Pt reassessed, still in NSR.    1215- Rapid COVID test negative. 65- Wife at bedside for visit. 1514- Started 1st unit of Platelets. Pt tolerated well.    1600- Pt reassessed, still in NSR.    1800-  Started 2nd unit of Platelets. Pt tolerated well. 1844- 3rd bag of IV IG started. Pt tolerating well after being premedicated with Benadryl. 1900- Shift change report given to Luis Camacho RN (oncoming nurse).

## 2022-06-29 NOTE — PROGRESS NOTES
RAPID RESPONSE TEAM    Overhead rapid response paged to room # 2105  at [address]    Reason for rapid response: Elevated HR, Chest Pain    Initial assessment: Upon arrival, patient is restless and diaphoretic with complaints of 10/10 chest pain, described as consistent, dull pain, radiating to left arm and jaw. Patient undergoing IVIG treatment, now stopped. Patient appears to be Afib RVR with new BBB on monitor, -180s, BP stable. Stat EKG obtained. NPAbdoul at bedside, orders received for Troponin, CK, Magnesium, pro BNP, IV morphine and SL nitro for chest pain, IV benadryl ordered for possible medication reaction. ICU consulted. NP, Karishma Gleason at bedside, new EKG obtained and reviewed by NP, orders received for Amio bolus/gtt, IV Fentanyl, IV Mag, stat CTA chest/ ABD and stat cardiology consult. Dr. Marianela Estrada with Heme-onc notified of possible reaction to IVIG treatment, verbal orders received to resume treatment at decreased rate and to stop treatment again if symptoms worsen.      Patient Vitals for the past 4 hrs:   Temp Pulse Resp BP SpO2   06/29/22 0120 -- 89 18 -- 93 %   06/28/22 2323 -- (!) 156 -- (!) 179/84 97 %   06/28/22 2310 -- (!) 159 22 126/80 98 %   06/28/22 2307 (!) 96.6 °F (35.9 °C) (!) 150 22 (!) 196/68 100 %   06/28/22 2149 98 °F (36.7 °C) 76 19 127/68 97 %          Interventions:     - EKG- Afib RVR, LBBB-  Interpreted by NP, Sal[place]ao    - Trop, Mag, CBC, BMP, Lactic acid, BNP  - IV morphine and IV Fentanyl for pain  - IV Benadryl   - IV Mag  - SL Nitro  - Amio bolus/gtt  - CTA chest/ABD      Outcome: pt transferred to room # 9722/30     Please call with any questions or concerns    Mildred Hayden RN  Rapid Response Team  Ext [phone]     Recent Results (from the past 8 hour(s))   CBC W/O DIFF    Collection Time: 06/28/22  5:54 PM   Result Value Ref Range    WBC 8.1 4.1 - 11.1 K/uL    RBC 2.71 (L) 4.10 - 5.70 M/uL    HGB 8.8 (L) 12.1 - 17.0 g/dL    HCT 25.8 (L) 36.6 - 50.3 %    MCV 95.2 80.0 - 99.0 FL    MCH 32.5 26.0 - 34.0 PG    MCHC 34.1 30.0 - 36.5 g/dL    RDW 14.2 11.5 - 14.5 %    PLATELET <3 (LL) 782 - 400 K/uL    MPV ABNORMAL 8.9 - 12.9 FL    NRBC 0.0 0  WBC    ABSOLUTE NRBC 0.00 0.00 - 0.01 K/uL   GLUCOSE, POC    Collection Time: 06/28/22  6:12 PM   Result Value Ref Range    Glucose (POC) 298 (H) 65 - 117 mg/dL    Performed by Linnea Bumpers PCT    GLUCOSE, POC    Collection Time: 06/28/22  8:58 PM   Result Value Ref Range    Glucose (POC) 254 (H) 65 - 117 mg/dL    Performed by Saturnino Bourne (PCT)    PLATELETS, ALLOCATE    Collection Time: 06/28/22 10:30 PM   Result Value Ref Range    Unit number [id number]     Blood component type PLP,LR PSTC3     Unit division 00     Status of unit ALLOCATED     Unit number [id number]     Blood component type PLP,LR PSTC2     Unit division 00     Status of unit ALLOCATED    GLUCOSE, POC    Collection Time: 06/28/22 10:59 PM   Result Value Ref Range    Glucose (POC) 217 (H) 65 - 117 mg/dL    Performed by Allie Elena (ANI RN)    CBC W/O DIFF    Collection Time: 06/28/22 11:31 PM   Result Value Ref Range    WBC 11.8 (H) 4.1 - 11.1 K/uL    RBC 2.74 (L) 4.10 - 5.70 M/uL    HGB 8.8 (L) 12.1 - 17.0 g/dL    HCT 26.0 (L) 36.6 - 50.3 %    MCV 94.9 80.0 - 99.0 FL    MCH 32.1 26.0 - 34.0 PG    MCHC 33.8 30.0 - 36.5 g/dL    RDW 14.2 11.5 - 14.5 %    PLATELET <3 (LL) 230 - 400 K/uL    MPV ABNORMAL 8.9 - 12.9 FL    NRBC 0.2 (H) 0  WBC    ABSOLUTE NRBC 0.02 (H) 0.00 - 0.01 K/uL   TROPONIN-HIGH SENSITIVITY    Collection Time: 06/28/22 11:31 PM   Result Value Ref Range    Troponin-High Sensitivity 8 0 - 76 ng/L   CK    Collection Time: 06/28/22 11:31 PM   Result Value Ref Range     39 - 308 U/L   NT-PRO BNP    Collection Time: 06/28/22 11:31 PM   Result Value Ref Range    NT pro- <450 PG/ML   MAGNESIUM    Collection Time: 06/28/22 11:31 PM   Result Value Ref Range    Magnesium 2.0 1.6 - 2.4 mg/dL   HEPATIC FUNCTION PANEL    Collection Time: 06/28/22 11:31 PM   Result Value Ref Range    Protein, total 8.0 6.4 - 8.2 g/dL    Albumin 2.9 (L) 3.5 - 5.0 g/dL    Globulin 5.1 (H) 2.0 - 4.0 g/dL    A-G Ratio 0.6 (L) 1.1 - 2.2      Bilirubin, total 0.5 0.2 - 1.0 MG/DL    Bilirubin, direct 0.2 0.0 - 0.2 MG/DL    Alk.  phosphatase 47 45 - 117 U/L    AST (SGOT) 24 15 - 37 U/L    ALT (SGPT) 18 12 - 78 U/L   METABOLIC PANEL, BASIC    Collection Time: 06/28/22 11:31 PM   Result Value Ref Range    Sodium 138 136 - 145 mmol/L    Potassium 4.2 3.5 - 5.1 mmol/L    Chloride 111 (H) 97 - 108 mmol/L    CO2 18 (L) 21 - 32 mmol/L    Anion gap 9 5 - 15 mmol/L    Glucose 222 (H) 65 - 100 mg/dL    BUN 41 (H) 6 - 20 MG/DL    Creatinine 1.41 (H) 0.70 - 1.30 MG/DL    BUN/Creatinine ratio 29 (H) 12 - 20      GFR est AA 59 (L) >60 ml/min/1.73m2    GFR est non-AA 48 (L) >60 ml/min/1.73m2    Calcium 8.7 8.5 - 10.1 MG/DL   LACTIC ACID    Collection Time: 06/28/22 11:33 PM   Result Value Ref Range    Lactic acid 3.8 (HH) 0.4 - 2.0 MMOL/L   TYPE & SCREEN    Collection Time: 06/28/22 11:33 PM   Result Value Ref Range    Crossmatch Expiration 07/01/2022,2359     ABO/Rh(D) William Rosales POSITIVE     Antibody screen NEG    FIBRINOGEN    Collection Time: 06/28/22 11:33 PM   Result Value Ref Range    Fibrinogen 347 200 - 475 mg/dL   PROTHROMBIN TIME + INR    Collection Time: 06/28/22 11:33 PM   Result Value Ref Range    INR 1.1 0.9 - 1.1      Prothrombin time 11.3 (H) 9.0 - 11.1 sec   PTT    Collection Time: 06/28/22 11:33 PM   Result Value Ref Range    aPTT 22.8 22.1 - 31.0 sec    aPTT, therapeutic range     58.0 - 77.0 SECS   GLUCOSE, POC    Collection Time: 06/29/22  1:10 AM   Result Value Ref Range    Glucose (POC) 295 (H) 65 - 117 mg/dL    Performed by Eulogio Nava RN

## 2022-06-29 NOTE — CONSULTS
[facility]    ICU TEAM Consult Note    Name: Porsche Pineda   : 1945   MRN: [id number]   Date: 2022      Subjective:   Progress Note: 2022      Mr Gris Noriega is a 75 yo male with a PMH of CAD s/p stenting x3; Hx PE 2021 and DVT on eliquis, DM on metformin and recent UTI on bactrim. He presented to the ED at [facility] from assisted living on  with melena and recent hematuria. He also reported development of petechia in the 24h leading up to admission. He was found to have severe thrombocytopenia with platelet count <3. GI and hematology were consulted. There was concern for ITP, possibly due to bactrim. He received platelet transfusions, but platelet count remains <3 at the time of this note. He was started on IVIG and dexamethasone 40mg x4 days. Eliquis and aspirin were held. GI saw the patient and he was placed on a PPI and managed conservatively due to thrombocytopenia. The patient was undergoing his second IVIG treatment when he developed acute onset atrial fibrillation with RVR. He developed associated 10/10 L sided chest pain that was sharp, did not change with inspiration and radiated to his jaw. The pain was not relieved with morphine or nitroglycerin. Rate did not respond to IV push diltiazem. ICU was called at 2306 for consultation. Amiodarone bolus and infusion ordered as rates as high as 170s, though no sustained. ECG showed new LBBB in addition to afib with RVR. CXR ordered. Additional analgesics administered. The patient continued to have 10/10 chest pain the he said began to radiate across the midline of his chest. Placed a cardiology consult at 2317. Spoke with Dr Anuj Bergeron at . He explained the patient would not be a candidate for cardiac cath due to thrombocytopenia. The patient reported the pain was very similar to that he experienced during 2018 when he had a PE. Trop 8, , LA 3.8.   He continued to have 10/10 chest pain despite additional analgesics. Stat CTA chest/abdomen ordered to exclude PE or dissection. Upon arrival to the ICU, the patient continued to have 10/10 chest pain. A second amiodarone bolus was administered and the infusion was continued. He converted to NSR. His chest pain greatly improved. Active Problem List:     Problem List  Date Reviewed: 8/8/2021          Codes Class    Thrombocytopenia (HCC) ICD-10-CM: D69.6  ICD-9-CM: 287.5         GI bleed ICD-10-CM: K92.2  ICD-9-CM: 578.9         UGO (acute kidney injury) ([facility] ICD-10-CM: N17.9  ICD-9-CM: 340. 9         Chest pain ICD-10-CM: R07.9  ICD-9-CM: 786.50         HTN (hypertension) (Chronic) ICD-10-CM: I10  ICD-9-CM: 401.9         Hyperlipidemia (Chronic) ICD-10-CM: E78.5  ICD-9-CM: 272.4         Abnormal stress test ICD-10-CM: R94.39  ICD-9-CM: 794.39         Unstable angina (HCC) ICD-10-CM: I20.0  ICD-9-CM: 411.1         Morbid obesity (HCC) (Chronic) ICD-10-CM: E66.01  ICD-9-CM: 278.01               Past Medical History:      has a past medical history of Arthritis, CAD (coronary artery disease) (6/7/13), Chronic pain, Diabetes ([facility] GERD (gastroesophageal reflux disease), Macular degeneration, Morbid obesity ([facility] Other and unspecified symptoms and signs involving general sensations and perceptions, Other ill-defined conditions(799.89), Pulmonary embolism ([facility] (08/2021), and Thromboembolus ([facility] (2008). Past Surgical History:      has a past surgical history that includes pr cardiac surg procedure unlist (6/713); pr total hip arthroplasty (6/2007); and pr total hip arthroplasty (12/2007). Home Medications:     Prior to Admission medications    Medication Sig Start Date End Date Taking? Authorizing Provider   apixaban (Eliquis) 5 mg tablet Take 5 mg by mouth two (2) times a day. Yes Provider, Historical   trimethoprim-sulfamethoxazole (Bactrim DS) 160-800 mg per tablet Take 1 Tablet by mouth two (2) times a day.    Yes Provider, Historical amoxicillin (AMOXIL) 500 mg capsule Take 500 mg by mouth as needed. 4 capsules before dental procedure   Yes Provider, Historical   acetaminophen (Tylenol Extra Strength) 500 mg tablet Take 500 mg by mouth every six (6) hours as needed for Pain. Yes Provider, Historical   vit A/vit C/vit E/zinc/copper (ICAPS AREDS PO) Take 1 Tablet by mouth two (2) times a day. Yes Provider, Historical   omega 3-dha-epa-fish oil 100-160-1,000 mg cap Take 1 Capsule by mouth daily. Yes Provider, Historical   glucosamine-chondroitin (ELATION) 1,500-1,200 mg/30 mL liqd Take 1 Tablet by mouth two (2) times a day. Yes Provider, Historical   COVID-19 mRNA Vacc (MODERNA) 100 mcg/0.5 mL susp injection 0.5 mL by IntraMUSCular route once. 22  Yes Provider, Historical   simvastatin (ZOCOR) 40 mg tablet Take 40 mg by mouth nightly. 21  Yes Provider, Historical   aspirin 81 mg chewable tablet Take 81 mg by mouth daily. Yes Provider, Historical   metFORMIN (GLUCOPHAGE) 500 mg tablet Take 500 mg by mouth two (2) times daily (with meals). Yes Provider, Historical   multivitamin (ONE A DAY) tablet Take 1 Tab by mouth daily. Yes Provider, Historical       Allergies/Social/Family History: Allergies   Allergen Reactions    Adhesive Other (comments)     Blisters     Advil [Ibuprofen] Rash     Rash all over, itching, trouble breathing     Celebrex [Celecoxib] Other (comments)     Swelling in legs, no rash, no itching, no trouble breathing    Cleocin [Clindamycin Hcl] Rash    Other Medication Unknown (comments)     \"Phosphate\"      Social History     Tobacco Use    Smoking status: Former Smoker     Packs/day: 1.00     Quit date: 1983     Years since quittin.4    Smokeless tobacco: Never Used   Substance Use Topics    Alcohol use:  Yes     Alcohol/week: 2.5 standard drinks     Types: 2 Glasses of wine, 1 Cans of beer per week     Comment: 0-2 X PER WK WITH MEALS OR AFTER PLAYING GOLF      Family History Problem Relation Age of Onset    Heart Disease Mother     Arrhythmia Mother     Heart Disease Father     Cancer Sister     Lung Disease Sister        Review of Systems:     As per HPI    Objective:   Vital Signs:  Visit Vitals  BP (!) 179/84   Pulse 89   Temp 98 °F (36.7 °C)   Resp 18   Ht 6' 4\" (1.93 m)   Wt 128.3 kg (282 lb 13.6 oz)   SpO2 93%   BMI 34.43 kg/m²      O2 Device: None (Room air) Temp (24hrs), Av °F (36.7 °C), Min:96.6 °F (35.9 °C), Max:98.4 °F (36.9 °C)           Intake/Output:     Intake/Output Summary (Last 24 hours) at 2022 0147  Last data filed at 2022 2149  Gross per 24 hour   Intake 1116.1 ml   Output 1610 ml   Net -493.9 ml     Physical Exam: Exam below reflects initial exam    General: Significant distress, moaning, crying out in pain, trying to sit up in bed, grabbing at chest, diaphoretic   Eye: PERRLA  Neurologic: PERRLA  Neck:  Supple, no JVD  Lungs:  CTAB, tachypnea  Heart:  Irregular, tachycardic rate and rhythm, 1+ pulses, 1+ edema of BLE  Abdomen: Soft, nontender, nondistended  Skin: Scattered petechia over extremities and thorax    LABS AND  DATA: Personally reviewed  Recent Labs     22  1754   WBC 11.8* 8.1   HGB 8.8* 8.8*   HCT 26.0* 25.8*   PLT <3* <3*     Recent Labs     22  0446    140   K 4.2 4.4   * 113*   CO2 18* 19*   BUN 41* 48*   CREA 1.41* 1.12   * 128*   CA 8.7 8.1*   MG 2.0  --      Recent Labs     22  0741   AP 47 61   TP 8.0 7.2   ALB 2.9* 3.2*   GLOB 5.1* 4.0     Recent Labs     22  2333 22  1004 22  0832 22  0832   INR 1.1 1.1   < > 1.1  1.1   PTP 11.3* 11.8*   < > 11.4  11.8*   APTT 22.8  --   --  28    < > = values in this interval not displayed. No results for input(s): PHI, PCO2I, PO2I, FIO2I in the last 72 hours.   Recent Labs     22  2331        2L/min NC    MEDS: Reviewed    Chest X-Ray: personally reviewed and report checked    Assessment and Plan:     Atrial fibrillation with RVR complicated by acute chest pain: Possibly due to angina in the setting of RVR with poor ability to compensate in the setting of known CAD. 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 Unable to estimate degree of CKD at this time. - Trend BMP    GI bleed: GI managing conservatively. No melena noted at this time  - Continue PPI    Hx DVT and PE: Last known 8/2021. Was recommended to be on life-long anticoagulation. Held at this time due to thrombocytopenia. - Awaiting CTA      CRITICAL CARE CONSULTANT NOTE  I had a face to face encounter with the patient, reviewed and interpreted patient data including clinical events, labs, images, vital signs, I/O's, and examined patient. I have discussed the case and the plan and management of the patient's care with the consulting services, the bedside nurses and the respiratory therapist.      NOTE OF PERSONAL INVOLVEMENT IN CARE   This patient has a high probability of imminent, clinically significant deterioration, which requires the highest level of preparedness to intervene urgently. I participated in the decision-making and personally managed or directed the management of the following life and organ supporting interventions that required my frequent assessment to treat or prevent imminent deterioration. I personally spent 120 minutes of critical care time.   This is time spent at this critically ill patient's bedside actively involved in patient care as well as the coordination of care and discussions with the patient's family. This does not include any procedural time which has been billed separately.     Marni Rosas NP  [facility]  6/29/2022

## 2022-06-29 NOTE — PROGRESS NOTES
GI PROGRESS NOTE  Simi Somers NP  [phone] NP in-hospital cell phone M-F until 4:30  After 5pm or on weekends, please call  for physician on call    NAME:Carlos Duque :1945 RTB:[id number]   ATTG: Dr. Poppy Rodriguez  PCP: Robin Yoder MD  Date/Time:  2022 10:10 AM   Primary GI: Dr. Lorin Wright  Reason for following: Rectal bleeding    Assessment:     Rectal bleeding   Acute on chronic anemia   Severe thrombocytopenia, ? ITP secondary to bactrim   · Hgb 6.6; was 12.9 on 22  · PLT 5; was 210 on 2021  · FOBT +     Hx of PE  · Eliquis on hold      Plan:     · Patient will likely need endoscopic procedures during this admission d/t rectal bleeding and anemia. Unable to complete at this time secondary to severe thrombocytopenis. Will continue to follow for timing. · Serial H&H; goal for hgb >7.0  · Monitor for s/s of bleeding; transfuse as clinically indicated  · Agree with platelet transfusion   · Appreciate hematology input   · Clear liquid diet   · Continue to hold Eliquis   · Continue PPI   · Symptomatic care per primary team   *Plan of care discussed with Dr. Lorin Wright      Subjective:   Discussed with RN events overnight. Patient resting in bed. No new complaints. Reports maroon colored stool last evening. Review of Systems:  Symptom Y/N Comments  Symptom Y/N Comments   Fever/Chills n   Chest Pain n    Cough n   Headaches n    Sputum n   Joint Pain n    SOB/SHEARER n   Pruritis/Rash n    Tolerating Diet y   Other       Could NOT obtain due to:      Objective:   VITALS:   Last 24hrs VS reviewed since prior progress note.  Most recent are:  Visit Vitals  BP (!) 109/57   Pulse 64   Temp 97.8 °F (36.6 °C)   Resp 18   Ht 6' 4\" (1.93 m)   Wt 128.3 kg (282 lb 13.6 oz)   SpO2 91%   BMI 34.43 kg/m²       Intake/Output Summary (Last 24 hours) at 2022 1010  Last data filed at 2022 0815  Gross per 24 hour   Intake 1519.9 ml   Output 1435 ml   Net 84.9 ml     PHYSICAL EXAM:  General: Pleasant elderly  male. NAD    HEENT: NC, Atraumatic. Anicteric sclerae. Bloody oral vesicles   Lungs:  CTA Bilaterally. No Wheezing/Rhonchi/Rales. Heart:  Regular  rhythm,  No murmur, No Rubs, No Gallops  Abdomen: Soft, Non distended, Non tender. +Bowel sounds, no HSM  Extremities: No c/c/e. Petechiae all over   Neurologic:  Alert and oriented X 3. No acute neurological distress   Psych:   Good insight. Not anxious nor agitated. Lab and Radiology Data Reviewed: (see below)    Medications Reviewed: (see below)  PMH/SH reviewed - no change compared to H&P  ________________________________________________________________________  Total time spent with patient: 15 minutes ________________________________________________________________________  Care Plan discussed with:  Patient x   Family     RN x              Consultant:       Marylen Nett, NP     Procedures: see electronic medical records for all procedures/Xrays and details which were not copied into this note but were reviewed prior to creation of Plan. LABS:  Recent Labs     06/29/22  0824 06/29/22 0512 06/28/22 2331 06/28/22 2331   WBC  --  11.3*  --  11.8*   HGB 6.6* 7.0*   < > 8.8*   HCT 19.5* 21.2*   < > 26.0*   PLT  --  5*  --  <3*    < > = values in this interval not displayed. Recent Labs     06/29/22  0512 06/28/22  2331 06/28/22  0446    138 140   K 4.0 4.2 4.4   * 111* 113*   CO2 18* 18* 19*   BUN 43* 41* 48*   CREA 1.26 1.41* 1.12   * 222* 128*   CA 8.3* 8.7 8.1*   MG 2.4 2.0  --    PHOS 2.8  --   --      Recent Labs     06/28/22  2331 06/27/22  0741   AP 47 61   TP 8.0 7.2   ALB 2.9* 3.2*   GLOB 5.1* 4.0     Recent Labs     06/28/22  2333 06/27/22  1004 06/27/22  0832   INR 1.1 1.1 1.1  1.1   PTP 11.3* 11.8* 11.4  11.8*   APTT 22.8  --  28      No results for input(s): FE, TIBC, PSAT, FERR in the last 72 hours.    Lab Results   Component Value Date/Time    Folate 15.6 06/27/2022 10:04 AM     No results for input(s): PH, PCO2, PO2 in the last 72 hours.   Recent Labs     06/28/22  2331        Lab Results   Component Value Date/Time    Color YELLOW/STRAW 10/28/2018 11:43 AM    Appearance CLEAR 10/28/2018 11:43 AM    Specific gravity 1.011 10/28/2018 11:43 AM    pH (UA) 5.5 10/28/2018 11:43 AM    Protein NEGATIVE  10/28/2018 11:43 AM    Glucose NEGATIVE  10/28/2018 11:43 AM    Ketone NEGATIVE  10/28/2018 11:43 AM    Bilirubin NEGATIVE  10/28/2018 11:43 AM    Urobilinogen 0.2 10/28/2018 11:43 AM    Nitrites NEGATIVE  10/28/2018 11:43 AM    Leukocyte Esterase NEGATIVE  10/28/2018 11:43 AM    Epithelial cells FEW 10/28/2018 11:43 AM    Bacteria NEGATIVE  10/28/2018 11:43 AM    WBC 0-4 10/28/2018 11:43 AM    RBC 0-5 10/28/2018 11:43 AM       MEDICATIONS:  Current Facility-Administered Medications   Medication Dose Route Frequency    glucose chewable tablet 16 g  4 Tablet Oral PRN    glucagon (GLUCAGEN) injection 1 mg  1 mg IntraMUSCular PRN    dextrose 10% infusion 0-250 mL  0-250 mL IntraVENous PRN    alcohol 62% (NOZIN) nasal  1 Ampule  1 Ampule Topical Q12H    insulin glargine (LANTUS) injection 40 Units  40 Units SubCUTAneous DAILY    insulin lispro (HUMALOG) injection   SubCUTAneous AC&HS    0.9% sodium chloride infusion 250 mL  250 mL IntraVENous PRN    0.9% sodium chloride infusion 250 mL  250 mL IntraVENous PRN    immune globulin 10% 5 g, immune globulin 10% (GAMUNEX-C) 80 g  85 g IntraVENous ONCE TITR    immune globulin 10% 5 g, immune globulin 10% (GAMUNEX-C) 40 g, immune globulin 10% (GAMUNEX-C) 40 g  85 g IntraVENous ONCE TITR    0.9% sodium chloride infusion 250 mL  250 mL IntraVENous PRN    dilTIAZem (CARDIZEM) injection 10 mg  10 mg IntraVENous ONCE    nitroglycerin (NITROSTAT) tablet 0.4 mg  0.4 mg SubLINGual PRN    amiodarone (CORDARONE) 375 mg/250 mL D5W infusion  0.5-1 mg/min IntraVENous TITRATE    diphenhydrAMINE (BENADRYL) 50 mg/mL injection  HYDROmorphone (DILAUDID) injection 0.5 mg  0.5 mg IntraVENous ONCE    0.9% sodium chloride infusion 250 mL  250 mL IntraVENous PRN    dexamethasone (DECADRON) 40 mg in 0.9% sodium chloride 50 mL IVPB  40 mg IntraVENous DAILY    atorvastatin (LIPITOR) tablet 20 mg  20 mg Oral QHS    vitamin A-A-O-lutein-minerals (OCUVITE) tablet 1 Tablet  1 Tablet Oral DAILY    pantoprazole (PROTONIX) 40 mg in 0.9% sodium chloride 10 mL injection  40 mg IntraVENous Q12H    sodium chloride (NS) flush 5-40 mL  5-40 mL IntraVENous Q8H    sodium chloride (NS) flush 5-40 mL  5-40 mL IntraVENous PRN    acetaminophen (TYLENOL) tablet 650 mg  650 mg Oral Q6H PRN    Or    acetaminophen (TYLENOL) suppository 650 mg  650 mg Rectal Q6H PRN    polyethylene glycol (MIRALAX) packet 17 g  17 g Oral DAILY PRN    ondansetron (ZOFRAN ODT) tablet 4 mg  4 mg Oral Q8H PRN    Or    ondansetron (ZOFRAN) injection 4 mg  4 mg IntraVENous Q6H PRN    0.9% sodium chloride infusion 250 mL  250 mL IntraVENous PRN

## 2022-06-30 LAB
ABO + RH BLD: NORMAL
ANION GAP SERPL CALC-SCNC: 7 MMOL/L (ref 5–15)
APTT PPP: 28 SEC (ref 24–33)
ATRIAL RATE: 84 BPM
BLD PROD TYP BPU: NORMAL
BLOOD GROUP ANTIBODIES SERPL: NORMAL
BPU ID: NORMAL
BUN SERPL-MCNC: 32 MG/DL (ref 6–20)
BUN/CREAT SERPL: 26 (ref 12–20)
CALCIUM SERPL-MCNC: 8.5 MG/DL (ref 8.5–10.1)
CALCULATED P AXIS, ECG09: 70 DEGREES
CALCULATED R AXIS, ECG10: -49 DEGREES
CALCULATED T AXIS, ECG11: 109 DEGREES
CHLORIDE SERPL-SCNC: 110 MMOL/L (ref 97–108)
CO2 SERPL-SCNC: 23 MMOL/L (ref 21–32)
CREAT SERPL-MCNC: 1.21 MG/DL (ref 0.7–1.3)
CROSSMATCH RESULT,%XM: NORMAL
D-DIMER, 115188: 0.78 MG/L FEU (ref 0–0.49)
DIAGNOSIS, 93000: NORMAL
ERYTHROCYTE [DISTWIDTH] IN BLOOD BY AUTOMATED COUNT: 15.6 % (ref 11.5–14.5)
ERYTHROCYTE [DISTWIDTH] IN BLOOD BY AUTOMATED COUNT: 16.2 % (ref 11.5–14.5)
FIBRINOGEN ACTIVITY, 001610: 406 MG/DL (ref 193–507)
FSP PPP LA-ACNC: <5 UG/ML
GLUCOSE BLD STRIP.AUTO-MCNC: 182 MG/DL (ref 65–117)
GLUCOSE BLD STRIP.AUTO-MCNC: 192 MG/DL (ref 65–117)
GLUCOSE BLD STRIP.AUTO-MCNC: 226 MG/DL (ref 65–117)
GLUCOSE BLD STRIP.AUTO-MCNC: 237 MG/DL (ref 65–117)
GLUCOSE SERPL-MCNC: 163 MG/DL (ref 65–100)
HCT VFR BLD AUTO: 21.8 % (ref 36.6–50.3)
HCT VFR BLD AUTO: 22.8 % (ref 36.6–50.3)
HGB BLD-MCNC: 7.4 G/DL (ref 12.1–17)
HGB BLD-MCNC: 7.6 G/DL (ref 12.1–17)
INR PPP: 1.1 (ref 0.9–1.2)
MAGNESIUM SERPL-MCNC: 2.4 MG/DL (ref 1.6–2.4)
MCH RBC QN AUTO: 31.6 PG (ref 26–34)
MCH RBC QN AUTO: 31.8 PG (ref 26–34)
MCHC RBC AUTO-ENTMCNC: 33.3 G/DL (ref 30–36.5)
MCHC RBC AUTO-ENTMCNC: 33.9 G/DL (ref 30–36.5)
MCV RBC AUTO: 93.2 FL (ref 80–99)
MCV RBC AUTO: 95.4 FL (ref 80–99)
MORPHOLOGY BLD-IMP: NORMAL
NRBC # BLD: 0.06 K/UL (ref 0–0.01)
NRBC # BLD: 0.15 K/UL (ref 0–0.01)
NRBC BLD-RTO: 0.5 PER 100 WBC
NRBC BLD-RTO: 1.2 PER 100 WBC
P-R INTERVAL, ECG05: 192 MS
PHOSPHATE SERPL-MCNC: 3.4 MG/DL (ref 2.6–4.7)
PLATELET # BLD AUTO: 48 K/UL (ref 150–400)
PLATELET # BLD AUTO: 67 K/UL (ref 150–400)
PLATELET # BLD AUTO: ABNORMAL X10E3/UL
PMV BLD AUTO: 11.8 FL (ref 8.9–12.9)
PMV BLD AUTO: 12 FL (ref 8.9–12.9)
POTASSIUM SERPL-SCNC: 3.9 MMOL/L (ref 3.5–5.1)
PROTHROMBIN TIME: 11.4 SEC (ref 9.1–12)
Q-T INTERVAL, ECG07: 432 MS
QRS DURATION, ECG06: 142 MS
QTC CALCULATION (BEZET), ECG08: 510 MS
RBC # BLD AUTO: 2.34 M/UL (ref 4.1–5.7)
RBC # BLD AUTO: 2.39 M/UL (ref 4.1–5.7)
SERVICE CMNT-IMP: ABNORMAL
SODIUM SERPL-SCNC: 140 MMOL/L (ref 136–145)
SPECIMEN EXP DATE BLD: NORMAL
STATUS OF UNIT,%ST: NORMAL
UNIT DIVISION, %UDIV: 0
VENTRICULAR RATE, ECG03: 84 BPM
WBC # BLD AUTO: 12.6 K/UL (ref 4.1–11.1)
WBC # BLD AUTO: 13.3 K/UL (ref 4.1–11.1)

## 2022-06-30 PROCEDURE — 74011250637 HC RX REV CODE- 250/637: Performed by: HOSPITALIST

## 2022-06-30 PROCEDURE — 99232 SBSQ HOSP IP/OBS MODERATE 35: CPT | Performed by: INTERNAL MEDICINE

## 2022-06-30 PROCEDURE — 83735 ASSAY OF MAGNESIUM: CPT

## 2022-06-30 PROCEDURE — 77010033678 HC OXYGEN DAILY

## 2022-06-30 PROCEDURE — 80048 BASIC METABOLIC PNL TOTAL CA: CPT

## 2022-06-30 PROCEDURE — 74011000258 HC RX REV CODE- 258: Performed by: NURSE PRACTITIONER

## 2022-06-30 PROCEDURE — 74011250637 HC RX REV CODE- 250/637: Performed by: INTERNAL MEDICINE

## 2022-06-30 PROCEDURE — 74011000250 HC RX REV CODE- 250: Performed by: HOSPITALIST

## 2022-06-30 PROCEDURE — 36415 COLL VENOUS BLD VENIPUNCTURE: CPT

## 2022-06-30 PROCEDURE — 85027 COMPLETE CBC AUTOMATED: CPT

## 2022-06-30 PROCEDURE — 74011250636 HC RX REV CODE- 250/636: Performed by: HOSPITALIST

## 2022-06-30 PROCEDURE — 84100 ASSAY OF PHOSPHORUS: CPT

## 2022-06-30 PROCEDURE — 82962 GLUCOSE BLOOD TEST: CPT

## 2022-06-30 PROCEDURE — 74011250636 HC RX REV CODE- 250/636: Performed by: NURSE PRACTITIONER

## 2022-06-30 PROCEDURE — C9113 INJ PANTOPRAZOLE SODIUM, VIA: HCPCS | Performed by: HOSPITALIST

## 2022-06-30 PROCEDURE — 65610000006 HC RM INTENSIVE CARE

## 2022-06-30 PROCEDURE — 74011636637 HC RX REV CODE- 636/637: Performed by: INTERNAL MEDICINE

## 2022-06-30 RX ORDER — AMIODARONE HYDROCHLORIDE 200 MG/1
400 TABLET ORAL 3 TIMES DAILY
Status: DISCONTINUED | OUTPATIENT
Start: 2022-06-30 | End: 2022-07-01

## 2022-06-30 RX ORDER — CALCIUM CARBONATE 200(500)MG
400 TABLET,CHEWABLE ORAL
Status: DISCONTINUED | OUTPATIENT
Start: 2022-07-01 | End: 2022-06-30

## 2022-06-30 RX ORDER — PANTOPRAZOLE SODIUM 40 MG/1
40 TABLET, DELAYED RELEASE ORAL EVERY 12 HOURS
Status: DISCONTINUED | OUTPATIENT
Start: 2022-06-30 | End: 2022-07-01

## 2022-06-30 RX ORDER — CALCIUM CARBONATE 200(500)MG
400 TABLET,CHEWABLE ORAL
Status: DISCONTINUED | OUTPATIENT
Start: 2022-06-30 | End: 2022-07-25 | Stop reason: HOSPADM

## 2022-06-30 RX ORDER — AMIODARONE HYDROCHLORIDE 200 MG/1
200 TABLET ORAL DAILY
Status: DISCONTINUED | OUTPATIENT
Start: 2022-07-06 | End: 2022-07-01

## 2022-06-30 RX ADMIN — AMIODARONE HYDROCHLORIDE 400 MG: 200 TABLET ORAL at 11:11

## 2022-06-30 RX ADMIN — Medication 3 UNITS: at 16:18

## 2022-06-30 RX ADMIN — AMIODARONE HYDROCHLORIDE 400 MG: 200 TABLET ORAL at 16:18

## 2022-06-30 RX ADMIN — ATORVASTATIN CALCIUM 20 MG: 20 TABLET, FILM COATED ORAL at 22:14

## 2022-06-30 RX ADMIN — SODIUM CHLORIDE, PRESERVATIVE FREE 10 ML: 5 INJECTION INTRAVENOUS at 16:18

## 2022-06-30 RX ADMIN — Medication 2 UNITS: at 22:17

## 2022-06-30 RX ADMIN — INSULIN GLARGINE 40 UNITS: 100 INJECTION, SOLUTION SUBCUTANEOUS at 09:12

## 2022-06-30 RX ADMIN — PANTOPRAZOLE SODIUM 40 MG: 40 TABLET, DELAYED RELEASE ORAL at 22:14

## 2022-06-30 RX ADMIN — Medication 1 AMPULE: at 22:13

## 2022-06-30 RX ADMIN — AMIODARONE HYDROCHLORIDE 0.5 MG/MIN: 50 INJECTION, SOLUTION INTRAVENOUS at 08:24

## 2022-06-30 RX ADMIN — Medication 4 UNITS: at 11:11

## 2022-06-30 RX ADMIN — SODIUM CHLORIDE, PRESERVATIVE FREE 40 MG: 5 INJECTION INTRAVENOUS at 09:12

## 2022-06-30 RX ADMIN — AMIODARONE HYDROCHLORIDE 400 MG: 200 TABLET ORAL at 22:14

## 2022-06-30 RX ADMIN — I-VITE, TAB 1000-60-2MG (60/BT) 1 TABLET: TAB at 09:12

## 2022-06-30 RX ADMIN — Medication 1 AMPULE: at 09:11

## 2022-06-30 RX ADMIN — SODIUM CHLORIDE, PRESERVATIVE FREE 10 ML: 5 INJECTION INTRAVENOUS at 22:14

## 2022-06-30 RX ADMIN — Medication 3 UNITS: at 09:12

## 2022-06-30 RX ADMIN — DEXAMETHASONE SODIUM PHOSPHATE 40 MG: 4 INJECTION, SOLUTION INTRAMUSCULAR; INTRAVENOUS at 09:16

## 2022-06-30 RX ADMIN — SODIUM CHLORIDE, PRESERVATIVE FREE 30 ML: 5 INJECTION INTRAVENOUS at 06:06

## 2022-06-30 NOTE — PROGRESS NOTES
Transition of Care Plan:    RUR:14%  Disposition:Return to [facility] with possible snf  Follow up appointments: To be done prior to discharge if going home  DME needed:None  Transportation at Discharge: [address] or means to access home:  Wife has keys       Medicare Letter: To be given prior to discharge  Is patient a BCPI-A Bundle:  No         If yes, was Bundle Letter given?:  N/A  Is patient a  and connected with the [facility]? No              If yes, was [employer] transfer form completed and [facility] notified? N/A   Caregiver Contact:Wife  Discharge Caregiver contacted prior to discharge? Caregiver to be contacted prior to discharge  Care Conference needed?: Not at this time      Patient continues to be monitored in the ccu. Lives at independent living at [facility]. Not medically stable for discharge at this time. Will continue to follow. Loyda Loja RN BSN CRM        [phone]

## 2022-06-30 NOTE — PROGRESS NOTES
1 - Report received from Vimal Camp RN.    2000 - Assessment completed. Patient alert and oriented x 4. On 2L nasal cannula. Normal sinus rhythm on amio drip at 0.5 in 20g left AC. IGG infusion currently infusing at 30 ml/hr in 20g right AC. Skin has generalized petechiae. Using urinal at bedside with clear yellow urine. Patient's wife and son at bedside but reports they will be leaving now. 2100 - New 22g IV in left hand started. 2131 - One unit of PRBC started. 2245 - Patient complains of diaphoresis and jaw pain similar to what happened last night during IGG infusion prior to admission to CCU. Paused the IGG infusion and notified [place] NP. Per [place] NP to give 50mg of benadryl then wait 30 minutes to restart IGG infusion. 2314 - Benadryl 50mg given per order. 2330 - Patient reports diaphoresis and jaw pain have subsided. 0000 - Reassessment completed. No changes noted. 0008 - Unit of PRBC completed. 0010 - Restarted IGG infusion at 30 ml/hr. 0100 - Patient reports diaphoresis, increased coughing, burning eyes, and general discomfort. Stopped IGG infusion and notified [place] NP.    2436 - Patient much more comfortable and reports symptoms have subsided. 0400 - Reassessment completed. No changes noted.

## 2022-06-30 NOTE — PROGRESS NOTES
GI PROGRESS NOTE  Patsy Gavin NP  [phone] NP in-hospital cell phone M-F until 4:30  After 5pm or on weekends, please call  for physician on call    NAME:Carlos Arthur :1945 HGJ:[id number]   ATTG: Dr. Yusra Zarco  PCP: Casey Jeter MD  Date/Time:  2022 10:10 AM   Primary GI: Dr. Thien Aldrihc  Reason for following: Rectal bleeding    Assessment:     Rectal bleeding   Acute on chronic anemia   Severe thrombocytopenia, ? ITP secondary to bactrim   · Hgb 7.4; was 12.9 on 22  · PLT 48; improved from <3 on admission  · 2022:  CT with no acute GI process   · FOBT +     Hx of PE  · Eliquis on hold      Plan:     · Patient will likely need endoscopic procedures during this admission d/t rectal bleeding and anemia. Unable to complete at this time secondary to severe thrombocytopenia. Will continue to follow for timing. · Serial H&H; goal for hgb >7.0  · Monitor for s/s of bleeding; transfuse as clinically indicated  · Agree with platelet transfusion   · Appreciate hematology input   · Diet as tolerated   · Continue to hold Eliquis   · Continue PPI   · Symptomatic care per primary team   *Plan of care discussed with Dr. Thien Aldrich      Subjective:   Discussed with RN events overnight. Patient resting in bed. Wife at bedside. No BM this AM.  Reports coughing up bloody sputum. Review of Systems:  Symptom Y/N Comments  Symptom Y/N Comments   Fever/Chills n   Chest Pain n    Cough n   Headaches n    Sputum n   Joint Pain n    SOB/SHEARER n   Pruritis/Rash n    Tolerating Diet y   Other       Could NOT obtain due to:      Objective:   VITALS:   Last 24hrs VS reviewed since prior progress note.  Most recent are:  Visit Vitals  /65   Pulse 62   Temp 97.7 °F (36.5 °C)   Resp 19   Ht 6' 4\" (1.93 m)   Wt 128.3 kg (282 lb 13.6 oz)   SpO2 94%   BMI 34.43 kg/m²       Intake/Output Summary (Last 24 hours) at 2022 1045  Last data filed at 2022 0600  Gross per 24 hour Intake 2166.9 ml   Output 2425 ml   Net -258.1 ml     PHYSICAL EXAM:  General: Pleasant elderly  male. NAD    HEENT: NC, Atraumatic. Anicteric sclerae. Bloody oral vesicles   Lungs:  CTA Bilaterally. No Wheezing/Rhonchi/Rales. Heart:  Regular  rhythm,  No murmur, No Rubs, No Gallops  Abdomen: Soft, Non distended, Non tender. +Bowel sounds, no HSM  Extremities: No c/c/e. Petechiae all over   Neurologic:  Alert and oriented X 3. No acute neurological distress   Psych:   Good insight. Not anxious nor agitated. Lab and Radiology Data Reviewed: (see below)    Medications Reviewed: (see below)  PMH/SH reviewed - no change compared to H&P  ________________________________________________________________________  Total time spent with patient: 15 minutes ________________________________________________________________________  Care Plan discussed with:  Patient x   Family  Wife    RN x              Consultant:       Lang Tena NP     Procedures: see electronic medical records for all procedures/Xrays and details which were not copied into this note but were reviewed prior to creation of Plan. LABS:  Recent Labs     06/30/22  0351 06/29/22  0824 06/29/22  0512 06/29/22  0512   WBC 13.3*  --   --  11.3*   HGB 7.4* 6.6*   < > 7.0*   HCT 21.8* 19.5*   < > 21.2*   PLT 48*  --   --  5*    < > = values in this interval not displayed. Recent Labs     06/30/22  0351 06/29/22  0512 06/28/22  2331    138 138   K 3.9 4.0 4.2   * 111* 111*   CO2 23 18* 18*   BUN 32* 43* 41*   CREA 1.21 1.26 1.41*   * 231* 222*   CA 8.5 8.3* 8.7   MG 2.4 2.4 2.0   PHOS 3.4 2.8  --      Recent Labs     06/28/22  2331   AP 47   TP 8.0   ALB 2.9*   GLOB 5.1*     Recent Labs     06/28/22 2333   INR 1.1   PTP 11.3*   APTT 22.8      No results for input(s): FE, TIBC, PSAT, FERR in the last 72 hours.    Lab Results   Component Value Date/Time    Folate 15.6 06/27/2022 10:04 AM     No results for input(s): PH, PCO2, PO2 in the last 72 hours.   Recent Labs     06/28/22  2331        Lab Results   Component Value Date/Time    Color YELLOW/STRAW 06/29/2022 02:12 PM    Appearance CLEAR 06/29/2022 02:12 PM    Specific gravity 1.010 06/29/2022 02:12 PM    Specific gravity 1.011 10/28/2018 11:43 AM    pH (UA) 5.0 06/29/2022 02:12 PM    Protein TRACE (A) 06/29/2022 02:12 PM    Glucose Negative 06/29/2022 02:12 PM    Ketone Negative 06/29/2022 02:12 PM    Bilirubin Negative 06/29/2022 02:12 PM    Urobilinogen 0.2 06/29/2022 02:12 PM    Nitrites Negative 06/29/2022 02:12 PM    Leukocyte Esterase Negative 06/29/2022 02:12 PM    Epithelial cells FEW 06/29/2022 02:12 PM    Bacteria Negative 06/29/2022 02:12 PM    WBC 0-4 06/29/2022 02:12 PM    RBC 0-5 06/29/2022 02:12 PM       MEDICATIONS:  Current Facility-Administered Medications   Medication Dose Route Frequency    amiodarone (CORDARONE) tablet 400 mg  400 mg Oral TID    Followed by   Jordana Diaz ON 7/6/2022] amiodarone (CORDARONE) tablet 200 mg  200 mg Oral DAILY    glucose chewable tablet 16 g  4 Tablet Oral PRN    glucagon (GLUCAGEN) injection 1 mg  1 mg IntraMUSCular PRN    dextrose 10% infusion 0-250 mL  0-250 mL IntraVENous PRN    alcohol 62% (NOZIN) nasal  1 Ampule  1 Ampule Topical Q12H    insulin lispro (HUMALOG) injection   SubCUTAneous AC&HS    nitroglycerin (NITROSTAT) tablet 0.4 mg  0.4 mg SubLINGual PRN    dexamethasone (DECADRON) 40 mg in 0.9% sodium chloride 50 mL IVPB  40 mg IntraVENous DAILY    atorvastatin (LIPITOR) tablet 20 mg  20 mg Oral QHS    vitamin U-O-I-lutein-minerals (OCUVITE) tablet 1 Tablet  1 Tablet Oral DAILY    pantoprazole (PROTONIX) 40 mg in 0.9% sodium chloride 10 mL injection  40 mg IntraVENous Q12H    sodium chloride (NS) flush 5-40 mL  5-40 mL IntraVENous Q8H    sodium chloride (NS) flush 5-40 mL  5-40 mL IntraVENous PRN    acetaminophen (TYLENOL) tablet 650 mg  650 mg Oral Q6H PRN    Or    acetaminophen (TYLENOL) suppository 650 mg  650 mg Rectal Q6H PRN    polyethylene glycol (MIRALAX) packet 17 g  17 g Oral DAILY PRN    ondansetron (ZOFRAN ODT) tablet 4 mg  4 mg Oral Q8H PRN    Or    ondansetron (ZOFRAN) injection 4 mg  4 mg IntraVENous Q6H PRN

## 2022-06-30 NOTE — PROGRESS NOTES
[address], [address] [facility] Cristobal Rodriguez, [address]  [phone]      Hematology/ Oncology Progress Note      Reason for consult:     Valdez Hurley is a 76 y.o. male who we have been asked to see by Dr. Priya Patel for acute thrombocytopenia. Subjective:     Valdez Hurley is a 76year old male who presented to the ED at [facility] for fatigue, scattered petechiae to all extremities, purpura, hematuria and rectal bleeding. He has a PMH of several DVT and PE since 2008 and has been on eliquis OP. He also has a PMH of CAD with PCI, diabetes type II, obesity and bilateral hip repair. He was recently started on bactrim OP for a UTI. Patient seen at bedside today in the ED with wife. Discussed potential causes of acute severe thrombocytopenia, work up and treatment. He stated that petechiae appeared over last 24 hours after a hot shower. Interval History:     6/28/2022 Patient seen at bedside, discussed another transfusion of platelets. 6/29/2022 Patient seen at bedside in ICU, transferred overnight with CP and afib. Reports he feels better. 6/30/2022 Patient seen at bedside, feeling better today. Happy to hear his platelets are improving. Did not tolerate IVIG infusion last night despite pre-medications. Review of Systems:  Pertinent items are noted in the History of Present Illness. Past Medical History:   Diagnosis Date    Arthritis     bilateral hips/knees    CAD (coronary artery disease) 6/7/13    PCI    Chronic pain     DJD; SILVIO KNEES    Diabetes ([address]     \"BORDERLINE\" PER MD    GERD (gastroesophageal reflux disease)     Macular degeneration     Morbid obesity ([address]     Other and unspecified symptoms and signs involving general sensations and perceptions     Bilat. legs \"give out\" intermittently; Bilat leg cramps; macular degenration (left).     Other ill-defined conditions(799.89)     HYPERLIPIDEMIA; LT LE DVT (); MORBID OBESITY    Pulmonary embolism ([facility] 2021    Thromboembolus ([facility]     left LE     Past Surgical History:   Procedure Laterality Date    NE CARDIAC SURG PROCEDURE UNLIST      STENTS TO RCA    NE TOTAL HIP ARTHROPLASTY  2007    right    NE TOTAL HIP ARTHROPLASTY  2007    left      Family History   Problem Relation Age of Onset    Heart Disease Mother     Arrhythmia Mother     Heart Disease Father     Cancer Sister     Lung Disease Sister      Social History     Tobacco Use    Smoking status: Former Smoker     Packs/day: 1.00     Quit date: 1983     Years since quittin.4    Smokeless tobacco: Never Used   Substance Use Topics    Alcohol use:  Yes     Alcohol/week: 2.5 standard drinks     Types: 2 Glasses of wine, 1 Cans of beer per week     Comment: 0-2 X PER WK WITH MEALS OR AFTER PLAYING GOLF      Current Facility-Administered Medications   Medication Dose Route Frequency Provider Last Rate Last Admin    amiodarone (CORDARONE) tablet 400 mg  400 mg Oral TID Remy Andres MD   400 mg at 22 1111    Followed by   Jessica Acosta ON 2022] amiodarone (CORDARONE) tablet 200 mg  200 mg Oral DAILY Remy Andres MD        glucose chewable tablet 16 g  4 Tablet Oral PRN Shivani Montiel NP        glucagon (GLUCAGEN) injection 1 mg  1 mg IntraMUSCular PRN Shivani Montiel NP        dextrose 10% infusion 0-250 mL  0-250 mL IntraVENous PRN Shivani Montiel NP        alcohol 62% (NOZIN) nasal  1 Ampule  1 Ampule Topical Q12H Bo Garcia MD   1 Ampule at 22 0911    insulin lispro (HUMALOG) injection   SubCUTAneous AC&HS Bo Garcia MD   4 Units at 22 1111    nitroglycerin (NITROSTAT) tablet 0.4 mg  0.4 mg SubLINGual PRN Nikko Augustin NP   0.4 mg at 22 232    dexamethasone (DECADRON) 40 mg in 0.9% sodium chloride 50 mL IVPB  40 mg IntraVENous DAILY Kaylen VINSON NP   40 mg at 06/30/22 0916    atorvastatin (LIPITOR) tablet 20 mg  20 mg Oral QHS Anita Leon MD   20 mg at 06/29/22 2246    vitamin H-T-V-lutein-minerals (OCUVITE) tablet 1 Tablet  1 Tablet Oral DAILY Anita Leon MD   1 Tablet at 06/30/22 0912    pantoprazole (PROTONIX) 40 mg in 0.9% sodium chloride 10 mL injection  40 mg IntraVENous Q12H Anita Leon MD   40 mg at 06/30/22 0912    sodium chloride (NS) flush 5-40 mL  5-40 mL IntraVENous Q8H Anita Leon MD   30 mL at 06/30/22 0606    sodium chloride (NS) flush 5-40 mL  5-40 mL IntraVENous PRN Anita Leon MD        acetaminophen (TYLENOL) tablet 650 mg  650 mg Oral Q6H PRN Anita Leon MD   650 mg at 06/29/22 2228    Or    acetaminophen (TYLENOL) suppository 650 mg  650 mg Rectal Q6H PRN Anita Leon MD        polyethylene glycol [facility]) packet 17 g  17 g Oral DAILY PRN Anita Leon MD        ondansetron (ZOFRAN ODT) tablet 4 mg  4 mg Oral Q8H PRN Anita Leon MD        Or    [facility]ron [facility] injection 4 mg  4 mg IntraVENous Q6H PRN Anita Leon MD            Allergies   Allergen Reactions    Adhesive Other (comments)     Blisters     Advil [Ibuprofen] Rash     Rash all over, itching, trouble breathing     Celebrex [Celecoxib] Other (comments)     Swelling in legs, no rash, no itching, no trouble breathing    Cleocin [Clindamycin Hcl] Rash    Other Medication Unknown (comments)     \"Phosphate\"          Objective:     Patient Vitals for the past 8 hrs:   BP Temp Pulse Resp SpO2   06/30/22 1100 131/63 98.1 °F (36.7 °C) 66 13 92 %   06/30/22 1000 (!) 145/70 -- 69 24 96 %   06/30/22 0900 139/61 -- 67 21 95 %   06/30/22 0800 134/65 -- 62 19 94 %   06/30/22 0700 139/61 97.7 °F (36.5 °C) 69 24 92 %   06/30/22 0600 129/63 -- 62 17 91 %       Lab Results   Component Value Date/Time    WBC 13.3 (H) 06/30/2022 03:51 AM    HGB 7.4 (L) 06/30/2022 03:51 AM    HCT 21.8 (L) 06/30/2022 03:51 AM    PLATELET 48 (LL) 48/16/1934 03:51 AM    MCV 93.2 06/30/2022 03:51 AM       Physical Exam:   Visit Vitals  /63 (BP 1 Location: Right upper arm, BP Patient Position: At rest)   Pulse 66   Temp 98.1 °F (36.7 °C)   Resp 13   Ht 6' 4\" (1.93 m)   Wt 282 lb 13.6 oz (128.3 kg)   SpO2 92%   BMI 34.43 kg/m²     General appearance: alert, cooperative, no distress, appears stated age, moderately obese  Head: Normocephalic, without obvious abnormality, atraumatic  Throat: abnormal findings: multiple purpura   Abdomen: soft, non-tender.  Bowel sounds normal. No masses,  no organomegaly  Extremities: extremities normal, atraumatic, no cyanosis or edema  Skin: petechiae - scattered, to all extremities   Neurologic: Grossly normal    Assessment:     Severe Thrombocytopenia  Sudden onset of scattered petechiae to all extremities and purpura, also melena to stool   Platelets found to be <3 in ED   Recently started bactrim for UTI, stated has taken before with no issues   On Eliquis PTA for DVT/PE, last DVT in 8/2021    GI Bleed   Melena in ED, occult stool positive   Receiving IV Protonix   Appreciate GI evaluation     History of DVT and PE   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 car trips x2  PE of unknown origin   DVT after hip replacement   Last DVT 8/2021 - BLE dopplers confirm resolution   Patient reports his in immobile much of the time d/t debility   Has not had hypercoagulable work up     Acute Kidney Injury - improving   Managed by primary team     Atrial Fibrillation   Episode of A fib   Transferred to ICU and started on amiodarone drip  Now in NSR   Evaluation by Cardiology       Plan:     > Suspect severe thrombocytopenia r/t ITP  > Platelets have improved today, follow CBC closely    > Continue Dexamethasone 40 mg daily x 4 days - Day 4  > Received 3 doses IVIG, did not tolerate final IVIG infusion despite pre-medications and slowed rate, was stopped early   > Follow hgb with GIB, would transfuse to keep hgb > 7   > Appreciate GI input - cannot pursue testing until patient is hemodynamically stable   > Agree with holding eliquis and ASA for now, anticoagulation will be difficult in the future with ITP given patient has history of several provoked DVTs r/t surgery and immobility. Patient's current baseline is somewhat of a poor functional status with chronic immobility. Thank you for allowing us to participate in the care of Mr. Javy Moore, will continue to follow closely.      Navya Lewis NP

## 2022-06-30 NOTE — INTERDISCIPLINARY ROUNDS
Interdisciplinary team rounds were held 6/30/2022 with the following team members:Care Management, Diabetes Treatment Specialist, Nursing, Nutrition, Pharmacy, Physician. Plan of care discussed. See clinical pathway and/or care plan for interventions and desired outcomes. Goals of the Day:  Consults noted, will continue to monitor.

## 2022-06-30 NOTE — PROGRESS NOTES
Physician Progress Note      PATIENT:               Linda Garcia  CSN #:                  [id number]  :                       1945  ADMIT DATE:       2022 7:15 AM  DISCH DATE:  RESPONDING  PROVIDER #:        ANIBAL CABRERA MD          QUERY TEXT:    Pt admitted with GI Bleed, Melena in ED, occult stool positive, and has anemia documented. Please document in progress notes and discharge summary further specificity regarding the acuity and type of anemia:    The medical record reflects the following:    Risk Factors: 75 yo male on Eliquis PTA for DVT/PE, last DVT in 2021    Clinical Indicators:  22  Stool test occult blood positive  HGB 12.9 - 7.8 - 8.8 - 7.0 - 6.6 - 7.4  Platelets found to be <3 in ED ->transfused in ED    ED PN:  Patient reports blood in his urine for the past week and dark red blood and clots in stool, as well a spitting up blood with blood on pillow.  Hem/Onc: Suspect severe thrombocytopenia r/t ITP    Treatment: CBC monitoring, holding Eliquis and ASA for now, transfusion of platelet and PRBC, Dexamethasone, Received 3 doses IVIG, GI and Hematology consult ,  PPI    [facility], [address] [place] [address]  Options provided:  -- Anemia due to acute blood loss  -- Anemia due to chronic blood loss  -- Anemia due to acute on chronic blood loss  -- Anemia due to CKD  -- Other - I will add my own diagnosis  -- Disagree - Not applicable / Not valid  -- Disagree - Clinically unable to determine / Unknown  -- Refer to Clinical Documentation Reviewer    PROVIDER RESPONSE TEXT:    This patient has acute blood loss anemia.     Query created by: Lonnie Quinn on 2022 4:05 PM      Electronically signed by:  Ksenia Cole MD 2022 7:07 PM

## 2022-06-30 NOTE — PROGRESS NOTES
Critical Care Progress Note  Geronimo Main MD          Date of Service:  2022  NAME:  Trisha Gallo  :  1945  MRN:  [id number]      Subjective/Hospital course:      : Patient converted to SR yesterday. : Patient reports feeling better. No acute events overnight. Problem list:     Problem list:  Hospital Problems  Date Reviewed: 2021          Codes Class Noted POA    Thrombocytopenia ([address] ICD-10-CM: D69.6  ICD-9-CM: 287.5  2022 Unknown        GI bleed ICD-10-CM: K92.2  ICD-9-CM: 578.9  2022 Unknown        UGO (acute kidney injury) [facility]) ICD-10-CM: N17.9  ICD-9-CM: 584.9  2022 Unknown              Assessment/Plan:     Atrial fibrillation with RVR complicated by acute chest pain: Possibly due to angina in the setting of RVR - no improved. - Continue amio  - Cardiology consulted  - Continue tele monitoring in ICU     Severe acute thrombocytopenia c/b diffuse petechia: Likely ITP due to bactrim  - Continue transfusion of platelets as per hematology  - Holding eliquis and aspirin  - Hematology following, appreciate recs    CAD: Status post 3 stents  - Cardiology following, appreciate recs     UGO vs UGO on CKD: Historical GFR as low as 46, but appears to typically be >60. Unable to estimate degree of CKD at this time. - Trend BMP     GI bleed: GI managing conservatively. No melena noted at this time  - Continue PPI     Hx DVT and PE: Last known 2021. Was recommended to be on life-long anticoagulation. Held at this time due to thrombocytopenia. - CTA now is neg for PE. Will ask heme onc on future plan for [facility] in light of severe ITP.      Code status: Full code. DVT prophylaxis: SCD. I personally spent 40 minutes of critical care time. This is time spent at this critically ill patient's bedside actively involved in patient care as well as the coordination of care and discussions with the patient's family.   This does not include any procedural time which has been billed separately. Review of Systems:   Pertinent items are noted in HPI. Vital Signs:     Patient Vitals for the past 4 hrs:   BP Temp Pulse Resp SpO2   06/30/22 0800 134/65 -- 62 19 94 %   06/30/22 0700 139/61 97.7 °F (36.5 °C) 69 24 92 %   06/30/22 0600 129/63 -- 62 17 91 %        Intake/Output Summary (Last 24 hours) at 6/30/2022 9078  Last data filed at 6/30/2022 0600  Gross per 24 hour   Intake 2186.9 ml   Output 2525 ml   Net -338.1 ml        Physical Examination:    Physical Exam  Constitutional:       Appearance: Normal appearance. HENT:      Head: Normocephalic and atraumatic. Mouth/Throat:      Mouth: Mucous membranes are moist.   Eyes:      Extraocular Movements: Extraocular movements intact. Conjunctiva/sclera: Conjunctivae normal.   Cardiovascular:      Rate and Rhythm: Normal rate and regular rhythm. Pulmonary:      Effort: Pulmonary effort is normal. No respiratory distress. Breath sounds: Normal breath sounds. Abdominal:      General: Abdomen is flat. There is no distension. Palpations: Abdomen is soft. Tenderness: There is no abdominal tenderness. There is no guarding. Musculoskeletal:      Cervical back: Normal range of motion and neck supple. Neurological:      Mental Status: He is alert and oriented to person, place, and time. Labs:   Labs and imaging reviewed.       Medications:     Current Facility-Administered Medications   Medication Dose Route Frequency    glucose chewable tablet 16 g  4 Tablet Oral PRN    glucagon (GLUCAGEN) injection 1 mg  1 mg IntraMUSCular PRN    dextrose 10% infusion 0-250 mL  0-250 mL IntraVENous PRN    alcohol 62% (NOZIN) nasal  1 Ampule  1 Ampule Topical Q12H    insulin glargine (LANTUS) injection 40 Units  40 Units SubCUTAneous DAILY    insulin lispro (HUMALOG) injection   SubCUTAneous AC&HS    0.9% sodium chloride infusion 250 mL  250 mL IntraVENous PRN    0.9% sodium chloride infusion 250 mL  250 mL IntraVENous PRN    0.9% sodium chloride infusion 250 mL  250 mL IntraVENous PRN    0.9% sodium chloride infusion 250 mL  250 mL IntraVENous PRN    0.9% sodium chloride infusion 250 mL  250 mL IntraVENous PRN    nitroglycerin (NITROSTAT) tablet 0.4 mg  0.4 mg SubLINGual PRN    amiodarone (CORDARONE) 375 mg/250 mL D5W infusion  0.5-1 mg/min IntraVENous TITRATE    0.9% sodium chloride infusion 250 mL  250 mL IntraVENous PRN    dexamethasone (DECADRON) 40 mg in 0.9% sodium chloride 50 mL IVPB  40 mg IntraVENous DAILY    atorvastatin (LIPITOR) tablet 20 mg  20 mg Oral QHS    vitamin Y-J-B-lutein-minerals (OCUVITE) tablet 1 Tablet  1 Tablet Oral DAILY    pantoprazole (PROTONIX) 40 mg in 0.9% sodium chloride 10 mL injection  40 mg IntraVENous Q12H    sodium chloride (NS) flush 5-40 mL  5-40 mL IntraVENous Q8H    sodium chloride (NS) flush 5-40 mL  5-40 mL IntraVENous PRN    acetaminophen (TYLENOL) tablet 650 mg  650 mg Oral Q6H PRN    Or    acetaminophen (TYLENOL) suppository 650 mg  650 mg Rectal Q6H PRN    polyethylene glycol (MIRALAX) packet 17 g  17 g Oral DAILY PRN    ondansetron (ZOFRAN ODT) tablet 4 mg  4 mg Oral Q8H PRN    Or    ondansetron (ZOFRAN) injection 4 mg  4 mg IntraVENous Q6H PRN    0.9% sodium chloride infusion 250 mL  250 mL IntraVENous PRN     ______________________________________________________________________  EXPECTED LENGTH OF STAY: 3d 14h  ACTUAL LENGTH OF STAY:          MD Lucía   Pulmonary/[facility]  [address]  [phone]  6/30/2022

## 2022-07-01 ENCOUNTER — APPOINTMENT (OUTPATIENT)
Dept: CT IMAGING | Age: 77
DRG: 981 | End: 2022-07-01
Attending: NURSE PRACTITIONER
Payer: MEDICARE

## 2022-07-01 ENCOUNTER — APPOINTMENT (OUTPATIENT)
Dept: GENERAL RADIOLOGY | Age: 77
DRG: 981 | End: 2022-07-01
Attending: HOSPITALIST
Payer: MEDICARE

## 2022-07-01 LAB
ANION GAP SERPL CALC-SCNC: 9 MMOL/L (ref 5–15)
BACTERIA SPEC CULT: NORMAL
BUN SERPL-MCNC: 30 MG/DL (ref 6–20)
BUN/CREAT SERPL: 23 (ref 12–20)
CALCIUM SERPL-MCNC: 8.6 MG/DL (ref 8.5–10.1)
CHLORIDE SERPL-SCNC: 109 MMOL/L (ref 97–108)
CO2 SERPL-SCNC: 19 MMOL/L (ref 21–32)
CREAT SERPL-MCNC: 1.32 MG/DL (ref 0.7–1.3)
ERYTHROCYTE [DISTWIDTH] IN BLOOD BY AUTOMATED COUNT: 15.9 % (ref 11.5–14.5)
ERYTHROCYTE [DISTWIDTH] IN BLOOD BY AUTOMATED COUNT: 16 % (ref 11.5–14.5)
GLUCOSE BLD STRIP.AUTO-MCNC: 160 MG/DL (ref 65–117)
GLUCOSE BLD STRIP.AUTO-MCNC: 184 MG/DL (ref 65–117)
GLUCOSE BLD STRIP.AUTO-MCNC: 195 MG/DL (ref 65–117)
GLUCOSE SERPL-MCNC: 210 MG/DL (ref 65–100)
GRAM STN SPEC: NORMAL
HCT VFR BLD AUTO: 23.5 % (ref 36.6–50.3)
HCT VFR BLD AUTO: 24.4 % (ref 36.6–50.3)
HCT VFR BLD AUTO: 24.8 % (ref 36.6–50.3)
HGB BLD-MCNC: 8 G/DL (ref 12.1–17)
HGB BLD-MCNC: 8.2 G/DL (ref 12.1–17)
HGB BLD-MCNC: 8.4 G/DL (ref 12.1–17)
MAGNESIUM SERPL-MCNC: 2.3 MG/DL (ref 1.6–2.4)
MCH RBC QN AUTO: 32.1 PG (ref 26–34)
MCH RBC QN AUTO: 32.6 PG (ref 26–34)
MCHC RBC AUTO-ENTMCNC: 33.9 G/DL (ref 30–36.5)
MCHC RBC AUTO-ENTMCNC: 34 G/DL (ref 30–36.5)
MCV RBC AUTO: 94.4 FL (ref 80–99)
MCV RBC AUTO: 96.1 FL (ref 80–99)
NRBC # BLD: 0.2 K/UL (ref 0–0.01)
NRBC # BLD: 0.2 K/UL (ref 0–0.01)
NRBC BLD-RTO: 1.4 PER 100 WBC
NRBC BLD-RTO: 1.5 PER 100 WBC
PHOSPHATE SERPL-MCNC: 3.6 MG/DL (ref 2.6–4.7)
PLATELET # BLD AUTO: 86 K/UL (ref 150–400)
PLATELET # BLD AUTO: 97 K/UL (ref 150–400)
PMV BLD AUTO: 11.4 FL (ref 8.9–12.9)
PMV BLD AUTO: 11.6 FL (ref 8.9–12.9)
POTASSIUM SERPL-SCNC: 4.2 MMOL/L (ref 3.5–5.1)
RBC # BLD AUTO: 2.49 M/UL (ref 4.1–5.7)
RBC # BLD AUTO: 2.58 M/UL (ref 4.1–5.7)
SERVICE CMNT-IMP: ABNORMAL
SERVICE CMNT-IMP: NORMAL
SODIUM SERPL-SCNC: 137 MMOL/L (ref 136–145)
TROPONIN-HIGH SENSITIVITY: 1897 NG/L (ref 0–76)
TROPONIN-HIGH SENSITIVITY: 2059 NG/L (ref 0–76)
TROPONIN-HIGH SENSITIVITY: 2306 NG/L (ref 0–76)
WBC # BLD AUTO: 13.3 K/UL (ref 4.1–11.1)
WBC # BLD AUTO: 14.7 K/UL (ref 4.1–11.1)

## 2022-07-01 PROCEDURE — 74011250637 HC RX REV CODE- 250/637: Performed by: HOSPITALIST

## 2022-07-01 PROCEDURE — 74011250636 HC RX REV CODE- 250/636: Performed by: HOSPITALIST

## 2022-07-01 PROCEDURE — 74011000636 HC RX REV CODE- 636: Performed by: HOSPITALIST

## 2022-07-01 PROCEDURE — 82962 GLUCOSE BLOOD TEST: CPT

## 2022-07-01 PROCEDURE — 74011250637 HC RX REV CODE- 250/637: Performed by: NURSE PRACTITIONER

## 2022-07-01 PROCEDURE — 74011250637 HC RX REV CODE- 250/637: Performed by: INTERNAL MEDICINE

## 2022-07-01 PROCEDURE — 77010033678 HC OXYGEN DAILY

## 2022-07-01 PROCEDURE — 74011636637 HC RX REV CODE- 636/637: Performed by: INTERNAL MEDICINE

## 2022-07-01 PROCEDURE — 74011636637 HC RX REV CODE- 636/637: Performed by: HOSPITALIST

## 2022-07-01 PROCEDURE — 74018 RADEX ABDOMEN 1 VIEW: CPT

## 2022-07-01 PROCEDURE — 74011000258 HC RX REV CODE- 258: Performed by: HOSPITALIST

## 2022-07-01 PROCEDURE — 99232 SBSQ HOSP IP/OBS MODERATE 35: CPT | Performed by: INTERNAL MEDICINE

## 2022-07-01 PROCEDURE — 83735 ASSAY OF MAGNESIUM: CPT

## 2022-07-01 PROCEDURE — 76937 US GUIDE VASCULAR ACCESS: CPT

## 2022-07-01 PROCEDURE — 84484 ASSAY OF TROPONIN QUANT: CPT

## 2022-07-01 PROCEDURE — 65610000006 HC RM INTENSIVE CARE

## 2022-07-01 PROCEDURE — 84100 ASSAY OF PHOSPHORUS: CPT

## 2022-07-01 PROCEDURE — 74011000250 HC RX REV CODE- 250: Performed by: HOSPITALIST

## 2022-07-01 PROCEDURE — 85027 COMPLETE CBC AUTOMATED: CPT

## 2022-07-01 PROCEDURE — 36415 COLL VENOUS BLD VENIPUNCTURE: CPT

## 2022-07-01 PROCEDURE — 74178 CT ABD&PLV WO CNTR FLWD CNTR: CPT

## 2022-07-01 PROCEDURE — 80048 BASIC METABOLIC PNL TOTAL CA: CPT

## 2022-07-01 PROCEDURE — 85018 HEMOGLOBIN: CPT

## 2022-07-01 RX ORDER — LACTULOSE 10 G/15ML
200 SOLUTION ORAL; RECTAL 2 TIMES DAILY
Status: DISCONTINUED | OUTPATIENT
Start: 2022-07-01 | End: 2022-07-09

## 2022-07-01 RX ORDER — INSULIN LISPRO 100 [IU]/ML
INJECTION, SOLUTION INTRAVENOUS; SUBCUTANEOUS EVERY 6 HOURS
Status: DISCONTINUED | OUTPATIENT
Start: 2022-07-02 | End: 2022-07-16

## 2022-07-01 RX ORDER — POLYETHYLENE GLYCOL 3350 17 G/17G
17 POWDER, FOR SOLUTION ORAL DAILY
Status: DISCONTINUED | OUTPATIENT
Start: 2022-07-01 | End: 2022-07-09

## 2022-07-01 RX ORDER — ONDANSETRON 2 MG/ML
4 INJECTION INTRAMUSCULAR; INTRAVENOUS
Status: DISCONTINUED | OUTPATIENT
Start: 2022-07-01 | End: 2022-07-01 | Stop reason: SDUPTHER

## 2022-07-01 RX ADMIN — PANTOPRAZOLE SODIUM 40 MG: 40 TABLET, DELAYED RELEASE ORAL at 08:12

## 2022-07-01 RX ADMIN — POLYETHYLENE GLYCOL 3350 17 G: 17 POWDER, FOR SOLUTION ORAL at 10:37

## 2022-07-01 RX ADMIN — Medication 4 UNITS: at 08:12

## 2022-07-01 RX ADMIN — CALCIUM CARBONATE (ANTACID) CHEW TAB 500 MG 400 MG: 500 CHEW TAB at 01:33

## 2022-07-01 RX ADMIN — Medication 20 UNITS: at 23:31

## 2022-07-01 RX ADMIN — SODIUM CHLORIDE, PRESERVATIVE FREE 10 ML: 5 INJECTION INTRAVENOUS at 21:30

## 2022-07-01 RX ADMIN — DEXAMETHASONE SODIUM PHOSPHATE 40 MG: 4 INJECTION, SOLUTION INTRAMUSCULAR; INTRAVENOUS at 11:13

## 2022-07-01 RX ADMIN — Medication 1 AMPULE: at 08:13

## 2022-07-01 RX ADMIN — Medication 3 UNITS: at 11:23

## 2022-07-01 RX ADMIN — I-VITE, TAB 1000-60-2MG (60/BT) 1 TABLET: TAB at 08:19

## 2022-07-01 RX ADMIN — ONDANSETRON 4 MG: 2 INJECTION INTRAMUSCULAR; INTRAVENOUS at 04:34

## 2022-07-01 RX ADMIN — Medication 3 UNITS: at 23:31

## 2022-07-01 RX ADMIN — IOPAMIDOL 100 ML: 755 INJECTION, SOLUTION INTRAVENOUS at 12:51

## 2022-07-01 RX ADMIN — Medication 20 UNITS: at 10:37

## 2022-07-01 RX ADMIN — Medication 1 AMPULE: at 21:29

## 2022-07-01 RX ADMIN — LACTULOSE 300 ML: 10 SOLUTION ORAL at 18:30

## 2022-07-01 RX ADMIN — SODIUM CHLORIDE, PRESERVATIVE FREE 10 ML: 5 INJECTION INTRAVENOUS at 15:18

## 2022-07-01 RX ADMIN — ONDANSETRON 4 MG: 2 INJECTION INTRAMUSCULAR; INTRAVENOUS at 11:40

## 2022-07-01 RX ADMIN — SODIUM CHLORIDE, PRESERVATIVE FREE 10 ML: 5 INJECTION INTRAVENOUS at 07:04

## 2022-07-01 RX ADMIN — AMIODARONE HYDROCHLORIDE 400 MG: 200 TABLET ORAL at 08:11

## 2022-07-01 RX ADMIN — Medication 3 UNITS: at 18:22

## 2022-07-01 NOTE — PROGRESS NOTES
GI PROGRESS NOTE  Edgar Green, IRMA  [phone] NP in-hospital cell phone M-F until 4:30  After 5pm or on weekends, please call  for physician on call    NAME:Carlos Escamilla :1945 HEB:[id number]   ATTG: Dr. Dee Tilley  PCP: Page Caceres MD  Date/Time:  2022 10:10 AM   Primary GI: Dr. Clifton Kaba; Dr. Roman Sutton following   Reason for following: Rectal bleeding    Assessment:     Rectal bleeding   Acute on chronic anemia   Severe thrombocytopenia, ? ITP secondary to bactrim   · Hgb 8.0; trending up   · PLT 86; improved from <3 on admission  · 2022:  CT with no acute GI process   · FOBT +     Hx of PE  · Eliquis on hold      Plan:     · Patient will likely need endoscopic procedures during this admission d/t rectal bleeding and anemia. Unable to complete at this time secondary to severe thrombocytopenia. Will continue to follow for timing. · Will order repeat CT abd/pel   · Serial H&H; goal for hgb >7.0  · Monitor for s/s of bleeding; transfuse as clinically indicated  · Appreciate hematology input   · Diet as tolerated   · Continue to hold Eliquis   · Continue PPI   · Symptomatic care per primary team   · Will see patient over the weekend by request.  Please call with any questions or concerns. Will follow-up with patient on Monday, if still admitted  *Plan of care discussed with Dr. Roman Sutton      Subjective:   Discussed with RN events overnight. Patient resting in bed. Reports indigestion and constipation. Review of Systems:  Symptom Y/N Comments  Symptom Y/N Comments   Fever/Chills n   Chest Pain n    Cough n   Headaches n    Sputum n   Joint Pain n    SOB/SHEARER n   Pruritis/Rash n    Tolerating Diet y   Other       Could NOT obtain due to:      Objective:   VITALS:   Last 24hrs VS reviewed since prior progress note.  Most recent are:  Visit Vitals  BP (!) 162/80   Pulse 64   Temp 97.8 °F (36.6 °C)   Resp 20   Ht 6' 4\" (1.93 m)   Wt 128.3 kg (282 lb 13.6 oz)   SpO2 97%   BMI 34.43 kg/m²       Intake/Output Summary (Last 24 hours) at 7/1/2022 0934  Last data filed at 6/30/2022 2200  Gross per 24 hour   Intake 545 ml   Output 950 ml   Net -405 ml     PHYSICAL EXAM:  General: Pleasant elderly  male. NAD    HEENT: NC, Atraumatic. Anicteric sclerae. Bloody oral vesicles   Lungs:  CTA Bilaterally. No Wheezing/Rhonchi/Rales. Heart:  Regular  rhythm,  No murmur, No Rubs, No Gallops  Abdomen: Soft, Non distended, Non tender. +Bowel sounds, no HSM  Extremities: No c/c/e. Petechiae all over   Neurologic:  Alert and oriented X 3. No acute neurological distress   Psych:   Good insight. Not anxious nor agitated. Lab and Radiology Data Reviewed: (see below)    Medications Reviewed: (see below)  PMH/SH reviewed - no change compared to H&P  ________________________________________________________________________  Total time spent with patient: 15 minutes ________________________________________________________________________  Care Plan discussed with:  Patient x   Family     RN x              Consultant:       Juan Pablo Nava NP     Procedures: see electronic medical records for all procedures/Xrays and details which were not copied into this note but were reviewed prior to creation of Plan. LABS:  Recent Labs     07/01/22 0348 06/30/22 2031   WBC 13.3* 12.6*   HGB 8.0* 7.6*   HCT 23.5* 22.8*   PLT 86* 67*     Recent Labs     07/01/22  0348 06/30/22  0351 06/29/22  0512    140 138   K 4.2 3.9 4.0   * 110* 111*   CO2 19* 23 18*   BUN 30* 32* 43*   CREA 1.32* 1.21 1.26   * 163* 231*   CA 8.6 8.5 8.3*   MG 2.3 2.4 2.4   PHOS 3.6 3.4 2.8     Recent Labs     06/28/22  2331   AP 47   TP 8.0   ALB 2.9*   GLOB 5.1*     Recent Labs     06/28/22 2333   INR 1.1   PTP 11.3*   APTT 22.8      No results for input(s): FE, TIBC, PSAT, FERR in the last 72 hours.    Lab Results   Component Value Date/Time    Folate 15.6 06/27/2022 10:04 AM     No results for input(s): PH, PCO2, PO2 in the last 72 hours.   Recent Labs     06/28/22  2331        Lab Results   Component Value Date/Time    Color YELLOW/STRAW 06/29/2022 02:12 PM    Appearance CLEAR 06/29/2022 02:12 PM    Specific gravity 1.010 06/29/2022 02:12 PM    Specific gravity 1.011 10/28/2018 11:43 AM    pH (UA) 5.0 06/29/2022 02:12 PM    Protein TRACE (A) 06/29/2022 02:12 PM    Glucose Negative 06/29/2022 02:12 PM    Ketone Negative 06/29/2022 02:12 PM    Bilirubin Negative 06/29/2022 02:12 PM    Urobilinogen 0.2 06/29/2022 02:12 PM    Nitrites Negative 06/29/2022 02:12 PM    Leukocyte Esterase Negative 06/29/2022 02:12 PM    Epithelial cells FEW 06/29/2022 02:12 PM    Bacteria Negative 06/29/2022 02:12 PM    WBC 0-4 06/29/2022 02:12 PM    RBC 0-5 06/29/2022 02:12 PM       MEDICATIONS:  Current Facility-Administered Medications   Medication Dose Route Frequency    amiodarone (CORDARONE) tablet 400 mg  400 mg Oral TID    Followed by   Simon Godfrey ON 7/6/2022] amiodarone (CORDARONE) tablet 200 mg  200 mg Oral DAILY    pantoprazole (PROTONIX) tablet 40 mg  40 mg Oral Q12H    calcium carbonate (TUMS) chewable tablet 400 mg [elemental]  400 mg Oral TID PRN    glucose chewable tablet 16 g  4 Tablet Oral PRN    glucagon (GLUCAGEN) injection 1 mg  1 mg IntraMUSCular PRN    dextrose 10% infusion 0-250 mL  0-250 mL IntraVENous PRN    alcohol 62% (NOZIN) nasal  1 Ampule  1 Ampule Topical Q12H    insulin lispro (HUMALOG) injection   SubCUTAneous AC&HS    nitroglycerin (NITROSTAT) tablet 0.4 mg  0.4 mg SubLINGual PRN    atorvastatin (LIPITOR) tablet 20 mg  20 mg Oral QHS    vitamin P-P-P-lutein-minerals (OCUVITE) tablet 1 Tablet  1 Tablet Oral DAILY    sodium chloride (NS) flush 5-40 mL  5-40 mL IntraVENous Q8H    sodium chloride (NS) flush 5-40 mL  5-40 mL IntraVENous PRN    acetaminophen (TYLENOL) tablet 650 mg  650 mg Oral Q6H PRN    Or    acetaminophen (TYLENOL) suppository 650 mg  650 mg Rectal Q6H PRN    polyethylene glycol (MIRALAX) packet 17 g  17 g Oral DAILY PRN    ondansetron (ZOFRAN ODT) tablet 4 mg  4 mg Oral Q8H PRN    Or    ondansetron (ZOFRAN) injection 4 mg  4 mg IntraVENous Q6H PRN

## 2022-07-01 NOTE — PROGRESS NOTES
[address], [address] [facility], [address] [facility]  [phone]      Hematology/ Oncology Progress Note      Reason for consult:     Alfie Torrez is a 76 y.o. male who we have been asked to see by Dr. Jose Love for acute thrombocytopenia. Subjective:     Alfie Torrez is a 76year old male who presented to the ED at [facility] for fatigue, scattered petechiae to all extremities, purpura, hematuria and rectal bleeding. He has a PMH of several DVT and PE since 2008 and has been on eliquis OP. He also has a PMH of CAD with PCI, diabetes type II, obesity and bilateral hip repair. He was recently started on bactrim OP for a UTI. Patient seen at bedside today in the ED with wife. Discussed potential causes of acute severe thrombocytopenia, work up and treatment. He stated that petechiae appeared over last 24 hours after a hot shower. Interval History:     6/28/2022 Patient seen at bedside, discussed another transfusion of platelets. 6/29/2022 Patient seen at bedside in ICU, transferred overnight with CP and afib. Reports he feels better. 6/30/2022 Patient seen at bedside, feeling better today. Happy to hear his platelets are improving. Did not tolerate IVIG infusion last night despite pre-medications. 7/1/2022 Patient seen at bedside, reports he is feeling well today. Hopeful to be moved out of ICU soon. Review of Systems:  Pertinent items are noted in the History of Present Illness.        Past Medical History:   Diagnosis Date    Arthritis     bilateral hips/knees    CAD (coronary artery disease) 6/7/13    PCI    Chronic pain     DJD; SILVIO KNEES    Diabetes ([address]     \"BORDERLINE\" PER MD    GERD (gastroesophageal reflux disease)     Macular degeneration     Morbid obesity ([address]     Other and unspecified symptoms and signs involving general sensations and perceptions     Bilat. legs \"give out\" intermittently; Bilat leg cramps; macular degenration (left).  Other ill-defined conditions(799.89)     HYPERLIPIDEMIA; LT LE DVT (); MORBID OBESITY    Pulmonary embolism ([facility] 2021    Thromboembolus ([facility]     left LE     Past Surgical History:   Procedure Laterality Date    [place] CARDIAC SURG PROCEDURE UNLIST      STENTS TO RCA    IL TOTAL HIP ARTHROPLASTY  2007    right    IL TOTAL HIP ARTHROPLASTY  2007    left      Family History   Problem Relation Age of Onset    Heart Disease Mother     Arrhythmia Mother     Heart Disease Father     Cancer Sister     Lung Disease Sister      Social History     Tobacco Use    Smoking status: Former Smoker     Packs/day: 1.00     Quit date: 1983     Years since quittin.4    Smokeless tobacco: Never Used   Substance Use Topics    Alcohol use:  Yes     Alcohol/week: 2.5 standard drinks     Types: 2 Glasses of wine, 1 Cans of beer per week     Comment: 0-2 X PER WK WITH MEALS OR AFTER PLAYING GOLF      Current Facility-Administered Medications   Medication Dose Route Frequency Provider Last Rate Last Admin    polyethylene glycol (MIRALAX) packet 17 g  17 g Oral DAILY Cindy Melo NP   17 g at 22 1037    insulin NPH (NOVOLIN N, HUMULIN N) injection 20 Units  20 Units SubCUTAneous Dilan Garcia MD        amiodarone (CORDARONE) tablet 400 mg  400 mg Oral TID Kim Corona MD   400 mg at 22 5270    Followed by   Cherelle Nunnu ON 2022] amiodarone (CORDARONE) tablet 200 mg  200 mg Oral DAILY Kim Corona MD        pantoprazole (PROTONIX) tablet 40 mg  40 mg Oral Q12H Kim Corona MD   40 mg at 22 6560    calcium carbonate (TUMS) chewable tablet 400 mg [elemental]  400 mg Oral TID PRN Atif Carr NP   400 mg at 22 0133    glucose chewable tablet 16 g  4 Tablet Oral PRN [place] Night, NP        glucagon (GLUCAGEN) injection 1 mg  1 mg IntraMUSCular PRN Jefry Night, NP  dextrose 10% infusion 0-250 mL  0-250 mL IntraVENous PRN Paula Morrison NP        alcohol 62% (NOZIN) nasal  1 Ampule  1 Ampule Topical Q12H Elizabeth Deng MD   1 Ampule at 22 0813    insulin lispro (HUMALOG) injection   SubCUTAneous AC&HS Elizabeth Deng MD   3 Units at 22 1123    nitroglycerin (NITROSTAT) tablet 0.4 mg  0.4 mg SubLINGual PRN Shonna Veras NP   0.4 mg at 22 2322    atorvastatin (LIPITOR) tablet 20 mg  20 mg Oral QHS Anita Robles MD   20 mg at 22 2214    vitamin D-Y-N-lutein-minerals (OCUVITE) tablet 1 Tablet  1 Tablet Oral DAILY Anita Robles MD   1 Tablet at 22 0819    sodium chloride (NS) flush 5-40 mL  5-40 mL IntraVENous Q8H Anita Robles MD   10 mL at 22 0704    sodium chloride (NS) flush 5-40 mL  5-40 mL IntraVENous PRN Anita Robles MD        acetaminophen (TYLENOL) tablet 650 mg  650 mg Oral Q6H PRN Anita Robles MD   650 mg at 22 3363    Or    acetaminophen (TYLENOL) suppository 650 mg  650 mg Rectal Q6H PRN Anita Robles MD        polyethylene glycol [facility]) packet 17 g  17 g Oral DAILY PRN Anita Robles MD        ondansetron (ZOFRAN ODT) tablet 4 mg  4 mg Oral Q8H PRN Anita Robles MD        Or    ondansetron [facility]) injection 4 mg  4 mg IntraVENous Q6H PRN Anita Robles MD   4 mg at 22 1140        Allergies   Allergen Reactions    Adhesive Other (comments)     Blisters     Advil [Ibuprofen] Rash     Rash all over, itching, trouble breathing     Celebrex [Celecoxib] Other (comments)     Swelling in legs, no rash, no itching, no trouble breathing    Cleocin [Clindamycin Hcl] Rash    Other Medication Unknown (comments)     \"Phosphate\"          Objective:     Patient Vitals for the past 8 hrs:   BP Temp Pulse Resp SpO2   22 1303 -- -- 72 19 93 %   22 1200 (!) 155/76 -- 68 22 94 %   22 1100 (!) 179/70 -- 68 20 95 %   22 1000 (!) 175/79 -- 70 20 95 %   22 8584 (!) 162/80 97.8 °F (36.6 °C) 64 20 --   07/01/22 0900 (!) 165/80 -- 75 21 97 %   07/01/22 0800 (!) 162/80 -- 75 13 --   07/01/22 0700 (!) 151/70 -- 67 18 94 %   07/01/22 0600 (!) 153/74 -- 67 19 93 %       Lab Results   Component Value Date/Time    WBC 14.7 (H) 07/01/2022 11:24 AM    HGB 8.4 (L) 07/01/2022 11:24 AM    HCT 24.8 (L) 07/01/2022 11:24 AM    PLATELET 97 (L) 83/46/5145 11:24 AM    MCV 96.1 07/01/2022 11:24 AM       Physical Exam:   Visit Vitals  BP (!) 155/76   Pulse 72   Temp 97.8 °F (36.6 °C)   Resp 19   Ht 6' 4\" (1.93 m)   Wt 282 lb 13.6 oz (128.3 kg)   SpO2 93%   BMI 34.43 kg/m²     General appearance: alert, cooperative, no distress, appears stated age, moderately obese  Head: Normocephalic, without obvious abnormality, atraumatic  Throat: abnormal findings: multiple purpura   Abdomen: soft, non-tender.  Bowel sounds normal. No masses,  no organomegaly  Extremities: extremities normal, atraumatic, no cyanosis or edema  Skin: petechiae - scattered, to all extremities   Neurologic: Grossly normal    Assessment:     Severe Thrombocytopenia  Sudden onset of scattered petechiae to all extremities and purpura, also melena to stool   Platelets found to be <3 in ED   Recently started bactrim for UTI, stated has taken before with no issues   On Eliquis PTA for DVT/PE, last DVT in 8/2021    GI Bleed   Melena in ED, occult stool positive   Receiving IV Protonix   Appreciate GI evaluation     History of DVT and PE   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 car trips x2  PE of unknown origin   DVT after hip replacement   Last DVT 8/2021 - BLE dopplers confirm resolution   Patient reports his in immobile much of the time d/t debility   Has not had hypercoagulable work up     Acute Kidney Injury - improving   Managed by primary team     Atrial Fibrillation   Episode of A fib   Transferred to ICU and started on amiodarone drip  Now in NSR   Evaluation by Cardiology       Plan:     > Suspect severe thrombocytopenia r/t ITP  > Platelets have improved greatly, follow CBC closely    > Completed Dexamethasone 40 mg daily x 4 days   > Received 3 doses IVIG, did not tolerate final IVIG infusion despite pre-medications and slowed rate, was stopped early   > Follow hgb with GIB, would transfuse to keep hgb > 7   > Appreciate GI input - are not pursing work up currently, platelets are trending up and patient appears to be stable if endoscopic procedures are needed  > Agree with holding eliquis and ASA for now, anticoagulation will be difficult in the future with ITP given patient has history of several provoked DVTs r/t surgery and immobility. Patient's current baseline is somewhat of a poor functional status with chronic immobility. Thank you for allowing us to participate in the care of Mr. Angelo Kothari, will continue to PRN over the weekend. Please call with questions.       Janell Crooks NP

## 2022-07-01 NOTE — PROGRESS NOTES
Endurance Catheter insertion note     Inserted 18 G, 8 cm long  Endurance Extended Dwell Peripheral Catheter into right upper using ultrasound guidance and sterile technique. Positive blood return. Patient tolerated procedure well. Denies questions or concerns at this time. The Endurance catheter is an extended dwell peripheral catheter and may remain in place 29 days. Blood samples can be obtained from this catheter. To obtain labs, a tourniquet may be used, flush with 10ml NS, waste 3ml, draw labs, flush with 20ml NS. Dressings needs to be changed with central line dressing kit using bio patch and stat lock every 7 days by nurse. Primary nurse, GILBERT Maria notified. Jade Rivera RN .   Vascular Access Team. PICC Nurse

## 2022-07-01 NOTE — PROGRESS NOTES
Spiritual Care Assessment/Progress Note  [facility]      NAME: John Mendoza      MRN: [id number]  AGE: 76 y.o. SEX: male  Tenriism Affiliation: Quaker   Language: English     7/1/2022     Total Time (in minutes): 12     Spiritual Assessment begun in [facility] 2 CRITICAL CARE 1 through conversation with:         []Patient        [] Family    [] Friend(s)        Reason for Consult: Initial/Spiritual assessment, critical care     Spiritual beliefs: (Please include comment if needed)     [] Identifies with a adela tradition:         [] Supported by a adela community:            [] Claims no spiritual orientation:           [] Seeking spiritual identity:                [] Adheres to an individual form of spirituality:           [x] Not able to assess:                           Identified resources for coping:      [] Prayer                               [] Music                  [] Guided Imagery     [] Family/friends                 [] Pet visits     [] Devotional reading                         [x] Unknown     [] Other:                                             Interventions offered during this visit: (See comments for more details)    Patient Interventions: Initial visit           Plan of Care:     [x] Support spiritual and/or cultural needs    [] Support AMD and/or advance care planning process      [] Support grieving process   [] Coordinate Rites and/or Rituals    [] Coordination with community clergy   [] No spiritual needs identified at this time   [] Detailed Plan of Care below (See Comments)  [] Make referral to Music Therapy  [] Make referral to Pet Therapy     [] Make referral to Addiction services  [] Make referral to [facility]  [] Make referral to Spiritual Care Partner  [] No future visits requested        [x] Contact Spiritual Care for further referrals     Comments:  Reviewed chart prior to visit on CCU for spiritual assessment. No family/friends present.  Patient appeared to be resting soundly. Unable to assess for spiritual needs or concerns.    available upon referral by staff or by patient/family request.       KEVON Jimenez, [facility], Staff   RON MCDONALD [facility] Paging Service  [phone])

## 2022-07-01 NOTE — INTERDISCIPLINARY ROUNDS
Interdisciplinary team rounds were held 7/1/2022 with the following team members:Care Management, Diabetes Treatment Specialist, Nursing, Nutrition, Pharmacy and Physician. Plan of care discussed. See clinical pathway and/or care plan for interventions and desired outcomes. Goals of the Day: For CT today. Needs Endurance catheter to be placed today.

## 2022-07-01 NOTE — PROGRESS NOTES
1600  Ambulated up to toilet using walker. Attempted to have BM but unsuccessful. Voided. Now in recliner. 1645  Nasogastric tube placed as ordered. Vomited moderate amount liquid tan fluid. 1700  Returned to bed with 1 assist,     1745 KUB done. ngt placement verified. 1830  Lactulose enema given. Amiodarone drip started as instructed due to new npo status.

## 2022-07-01 NOTE — PROGRESS NOTES
1900: Bedside/verbal report received from [facility]. NSR on CM. Denies discomforts. 2031: CBC obtained as scheduled. 2200: Patient noted to be briefly disoriented when awaking from sleep. Bed alarm remains on and frequent reminding to use call bell tonight. 0400: Poor safety awareness noted this shift. Multiple attempts to get OOB to use bathroom without calling nurse first. Intermittent C/O abdominal pain. Vomited green bile once after coughing spell this morning. [id number]: Vomited a moderate amount of green bile while in bed. Denies CP and SOB. C/O lower abdominal pain. VSS. Will send troponin with morning labs. Patient very anxious about episode. Stayed at bedside and encouraged patient to express emotional needs. PRN Zofran given. 3281: ICU NP notified of troponin results. 0700: Bedside/verbal report given to oncoming.

## 2022-07-01 NOTE — PROGRESS NOTES
0157- Verbal shift change report given to Machelle Esparza RN (oncoming nurse) [facility] (offgoing nurse). Report included the following information SBAR, Intake/Output, Recent Results, Med Rec Status, Cardiac Rhythm NSR and Alarm Parameters . 0800- IV Team notified of need for Endurance catheter per patient request due to fear of needles and required lab draws. [phone]- Spoke to Dr Magdaleno Gallagher about elevated troponin, will see patient today. No new orders at this time. 2133- Dr Magdaleno Gallagher at bedside. Troponin q6hr ordered. 6921- Interdisciplinary team rounds were held 7/1/2022 with the following team members:Diabetes Treatment Specialist, Nursing, Nutrition, Pharmacy and Physician and the patient. Plan of care discussed. See clinical pathway and/or care plan for interventions and desired outcomes. Goals of the Day:  Give last steroid dose, CT abd per GI, Troponin q6hr per cardiology, change diet to clear liquids, add NPH for glucose control. 1227- Critical value Troponin 2059. Trending per order. 1230- Transport to CT in bed with monitor intact and [id number]. Annmarie Maloney PCA and Machelle Esparza RN with patient. 1305- Return from CT. VSS no issues noted.

## 2022-07-01 NOTE — PROGRESS NOTES
Progress Note      7/1/2022 9:12 AM  NAME: Valdez Hurley   MRN:  [id number]   Admit Diagnosis: Thrombocytopenia ([facility] [address] [D69.6]  GI bleed [K92.2]  UGO (acute kidney injury) ([facility] [address] [N17.9]      Problem List:     1. Idiopathic thrombocytopenia  2. Anemia  3. Rectal bleeding  4. Lactic acidosis  5. NSTEMI  6. Paroxysmal atrial fibrillation  7. Remote PE and DVT (separate events)  8. CAD w/ mild diffuse disease w/ patent stent in the RCA 7/17 cath. 9. XOL  10. Morbid obesity  11. DM  12. Former smoker     Assessment/Plan: TnI 2300  PLT 80s  HgB 8    1. Continue oral amio  2. No ASA, eliquis, plavix until ok with GI/hematology  3. Continue statin  4. No plans for invasive procedures at this time         []       High complexity decision making was performed in this patient at high risk for decompensation with multiple organ involvement. Subjective:     Valdez Hurley denies chest pain, dyspnea. Upset. Discussed with RN events overnight. Review of Systems:    Symptom Y/N Comments  Symptom Y/N Comments   Fever/Chills N   Chest Pain N    Poor Appetite N   Edema N    Cough N   Abdominal Pain N    Sputum N   Joint Pain N    SOB/SHEARER N   Pruritis/Rash N    Nausea/vomit N   Tolerating PT/OT Y    Diarrhea N   Tolerating Diet Y    Constipation N   Other       Could NOT obtain due to:      Objective:      Physical Exam:    Last 24hrs VS reviewed since prior progress note. Most recent are:    Visit Vitals  BP (!) 162/80   Pulse 75   Temp 97.7 °F (36.5 °C)   Resp 21   Ht 6' 4\" (1.93 m)   Wt 128.3 kg (282 lb 13.6 oz)   SpO2 97%   BMI 34.43 kg/m²       Intake/Output Summary (Last 24 hours) at 7/1/2022 0912  Last data filed at 6/30/2022 2200  Gross per 24 hour   Intake 545 ml   Output 950 ml   Net -405 ml        General Appearance: Well developed, well nourished, alert & oriented x 3,    no acute distress. Ears/Nose/Mouth/Throat: Hearing grossly normal.  Neck: Supple.   Chest: Lungs clear to auscultation bilaterally. Cardiovascular: Regular rate and rhythm, S1S2 normal, no murmur. Abdomen: Soft, non-tender, bowel sounds are active. Extremities: No edema bilaterally. Skin: Warm and dry. Petechiae. []         Post-cath site without hematoma, bruit, tenderness, or thrill. Distal pulses intact. PMH/ reviewed - no change compared to H&P    Data Review    Telemetry: sinus rhythm     EKG:   [x]  No new EKG for review    Lab Data Personally Reviewed:    Recent Labs     07/01/22 0348 06/30/22 2031   WBC 13.3* 12.6*   HGB 8.0* 7.6*   HCT 23.5* 22.8*   PLT 86* 67*     Recent Labs     06/28/22  2333   INR 1.1   PTP 11.3*   APTT 22.8      Recent Labs     07/01/22 0348 06/30/22  0351 06/29/22  0512    140 138   K 4.2 3.9 4.0   * 110* 111*   CO2 19* 23 18*   BUN 30* 32* 43*   CREA 1.32* 1.21 1.26   * 163* 231*   CA 8.6 8.5 8.3*   MG 2.3 2.4 2.4     Recent Labs     06/28/22  2331        Lab Results   Component Value Date/Time    Cholesterol, total 133 06/07/2013 02:40 PM    HDL Cholesterol 43 06/07/2013 02:40 PM    LDL, calculated 73.4 06/07/2013 02:40 PM    Triglyceride 83 06/07/2013 02:40 PM    CHOL/HDL Ratio 3.1 06/07/2013 02:40 PM       Recent Labs     06/28/22  2331   AP 47   TP 8.0   ALB 2.9*   GLOB 5.1*     No results for input(s): PH, PCO2, PO2 in the last 72 hours.     Medications Personally Reviewed:    Current Facility-Administered Medications   Medication Dose Route Frequency    amiodarone (CORDARONE) tablet 400 mg  400 mg Oral TID    Followed by   Kurt Aguilar ON 7/6/2022] amiodarone (CORDARONE) tablet 200 mg  200 mg Oral DAILY    pantoprazole (PROTONIX) tablet 40 mg  40 mg Oral Q12H    calcium carbonate (TUMS) chewable tablet 400 mg [elemental]  400 mg Oral TID PRN    glucose chewable tablet 16 g  4 Tablet Oral PRN    glucagon (GLUCAGEN) injection 1 mg  1 mg IntraMUSCular PRN    dextrose 10% infusion 0-250 mL  0-250 mL IntraVENous PRN    alcohol 62% (NOZIN) nasal  1 Ampule  1 Ampule Topical Q12H    insulin lispro (HUMALOG) injection   SubCUTAneous AC&HS    nitroglycerin (NITROSTAT) tablet 0.4 mg  0.4 mg SubLINGual PRN    atorvastatin (LIPITOR) tablet 20 mg  20 mg Oral QHS    vitamin X-K-T-lutein-minerals (OCUVITE) tablet 1 Tablet  1 Tablet Oral DAILY    sodium chloride (NS) flush 5-40 mL  5-40 mL IntraVENous Q8H    sodium chloride (NS) flush 5-40 mL  5-40 mL IntraVENous PRN    acetaminophen (TYLENOL) tablet 650 mg  650 mg Oral Q6H PRN    Or    acetaminophen (TYLENOL) suppository 650 mg  650 mg Rectal Q6H PRN    polyethylene glycol (MIRALAX) packet 17 g  17 g Oral DAILY PRN    ondansetron (ZOFRAN ODT) tablet 4 mg  4 mg Oral Q8H PRN    Or    ondansetron (ZOFRAN) injection 4 mg  4 mg IntraVENous Q6H PRN         Oswaldo Pitt III, DO

## 2022-07-01 NOTE — PROGRESS NOTES
Critical Care Progress Note  Claudette Alcon, MD          Date of Service:  2022  NAME:  Tanna Felix  :  1945  MRN:  [id number]      Subjective/Hospital course:      : Patient converted to SR yesterday. : Patient reports feeling better. No acute events overnight. :   Reports abd pain and did not have BM in the last 3 days. No acute events overnight. Problem list:     Problem list:  Hospital Problems  Date Reviewed: 2021          Codes Class Noted POA    Thrombocytopenia ([facility] [address] ICD-10-CM: D69.6  ICD-9-CM: 287.5  2022 Unknown        GI bleed ICD-10-CM: K92.2  ICD-9-CM: 578.9  2022 Unknown        UGO (acute kidney injury) [facility]) ICD-10-CM: N17.9  ICD-9-CM: 584.9  2022 Unknown              Assessment/Plan:     Atrial fibrillation with RVR complicated by acute chest pain: Possibly due to angina in the setting of RVR - no improved. - Continue amio. - Cardiology consulted. - Continue tele monitoring in ICU. 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 On Dexamethasone. - Hematology following, appreciate recs    CAD: Status post 3 stents  - Cardiology following, appreciate recs     UGO vs UGO on CKD: Historical GFR as low as 46, but appears to typically be >60. Unable to estimate degree of CKD at this time. - Trend BMP     GI bleed: GI managing conservatively. No melena noted at this time  - Continue PPI     Hx DVT and PE: Last known 2021. Was recommended to be on life-long anticoagulation. Held at this time due to thrombocytopenia. - CTA now is neg for PE. Will ask heme onc on future plan for [facility] [place] operated by [facility] in light of severe ITP.      Code status: Full code. DVT prophylaxis: SCD. May transfer out of ICU.     I personally spent --- minutes of critical care time. This is time spent at this critically ill patient's bedside actively involved in patient care as well as the coordination of care and discussions with the patient's family. This does not include any procedural time which has been billed separately. Review of Systems:   Pertinent items are noted in HPI. Vital Signs:     Patient Vitals for the past 4 hrs:   BP Temp Pulse Resp SpO2   07/01/22 0926 (!) 162/80 97.8 °F (36.6 °C) 64 20 --   07/01/22 0900 -- -- 75 21 97 %        Intake/Output Summary (Last 24 hours) at 7/1/2022 1244  Last data filed at 6/30/2022 2200  Gross per 24 hour   Intake 545 ml   Output 750 ml   Net -205 ml        Physical Examination:    Physical Exam  Constitutional:       Appearance: Normal appearance. HENT:      Head: Normocephalic and atraumatic. Mouth/Throat:      Mouth: Mucous membranes are moist.   Eyes:      Extraocular Movements: Extraocular movements intact. Conjunctiva/sclera: Conjunctivae normal.   Cardiovascular:      Rate and Rhythm: Normal rate and regular rhythm. Pulmonary:      Effort: Pulmonary effort is normal. No respiratory distress. Breath sounds: Normal breath sounds. Abdominal:      General: Abdomen is flat. There is no distension. Palpations: Abdomen is soft. Tenderness: There is no abdominal tenderness. There is no guarding. Musculoskeletal:      Cervical back: Normal range of motion and neck supple. Neurological:      Mental Status: He is alert and oriented to person, place, and time. Labs:   Labs and imaging reviewed.       Medications:     Current Facility-Administered Medications   Medication Dose Route Frequency    polyethylene glycol (MIRALAX) packet 17 g  17 g Oral DAILY    insulin NPH (NOVOLIN N, HUMULIN N) injection 20 Units  20 Units SubCUTAneous ONCE    iopamidoL (ISOVUE-370) 76 % injection 100 mL  100 mL IntraVENous RAD ONCE    amiodarone (CORDARONE) tablet 400 mg  400 mg Oral TID    Followed by   Elida Sinclair ON 7/6/2022] amiodarone (CORDARONE) tablet 200 mg  200 mg Oral DAILY    pantoprazole (PROTONIX) tablet 40 mg  40 mg Oral Q12H    calcium carbonate (TUMS) chewable tablet 400 mg [elemental]  400 mg Oral TID PRN    glucose chewable tablet 16 g  4 Tablet Oral PRN    glucagon (GLUCAGEN) injection 1 mg  1 mg IntraMUSCular PRN    dextrose 10% infusion 0-250 mL  0-250 mL IntraVENous PRN    alcohol 62% (NOZIN) nasal  1 Ampule  1 Ampule Topical Q12H    insulin lispro (HUMALOG) injection   SubCUTAneous AC&HS    nitroglycerin (NITROSTAT) tablet 0.4 mg  0.4 mg SubLINGual PRN    atorvastatin (LIPITOR) tablet 20 mg  20 mg Oral QHS    vitamin D-V-Q-lutein-minerals (OCUVITE) tablet 1 Tablet  1 Tablet Oral DAILY    sodium chloride (NS) flush 5-40 mL  5-40 mL IntraVENous Q8H    sodium chloride (NS) flush 5-40 mL  5-40 mL IntraVENous PRN    acetaminophen (TYLENOL) tablet 650 mg  650 mg Oral Q6H PRN    Or    acetaminophen (TYLENOL) suppository 650 mg  650 mg Rectal Q6H PRN    polyethylene glycol (MIRALAX) packet 17 g  17 g Oral DAILY PRN    ondansetron (ZOFRAN ODT) tablet 4 mg  4 mg Oral Q8H PRN    Or    ondansetron (ZOFRAN) injection 4 mg  4 mg IntraVENous Q6H PRN     ______________________________________________________________________  EXPECTED LENGTH OF STAY: 3d 14h  ACTUAL LENGTH OF STAY:          Willian Arellano, MD   Pulmonary/[facility]  [address]  [phone]  7/1/2022

## 2022-07-02 ENCOUNTER — APPOINTMENT (OUTPATIENT)
Dept: GENERAL RADIOLOGY | Age: 77
DRG: 981 | End: 2022-07-02
Attending: HOSPITALIST
Payer: MEDICARE

## 2022-07-02 ENCOUNTER — APPOINTMENT (OUTPATIENT)
Dept: GENERAL RADIOLOGY | Age: 77
DRG: 981 | End: 2022-07-02
Attending: SURGERY
Payer: MEDICARE

## 2022-07-02 LAB
ANION GAP SERPL CALC-SCNC: 7 MMOL/L (ref 5–15)
BUN SERPL-MCNC: 35 MG/DL (ref 6–20)
BUN/CREAT SERPL: 29 (ref 12–20)
CALCIUM SERPL-MCNC: 8.2 MG/DL (ref 8.5–10.1)
CHLORIDE SERPL-SCNC: 111 MMOL/L (ref 97–108)
CO2 SERPL-SCNC: 23 MMOL/L (ref 21–32)
CREAT SERPL-MCNC: 1.22 MG/DL (ref 0.7–1.3)
ERYTHROCYTE [DISTWIDTH] IN BLOOD BY AUTOMATED COUNT: 15.7 % (ref 11.5–14.5)
ERYTHROCYTE [DISTWIDTH] IN BLOOD BY AUTOMATED COUNT: 15.7 % (ref 11.5–14.5)
GLUCOSE BLD STRIP.AUTO-MCNC: 118 MG/DL (ref 65–117)
GLUCOSE BLD STRIP.AUTO-MCNC: 98 MG/DL (ref 65–117)
GLUCOSE SERPL-MCNC: 106 MG/DL (ref 65–100)
HCT VFR BLD AUTO: 23 % (ref 36.6–50.3)
HCT VFR BLD AUTO: 23.1 % (ref 36.6–50.3)
HGB BLD-MCNC: 7.8 G/DL (ref 12.1–17)
HGB BLD-MCNC: 7.9 G/DL (ref 12.1–17)
MAGNESIUM SERPL-MCNC: 2.4 MG/DL (ref 1.6–2.4)
MCH RBC QN AUTO: 32.2 PG (ref 26–34)
MCH RBC QN AUTO: 32.5 PG (ref 26–34)
MCHC RBC AUTO-ENTMCNC: 33.8 G/DL (ref 30–36.5)
MCHC RBC AUTO-ENTMCNC: 34.3 G/DL (ref 30–36.5)
MCV RBC AUTO: 94.7 FL (ref 80–99)
MCV RBC AUTO: 95.5 FL (ref 80–99)
NRBC # BLD: 0.17 K/UL (ref 0–0.01)
NRBC # BLD: 0.18 K/UL (ref 0–0.01)
NRBC BLD-RTO: 1.3 PER 100 WBC
NRBC BLD-RTO: 1.6 PER 100 WBC
PHOSPHATE SERPL-MCNC: 3.2 MG/DL (ref 2.6–4.7)
PLATELET # BLD AUTO: 105 K/UL (ref 150–400)
PLATELET # BLD AUTO: 116 K/UL (ref 150–400)
PMV BLD AUTO: 10.9 FL (ref 8.9–12.9)
PMV BLD AUTO: 11.1 FL (ref 8.9–12.9)
POTASSIUM SERPL-SCNC: 3.8 MMOL/L (ref 3.5–5.1)
RBC # BLD AUTO: 2.42 M/UL (ref 4.1–5.7)
RBC # BLD AUTO: 2.43 M/UL (ref 4.1–5.7)
SERVICE CMNT-IMP: ABNORMAL
SERVICE CMNT-IMP: NORMAL
SODIUM SERPL-SCNC: 141 MMOL/L (ref 136–145)
TROPONIN-HIGH SENSITIVITY: 1672 NG/L (ref 0–76)
WBC # BLD AUTO: 11.4 K/UL (ref 4.1–11.1)
WBC # BLD AUTO: 12.9 K/UL (ref 4.1–11.1)

## 2022-07-02 PROCEDURE — 84100 ASSAY OF PHOSPHORUS: CPT

## 2022-07-02 PROCEDURE — 74018 RADEX ABDOMEN 1 VIEW: CPT

## 2022-07-02 PROCEDURE — 74011250636 HC RX REV CODE- 250/636: Performed by: HOSPITALIST

## 2022-07-02 PROCEDURE — 74011000250 HC RX REV CODE- 250: Performed by: NURSE PRACTITIONER

## 2022-07-02 PROCEDURE — 80048 BASIC METABOLIC PNL TOTAL CA: CPT

## 2022-07-02 PROCEDURE — 77010033678 HC OXYGEN DAILY

## 2022-07-02 PROCEDURE — 74011250637 HC RX REV CODE- 250/637: Performed by: HOSPITALIST

## 2022-07-02 PROCEDURE — 82962 GLUCOSE BLOOD TEST: CPT

## 2022-07-02 PROCEDURE — 36415 COLL VENOUS BLD VENIPUNCTURE: CPT

## 2022-07-02 PROCEDURE — 97116 GAIT TRAINING THERAPY: CPT | Performed by: PHYSICAL THERAPIST

## 2022-07-02 PROCEDURE — 65270000046 HC RM TELEMETRY

## 2022-07-02 PROCEDURE — 74011000250 HC RX REV CODE- 250: Performed by: HOSPITALIST

## 2022-07-02 PROCEDURE — 74011250636 HC RX REV CODE- 250/636: Performed by: NURSE PRACTITIONER

## 2022-07-02 PROCEDURE — 74011250637 HC RX REV CODE- 250/637: Performed by: INTERNAL MEDICINE

## 2022-07-02 PROCEDURE — 97161 PT EVAL LOW COMPLEX 20 MIN: CPT | Performed by: PHYSICAL THERAPIST

## 2022-07-02 PROCEDURE — 74011000258 HC RX REV CODE- 258: Performed by: HOSPITALIST

## 2022-07-02 PROCEDURE — 83735 ASSAY OF MAGNESIUM: CPT

## 2022-07-02 PROCEDURE — 74011250637 HC RX REV CODE- 250/637: Performed by: NURSE PRACTITIONER

## 2022-07-02 PROCEDURE — C9113 INJ PANTOPRAZOLE SODIUM, VIA: HCPCS | Performed by: NURSE PRACTITIONER

## 2022-07-02 PROCEDURE — 85027 COMPLETE CBC AUTOMATED: CPT

## 2022-07-02 PROCEDURE — 97530 THERAPEUTIC ACTIVITIES: CPT | Performed by: PHYSICAL THERAPIST

## 2022-07-02 PROCEDURE — 74011000636 HC RX REV CODE- 636: Performed by: SURGERY

## 2022-07-02 RX ORDER — AMIODARONE HYDROCHLORIDE 200 MG/1
200 TABLET ORAL DAILY
Status: DISCONTINUED | OUTPATIENT
Start: 2022-07-06 | End: 2022-07-10

## 2022-07-02 RX ORDER — AMIODARONE HYDROCHLORIDE 200 MG/1
200 TABLET ORAL EVERY 12 HOURS
Status: COMPLETED | OUTPATIENT
Start: 2022-07-02 | End: 2022-07-05

## 2022-07-02 RX ADMIN — AMIODARONE HYDROCHLORIDE 200 MG: 200 TABLET ORAL at 22:46

## 2022-07-02 RX ADMIN — LACTULOSE 300 ML: 10 SOLUTION ORAL at 10:28

## 2022-07-02 RX ADMIN — SODIUM CHLORIDE, PRESERVATIVE FREE 10 ML: 5 INJECTION INTRAVENOUS at 18:14

## 2022-07-02 RX ADMIN — ATORVASTATIN CALCIUM 20 MG: 20 TABLET, FILM COATED ORAL at 22:46

## 2022-07-02 RX ADMIN — AMIODARONE HYDROCHLORIDE 0.5 MG/MIN: 50 INJECTION, SOLUTION INTRAVENOUS at 06:07

## 2022-07-02 RX ADMIN — SODIUM CHLORIDE, PRESERVATIVE FREE 10 ML: 5 INJECTION INTRAVENOUS at 22:46

## 2022-07-02 RX ADMIN — POLYETHYLENE GLYCOL 3350 17 G: 17 POWDER, FOR SOLUTION ORAL at 09:27

## 2022-07-02 RX ADMIN — DIATRIZOATE MEGLUMINE AND DIATRIZOATE SODIUM 80 ML: 660; 100 LIQUID ORAL; RECTAL at 18:12

## 2022-07-02 RX ADMIN — Medication 1 AMPULE: at 22:51

## 2022-07-02 RX ADMIN — SODIUM CHLORIDE, PRESERVATIVE FREE 40 MG: 5 INJECTION INTRAVENOUS at 09:28

## 2022-07-02 RX ADMIN — SODIUM CHLORIDE, PRESERVATIVE FREE 10 ML: 5 INJECTION INTRAVENOUS at 05:54

## 2022-07-02 RX ADMIN — ACETAMINOPHEN 325MG 650 MG: 325 TABLET ORAL at 22:46

## 2022-07-02 RX ADMIN — Medication 1 AMPULE: at 09:28

## 2022-07-02 RX ADMIN — I-VITE, TAB 1000-60-2MG (60/BT) 1 TABLET: TAB at 09:27

## 2022-07-02 RX ADMIN — AMIODARONE HYDROCHLORIDE 200 MG: 200 TABLET ORAL at 09:27

## 2022-07-02 NOTE — PROGRESS NOTES
Critical Care Progress Note  Jalyn Rivas MD          Date of Service:  2022  NAME:  Betty Teixeira  :  1945  MRN:  [id number]      Subjective/Hospital course:      : Patient converted to SR yesterday. : Patient reports feeling better. No acute events overnight. :   Reports abd pain and did not have BM in the last 3 days. No acute events overnight. :   Patient reports feeling better than yesterday. He had small BM after enema. NGT to suction with 200ml biliary output overnight. Problem list:     Problem list:  Hospital Problems  Date Reviewed: 2021          Codes Class Noted POA    Thrombocytopenia ([facility] [address] ICD-10-CM: D69.6  ICD-9-CM: 287.5  2022 Unknown        GI bleed ICD-10-CM: K92.2  ICD-9-CM: 578.9  2022 Unknown        UGO (acute kidney injury) [facility]) ICD-10-CM: N17.9  ICD-9-CM: 584.9  2022 Unknown              Assessment/Plan:     Atrial fibrillation with RVR complicated by acute chest pain: Possibly due to angina in the setting of RVR - no improved. - Continue amio. - Cardiology consulted. - Continue tele monitoring in ICU. Severe acute thrombocytopenia c/b diffuse petechia: Likely ITP due to bactrim  - Continue transfusion of platelets as per hematology  - Holding eliquis and aspirin but to be started once ok with heme/onc. - Patient had chest pain from IVIG treatments, unclear if significant reaction but patient did not want it anymore. On Dexamethasone. - Hematology following, appreciate recs    CAD: Status post 3 stents  - Cardiology following, appreciate recs  - No plan for intervention yet especially when he is not able to get antiplatelet and AC therapy.     UGO vs UGO on CKD: Historical GFR as low as 46, but appears to typically be >60. Unable to estimate degree of CKD at this time. - Trend BMP     GI bleed: GI managing conservatively. No melena noted at this time  - Continue PPI     Hx DVT and PE: Last known 2021. Was recommended to be on life-long anticoagulation. Held at this time due to thrombocytopenia. - CTA now is neg for PE. Will ask heme onc on future plan for [facility] in light of severe ITP.      Code status: Full code. DVT prophylaxis: SCD. May transfer out of ICU. I personally spent --- minutes of critical care time. This is time spent at this critically ill patient's bedside actively involved in patient care as well as the coordination of care and discussions with the patient's family. This does not include any procedural time which has been billed separately. Review of Systems:   Pertinent items are noted in HPI. Vital Signs:     Patient Vitals for the past 4 hrs:   BP Pulse Resp SpO2   07/02/22 0800 (!) 125/58 (!) 47 16 91 %   07/02/22 0700 (!) 134/116 (!) 57 23 (!) 89 %   07/02/22 0600 (!) 109/44 63 22 91 %   07/02/22 0500 (!) 157/69 61 20 95 %        Intake/Output Summary (Last 24 hours) at 7/2/2022 0855  Last data filed at 7/2/2022 8094  Gross per 24 hour   Intake 50 ml   Output 950 ml   Net -900 ml        Physical Examination:    Physical Exam  Constitutional:       Appearance: Normal appearance. HENT:      Head: Normocephalic and atraumatic. Mouth/Throat:      Mouth: Mucous membranes are moist.   Eyes:      Extraocular Movements: Extraocular movements intact. Conjunctiva/sclera: Conjunctivae normal.   Cardiovascular:      Rate and Rhythm: Normal rate and regular rhythm. Pulmonary:      Effort: Pulmonary effort is normal. No respiratory distress. Breath sounds: Normal breath sounds. Abdominal:      General: Abdomen is flat. There is no distension. Palpations: Abdomen is soft. Tenderness: There is no abdominal tenderness. There is no guarding. Musculoskeletal:      Cervical back: Normal range of motion and neck supple. Neurological:      Mental Status: He is alert and oriented to person, place, and time.          Labs:   Labs and imaging reviewed.       Medications:     Current Facility-Administered Medications   Medication Dose Route Frequency    polyethylene glycol (MIRALAX) packet 17 g  17 g Oral DAILY    lactulose (CHRONULAC) 10 gram/15 mL solution 300 mL  200 g Rectal BID    amiodarone (CORDARONE) 375 mg/250 mL D5W infusion  0.5 mg/min IntraVENous CONTINUOUS    insulin lispro (HUMALOG) injection   SubCUTAneous Q6H    pantoprazole (PROTONIX) 40 mg in 0.9% sodium chloride 10 mL injection  40 mg IntraVENous DAILY    calcium carbonate (TUMS) chewable tablet 400 mg [elemental]  400 mg Oral TID PRN    glucose chewable tablet 16 g  4 Tablet Oral PRN    glucagon (GLUCAGEN) injection 1 mg  1 mg IntraMUSCular PRN    dextrose 10% infusion 0-250 mL  0-250 mL IntraVENous PRN    alcohol 62% (NOZIN) nasal  1 Ampule  1 Ampule Topical Q12H    nitroglycerin (NITROSTAT) tablet 0.4 mg  0.4 mg SubLINGual PRN    atorvastatin (LIPITOR) tablet 20 mg  20 mg Oral QHS    vitamin R-K-E-lutein-minerals (OCUVITE) tablet 1 Tablet  1 Tablet Oral DAILY    sodium chloride (NS) flush 5-40 mL  5-40 mL IntraVENous Q8H    sodium chloride (NS) flush 5-40 mL  5-40 mL IntraVENous PRN    acetaminophen (TYLENOL) tablet 650 mg  650 mg Oral Q6H PRN    Or    acetaminophen (TYLENOL) suppository 650 mg  650 mg Rectal Q6H PRN    polyethylene glycol (MIRALAX) packet 17 g  17 g Oral DAILY PRN    ondansetron (ZOFRAN ODT) tablet 4 mg  4 mg Oral Q8H PRN    Or    ondansetron (ZOFRAN) injection 4 mg  4 mg IntraVENous Q6H PRN     ______________________________________________________________________  EXPECTED LENGTH OF STAY: 3d 14h  ACTUAL LENGTH OF STAY:          [address], MD   Pulmonary/[facility]  [address]  [phone]  7/2/2022

## 2022-07-02 NOTE — PROGRESS NOTES
0700- Bedside shift change report given to Preston (oncoming nurse) by Merle Ramirez (offgoing nurse). Report included the following information SBAR, Kardex, Intake/Output, MAR, Accordion and Recent Results. 0930- Amio gtt stopped, pt switched to PO. General surgery consult called to Dr. Javed Sullivan. 1250- TRANSFER - OUT REPORT:    Verbal report given to Melonie(name) on Melvina Aguilera  being transferred to PCU(unit) for routine progression of care       Report consisted of patients Situation, Background, Assessment and   Recommendations(SBAR). Information from the following report(s) SBAR, Kardex, Intake/Output, MAR, Accordion and Recent Results was reviewed with the receiving nurse. Lines:   Peripheral IV 89/67/96 Right Cephalic (Active)   Site Assessment Clean, dry, & intact 07/02/22 1200   Phlebitis Assessment 0 07/02/22 1200   Infiltration Assessment 0 07/02/22 1200   Dressing Status Clean, dry, & intact 07/02/22 1200   Dressing Type Transparent 07/02/22 1200   Hub Color/Line Status Green;Capped 07/02/22 1200   Action Taken Open ports on tubing capped 07/02/22 0000   Alcohol Cap Used Yes 07/02/22 1200        Opportunity for questions and clarification was provided.       Patient transported with:   Monitor  O2 @ 2 liters  Registered Nurse

## 2022-07-02 NOTE — PROGRESS NOTES
Cardiology Progress Note      7/2/2022 12:45 PM    Admit Date: 6/27/2022          Subjective: Staying in [employer] JA[facility]EN Muhammad No new c/o          Visit Vitals  /64 (BP 1 Location: Left upper arm, BP Patient Position: At rest)   Pulse (!) 48   Temp 97.6 °F (36.4 °C)   Resp 15   Ht 6' 4\" (1.93 m)   Wt 128.3 kg (282 lb 13.6 oz)   SpO2 95%   BMI 34.43 kg/m²     06/30 1901 - 07/02 0700  In: 595 [P.O.:240; I.V.:355]  Out: 1250 [Urine:1050]        Objective:      Physical Exam:  VS as above  Chest CTA anteriorly  Card RRR distant ht sounds     Data Review:   Labs:    Hgb 7.8  Plat 116K  BUN 35  Creat 1.2   Trop 2306 yest     Telemetry: SR R 63      Assessment:     1. Idiopathic thrombocytopenia  2. Anemia  3. Rectal bleeding  4. Lactic acidosis- resolved   5.  small NSTEMI  6. Paroxysmal atrial fibrillation  7. Remote PE and DVT (separate events)  8. CAD w/ mild diffuse disease w/ patent stent in the RCA 7/17 cath. 9. XOL  10. Morbid obesity  11. DM  12. Former smoker    Plan:  Cont amiodarone. Agree with Tx to PCU. Medical Rx for NSTMI.  Resume aspirin when able

## 2022-07-02 NOTE — PROGRESS NOTES
1900: Shift change report received from JAIDEN Gabriel RN. Report included the following information SBAR, Kardex, Intake/Output, MAR and Cardiac Rhythm NSR.   2000:Shift assessment completed;see flow sheet  0000:Reassessment completed; see flow sheet  0400:Reassessment completed; see flow sheet  End of Shift Note    Bedside shift change report given to GILBERT Johnston (oncoming nurse) by Kumar Lr RN (offgoing nurse). Report included the following information Sinus bradycardia. Activity:  Activity Level: Up with Assistance  Number times ambulated in hallways past shift: 0  Number of times OOB to chair past shift: 0    Cardiac:   Cardiac Monitoring: Yes      Cardiac Rhythm: Sinus Rhythm    Access:   Current line(s): midline     Genitourinary:   Urinary status: voiding    Respiratory:   O2 Device: Nasal cannula  Chronic home O2 use?: NO  Incentive spirometer at bedside: NO       GI:  Last Bowel Movement Date: 06/27/22  Current diet:  DIET ONE TIME MESSAGE  DIET NPO  Passing flatus: YES  Tolerating current diet: YES       Pain Management:   Patient states pain is manageable on current regimen: YES    Skin:  Jose Guadalupe Score: 16  Interventions: speciality bed and float heels    Patient Safety:  Fall Score:  Total Score: 3  Interventions: bed/chair alarm, assistive device (walker, cane, etc), gripper socks and pt to call before getting OOB  High Fall Risk: Yes    Length of Stay:  Expected LOS: 3d 14h  Actual LOS: Nahum Russo, RN

## 2022-07-02 NOTE — PROGRESS NOTES
End of Shift Note    Bedside shift change report given to [facility], [address] [place] [address] (oncoming nurse) by Chasidy Guerrero RN (offgoing nurse). Report included the following information SBAR, Kardex, Intake/Output, MAR and Recent Results    Shift worked:  7a-7p   Shift summary and any significant changes:     Gastrograffin given at 1816. Awaiting X-ray     Concerns for physician to address:  none     Zone phone for oncoming shift:          Activity:  Activity Level: Up with Assistance  Number times ambulated in hallways past shift: 0  Number of times OOB to chair past shift: 1    Cardiac:   Cardiac Monitoring: Yes      Cardiac Rhythm: Sinus Dane    Access:   Current line(s): PIV     Genitourinary:   Urinary status: voiding    Respiratory:   O2 Device: Nasal cannula  Chronic home O2 use?: NO  Incentive spirometer at bedside: YES       GI:  Last Bowel Movement Date: 07/02/22  Current diet:  DIET ONE TIME MESSAGE  DIET NPO  Passing flatus: YES  Tolerating current diet: YES       Pain Management:   Patient states pain is manageable on current regimen: N/A    Skin:  Jose Guadalupe Score: 16  Interventions: turn team, speciality bed, float heels, increase time out of bed and foam dressing    Patient Safety:  Fall Score:  Total Score: 3  Interventions: bed/chair alarm, assistive device (walker, cane, etc) and pt to call before getting OOB  High Fall Risk: Yes    Length of Stay:  Expected LOS: 3d 14h  Actual LOS: 5      Chasidy Guerrero RN

## 2022-07-02 NOTE — PROGRESS NOTES
Problem: Mobility Impaired (Adult and Pediatric)  Goal: *Acute Goals and Plan of Care (Insert Text)  Description: FUNCTIONAL STATUS PRIOR TO ADMISSION: Patient was modified independent using a rollator for functional mobility. HOME SUPPORT PRIOR TO ADMISSION: The patient lived with wife at [address] but did not require assist.    Physical Therapy Goals  Initiated 7/2/2022  1. Patient will move from supine to sit and sit to supine , scoot up and down, and roll side to side in bed with modified independence within 7 day(s). 2.  Patient will transfer from bed to chair and chair to bed with modified independence using the least restrictive device within 7 day(s). 3.  Patient will perform sit to stand with modified independence within 7 day(s). 4.  Patient will ambulate with modified independence for 150 feet with the least restrictive device within 7 day(s). Outcome: Not Met   PHYSICAL THERAPY EVALUATION  Patient: Devin Alvarez (18 y.o. male)  Date: 7/2/2022  Primary Diagnosis: Thrombocytopenia ([facility] [D69.6]  GI bleed [K92.2]  UGO (acute kidney injury) ([facility] [N17.9]        Precautions: falls       ASSESSMENT  Based on the objective data described below, the patient presents with mildly impaired functional mobility and standing balance and decreased endurance. Patient able to complete sit<>stand transfers with SBA and additional time. HHe was able to ambulate 50 feet in room using RW for support with CGA. Gait is slow but steady overall with no overt LOB noted. Patient noted to demonstrate SHEARER but O2 sats remained 94% on RA during mobility. Patient remained in chair at end of session. Patient reports modified independence with overall mobility using rollator at baseline. He is near his functional baseline but is limited by impaired endurance. Recommend HHPT following discharge to ensure safe mobility in home environment.     Current Level of Function Impacting Discharge (mobility/balance): SBA to CGA    Functional Outcome Measure: The patient scored 60/100 on the Barthel Index outcome measure which is indicative of minimally independent. Patient will benefit from skilled therapy intervention to address the above noted impairments. PLAN :  Recommendations and Planned Interventions: bed mobility training, transfer training, gait training, therapeutic exercises, patient and family training/education, and therapeutic activities      Frequency/Duration: Patient will be followed by physical therapy:  4 times a week to address goals. Recommendation for discharge: (in order for the patient to meet his/her long term goals)  Physical therapy at least 2 days/week in the home     This discharge recommendation:  Has not yet been discussed the attending provider and/or case management    IF patient discharges home will need the following DME: patient owns DME required for discharge         SUBJECTIVE:   Patient stated Kristi Summers think I have internal bleeding.     OBJECTIVE DATA SUMMARY:   HISTORY:    Past Medical History:   Diagnosis Date    Arthritis     bilateral hips/knees    CAD (coronary artery disease) 6/7/13    PCI    Chronic pain     DJD; SILVIO KNEES    Diabetes ([address]     \"BORDERLINE\" PER MD    GERD (gastroesophageal reflux disease)     Macular degeneration     Morbid obesity ([address]     Other and unspecified symptoms and signs involving general sensations and perceptions     Bilat. legs \"give out\" intermittently; Bilat leg cramps; macular degenration (left). Other ill-defined conditions(799.89)     HYPERLIPIDEMIA; LT LE DVT (2008);  MORBID OBESITY    Pulmonary embolism ([address] 08/2021    Thromboembolus ([address] 2008    left LE     Past Surgical History:   Procedure Laterality Date    [place] CARDIAC SURG PROCEDURE UNLIST  6/713    STENTS TO RCA    [place] TOTAL HIP ARTHROPLASTY  6/2007    right    [place] TOTAL HIP ARTHROPLASTY  12/2007    left         Home Situation  Home Environment: Independent living ([address] [facility]; 2nd floor with elevator)  One/Two Story Residence: One story  Living Alone: No  Support Systems: Spouse/Significant Other  Patient Expects to be Discharged to[de-identified] Home with family assistance  Current DME Used/Available at Home: Cane, straight,Walker, rolling,Walker, rollator,Grab bars (molded bench in shower)  Tub or Shower Type: Shower    EXAMINATION/PRESENTATION/DECISION MAKING:   Critical Behavior:  Neurologic State: Alert,Appropriate for age  Orientation Level: Oriented X4  Cognition: Appropriate decision making,Follows commands     Hearing: Auditory  Auditory Impairment: None  Skin:  petechia noted on extremities and trunk    Range Of Motion:  AROM: Generally decreased, functional                       Strength:    Strength: Within functional limits                    Tone & Sensation:   Tone: Normal                              Coordination:  Coordination: Within functional limits       Functional Mobility:  Bed Mobility:           Scooting: Modified independent  Transfers:  Sit to Stand: Stand-by assistance; Additional time  Stand to Sit: Stand-by assistance; Additional time                       Balance:   Sitting: Intact  Standing: Impaired  Standing - Static: Good;Constant support  Standing - Dynamic : Good;Constant support  Ambulation/Gait Training:  Distance (ft): 50 Feet (ft)  Assistive Device: Walker, rolling;Gait belt  Ambulation - Level of Assistance: Contact guard assistance        Gait Abnormalities: Decreased step clearance;Trunk sway increased        Base of Support: Widened     Speed/Beatriz: Pace decreased (<100 feet/min)  Step Length: Left shortened;Right shortened         Gait is slow but steady overall with no LOB noted; increased SOB             Functional Measure:  Barthel Index:    Bathin  Bladder: 10  Bowels: 5  Groomin  Dressing: 10  Feeding: 10  Mobility: 0  Stairs: 0  Toilet Use: 10  Transfer (Bed to Chair and Back): 10  Total: 60/100       The Barthel ADL Index: Guidelines  1. The index should be used as a record of what a patient does, not as a record of what a patient could do. 2. The main aim is to establish degree of independence from any help, physical or verbal, however minor and for whatever reason. 3. The need for supervision renders the patient not independent. 4. A patient's performance should be established using the best available evidence. Asking the patient, friends/relatives and nurses are the usual sources, but direct observation and common sense are also important. However direct testing is not needed. 5. Usually the patient's performance over the preceding 24-48 hours is important, but occasionally longer periods will be relevant. 6. Middle categories imply that the patient supplies over 50 per cent of the effort. 7. Use of aids to be independent is allowed. Score Interpretation (from [address] [address])    Independent   60-79 Minimally independent   40-59 Partially dependent   20-39 Very dependent   <20 Totally dependent     -Gracie Lees., Barthel, D.W. (1965). Functional evaluation: the Barthel Index. [address] [facility] ([address] (1997). The Barthel activities of daily living index: self-reporting versus actual performance in the old (> or = 75 years). Journal of [address] [place] [address] 45(7), [address] [facility], DenyDenyDeny, Justin Bio., [facility]. (1999). Measuring the change in disability after inpatient rehabilitation; comparison of the responsiveness of the Barthel Index and Functional [place] Measure. Journal of Neurology, Neurosurgery, and Psychiatry, 66(4), 422-953. Kristopher Brunson, N.J.A, JOAQUINA Mcdonald, & Navneet Hernandez MJOSEPH. (2004) Assessment of post-stroke quality of life in cost-effectiveness studies: The usefulness of the Barthel Index and the EuroQoL-5D.  Quality of Life Research, 13, 030-29            Activity Tolerance:   Fair, SpO2 stable on RA, requires rest breaks, and observed SOB with activity    After treatment patient left in no apparent distress:   Sitting in chair and Call bell within reach    COMMUNICATION/EDUCATION:   The patients plan of care was discussed with: Registered nurse. Fall prevention education was provided and the patient/caregiver indicated understanding., Patient/family have participated as able in goal setting and plan of care. , and Patient/family agree to work toward stated goals and plan of care.     Thank you for this referral.  Belén Marcelino, PT   Time Calculation: 25 mins

## 2022-07-02 NOTE — CONSULTS
Hospitalist Progress Note    NAME: Sheree Medina   :  1945   MRN:  [id number]           Subjective:     Chief Complaint / Reason for Physician Visit  Patient seen and evaluated at bedside, overnight events reviewed, patient currently has no new complaints, does complain of generalized weakness, states he has had no further blood in his stool. Discussed with RN events overnight. Review of Systems:  Symptom Y/N Comments  Symptom Y/N Comments   Fever/Chills N   Chest Pain N    Poor Appetite Y   Edema N    Cough N   Abdominal Pain N    Sputum N   Joint Pain N    SOB/SHEARER N   Pruritis/Rash Y    Nausea/vomit N   Tolerating PT/OT NA    Diarrhea N   Tolerating Diet     Constipation N   Other       Could NOT obtain due to:      Objective:     VITALS:   Last 24hrs VS reviewed since prior progress note.  Most recent are:  Patient Vitals for the past 24 hrs:   Temp Pulse Resp BP SpO2   22 1100 -- (!) 59 16 132/63 93 %   22 1000 -- (!) 58 18 (!) 151/65 94 %   22 0900 -- 60 14 (!) 133/59 93 %   22 0800 98.3 °F (36.8 °C) (!) 47 16 (!) 125/58 91 %   22 0700 -- (!) 57 23 (!) 134/116 (!) 89 %   22 0600 -- 63 22 (!) 109/44 91 %   22 0500 -- 61 20 (!) 157/69 95 %   22 0400 97.9 °F (36.6 °C) (!) 58 15 (!) 109/44 96 %   22 0300 -- (!) 55 15 (!) 163/73 90 %   22 0200 -- (!) 52 12 (!) 118/49 93 %   22 0100 -- 67 16 (!) 134/57 90 %   22 0000 97.8 °F (36.6 °C) 64 18 131/61 90 %   22 2300 -- (!) 57 17 (!) 151/68 93 %   22 2200 -- 65 19 136/66 90 %   22 2100 -- 66 16 (!) 129/57 92 %   22 2000 97.6 °F (36.4 °C) 78 23 (!) 154/73 90 %   22 1800 -- 73 22 (!) 152/69 91 %   22 1700 -- 67 20 (!) 146/68 92 %   22 1600 97.7 °F (36.5 °C) 67 22 -- 97 %   22 1500 -- (!) 59 17 (!) 122/58 93 %   22 1400 -- (!) 59 19 134/63 95 %   22 1303 -- 72 19 -- 93 %   22 1200 -- 68 22 (!) 155/76 94 % Intake/Output Summary (Last 24 hours) at 7/2/2022 1120  Last data filed at 7/2/2022 1000  Gross per 24 hour   Intake 390 ml   Output 1175 ml   Net -785 ml        PHYSICAL EXAM:  General: Patient appears comfortable   EENT:  EOMI. Anicteric sclerae. MMM  Resp:  CTA bilaterally, no wheezing or rales. No accessory muscle use  CV:  Regular  Rhythm, s1/s2 no m/r/g  No edema  GI:  Soft, Non distended, Non tender. +Bowel sounds  Neurologic:  Alert and oriented X 3, normal speech,   Psych:   Good insight. Not anxious nor agitated  Skin:  No rashes. No jaundice    Procedures: see electronic medical records for all procedures/Xrays and details which were not copied into this note but were reviewed prior to creation of Plan. LABS:  I reviewed today's most current labs and imaging studies. Pertinent labs include:  Recent Labs     07/02/22  0519 07/01/22  2350 07/01/22  1752 07/01/22  1124 07/01/22  1124   WBC 11.4* 12.9*  --   --  14.7*   HGB 7.8* 7.9* 8.2*   < > 8.4*   HCT 23.1* 23.0* 24.4*   < > 24.8*   * 105*  --   --  97*    < > = values in this interval not displayed. Recent Labs     07/02/22  0519 07/01/22  0348 06/30/22  0351    137 140   K 3.8 4.2 3.9   * 109* 110*   CO2 23 19* 23   * 210* 163*   BUN 35* 30* 32*   CREA 1.22 1.32* 1.21   CA 8.2* 8.6 8.5   MG 2.4 2.3 2.4   PHOS 3.2 3.6 3.4       Signed: Julia Strickland MD    X-ray KUB: IMPRESSION     Improved small bowel obstruction with NG tube in place.     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-of note patient presented with severe thrombocytopenia with GI bleed, likely secondary to ITP, patient did not tolerate IVIG, received Decadron 40 mg daily for 4 days, platelets uptrending appropriately  Continue to trend platelets  In active type and screen  No signs or symptoms of active bleeding at this time  Hematology consult appreciated, continue to follow recommendations    GI bleed-of note patient found to have GI bleed in the setting of severe thrombocytopenia, received packed RBC transfusion, currently hemoglobin and hematocrit remained stable  Maintain active type and screen  Continue Protonix 40 mg IV daily  Transfuse for hemoglobin of less than 7  Gastroenterology consult appreciated, currently holding off on endoscopy, continue to follow recommendations    Atrial fibrillation with RVR-of note patient did develop atrial fibrillation with RVR during admission, currently doing well on amiodarone  Continuous telemetry monitoring  Continue amiodarone as per cardiology recommendations  Patient cannot receive anticoagulation at this time  Cardiology consult appreciated, continue to follow recommendations    Small bowel obstruction-currently improved with conservative management  Continue NG tube  Continue serial abdominal examinations  Follow general surgery recommendations    Non-ST elevation MI-of note patient found to have significantly elevated serum troponin, secondary to non-ST elevation MI, could be secondary to type II  Of note patient cannot receive antiplatelets or anticoagulation at this time secondary to severe thrombocytopenia as well as GI bleed  Continue management as per cardiology recommendations    Hyperlipidemia-continue statin    Chronic kidney disease stage III-currently serum creatinine at baseline  Continue to trend serum creatinine    Prophylaxis-SCDs  FEN-n.p.o., replete potassium and magnesium  Full code, patient surrogate decision-maker is his wife  Disposition-patient has been accepted to stepdown unit, PT OT ordered      30.0 - 39.9 Obese / Body mass index is 34.43 kg/m².     Code status: Full  Prophylaxis: SCD's  Recommended Disposition: TBD     ________________________________________________________________________  Care Plan discussed with:    Comments   Patient x    Family      RN x    Care Manager x    Consultant  x                     x Multidiciplinary team rounds were held today with , nursing, pharmacist and clinical coordinator. Patient's plan of care was discussed; medications were reviewed and discharge planning was addressed.      ________________________________________________________________________  Total NON critical care TIME:  35   Minutes      Comments   >50% of visit spent in counseling and coordination of care x    ________________________________________________________________________  Khurram Allen MD

## 2022-07-03 PROBLEM — R77.8 ELEVATED TROPONIN: Status: ACTIVE | Noted: 2022-07-03

## 2022-07-03 PROBLEM — R79.89 ELEVATED TROPONIN: Status: ACTIVE | Noted: 2022-07-03

## 2022-07-03 PROBLEM — I48.91 ATRIAL FIBRILLATION (HCC): Status: ACTIVE | Noted: 2022-07-03

## 2022-07-03 LAB
ANION GAP SERPL CALC-SCNC: 6 MMOL/L (ref 5–15)
BUN SERPL-MCNC: 40 MG/DL (ref 6–20)
BUN/CREAT SERPL: 31 (ref 12–20)
CALCIUM SERPL-MCNC: 8 MG/DL (ref 8.5–10.1)
CHLORIDE SERPL-SCNC: 112 MMOL/L (ref 97–108)
CO2 SERPL-SCNC: 23 MMOL/L (ref 21–32)
CREAT SERPL-MCNC: 1.3 MG/DL (ref 0.7–1.3)
ERYTHROCYTE [DISTWIDTH] IN BLOOD BY AUTOMATED COUNT: 16.7 % (ref 11.5–14.5)
GLUCOSE BLD STRIP.AUTO-MCNC: 133 MG/DL (ref 65–117)
GLUCOSE BLD STRIP.AUTO-MCNC: 206 MG/DL (ref 65–117)
GLUCOSE BLD STRIP.AUTO-MCNC: 215 MG/DL (ref 65–117)
GLUCOSE BLD STRIP.AUTO-MCNC: 86 MG/DL (ref 65–117)
GLUCOSE BLD STRIP.AUTO-MCNC: 93 MG/DL (ref 65–117)
GLUCOSE SERPL-MCNC: 81 MG/DL (ref 65–100)
HCT VFR BLD AUTO: 24.7 % (ref 36.6–50.3)
HGB BLD-MCNC: 7.9 G/DL (ref 12.1–17)
MCH RBC QN AUTO: 32.5 PG (ref 26–34)
MCHC RBC AUTO-ENTMCNC: 32 G/DL (ref 30–36.5)
MCV RBC AUTO: 101.6 FL (ref 80–99)
NRBC # BLD: 0.18 K/UL (ref 0–0.01)
NRBC BLD-RTO: 2.2 PER 100 WBC
PLATELET # BLD AUTO: 118 K/UL (ref 150–400)
PMV BLD AUTO: 10.6 FL (ref 8.9–12.9)
POTASSIUM SERPL-SCNC: 3.5 MMOL/L (ref 3.5–5.1)
RBC # BLD AUTO: 2.43 M/UL (ref 4.1–5.7)
SERVICE CMNT-IMP: ABNORMAL
SERVICE CMNT-IMP: NORMAL
SERVICE CMNT-IMP: NORMAL
SODIUM SERPL-SCNC: 141 MMOL/L (ref 136–145)
WBC # BLD AUTO: 8.3 K/UL (ref 4.1–11.1)

## 2022-07-03 PROCEDURE — 74011250636 HC RX REV CODE- 250/636: Performed by: NURSE PRACTITIONER

## 2022-07-03 PROCEDURE — 74011636637 HC RX REV CODE- 636/637: Performed by: HOSPITALIST

## 2022-07-03 PROCEDURE — 85027 COMPLETE CBC AUTOMATED: CPT

## 2022-07-03 PROCEDURE — 97535 SELF CARE MNGMENT TRAINING: CPT

## 2022-07-03 PROCEDURE — 74011250637 HC RX REV CODE- 250/637: Performed by: HOSPITALIST

## 2022-07-03 PROCEDURE — 74011250637 HC RX REV CODE- 250/637: Performed by: INTERNAL MEDICINE

## 2022-07-03 PROCEDURE — 74011000250 HC RX REV CODE- 250: Performed by: NURSE PRACTITIONER

## 2022-07-03 PROCEDURE — 97165 OT EVAL LOW COMPLEX 30 MIN: CPT

## 2022-07-03 PROCEDURE — 36415 COLL VENOUS BLD VENIPUNCTURE: CPT

## 2022-07-03 PROCEDURE — 74011000250 HC RX REV CODE- 250: Performed by: HOSPITALIST

## 2022-07-03 PROCEDURE — 82962 GLUCOSE BLOOD TEST: CPT

## 2022-07-03 PROCEDURE — 80048 BASIC METABOLIC PNL TOTAL CA: CPT

## 2022-07-03 PROCEDURE — 99231 SBSQ HOSP IP/OBS SF/LOW 25: CPT | Performed by: SURGERY

## 2022-07-03 PROCEDURE — C9113 INJ PANTOPRAZOLE SODIUM, VIA: HCPCS | Performed by: NURSE PRACTITIONER

## 2022-07-03 PROCEDURE — 65270000046 HC RM TELEMETRY

## 2022-07-03 RX ADMIN — Medication 4 UNITS: at 14:43

## 2022-07-03 RX ADMIN — SODIUM CHLORIDE, PRESERVATIVE FREE 10 ML: 5 INJECTION INTRAVENOUS at 05:49

## 2022-07-03 RX ADMIN — I-VITE, TAB 1000-60-2MG (60/BT) 1 TABLET: TAB at 09:36

## 2022-07-03 RX ADMIN — AMIODARONE HYDROCHLORIDE 200 MG: 200 TABLET ORAL at 21:16

## 2022-07-03 RX ADMIN — SODIUM CHLORIDE, PRESERVATIVE FREE 10 ML: 5 INJECTION INTRAVENOUS at 14:43

## 2022-07-03 RX ADMIN — AMIODARONE HYDROCHLORIDE 200 MG: 200 TABLET ORAL at 09:32

## 2022-07-03 RX ADMIN — Medication 1 AMPULE: at 09:33

## 2022-07-03 RX ADMIN — ATORVASTATIN CALCIUM 20 MG: 20 TABLET, FILM COATED ORAL at 21:16

## 2022-07-03 RX ADMIN — Medication 4 UNITS: at 18:27

## 2022-07-03 RX ADMIN — Medication 1 AMPULE: at 21:15

## 2022-07-03 RX ADMIN — SODIUM CHLORIDE, PRESERVATIVE FREE 40 MG: 5 INJECTION INTRAVENOUS at 09:32

## 2022-07-03 RX ADMIN — SODIUM CHLORIDE, PRESERVATIVE FREE 10 ML: 5 INJECTION INTRAVENOUS at 21:16

## 2022-07-03 NOTE — PROGRESS NOTES
Problem: Self Care Deficits Care Plan (Adult)  Goal: *Acute Goals and Plan of Care (Insert Text)  Description:  FUNCTIONAL STATUS PRIOR TO ADMISSION: Patient was modified independent using a rollator for functional mobility. HOME SUPPORT PRIOR TO ADMISSION: The patient lived with wife at [address] but did not require assist. Assist with IADLs provided by ILF as needed. Occupational Therapy Goals  Initiated 7/3/2022  1. Patient will perform lower body dressing with modified independence within 7 day(s). 2.  Patient will perform bathing with modified independence within 7 day(s). 3.  Patient will perform simple home management with modified independence within 7 day(s). 4.  Patient will perform toilet transfers with modified independence within 7 day(s). 5.  Patient will perform all aspects of toileting with modified independence within 7 day(s). 6.  Patient will utilize energy conservation techniques during functional activities with verbal cues within 7 day(s). Outcome: Progressing Towards Goal    OCCUPATIONAL THERAPY EVALUATION  Patient: Vee Wilson (57 y.o. male)  Date: 7/3/2022  Primary Diagnosis: Thrombocytopenia ([facility] [D69.6]  GI bleed [K92.2]  UGO (acute kidney injury) ([facility] [N17.9]        Precautions: Fall       ASSESSMENT  Based on the objective data described below, the patient presents with decreased endurance, limited activity tolerance, mildly impaired balance, and generalized weakness from prolonged hospital stay. Patient is functioning below their mod I baseline for self-care and functional mobility, now completing self-care with min assist and functional mobility with CGA at  level. Patient received seated in recliner chair, agreeable to session. Patient able to complete bedroom/bathroom mobility with CGA, toilet transfer with CGA and use of grab bars. Pt required assist for clothing mgmt however able to manage all aspects of toileting with SPV. Following pt tolerated standing at sink for teeth brushing and face washing, required brief standing rest breaks due to fatigue. Vitals monitored closely and stable with activity. AT conclusion of session pt returned to recliner chair, all needs met. Recommend return to ILF with [address] [place] [address] at [place], may require increased assist for IADL tasks. Current Level of Function Impacting Discharge (ADLs/self-care):   Feeding: Independent    Oral Facial Hygiene/Grooming: Independent    Bathing: Stand-by assistance    Upper Body Dressing: Stand-by assistance    Lower Body Dressing: Minimum assistance    Toileting: Stand by assistance    Functional Outcome Measure: The patient scored Total: 60/100 on the Barthel Index outcome measure which is indicative of being partially dependent in basic self-care. Other factors to consider for discharge: ILF     Patient will benefit from skilled therapy intervention to address the above noted impairments. PLAN :  Recommendations and Planned Interventions: self care training, functional mobility training, therapeutic exercise, balance training, therapeutic activities, endurance activities, patient education, home safety training, and family training/education    Frequency/Duration: Patient will be followed by occupational therapy 4 times a week to address goals. Recommendation for discharge: (in order for the patient to meet his/her long term goals)  Occupational therapy at least 2 days/week in the home AND ensure assist and/or supervision for safety with IADLS    This discharge recommendation:  Has not yet been discussed the attending provider and/or case management    IF patient discharges home will need the following DME: shower chair       SUBJECTIVE:   Patient stated I feel good,just weak.     OBJECTIVE DATA SUMMARY:   HISTORY:   Past Medical History:   Diagnosis Date    Arthritis     bilateral hips/knees    CAD (coronary artery disease) 6/7/13    PCI    Chronic pain DJD; SILVIO KNEES    Diabetes ([facility]     \"BORDERLINE\" PER MD    GERD (gastroesophageal reflux disease)     Macular degeneration     Morbid obesity ([facility]     Other and unspecified symptoms and signs involving general sensations and perceptions     Bilat. legs \"give out\" intermittently; Bilat leg cramps; macular degenration (left). Other ill-defined conditions(799.89)     HYPERLIPIDEMIA; LT LE DVT (2008); MORBID OBESITY    Pulmonary embolism ([facility] 08/2021    Thromboembolus ([facility] [facility] 2008    left LE     Past Surgical History:   Procedure Laterality Date    DE CARDIAC SURG PROCEDURE UNLIST  6/713    STENTS TO RCA    DE TOTAL HIP ARTHROPLASTY  6/2007    right    DE TOTAL HIP ARTHROPLASTY  12/2007    left       Expanded or extensive additional review of patient history:     Home Situation  [facility] [address] Name: Beni Whitten  One/Two Story Residence: One story  Living Alone: No  Support Systems: Spouse/Significant Other  Patient Expects to be Discharged to[de-identified] Home with family assistance  Current DME Used/Available at Home: Cane, straight,Walker, rolling,Walker, rollator,Grab bars (molded bench in shower)  Tub or Shower Type: Shower    Hand dominance: Right    EXAMINATION OF PERFORMANCE DEFICITS:  Cognitive/Behavioral Status:  Neurologic State: Alert  Orientation Level: Oriented X4  Cognition: Appropriate decision making  Perception: Appears intact  Perseveration: No perseveration noted  Safety/Judgement: Awareness of environment    Skin: intact    Edema: BLE edema    Hearing: Auditory  Auditory Impairment: None    Vision/Perceptual:                           Acuity: Within Defined Limits         Range of Motion:    AROM: Generally decreased, functional                         Strength:    Strength: Generally decreased, functional                Coordination:  Coordination: Within functional limits  Fine Motor Skills-Upper: Left Intact; Right Intact    Gross Motor Skills-Upper: Left Intact; Right Intact    Tone & Sensation:    Tone: Normal                         Balance:  Sitting: Intact  Standing: Impaired  Standing - Static: Good  Standing - Dynamic : Good    Functional Mobility and Transfers for ADLs:  Bed Mobility:       Transfers:  Sit to Stand: Supervision  Stand to Sit: Supervision  Bathroom Mobility: Stand-by assistance  Toilet Transfer : Stand-by assistance    ADL Assessment:  Feeding: Independent    Oral Facial Hygiene/Grooming: Independent    Bathing: Stand-by assistance         Upper Body Dressing: Stand-by assistance    Lower Body Dressing: Minimum assistance    Toileting: Stand by assistance                ADL Intervention and task modifications:       Grooming  Grooming Assistance: Supervision  Position Performed: Standing (at sink)  Washing Face: Supervision  Washing Hands: Supervision  Brushing Teeth: Supervision                             Toileting  Toileting Assistance: Supervision;Stand-by assistance  Bladder Hygiene: Supervision  Clothing Management: Stand-by assistance  Adaptive Equipment: Grab bars; Walker    Cognitive Retraining  Safety/Judgement: Awareness of environment       Functional Measure:    Barthel Index:  Bathin  Bladder: 10  Bowels: 10  Groomin  Dressing: 10  Feedin  Mobility: 0  Stairs: 0  Toilet Use: 10  Transfer (Bed to Chair and Back): 10  Total: 60/100      The Barthel ADL Index: Guidelines  1. The index should be used as a record of what a patient does, not as a record of what a patient could do. 2. The main aim is to establish degree of independence from any help, physical or verbal, however minor and for whatever reason. 3. The need for supervision renders the patient not independent. 4. A patient's performance should be established using the best available evidence. Asking the patient, friends/relatives and nurses are the usual sources, but direct observation and common sense are also important. However direct testing is not needed.   5. Usually the patient's performance over the preceding 24-48 hours is important, but occasionally longer periods will be relevant. 6. Middle categories imply that the patient supplies over 50 per cent of the effort. 7. Use of aids to be independent is allowed. Score Interpretation (from [address] [facility])    Independent   60-79 Minimally independent   40-59 Partially dependent   20-39 Very dependent   <20 Totally dependent     -Gracie Lees., BarthelYUDITH. (1965). Functional evaluation: the Barthel Index. [address] ([address] (1997). The Barthel activities of daily living index: self-reporting versus actual performance in the old (> or = 75 years). Journal of [address] [place] [address] 45(7), [facility], ANYA, Fatuma Josue., Gregorio Ron. (1999). Measuring the change in disability after inpatient rehabilitation; comparison of the responsiveness of the Barthel Index and Functional [place] Measure. Journal of Neurology, Neurosurgery, and Psychiatry, 66(4), 865-499. YUDELKA Darling, JOAQUINA Mcdonald, & Roma Ramos, M.A. (2004) Assessment of post-stroke quality of life in cost-effectiveness studies: The usefulness of the Barthel Index and the EuroQoL-5D.  Quality of Life Research, 15, 111-82     Occupational Therapy Evaluation Charge Determination   History Examination Decision-Making   LOW Complexity : Brief history review  MEDIUM Complexity : 3-5 performance deficits relating to physical, cognitive , or psychosocial skils that result in activity limitations and / or participation restrictions LOW Complexity : No comorbidities that affect functional and no verbal or physical assistance needed to complete eval tasks       Based on the above components, the patient evaluation is determined to be of the following complexity level: LOW   Pain Rating:  Patient did not endorse pain during session     Activity Tolerance:   Fair and requires rest breaks    After treatment patient left in no apparent distress:    Sitting in chair and Call bell within reach    COMMUNICATION/EDUCATION:   The patients plan of care was discussed with: Occupational therapist and Registered nurse. Patient/family have participated as able in goal setting and plan of care. This patients plan of care is appropriate for delegation to [facility].     Thank you for this referral.  Jean Paul Carrington OT  Time Calculation: 23 mins

## 2022-07-03 NOTE — ASSESSMENT & PLAN NOTE
Was to due severe ITP  S/p blood transfusion  Hb is stable  Continue with protonix 40 daily(can switch to famotidine on discharger)  GI on board, holdign off EGD for now

## 2022-07-03 NOTE — PROGRESS NOTES
Cardiology Progress Note      7/3/2022 12:40 PM    Admit Date: 6/27/2022          Subjective: Up in chair. Feeling better. Platelet count improving . Some N/V yest but resolved, feels well. Visit Vitals  BP (!) 135/55 (BP 1 Location: Left upper arm, BP Patient Position: At rest)   Pulse 61   Temp 97 °F (36.1 °C)   Resp 15   Ht 6' 4\" (1.93 m)   Wt 128.3 kg (282 lb 13.6 oz)   SpO2 95%   BMI 34.43 kg/m²     07/01 1901 - 07/03 0700  In: 1260 [P.O.:800; I.V.:250]  Out: 1025 [Urine:675]        Objective:      Physical Exam:  VS as above  Chest CTA  Card RRR no changes     Data Review:   Labs:    Hgb 7.9  Plat 118K  BUN 40  Creat 1.3     Telemetry: SR No afib       Assessment:     1. ITP  2. Anemia  3. Rectal bleeding  4. Lactic acidosis- resolved   5.  small NSTEMI- type 2   6. Paroxysmal atrial fibrillation, prior Nl LVEF 2021 by echo   7. Remote PE and DVT (separate events)  8. CAD w/ mild diffuse disease w/ patent stent in the RCA 7/17 cath. 9. XOL  10. Morbid obesity  11. DM  12. Former smoker    Plan:  Cont current Rx. At some point should get stress nuclear to risk stratify.  Could be done as outpt

## 2022-07-03 NOTE — ASSESSMENT & PLAN NOTE
S/p IVIG couldn't toleralate whole doses  S/p decadron   plts continue to improve today is 123  Hemo/onc on board   will need recommendation regarding restarting anticoagulation on discharge for A.fib

## 2022-07-03 NOTE — PROGRESS NOTES
Problem: Discharge Planning  Goal: *Discharge to safe environment  Outcome: Progressing Towards Goal     Problem: Falls - Risk of  Goal: *Absence of Falls  Description: Document Juanebony Atknis Fall Risk and appropriate interventions in the flowsheet.   Outcome: Progressing Towards Goal  Note: Fall Risk Interventions:  Mobility Interventions: Bed/chair exit alarm    Mentation Interventions: Adequate sleep, hydration, pain control    Medication Interventions: Bed/chair exit alarm    Elimination Interventions: Bed/chair exit alarm    History of Falls Interventions: Bed/chair exit alarm

## 2022-07-03 NOTE — PROGRESS NOTES
Hospitalist Progress Note    NAME: Aguilar Worley   :  1945   MRN:  [id number]       Assessment / Plan: Thrombocytopenia (HCC)  S/p IVIG couldn't toleralate whole doses  S/p decadron   plts continue to improve today is 118  Hemo/onc on board     GI bleed  Was to due severe ITP  S/p blood transfusion  Hb is stable  Continue with protonix 40 daily(can switch to famotidine on discharger)  GI on board, holdign off EGD for now, will need recommendation regarding restarting anticoagulation on discharge      Atrial fibrillation ([address]  HR is controlled now   Continue with amiodarone  AC held for GI bleed  Will follow with cardiology      Elevated troponin  Can't be on anti plalates it for now  cardiology on board: will need NST   Continue with statin         30.0 - 39.9 Obese / Body mass index is 34.43 kg/m². Estimated discharge date:   Barriers:    Code status: Full  Prophylaxis: SCD's  Recommended Disposition: SAH/Rehab     Subjective:     Chief Complaint / Reason for Physician Visit  \"patient seen and examined today, doing well, no further bleeding episode\". Discussed with RN events overnight. Review of Systems:  Symptom Y/N Comments  Symptom Y/N Comments   Fever/Chills N   Chest Pain N    Poor Appetite N   Edema N    Cough N   Abdominal Pain N    Sputum N   Joint Pain N    SOB/SHEARER N   Pruritis/Rash N    Nausea/vomit N   Tolerating PT/OT Y    Diarrhea N   Tolerating Diet     Constipation N   Other       Could NOT obtain due to:      Objective:     VITALS:   Last 24hrs VS reviewed since prior progress note.  Most recent are:  Patient Vitals for the past 24 hrs:   Temp Pulse Resp BP SpO2   22 1637 97.6 °F (36.4 °C) 80 19 (!) 141/55 100 %   22 1241 97.8 °F (36.6 °C) 67 18 (!) 129/46 100 %   22 0740 97 °F (36.1 °C) 61 15 (!) 135/55 95 %   22 0352 98 °F (36.7 °C) (!) 57 12 (!) 117/55 99 %   22 2343 97.6 °F (36.4 °C) 60 17 (!) 121/52 97 %   22 2253 -- 74 23 (!) 148/57 94 %   07/02/22 1925 97.4 °F (36.3 °C) 66 16 (!) 129/54 90 %       Intake/Output Summary (Last 24 hours) at 7/3/2022 1833  Last data filed at 7/3/2022 1704  Gross per 24 hour   Intake 830 ml   Output --   Net 830 ml        I had a face to face encounter and independently examined this patient on 7/3/2022, as outlined below:  PHYSICAL EXAM:  Physical Exam  Constitutional:       Appearance: Normal appearance. He is obese. HENT:      Head: Normocephalic and atraumatic. Mouth/Throat:      Mouth: Mucous membranes are moist.   Eyes:      General: No scleral icterus. Cardiovascular:      Rate and Rhythm: Normal rate and regular rhythm. Pulmonary:      Effort: No respiratory distress. Breath sounds: Normal breath sounds. Abdominal:      Palpations: Abdomen is soft. Tenderness: There is no abdominal tenderness. Musculoskeletal:      Cervical back: Neck supple. Right lower leg: No edema. Left lower leg: No edema. Comments: Some ecchymosis     Skin:     General: Skin is warm and dry. Capillary Refill: Capillary refill takes less than 2 seconds. Neurological:      Mental Status: He is alert and oriented to person, place, and time. Reviewed most current lab test results and cultures  YES  Reviewed most current radiology test results   YES  Review and summation of old records today    NO  Reviewed patient's current orders and MAR    YES  PMH/SH reviewed - no change compared to H&P  ________________________________________________________________________  Care Plan discussed with:    Comments   Patient X    Family  X    RN X    Care Manager     Consultant                        Multidiciplinary team rounds were held today with , nursing, pharmacist and clinical coordinator. Patient's plan of care was discussed; medications were reviewed and discharge planning was addressed. ________________________________________________________________________  Total NON critical care TIME:  35   Minutes    Total CRITICAL CARE TIME Spent:   Minutes non procedure based      Comments   >50% of visit spent in counseling and coordination of care X    ________________________________________________________________________  Cynthia Alexandra MD     Procedures: see electronic medical records for all procedures/Xrays and details which were not copied into this note but were reviewed prior to creation of Plan. LABS:  I reviewed today's most current labs and imaging studies. Pertinent labs include:  Recent Labs     07/03/22  0402 07/02/22  0519 07/01/22  2350   WBC 8.3 11.4* 12.9*   HGB 7.9* 7.8* 7.9*   HCT 24.7* 23.1* 23.0*   * 116* 105*     Recent Labs     07/03/22  0402 07/02/22  0519 07/01/22  0348    141 137   K 3.5 3.8 4.2   * 111* 109*   CO2 23 23 19*   GLU 81 106* 210*   BUN 40* 35* 30*   CREA 1.30 1.22 1.32*   CA 8.0* 8.2* 8.6   MG  --  2.4 2.3   PHOS  --  3.2 3.6       Signed:  Cynthia Alexandra MD

## 2022-07-03 NOTE — PROGRESS NOTES
SURGERY PROGRESS NOTE      Admit Date: 2022    POD * No surgery found *    Procedure: * No surgery found *      Subjective:     Patient has no complaints. Passing flatus and having BMs. Denies nausea. Tolerating liquids. Does not want to advance his diet yet. Objective:     Visit Vitals  BP (!) 129/46 (BP 1 Location: Left upper arm, BP Patient Position: At rest)   Pulse 67   Temp 97.8 °F (36.6 °C)   Resp 18   Ht 6' 4\" (1.93 m)   Wt 128.3 kg (282 lb 13.6 oz)   SpO2 100%   BMI 34.43 kg/m²        Temp (24hrs), Av.8 °F (36.6 °C), Min:97 °F (36.1 °C), Max:98.4 °F (36.9 °C)      No intake/output data recorded.  1901 -  0700  In: 1260 [P.O.:800; I.V.:250]  Out: 1025 [Urine:675]    Physical Exam:    General:  alert, cooperative, no distress, appears stated age   Abdomen: soft, bowel sounds active, non-tender   Incision:   healing well, no drainage, no erythema, no hernia, no seroma, no swelling, no dehiscence, incision well approximated           Lab Results   Component Value Date/Time    WBC 8.3 2022 04:02 AM    HGB 7.9 (L) 2022 04:02 AM    HCT 24.7 (L) 2022 04:02 AM    PLATELET 668 (L) 15/23/8074 04:02 AM    .6 (H) 2022 04:02 AM     Lab Results   Component Value Date/Time    GFR est non-AA 53 (L) 2022 04:02 AM    GFR est AA >60 2022 04:02 AM    Creatinine 1.30 2022 04:02 AM    BUN 40 (H) 2022 04:02 AM    Sodium 141 2022 04:02 AM    Potassium 3.5 2022 04:02 AM    Chloride 112 (H) 2022 04:02 AM    CO2 23 2022 04:02 AM    Magnesium 2.4 2022 05:19 AM    Phosphorus 3.2 2022 05:19 AM       Assessment:     Active Problems:     Thrombocytopenia ([facility] (2022)      GI bleed (2022)      UGO (acute kidney injury) ([facility] (2022)    Ileus resolved    Plan:     Advance diet as tolerated  Will see PRN

## 2022-07-03 NOTE — PROGRESS NOTES
End of Shift Note    Bedside shift change report given to GILBERT Minor (oncoming nurse) by Janice Mustafa RN (offgoing nurse). Report included the following information SBAR, Kardex, Intake/Output, MAR and Recent Results    Shift worked: 7a-7p     Shift summary and any significant changes:     no significant changes     Concerns for physician to address:  No changes     Zone phone for oncoming shift:  n/a     Activity:  Activity Level: Up with Assistance  Number times ambulated in hallways past shift: 0  Number of times OOB to chair past shift: 0    Cardiac:   Cardiac Monitoring: Yes      Cardiac Rhythm: Sinus Rhythm    Access:   Current line(s): endurance catheter    Genitourinary:   Urinary status: voiding    Respiratory:   O2 Device: None (Room air)  Chronic home O2 use?: NO  Incentive spirometer at bedside: N/A       GI:  Last Bowel Movement Date: 07/03/22  Current diet:  DIET ONE TIME MESSAGE  ADULT DIET Clear Liquid  Passing flatus: YES  Tolerating current diet: YES       Pain Management:   Patient states pain is manageable on current regimen: YES    Skin:  Jose Guadalupe Score: 16  Interventions: turn team, float heels, increase time out of bed and PT/OT consult    Patient Safety:  Fall Score:  Total Score: 3  Interventions: bed/chair alarm, assistive device (walker, cane, etc), gripper socks and pt to call before getting OOB  High Fall Risk: Yes    Length of Stay:  Expected LOS: 3d 14h  Actual LOS: 6      Janice Mustafa RN

## 2022-07-03 NOTE — CONSULTS
Surgery Consult    Subjective:      Tanna Felix is a 76 y.o. male who is being seen for evaluation of ileus vs SBO. Patient is admitted with Afib with RVR, thrombocytopenia and GI bleed. He developed nausea, vomiting and distention yesterday. A CT scan showed dilated loops without clear transition. A NG was placed. Patient denies abdominal pain. He is passing flatus and has had a BM. He has never had abdominal surgery. Patient Active Problem List    Diagnosis Date Noted    Thrombocytopenia ([address] 2022    GI bleed 2022    UGO (acute kidney injury) ([address] 2022    Chest pain 2021    HTN (hypertension) 2021    Hyperlipidemia 2021    Abnormal stress test 2017    Unstable angina ([address] 2013    Morbid obesity ([address] 2013     Past Medical History:   Diagnosis Date    Arthritis     bilateral hips/knees    CAD (coronary artery disease) 13    PCI    Chronic pain     DJD; SILVIO KNEES    Diabetes ([address]     \"BORDERLINE\" PER MD    GERD (gastroesophageal reflux disease)     Macular degeneration     Morbid obesity (HCC)     Other and unspecified symptoms and signs involving general sensations and perceptions     Bilat. legs \"give out\" intermittently; Bilat leg cramps; macular degenration (left).  Other ill-defined conditions(799.89)     HYPERLIPIDEMIA; LT LE DVT (); MORBID OBESITY    Pulmonary embolism ([address] 2021    Thromboembolus ([address]     left LE      Past Surgical History:   Procedure Laterality Date    MT CARDIAC SURG PROCEDURE UNLIST      STENTS TO RCA    MT TOTAL HIP ARTHROPLASTY  2007    right    MT TOTAL HIP ARTHROPLASTY  2007    left      Social History     Tobacco Use    Smoking status: Former Smoker     Packs/day: 1.00     Quit date: 1983     Years since quittin.4    Smokeless tobacco: Never Used   Substance Use Topics    Alcohol use:  Yes     Alcohol/week: 2.5 standard drinks Types: 2 Glasses of wine, 1 Cans of beer per week     Comment: 0-2 X PER WK WITH MEALS OR AFTER PLAYING GOLF      Family History   Problem Relation Age of Onset    Heart Disease Mother     Arrhythmia Mother     Heart Disease Father     Cancer Sister     Lung Disease Sister       Current Facility-Administered Medications   Medication Dose Route Frequency    amiodarone (CORDARONE) tablet 200 mg  200 mg Oral Q12H    [START ON 7/6/2022] amiodarone (CORDARONE) tablet 200 mg  200 mg Oral DAILY    phenol throat spray (CHLORASEPTIC) 1 Spray  1 Spray Oral PRN    polyethylene glycol (MIRALAX) packet 17 g  17 g Oral DAILY    lactulose (CHRONULAC) 10 gram/15 mL solution 300 mL  200 g Rectal BID    insulin lispro (HUMALOG) injection   SubCUTAneous Q6H    pantoprazole (PROTONIX) 40 mg in 0.9% sodium chloride 10 mL injection  40 mg IntraVENous DAILY    calcium carbonate (TUMS) chewable tablet 400 mg [elemental]  400 mg Oral TID PRN    glucose chewable tablet 16 g  4 Tablet Oral PRN    glucagon (GLUCAGEN) injection 1 mg  1 mg IntraMUSCular PRN    dextrose 10% infusion 0-250 mL  0-250 mL IntraVENous PRN    alcohol 62% (NOZIN) nasal  1 Ampule  1 Ampule Topical Q12H    nitroglycerin (NITROSTAT) tablet 0.4 mg  0.4 mg SubLINGual PRN    atorvastatin (LIPITOR) tablet 20 mg  20 mg Oral QHS    vitamin U-J-U-lutein-minerals (OCUVITE) tablet 1 Tablet  1 Tablet Oral DAILY    sodium chloride (NS) flush 5-40 mL  5-40 mL IntraVENous Q8H    sodium chloride (NS) flush 5-40 mL  5-40 mL IntraVENous PRN    acetaminophen (TYLENOL) tablet 650 mg  650 mg Oral Q6H PRN    Or    acetaminophen (TYLENOL) suppository 650 mg  650 mg Rectal Q6H PRN    polyethylene glycol (MIRALAX) packet 17 g  17 g Oral DAILY PRN    ondansetron (ZOFRAN ODT) tablet 4 mg  4 mg Oral Q8H PRN    Or    ondansetron (ZOFRAN) injection 4 mg  4 mg IntraVENous Q6H PRN      Allergies   Allergen Reactions    Adhesive Other (comments)     Blisters     Advil [Ibuprofen] Rash     Rash all over, itching, trouble breathing     Celebrex [Celecoxib] Other (comments)     Swelling in legs, no rash, no itching, no trouble breathing    Cleocin [Clindamycin Hcl] Rash    Other Medication Unknown (comments)     \"Phosphate\"       Review of Systems:    A comprehensive review of systems was negative except for that written in the History of Present Illness. Objective:        Visit Vitals  BP (!) 121/53 (BP 1 Location: Left upper arm, BP Patient Position: At rest)   Pulse 64   Temp 98.4 °F (36.9 °C)   Resp 16   Ht 6' 4\" (1.93 m)   Wt 128.3 kg (282 lb 13.6 oz)   SpO2 99%   BMI 34.43 kg/m²       Physical Exam:  GENERAL: alert, cooperative, no distress, appears stated age, LUNG: clear to auscultation bilaterally, HEART: regular rate and rhythm, S1, S2 normal, no murmur, click, rub or gallop, ABDOMEN: soft, non-tender. Bowel sounds normal. No masses,  no organomegaly    Imaging:  images and reports reviewed    Lab/Data Review:  BMP:   Lab Results   Component Value Date/Time     07/02/2022 05:19 AM    K 3.8 07/02/2022 05:19 AM     (H) 07/02/2022 05:19 AM    CO2 23 07/02/2022 05:19 AM    AGAP 7 07/02/2022 05:19 AM     (H) 07/02/2022 05:19 AM    BUN 35 (H) 07/02/2022 05:19 AM    CREA 1.22 07/02/2022 05:19 AM    GFRAA >60 07/02/2022 05:19 AM    GFRNA 57 (L) 07/02/2022 05:19 AM     CBC:   Lab Results   Component Value Date/Time    WBC 11.4 (H) 07/02/2022 05:19 AM    HGB 7.8 (L) 07/02/2022 05:19 AM    HCT 23.1 (L) 07/02/2022 05:19 AM     (L) 07/02/2022 05:19 AM         Assessment:     76year old with ileus vs SBO. Appears to be resolving. Plan:     1. I recommend proceeding with gastrografin challenge. If contrast get to colon with D/C NG and start clear  liquids.

## 2022-07-03 NOTE — PROGRESS NOTES
1900 Bedside and Verbal shift change report given to [address] (oncoming nurse) by Gin Reis RN (offgoing nurse). Report included the following information SBAR, Kardex, Intake/Output, MAR, Recent Results and Cardiac Rhythm NSR.     2218 KUB resulted reviewed; contrast extending through small and large bowel to rectum, findings favored to represent ileus. Javed Sullivan MD paged. David Ann MD notified of KUB results. Verbal orders received for remove NG tube and advance pt to clear liquid diet. End of Shift Note    Bedside shift change report given to Gin Reis RN (oncoming nurse) by Eliezer Mccracken RN (offgoing nurse). Report included the following information SBAR, Kardex, Intake/Output, MAR, Recent Results and Cardiac Rhythm NSR    Shift worked:  1900 - 0700     Shift summary and any significant changes:     See above. Pt tolerating clears/     Concerns for physician to address:       Zone phone for oncoming shift:          Activity:  Activity Level: Up with Assistance  Number times ambulated in hallways past shift: 0  Number of times OOB to chair past shift: 0    Cardiac:   Cardiac Monitoring: Yes      Cardiac Rhythm: Sinus Dane    Access:   Current line(s): PIV     Genitourinary:   Urinary status: voiding    Respiratory:   O2 Device: None (Room air)  Chronic home O2 use?: NO  Incentive spirometer at bedside: N/A       GI:  Last Bowel Movement Date: 07/03/22  Current diet:  DIET ONE TIME MESSAGE  ADULT DIET Clear Liquid  Passing flatus: YES  Tolerating current diet: YES       Pain Management:   Patient states pain is manageable on current regimen: YES    Skin:  Jose Guadalupe Score: 16  Interventions: increase time out of bed and nutritional support     Patient Safety:  Fall Score:  Total Score: 3  Interventions: assistive device (walker, cane, etc), gripper socks and pt to call before getting OOB  High Fall Risk: Yes    Length of Stay:  Expected LOS: 3d 14h  Actual LOS: 6      Eliezer Mccracken RN

## 2022-07-04 LAB
ANION GAP SERPL CALC-SCNC: 6 MMOL/L (ref 5–15)
BUN SERPL-MCNC: 29 MG/DL (ref 6–20)
BUN/CREAT SERPL: 27 (ref 12–20)
CALCIUM SERPL-MCNC: 8.2 MG/DL (ref 8.5–10.1)
CHLORIDE SERPL-SCNC: 108 MMOL/L (ref 97–108)
CO2 SERPL-SCNC: 24 MMOL/L (ref 21–32)
CREAT SERPL-MCNC: 1.06 MG/DL (ref 0.7–1.3)
ERYTHROCYTE [DISTWIDTH] IN BLOOD BY AUTOMATED COUNT: 16.2 % (ref 11.5–14.5)
GLUCOSE BLD STRIP.AUTO-MCNC: 162 MG/DL (ref 65–117)
GLUCOSE BLD STRIP.AUTO-MCNC: 168 MG/DL (ref 65–117)
GLUCOSE BLD STRIP.AUTO-MCNC: 177 MG/DL (ref 65–117)
GLUCOSE BLD STRIP.AUTO-MCNC: 190 MG/DL (ref 65–117)
GLUCOSE SERPL-MCNC: 125 MG/DL (ref 65–100)
HCT VFR BLD AUTO: 24.6 % (ref 36.6–50.3)
HGB BLD-MCNC: 8.1 G/DL (ref 12.1–17)
MAGNESIUM SERPL-MCNC: 2.2 MG/DL (ref 1.6–2.4)
MCH RBC QN AUTO: 31.8 PG (ref 26–34)
MCHC RBC AUTO-ENTMCNC: 32.9 G/DL (ref 30–36.5)
MCV RBC AUTO: 96.5 FL (ref 80–99)
NRBC # BLD: 0.12 K/UL (ref 0–0.01)
NRBC BLD-RTO: 1.6 PER 100 WBC
PHOSPHATE SERPL-MCNC: 3.4 MG/DL (ref 2.6–4.7)
PLATELET # BLD AUTO: 123 K/UL (ref 150–400)
PMV BLD AUTO: 10.4 FL (ref 8.9–12.9)
POTASSIUM SERPL-SCNC: 3.5 MMOL/L (ref 3.5–5.1)
RBC # BLD AUTO: 2.55 M/UL (ref 4.1–5.7)
SERVICE CMNT-IMP: ABNORMAL
SODIUM SERPL-SCNC: 138 MMOL/L (ref 136–145)
WBC # BLD AUTO: 7.7 K/UL (ref 4.1–11.1)

## 2022-07-04 PROCEDURE — 74011636637 HC RX REV CODE- 636/637: Performed by: HOSPITALIST

## 2022-07-04 PROCEDURE — 74011000250 HC RX REV CODE- 250: Performed by: NURSE PRACTITIONER

## 2022-07-04 PROCEDURE — 74011250637 HC RX REV CODE- 250/637: Performed by: NURSE PRACTITIONER

## 2022-07-04 PROCEDURE — 51798 US URINE CAPACITY MEASURE: CPT

## 2022-07-04 PROCEDURE — 83735 ASSAY OF MAGNESIUM: CPT

## 2022-07-04 PROCEDURE — 85027 COMPLETE CBC AUTOMATED: CPT

## 2022-07-04 PROCEDURE — 74011250636 HC RX REV CODE- 250/636: Performed by: NURSE PRACTITIONER

## 2022-07-04 PROCEDURE — 74011250637 HC RX REV CODE- 250/637: Performed by: HOSPITALIST

## 2022-07-04 PROCEDURE — 84100 ASSAY OF PHOSPHORUS: CPT

## 2022-07-04 PROCEDURE — 74011000250 HC RX REV CODE- 250: Performed by: HOSPITALIST

## 2022-07-04 PROCEDURE — 74011250636 HC RX REV CODE- 250/636: Performed by: HOSPITALIST

## 2022-07-04 PROCEDURE — 80048 BASIC METABOLIC PNL TOTAL CA: CPT

## 2022-07-04 PROCEDURE — 65270000046 HC RM TELEMETRY

## 2022-07-04 PROCEDURE — 36415 COLL VENOUS BLD VENIPUNCTURE: CPT

## 2022-07-04 PROCEDURE — C9113 INJ PANTOPRAZOLE SODIUM, VIA: HCPCS | Performed by: NURSE PRACTITIONER

## 2022-07-04 PROCEDURE — 82962 GLUCOSE BLOOD TEST: CPT

## 2022-07-04 RX ADMIN — ATORVASTATIN CALCIUM 20 MG: 20 TABLET, FILM COATED ORAL at 21:07

## 2022-07-04 RX ADMIN — AMIODARONE HYDROCHLORIDE 200 MG: 200 TABLET ORAL at 21:07

## 2022-07-04 RX ADMIN — Medication 3 UNITS: at 06:00

## 2022-07-04 RX ADMIN — POLYETHYLENE GLYCOL 3350 17 G: 17 POWDER, FOR SOLUTION ORAL at 08:41

## 2022-07-04 RX ADMIN — Medication 3 UNITS: at 12:12

## 2022-07-04 RX ADMIN — AMIODARONE HYDROCHLORIDE 200 MG: 200 TABLET ORAL at 08:39

## 2022-07-04 RX ADMIN — I-VITE, TAB 1000-60-2MG (60/BT) 1 TABLET: TAB at 08:39

## 2022-07-04 RX ADMIN — SODIUM CHLORIDE, PRESERVATIVE FREE 40 MG: 5 INJECTION INTRAVENOUS at 08:39

## 2022-07-04 RX ADMIN — SODIUM CHLORIDE, PRESERVATIVE FREE 10 ML: 5 INJECTION INTRAVENOUS at 21:07

## 2022-07-04 RX ADMIN — SODIUM CHLORIDE, PRESERVATIVE FREE 10 ML: 5 INJECTION INTRAVENOUS at 06:00

## 2022-07-04 RX ADMIN — ONDANSETRON 4 MG: 2 INJECTION INTRAMUSCULAR; INTRAVENOUS at 13:44

## 2022-07-04 RX ADMIN — SODIUM CHLORIDE, PRESERVATIVE FREE 10 ML: 5 INJECTION INTRAVENOUS at 13:21

## 2022-07-04 RX ADMIN — Medication 3 UNITS: at 17:42

## 2022-07-04 NOTE — PROGRESS NOTES
Problem: Discharge Planning  Goal: *Discharge to safe environment  Outcome: Progressing Towards Goal     Problem: Falls - Risk of  Goal: *Absence of Falls  Description: Document Clydene Bone Fall Risk and appropriate interventions in the flowsheet. Outcome: Progressing Towards Goal  Note: Fall Risk Interventions:  Mobility Interventions: Bed/chair exit alarm,Communicate number of staff needed for ambulation/transfer,Patient to call before getting OOB,PT Consult for mobility concerns    Mentation Interventions: Adequate sleep, hydration, pain control,Room close to nurse's station    Medication Interventions: Patient to call before getting OOB,Teach patient to arise slowly    Elimination Interventions: Call light in reach,Patient to call for help with toileting needs    History of Falls Interventions: Bed/chair exit alarm,Consult care management for discharge planning,Door open when patient unattended         Problem: Patient Education: Go to Patient Education Activity  Goal: Patient/Family Education  Outcome: Progressing Towards Goal     Problem: Patient Education: Go to Patient Education Activity  Goal: Patient/Family Education  Outcome: Progressing Towards Goal     Problem: Risk for Spread of Infection  Goal: Prevent transmission of infectious organism to others  Description: Prevent the transmission of infectious organisms to other patients, staff members, and visitors.   Outcome: Progressing Towards Goal     Problem: Patient Education:  Go to Education Activity  Goal: Patient/Family Education  Outcome: Progressing Towards Goal     Problem: Patient Education: Go to Patient Education Activity  Goal: Patient/Family Education  Outcome: Progressing Towards Goal

## 2022-07-04 NOTE — PROGRESS NOTES
Bedside shift change report given to Heidi Blanchard RN by Mily HUNT. Report included the following information SBAR, ED Summary, Intake/Output, MAR, Recent Results and Cardiac Rhythm NSR.

## 2022-07-04 NOTE — PROGRESS NOTES
Cardiology Progress Note      7/4/2022 12:45 PM    Admit Date: 6/27/2022          Subjective: Some n/v this am. GI evaluating. No chest pain          Visit Vitals  BP (!) 131/59 (BP 1 Location: Left upper arm, BP Patient Position: Sitting)   Pulse 64   Temp 98.2 °F (36.8 °C)   Resp 18   Ht 6' 4\" (1.93 m)   Wt 125.7 kg (277 lb 1.9 oz)   SpO2 95%   BMI 33.73 kg/m²     07/02 1901 - 07/04 0700  In: 830 [P.O.:800]  Out: -         Objective:      Physical Exam:  VS as above  Chest CTA  Card RRR 2/6 KEVEN no gallop     Data Review:   Labs:      Hgb 8.1  Plat 123K  BUN 29  Creat 1.1      Telemetry: SR R 72      Assessment:     1. ITP- improving   2. Anemia  3. Rectal bleeding  4. Lactic acidosis- resolved   5.  small NSTEMI- type 2   6. Paroxysmal atrial fibrillation, prior Nl LVEF 2021 by echo   7. Remote PE and DVT (separate events)  8. CAD w/ mild diffuse disease w/ patent stent in the RCA 7/17 cath. 9. XOL  10. Morbid obesity  11. DM  12. Former smoker  15. N/V     Plan: Lexiscan nuclear tomm.  Would like to start aspirin low dose when ok with hematology

## 2022-07-04 NOTE — PROGRESS NOTES
Hospitalist Progress Note    NAME: Frankie Underwood   :  1945   MRN:  [id number]       Assessment / Plan: Thrombocytopenia (HCC)  S/p IVIG couldn't toleralate whole doses  S/p decadron   plts continue to improve today is 123  Hemo/onc on board   will need recommendation regarding restarting anticoagulation on discharge for A.fib    GI bleed  Was to due severe ITP  S/p blood transfusion  Hb is stable  Continue with protonix 40 daily(can switch to famotidine on discharger)  GI on board, holdign off EGD for now     Atrial fibrillation ([facility]  HR is controlled now   Continue with amiodarone  AC held for GI bleed  Will follow with cardiology      Elevated troponin  Can't be on anti plalates it for now  cardiology on board: will need NST   Continue with statin         30.0 - 39.9 Obese / Body mass index is 33.73 kg/m². Estimated discharge date:   Barriers:    Code status: Full  Prophylaxis: SCD's  Recommended Disposition: SAH/Rehab     Subjective:     Chief Complaint / Reason for Physician Visit  \"patient seen and examined today, doing well, no further bleeding episode\". Discussed with RN events overnight. Review of Systems:  Symptom Y/N Comments  Symptom Y/N Comments   Fever/Chills N   Chest Pain N    Poor Appetite N   Edema N    Cough N   Abdominal Pain N    Sputum N   Joint Pain N    SOB/SHEARER N   Pruritis/Rash N    Nausea/vomit N   Tolerating PT/OT Y    Diarrhea N   Tolerating Diet     Constipation N   Other       Could NOT obtain due to:      Objective:     VITALS:   Last 24hrs VS reviewed since prior progress note.  Most recent are:  Patient Vitals for the past 24 hrs:   Temp Pulse Resp BP SpO2   22 1048 98.2 °F (36.8 °C) 64 18 (!) 131/59 95 %   22 0755 97.7 °F (36.5 °C) 73 18 (!) 137/52 99 %   22 0409 98.2 °F (36.8 °C) 73 -- (!) 137/59 97 %   22 0015 -- 63 -- -- 95 %   22 2316 98.2 °F (36.8 °C) 65 17 (!) 135/52 98 %   22 2000 98.5 °F (36.9 °C) (!) 55 16 (!) 126/54 98 %   07/03/22 1637 97.6 °F (36.4 °C) 80 19 (!) 141/55 100 %       Intake/Output Summary (Last 24 hours) at 7/4/2022 1321  Last data filed at 7/4/2022 5143  Gross per 24 hour   Intake 480 ml   Output --   Net 480 ml        I had a face to face encounter and independently examined this patient on 7/4/2022, as outlined below:  PHYSICAL EXAM:  Physical Exam  Constitutional:       Appearance: Normal appearance. He is obese. HENT:      Head: Normocephalic and atraumatic. Mouth/Throat:      Mouth: Mucous membranes are moist.   Eyes:      General: No scleral icterus. Cardiovascular:      Rate and Rhythm: Normal rate and regular rhythm. Pulmonary:      Effort: No respiratory distress. Breath sounds: Normal breath sounds. Abdominal:      Palpations: Abdomen is soft. Tenderness: There is no abdominal tenderness. Musculoskeletal:      Cervical back: Neck supple. Right lower leg: No edema. Left lower leg: No edema. Comments: Some ecchymosis     Skin:     General: Skin is warm and dry. Capillary Refill: Capillary refill takes less than 2 seconds. Neurological:      Mental Status: He is alert and oriented to person, place, and time. Reviewed most current lab test results and cultures  YES  Reviewed most current radiology test results   YES  Review and summation of old records today    NO  Reviewed patient's current orders and MAR    YES  PMH/SH reviewed - no change compared to H&P  ________________________________________________________________________  Care Plan discussed with:    Comments   Patient X    Family  X    RN X    Care Manager     Consultant                        Multidiciplinary team rounds were held today with , nursing, pharmacist and clinical coordinator. Patient's plan of care was discussed; medications were reviewed and discharge planning was addressed. ________________________________________________________________________  Total NON critical care TIME:  35   Minutes    Total CRITICAL CARE TIME Spent:   Minutes non procedure based      Comments   >50% of visit spent in counseling and coordination of care X    ________________________________________________________________________  Arnold Simsm MD     Procedures: see electronic medical records for all procedures/Xrays and details which were not copied into this note but were reviewed prior to creation of Plan. LABS:  I reviewed today's most current labs and imaging studies. Pertinent labs include:  Recent Labs     07/04/22 0358 07/03/22  0402 07/02/22  0519   WBC 7.7 8.3 11.4*   HGB 8.1* 7.9* 7.8*   HCT 24.6* 24.7* 23.1*   * 118* 116*     Recent Labs     07/04/22 0358 07/03/22  0402 07/02/22  0519    141 141   K 3.5 3.5 3.8    112* 111*   CO2 24 23 23   * 81 106*   BUN 29* 40* 35*   CREA 1.06 1.30 1.22   CA 8.2* 8.0* 8.2*   MG 2.2  --  2.4   PHOS 3.4  --  3.2       Signed:  Arnold Simms MD

## 2022-07-04 NOTE — PROGRESS NOTES
1930: Bedside shift change report given to Mily HUNT (oncoming nurse) by Ranjith Khalil RN (offgoing nurse). Report included the following information SBAR, Kardex, Intake/Output, MAR, Cardiac Rhythm sinus bradycardia/nsr  and Alarm Parameters . 2000: Initial assessment completed- see flow sheet. Patient alert and oriented x 4. Patient currently resting in bed with no complaint of pain. VSS are stable. 97% on room air. Safety protocols in place. Call bell within reach. Will continue to monitor. 0015: Reassessment completed-see flow sheet   VSS stable. Patient resting in bed comfortably. Safety protocols in place. Call bell within reach. Will continue to monitor. 0709: Reassessment completed. No significant changes from initial assessment. Patient alert and oriented x 4. VSS stable. Patient ambulated to the bathroom with one person assist and walker. Patient urinated and had a bm no signs of bleeding. 0730: Bedside shift change report given to [address] (oncoming nurse) by Vinicius Sewell RN (offgoing nurse). Report included the following information SBAR, Kardex, Intake/Output, MAR, Recent Results, Cardiac Rhythm nsr  and Alarm Parameters .

## 2022-07-04 NOTE — PROGRESS NOTES
Comprehensive Nutrition Assessment    Type and Reason for Visit: Initial,RD nutrition re-screen/LOS    Nutrition Recommendations/Plan:   1. Advance diet post-procedure as able back to Carb Controlled (75 g CHO/meal) to meet est needs. 2. Please document % meals and supplements consumed in flowsheet I/O's under intake. Nutrition Assessment:     7/4: Maddie reviewed; med noted for thrombocytopenia, GI bleed. Pt currently on clear liquids with NPO after midnight. POC BG trends 133-215 last 24 hrs. Patient Vitals for the past 168 hrs:   % Diet Eaten   07/04/22 0852 76 - 100%   06/28/22 1002 26 - 50%     Last Weight Metric  Weight Loss Metrics 7/4/2022 8/9/2021 10/13/2019 11/27/2018 10/28/2018 7/17/2017 2/1/2014   Today's Wt 277 lb 1.9 oz 325 lb 13.4 oz 308 lb 305 lb 5.4 oz 304 lb 312 lb 306 lb   BMI 33.73 kg/m2 39.66 kg/m2 37.49 kg/m2 36.21 kg/m2 37 kg/m2 37 kg/m2 -     Nutrition Related Findings:    BM: 7/3; Labs: -215; Meds: reviewed Wound Type: None    Current Nutrition Intake & Therapies:        DIET ONE TIME MESSAGE  ADULT DIET Clear Liquid  DIET NPO    Anthropometric Measures:  Height: 6' 4\" (193 cm)  Ideal Body Weight (IBW): 202 lbs (92 kg)     Current Body Wt:  125.7 kg (277 lb 1.9 oz), 137.2 % IBW. Current BMI (kg/m2): 33.7                          BMI Category: Obese class 1 (BMI 30.0-34. 9)    Estimated Daily Nutrient Needs:  Energy Requirements Based On: Formula  Weight Used for Energy Requirements: Current  Energy (kcal/day): 2500 (BMR x 1. 2AF)  Weight Used for Protein Requirements: Current  Protein (g/day): 126 (1.0 g/kg bw)  Method Used for Fluid Requirements: 1 ml/kcal  Fluid (ml/day): 2500 ml/day    Nutrition Diagnosis:   · Inadequate protein-energy intake related to altered GI function as evidenced by NPO or clear liquid status due to medical condition    Nutrition Interventions:   Food and/or Nutrient Delivery: Continue current diet (diet progression as able per GI and attending)  Nutrition Education/Counseling: No recommendations at this time  Coordination of Nutrition Care: Continue to monitor while inpatient       Goals:     Goals:  (Progress diet beyond clear liquids next 24-72 hrs post procedure)       Nutrition Monitoring and Evaluation:   Behavioral-Environmental Outcomes: None identified  Food/Nutrient Intake Outcomes: Food and nutrient intake  Physical Signs/Symptoms Outcomes: Biochemical data,Skin,Weight,GI status    Discharge Planning:     Too soon to determine    Sy Durbin RD  Contact:

## 2022-07-05 ENCOUNTER — APPOINTMENT (OUTPATIENT)
Dept: NUCLEAR MEDICINE | Age: 77
DRG: 981 | End: 2022-07-05
Attending: INTERNAL MEDICINE
Payer: MEDICARE

## 2022-07-05 ENCOUNTER — HOSPITAL ENCOUNTER (OUTPATIENT)
Dept: NON INVASIVE DIAGNOSTICS | Age: 77
Discharge: HOME | DRG: 981 | End: 2022-07-05
Attending: INTERNAL MEDICINE
Payer: MEDICARE

## 2022-07-05 ENCOUNTER — APPOINTMENT (OUTPATIENT)
Dept: CT IMAGING | Age: 77
DRG: 981 | End: 2022-07-05
Attending: STUDENT IN AN ORGANIZED HEALTH CARE EDUCATION/TRAINING PROGRAM
Payer: MEDICARE

## 2022-07-05 LAB
ANION GAP SERPL CALC-SCNC: 7 MMOL/L (ref 5–15)
BUN SERPL-MCNC: 33 MG/DL (ref 6–20)
BUN/CREAT SERPL: 26 (ref 12–20)
CALCIUM SERPL-MCNC: 8 MG/DL (ref 8.5–10.1)
CHLORIDE SERPL-SCNC: 105 MMOL/L (ref 97–108)
CO2 SERPL-SCNC: 21 MMOL/L (ref 21–32)
CREAT SERPL-MCNC: 1.26 MG/DL (ref 0.7–1.3)
ERYTHROCYTE [DISTWIDTH] IN BLOOD BY AUTOMATED COUNT: 15.9 % (ref 11.5–14.5)
GLUCOSE BLD STRIP.AUTO-MCNC: 141 MG/DL (ref 65–117)
GLUCOSE BLD STRIP.AUTO-MCNC: 160 MG/DL (ref 65–117)
GLUCOSE BLD STRIP.AUTO-MCNC: 174 MG/DL (ref 65–117)
GLUCOSE BLD STRIP.AUTO-MCNC: 175 MG/DL (ref 65–117)
GLUCOSE SERPL-MCNC: 159 MG/DL (ref 65–100)
HCT VFR BLD AUTO: 25.8 % (ref 36.6–50.3)
HGB BLD-MCNC: 8.5 G/DL (ref 12.1–17)
MAGNESIUM SERPL-MCNC: 2.2 MG/DL (ref 1.6–2.4)
MCH RBC QN AUTO: 31.4 PG (ref 26–34)
MCHC RBC AUTO-ENTMCNC: 32.9 G/DL (ref 30–36.5)
MCV RBC AUTO: 95.2 FL (ref 80–99)
NRBC # BLD: 0.04 K/UL (ref 0–0.01)
NRBC BLD-RTO: 0.5 PER 100 WBC
PHOSPHATE SERPL-MCNC: 3.7 MG/DL (ref 2.6–4.7)
PLATELET # BLD AUTO: 133 K/UL (ref 150–400)
PMV BLD AUTO: 10.6 FL (ref 8.9–12.9)
POTASSIUM SERPL-SCNC: 4.2 MMOL/L (ref 3.5–5.1)
RBC # BLD AUTO: 2.71 M/UL (ref 4.1–5.7)
SERVICE CMNT-IMP: ABNORMAL
SODIUM SERPL-SCNC: 133 MMOL/L (ref 136–145)
WBC # BLD AUTO: 7.9 K/UL (ref 4.1–11.1)

## 2022-07-05 PROCEDURE — 74011636637 HC RX REV CODE- 636/637: Performed by: HOSPITALIST

## 2022-07-05 PROCEDURE — 85027 COMPLETE CBC AUTOMATED: CPT

## 2022-07-05 PROCEDURE — 36415 COLL VENOUS BLD VENIPUNCTURE: CPT

## 2022-07-05 PROCEDURE — 80048 BASIC METABOLIC PNL TOTAL CA: CPT

## 2022-07-05 PROCEDURE — A9500 TC99M SESTAMIBI: HCPCS

## 2022-07-05 PROCEDURE — 83735 ASSAY OF MAGNESIUM: CPT

## 2022-07-05 PROCEDURE — 82962 GLUCOSE BLOOD TEST: CPT

## 2022-07-05 PROCEDURE — C9113 INJ PANTOPRAZOLE SODIUM, VIA: HCPCS | Performed by: NURSE PRACTITIONER

## 2022-07-05 PROCEDURE — 74011250636 HC RX REV CODE- 250/636: Performed by: INTERNAL MEDICINE

## 2022-07-05 PROCEDURE — 74011000250 HC RX REV CODE- 250: Performed by: INTERNAL MEDICINE

## 2022-07-05 PROCEDURE — 74011000250 HC RX REV CODE- 250: Performed by: NURSE PRACTITIONER

## 2022-07-05 PROCEDURE — 65270000046 HC RM TELEMETRY

## 2022-07-05 PROCEDURE — 74011250636 HC RX REV CODE- 250/636: Performed by: NURSE PRACTITIONER

## 2022-07-05 PROCEDURE — 97116 GAIT TRAINING THERAPY: CPT

## 2022-07-05 PROCEDURE — 74011250636 HC RX REV CODE- 250/636: Performed by: HOSPITALIST

## 2022-07-05 PROCEDURE — 74176 CT ABD & PELVIS W/O CONTRAST: CPT

## 2022-07-05 PROCEDURE — 74011250637 HC RX REV CODE- 250/637: Performed by: HOSPITALIST

## 2022-07-05 PROCEDURE — 93017 CV STRESS TEST TRACING ONLY: CPT

## 2022-07-05 PROCEDURE — 74011000250 HC RX REV CODE- 250: Performed by: HOSPITALIST

## 2022-07-05 PROCEDURE — 84100 ASSAY OF PHOSPHORUS: CPT

## 2022-07-05 RX ORDER — SODIUM CHLORIDE 0.9 % (FLUSH) 0.9 %
10 SYRINGE (ML) INJECTION AS NEEDED
Status: DISCONTINUED | OUTPATIENT
Start: 2022-07-05 | End: 2022-07-09 | Stop reason: HOSPADM

## 2022-07-05 RX ADMIN — Medication 3 UNITS: at 06:06

## 2022-07-05 RX ADMIN — SODIUM CHLORIDE, PRESERVATIVE FREE 10 ML: 5 INJECTION INTRAVENOUS at 06:06

## 2022-07-05 RX ADMIN — Medication 3 UNITS: at 18:34

## 2022-07-05 RX ADMIN — SODIUM CHLORIDE, PRESERVATIVE FREE 10 ML: 5 INJECTION INTRAVENOUS at 21:06

## 2022-07-05 RX ADMIN — Medication 3 UNITS: at 12:00

## 2022-07-05 RX ADMIN — I-VITE, TAB 1000-60-2MG (60/BT) 1 TABLET: TAB at 13:15

## 2022-07-05 RX ADMIN — ATORVASTATIN CALCIUM 20 MG: 20 TABLET, FILM COATED ORAL at 21:05

## 2022-07-05 RX ADMIN — AMIODARONE HYDROCHLORIDE 200 MG: 200 TABLET ORAL at 21:06

## 2022-07-05 RX ADMIN — AMIODARONE HYDROCHLORIDE 200 MG: 200 TABLET ORAL at 13:08

## 2022-07-05 RX ADMIN — REGADENOSON 0.4 MG: 0.08 INJECTION, SOLUTION INTRAVENOUS at 10:07

## 2022-07-05 RX ADMIN — SODIUM CHLORIDE, PRESERVATIVE FREE 10 ML: 5 INJECTION INTRAVENOUS at 14:00

## 2022-07-05 RX ADMIN — SODIUM CHLORIDE, PRESERVATIVE FREE 40 MG: 5 INJECTION INTRAVENOUS at 13:08

## 2022-07-05 RX ADMIN — SODIUM CHLORIDE, PRESERVATIVE FREE 10 ML: 5 INJECTION INTRAVENOUS at 10:00

## 2022-07-05 RX ADMIN — ONDANSETRON 4 MG: 2 INJECTION INTRAMUSCULAR; INTRAVENOUS at 18:25

## 2022-07-05 NOTE — PROGRESS NOTES
Problem: Mobility Impaired (Adult and Pediatric)  Goal: *Acute Goals and Plan of Care (Insert Text)  Description: FUNCTIONAL STATUS PRIOR TO ADMISSION: Patient was modified independent using a rollator for functional mobility. HOME SUPPORT PRIOR TO ADMISSION: The patient lived with wife at [address] but did not require assist.    Physical Therapy Goals  Initiated 7/2/2022  1. Patient will move from supine to sit and sit to supine , scoot up and down, and roll side to side in bed with modified independence within 7 day(s). 2.  Patient will transfer from bed to chair and chair to bed with modified independence using the least restrictive device within 7 day(s). 3.  Patient will perform sit to stand with modified independence within 7 day(s). 4.  Patient will ambulate with modified independence for 150 feet with the least restrictive device within 7 day(s). Outcome: Progressing Towards Goal     PHYSICAL THERAPY TREATMENT  Patient: Devin Alvarez (80 y.o. male)  Date: 7/5/2022  Diagnosis: Thrombocytopenia ([facility] [D69.6]  GI bleed [K92.2]  UGO (acute kidney injury) ([facility] [N17.9] <principal problem not specified>       Precautions:    Chart, physical therapy assessment, plan of care and goals were reviewed. ASSESSMENT  Patient continues with skilled PT services and is progressing towards goals. Able to progress gait distance this date but patient with quick movement and cues needed throughout for pacing and energy conservation. Patient fatiguing quickly with activity and noted HR increased from 80 at rest to 120 bpm with activity. Reviewed importance of energy conservation and pacing but patient continued to ambulate quickly. Remains limited by impaired activity tolerance/endurance and would benefit from [facility]PT [facility]. Acute PT will continue to follow and progress as able.     Current Level of Function Impacting Discharge (mobility/balance): Stand-by-assist with RW    Other factors to consider for discharge: lives at [address] :  Patient continues to benefit from skilled intervention to address the above impairments. Continue treatment per established plan of care. to address goals. Recommendation for discharge: (in order for the patient to meet his/her long term goals)  Physical therapy at least 2 days/week in the home AND ensure assist and/or supervision for safety with functional mobility as needed    This discharge recommendation:  Has not yet been discussed the attending provider and/or case management    IF patient discharges home will need the following DME: patient owns DME required for discharge       SUBJECTIVE:   Patient stated this is just how I am. I'm always Vernestine Huddle in regards to fast walking    OBJECTIVE DATA SUMMARY:   Critical Behavior:  Neurologic State: Alert  Orientation Level: Oriented X4  Cognition: Follows commands  Safety/Judgement: Awareness of environment  Functional Mobility Training:  Bed Mobility:  Supine to Sit: Stand-by assistance; Additional time;Bed Modified  Sit to Supine: Stand-by assistance  Scooting: Stand-by assistance  Transfers:  Sit to Stand: Stand-by assistance; Adaptive equipment; Additional time  Stand to Sit: Stand-by assistance; Adaptive equipment; Additional time  Balance:  Sitting: Intact  Standing: Impaired  Standing - Static: Good;Constant support  Standing - Dynamic : Good;Constant support  Ambulation/Gait Training:  Distance (ft): 70 Feet (ft)  Assistive Device: Walker, rolling;Gait belt  Ambulation - Level of Assistance: Contact guard assistance  Gait Abnormalities: Decreased step clearance;Trunk sway increased (cues for pacing)  Base of Support: Widened  Speed/Beatriz: Accelerated  Step Length: Left shortened;Right shortened  Pain Rating:  Did not interfere    Activity Tolerance:   Fair, requires rest breaks, observed SOB with activity, and HR increased 40bpm with minimal activity (80 at rest, 120 with activity)    After treatment patient left in no apparent distress:   Supine in bed, Call bell within reach, Bed / chair alarm activated, and Side rails x 3    COMMUNICATION/COLLABORATION:   The patients plan of care was discussed with: Registered nurse.      Kiah Lehman, PT   Time Calculation: 16 mins

## 2022-07-05 NOTE — PROGRESS NOTES
End of Shift Note    Bedside shift change report given to Tyler Barros RN (oncoming nurse) by Danica Henderson RN (offgoing nurse). Report included the following information SBAR, Ferdex and MAR    Shift worked:  [id number]     Shift summary and any significant changes:     Pt kept NPO after midnight     Concerns for physician to address:       Zone phone for oncoming shift:          Activity:  Activity Level: Up with Assistance  Number times ambulated in hallways past shift: 0  Number of times OOB to chair past shift: 0    Cardiac:   Cardiac Monitoring: Yes      Cardiac Rhythm: Sinus Rhythm    Access:   Current line(s): PIV     Genitourinary:   Urinary status: voiding    Respiratory:   O2 Device: None (Room air)  Chronic home O2 use?: NO  Incentive spirometer at bedside: NO       GI:  Last Bowel Movement Date: 07/03/22  Current diet:  DIET ONE TIME MESSAGE  DIET NPO  Passing flatus: YES  Tolerating current diet:        Pain Management:   Patient states pain is manageable on current regimen: YES    Skin:  Jose Guadalupe Score: 20  Interventions: increase time out of bed    Patient Safety:  Fall Score:  Total Score: 4  Interventions: bed/chair alarm, assistive device (walker, cane, etc), gripper socks and pt to call before getting OOB  High Fall Risk: Yes    Length of Stay:  Expected LOS: 3d 14h  Actual LOS: 8      Danica Henderson RN

## 2022-07-05 NOTE — PROGRESS NOTES
Pt had episode of vomiting, it was a light brown color and smelled like stool. He has had 2 episodes of liquid stool today no sign of blood. Abdomen is soft nontender. His only complaint is feeling more SOB with activity.      Dr Kenton Arteaga notified via perfect serve  - received order for STAT CT of abdomen

## 2022-07-05 NOTE — PROGRESS NOTES
Progress Note      7/5/2022 9:12 AM  NAME: Nighat Munguia   MRN:  [id number]   Admit Diagnosis: Thrombocytopenia ([facility] [address] [D69.6]  GI bleed [K92.2]  UGO (acute kidney injury) ([facility] [address] [N17.9]      Problem List:     1. Idiopathic thrombocytopenia  2. Anemia  3. Rectal bleeding  4. Lactic acidosis  5. NSTEMI  6. Paroxysmal atrial fibrillation  7. Remote PE and DVT (separate events)  8. CAD w/ mild diffuse disease w/ patent stent in the RCA 7/17 cath. 9. XOL  10. Morbid obesity  11. DM  12. Former smoker     Assessment/Plan: TnI 2300  PLT 130s  HgB 8    1. Continue oral amio  2. No ASA, eliquis, plavix until ok with GI/hematology  3. Continue statin  4. For Lexiscan MPI today (two day study)         [x]       High complexity decision making was performed in this patient at high risk for decompensation with multiple organ involvement. Subjective:     Nighat Munguia denies chest pain, dyspnea. Discussed with RN events overnight. Review of Systems:    Symptom Y/N Comments  Symptom Y/N Comments   Fever/Chills N   Chest Pain N    Poor Appetite N   Edema N    Cough N   Abdominal Pain N    Sputum N   Joint Pain N    SOB/SHEARER N   Pruritis/Rash N    Nausea/vomit N   Tolerating PT/OT Y    Diarrhea N   Tolerating Diet Y    Constipation N   Other       Could NOT obtain due to:      Objective:      Physical Exam:    Last 24hrs VS reviewed since prior progress note. Most recent are:    Visit Vitals  /60 (BP 1 Location: Left arm)   Pulse 66   Temp 98 °F (36.7 °C)   Resp 18   Ht 6' 4\" (1.93 m)   Wt 125.7 kg (277 lb 1.9 oz)   SpO2 99%   BMI 33.73 kg/m²       Intake/Output Summary (Last 24 hours) at 7/5/2022 1102  Last data filed at 7/5/2022 1003  Gross per 24 hour   Intake --   Output 450 ml   Net -450 ml        General Appearance: Well developed, well nourished, alert & oriented x 3,    no acute distress. Ears/Nose/Mouth/Throat: Hearing grossly normal.  Neck: Supple.   Chest: Lungs clear to auscultation bilaterally. Cardiovascular: Regular rate and rhythm, S1S2 normal, no murmur. Abdomen: Soft, non-tender, bowel sounds are active. Extremities: No edema bilaterally. Skin: Warm and dry. Petechiae. []         Post-cath site without hematoma, bruit, tenderness, or thrill. Distal pulses intact. PMH/ reviewed - no change compared to H&P    Data Review    Telemetry: sinus rhythm     EKG:   [x]  No new EKG for review    Lab Data Personally Reviewed:    Recent Labs     07/05/22  0340 07/04/22  0358   WBC 7.9 7.7   HGB 8.5* 8.1*   HCT 25.8* 24.6*   * 123*     No results for input(s): INR, PTP, APTT, INREXT, INREXT in the last 72 hours. Recent Labs     07/05/22  0236 07/04/22  0358 07/03/22  0402   * 138 141   K 4.2 3.5 3.5    108 112*   CO2 21 24 23   BUN 33* 29* 40*   CREA 1.26 1.06 1.30   * 125* 81   CA 8.0* 8.2* 8.0*   MG 2.2 2.2  --      No results for input(s): CPK, CKNDX, TROIQ in the last 72 hours. No lab exists for component: CPKMB  Lab Results   Component Value Date/Time    Cholesterol, total 133 06/07/2013 02:40 PM    HDL Cholesterol 43 06/07/2013 02:40 PM    LDL, calculated 73.4 06/07/2013 02:40 PM    Triglyceride 83 06/07/2013 02:40 PM    CHOL/HDL Ratio 3.1 06/07/2013 02:40 PM       No results for input(s): AP, TBIL, TP, ALB, GLOB, GGT, AML, LPSE in the last 72 hours. No lab exists for component: SGOT, GPT, AMYP, HLPSE  No results for input(s): PH, PCO2, PO2 in the last 72 hours.     Medications Personally Reviewed:    Current Facility-Administered Medications   Medication Dose Route Frequency    amiodarone (CORDARONE) tablet 200 mg  200 mg Oral Q12H    [START ON 7/6/2022] amiodarone (CORDARONE) tablet 200 mg  200 mg Oral DAILY    phenol throat spray (CHLORASEPTIC) 1 Spray  1 Spray Oral PRN    polyethylene glycol (MIRALAX) packet 17 g  17 g Oral DAILY    lactulose (CHRONULAC) 10 gram/15 mL solution 300 mL  200 g Rectal BID    insulin lispro (HUMALOG) injection   SubCUTAneous Q6H    pantoprazole (PROTONIX) 40 mg in 0.9% sodium chloride 10 mL injection  40 mg IntraVENous DAILY    calcium carbonate (TUMS) chewable tablet 400 mg [elemental]  400 mg Oral TID PRN    glucose chewable tablet 16 g  4 Tablet Oral PRN    glucagon (GLUCAGEN) injection 1 mg  1 mg IntraMUSCular PRN    dextrose 10% infusion 0-250 mL  0-250 mL IntraVENous PRN    nitroglycerin (NITROSTAT) tablet 0.4 mg  0.4 mg SubLINGual PRN    atorvastatin (LIPITOR) tablet 20 mg  20 mg Oral QHS    vitamin N-H-Z-lutein-minerals (OCUVITE) tablet 1 Tablet  1 Tablet Oral DAILY    sodium chloride (NS) flush 5-40 mL  5-40 mL IntraVENous Q8H    sodium chloride (NS) flush 5-40 mL  5-40 mL IntraVENous PRN    acetaminophen (TYLENOL) tablet 650 mg  650 mg Oral Q6H PRN    Or    acetaminophen (TYLENOL) suppository 650 mg  650 mg Rectal Q6H PRN    polyethylene glycol (MIRALAX) packet 17 g  17 g Oral DAILY PRN    ondansetron (ZOFRAN ODT) tablet 4 mg  4 mg Oral Q8H PRN    Or    ondansetron (ZOFRAN) injection 4 mg  4 mg IntraVENous Q6H PRN     Facility-Administered Medications Ordered in Other Encounters   Medication Dose Route Frequency    sodium chloride (NS) flush 10 mL  10 mL IntraVENous PRN         Onnie Kawasaki III, DO

## 2022-07-05 NOTE — PROGRESS NOTES
Hospitalist Progress Note    NAME: Yani Olivera   :  1945   MRN:  [id number]       Assessment / Plan: Thrombocytopenia (HCC)  S/p IVIG couldn't toleralate whole doses  S/p decadron   plts continue to improve today is 123  Hemo/onc on board     GI bleed  Was to due severe ITP  S/p blood transfusion  Hb is stable  Continue with protonix 40 daily(can switch to famotidine on discharger)  GI on board, holdign off EGD for now     Atrial fibrillation ([address]  HR is controlled now   Continue with amiodarone  AC held for GI bleed, Hemo/onc on board recommenced to restart anticoagulation upon discharge   Will follow with cardiology      Elevated troponin  Can't be on anti plalates it for now  cardiology on board: NST today   Continue with statin         30.0 - 39.9 Obese / Body mass index is 33.73 kg/m². Estimated discharge date:   Barriers:    Code status: Full  Prophylaxis: SCD's  Recommended Disposition: SAH/Rehab     Subjective:     Chief Complaint / Reason for Physician Visit  \"patient seen and examined today, doing well, no further bleeding episode\". Discussed with RN events overnight. Review of Systems:  Symptom Y/N Comments  Symptom Y/N Comments   Fever/Chills N   Chest Pain N    Poor Appetite N   Edema N    Cough N   Abdominal Pain N    Sputum N   Joint Pain N    SOB/SHEARER N   Pruritis/Rash N    Nausea/vomit N   Tolerating PT/OT Y    Diarrhea N   Tolerating Diet     Constipation N   Other       Could NOT obtain due to:      Objective:     VITALS:   Last 24hrs VS reviewed since prior progress note.  Most recent are:  Patient Vitals for the past 24 hrs:   Temp Pulse Resp BP SpO2   22 1250 98 °F (36.7 °C) 67 20 123/62 94 %   22 0751 98 °F (36.7 °C) 66 18 119/60 99 %   22 0243 98 °F (36.7 °C) 73 20 (!) 153/57 96 %   22 2300 98 °F (36.7 °C) 80 18 (!) 124/55 90 %   22 1918 98.6 °F (37 °C) 95 18 (!) 152/62 98 %   22 1632 98 °F (36.7 °C) 64 18 (!) 146/57 99 % Intake/Output Summary (Last 24 hours) at 7/5/2022 1613  Last data filed at 7/5/2022 1003  Gross per 24 hour   Intake --   Output 450 ml   Net -450 ml        I had a face to face encounter and independently examined this patient on 7/5/2022, as outlined below:  PHYSICAL EXAM:  Physical Exam  Constitutional:       Appearance: Normal appearance. He is obese. HENT:      Head: Normocephalic and atraumatic. Mouth/Throat:      Mouth: Mucous membranes are moist.   Eyes:      General: No scleral icterus. Cardiovascular:      Rate and Rhythm: Normal rate and regular rhythm. Pulmonary:      Effort: No respiratory distress. Breath sounds: Normal breath sounds. Abdominal:      Palpations: Abdomen is soft. Tenderness: There is no abdominal tenderness. Musculoskeletal:      Cervical back: Neck supple. Right lower leg: No edema. Left lower leg: No edema. Comments: Some ecchymosis     Skin:     General: Skin is warm and dry. Capillary Refill: Capillary refill takes less than 2 seconds. Neurological:      Mental Status: He is alert and oriented to person, place, and time. Reviewed most current lab test results and cultures  YES  Reviewed most current radiology test results   YES  Review and summation of old records today    NO  Reviewed patient's current orders and MAR    YES  PMH/SH reviewed - no change compared to H&P  ________________________________________________________________________  Care Plan discussed with:    Comments   Patient X    Family  X    RN X    Care Manager     Consultant                        Multidiciplinary team rounds were held today with , nursing, pharmacist and clinical coordinator. Patient's plan of care was discussed; medications were reviewed and discharge planning was addressed.      ________________________________________________________________________  Total NON critical care TIME:  35   Minutes    Total CRITICAL CARE TIME Spent: Minutes non procedure based      Comments   >50% of visit spent in counseling and coordination of care X    ________________________________________________________________________  Shai Hernadez MD     Procedures: see electronic medical records for all procedures/Xrays and details which were not copied into this note but were reviewed prior to creation of Plan. LABS:  I reviewed today's most current labs and imaging studies. Pertinent labs include:  Recent Labs     07/05/22  0340 07/04/22  0358 07/03/22  0402   WBC 7.9 7.7 8.3   HGB 8.5* 8.1* 7.9*   HCT 25.8* 24.6* 24.7*   * 123* 118*     Recent Labs     07/05/22  0236 07/04/22  0358 07/03/22  0402   * 138 141   K 4.2 3.5 3.5    108 112*   CO2 21 24 23   * 125* 81   BUN 33* 29* 40*   CREA 1.26 1.06 1.30   CA 8.0* 8.2* 8.0*   MG 2.2 2.2  --    PHOS 3.7 3.4  --        Signed:  Shai Hernadez MD

## 2022-07-05 NOTE — PROGRESS NOTES
[address] [facility]  [address] [facility], [address] [facility], [facility] Cristobal Rodriguez, [address]  [phone]      Hematology/ Oncology Progress Note      Reason for consult:     Tanna Felix is a 76 y.o. male who we have been asked to see by Dr. Anita Russell for acute thrombocytopenia. Subjective:     Tanna Felix is a 76year old male who presented to the ED at [facility] for fatigue, scattered petechiae to all extremities, purpura, hematuria and rectal bleeding. He has a PMH of several DVT and PE since 2008 and has been on eliquis OP. He also has a PMH of CAD with PCI, diabetes type II, obesity and bilateral hip repair. He was recently started on bactrim OP for a UTI. Patient seen at bedside today in the ED with wife. Discussed potential causes of acute severe thrombocytopenia, work up and treatment. He stated that petechiae appeared over last 24 hours after a hot shower. Interval History:     6/28/2022 Patient seen at bedside, discussed another transfusion of platelets. 6/29/2022 Patient seen at bedside in ICU, transferred overnight with CP and afib. Reports he feels better. 6/30/2022 Patient seen at bedside, feeling better today. Happy to hear his platelets are improving. Did not tolerate IVIG infusion last night despite pre-medications. 7/1/2022 Patient seen at bedside, reports he is feeling well today. Hopeful to be moved out of ICU soon. 7/5/2022 Patient seen at bedside with wife. Discussed ITP, platelets trending up and restarting eliquis. Now on step down unit. Petechiae are beginning to resolve. Review of Systems:  Pertinent items are noted in the History of Present Illness.        Past Medical History:   Diagnosis Date    Arthritis     bilateral hips/knees    CAD (coronary artery disease) 6/7/13    PCI    Chronic pain     DJD; SILVIO KNEES    Diabetes ([facility] [address] [address]     \"BORDERLINE\" PER MD    GERD (gastroesophageal reflux disease)     Macular degeneration     Morbid obesity ([facility] [address]     Other and unspecified symptoms and signs involving general sensations and perceptions     Bilat. legs \"give out\" intermittently; Bilat leg cramps; macular degenration (left).  Other ill-defined conditions(799.89)     HYPERLIPIDEMIA; LT LE DVT (); MORBID OBESITY    Pulmonary embolism ([address] 2021    Thromboembolus ([facility] [address]     left LE     Past Surgical History:   Procedure Laterality Date    IA CARDIAC SURG PROCEDURE UNLIST      STENTS TO RCA    IA TOTAL HIP ARTHROPLASTY  2007    right    IA TOTAL HIP ARTHROPLASTY  2007    left      Family History   Problem Relation Age of Onset    Heart Disease Mother     Arrhythmia Mother     Heart Disease Father     Cancer Sister     Lung Disease Sister      Social History     Tobacco Use    Smoking status: Former Smoker     Packs/day: 1.00     Quit date: 1983     Years since quittin.4    Smokeless tobacco: Never Used   Substance Use Topics    Alcohol use:  Yes     Alcohol/week: 2.5 standard drinks     Types: 2 Glasses of wine, 1 Cans of beer per week     Comment: 0-2 X PER WK WITH MEALS OR AFTER PLAYING GOLF      Current Facility-Administered Medications   Medication Dose Route Frequency Provider Last Rate Last Admin    apixaban (ELIQUIS) tablet 5 mg  5 mg Oral BID Caye Po L, NP        amiodarone (CORDARONE) tablet 200 mg  200 mg Oral Q12H Valeria Melissa MD   200 mg at 22 1308    [START ON 2022] amiodarone (CORDARONE) tablet 200 mg  200 mg Oral DAILY Valeria Melissa MD        phenol throat spray (CHLORASEPTIC) 1 Spray  1 Spray Oral PRN Quan Quinn MD        polyethylene glycol [facility]) packet 17 g  17 g Oral DAILY Cindy Melo NP   17 g at 22 0841    lactulose (CHRONULAC) 10 gram/15 mL solution 300 mL  200 g Rectal BID Valeria Melissa MD   300 mL at 22 1028    insulin lispro (HUMALOG) injection   SubCUTAneous Q6H Valeria Melissa MD   3 Units at 07/05/22 1200    pantoprazole (PROTONIX) 40 mg in 0.9% sodium chloride 10 mL injection  40 mg IntraVENous DAILY Lindsay Polk NP   40 mg at 07/05/22 1308    calcium carbonate (TUMS) chewable tablet 400 mg [elemental]  400 mg Oral TID PRN Atif Carr NP   400 mg at 07/01/22 0133    glucose chewable tablet 16 g  4 Tablet Oral PRN Octavio Foote NP        glucagon (GLUCAGEN) injection 1 mg  1 mg IntraMUSCular PRN Octavio Foote NP        dextrose 10% infusion 0-250 mL  0-250 mL IntraVENous PRN Octavio Foote NP        nitroglycerin (NITROSTAT) tablet 0.4 mg  0.4 mg SubLINGual PRN Simona Calles NP   0.4 mg at 06/28/22 2322    atorvastatin (LIPITOR) tablet 20 mg  20 mg Oral QHS Reny Sood MD   20 mg at 07/04/22 2107    vitamin Y-K-Q-lutein-minerals (OCUVITE) tablet 1 Tablet  1 Tablet Oral DAILY Reny Sood MD   1 Tablet at 07/05/22 1315    sodium chloride (NS) flush 5-40 mL  5-40 mL IntraVENous Q8H Reny Sood MD   10 mL at 07/05/22 1400    sodium chloride (NS) flush 5-40 mL  5-40 mL IntraVENous PRN Reny Sood MD        acetaminophen (TYLENOL) tablet 650 mg  650 mg Oral Q6H PRN Reny Sood MD   650 mg at 07/02/22 2246    Or    acetaminophen (TYLENOL) suppository 650 mg  650 mg Rectal Q6H PRN Reny Sood MD        polyethylene glycol [facility]) packet 17 g  17 g Oral DAILY PRN Reny Sood MD        ondansetron (ZOFRAN ODT) tablet 4 mg  4 mg Oral Q8H PRN Reny Sood MD        Or    ondansetron [facility]) injection 4 mg  4 mg IntraVENous Q6H PRN Reny Sood MD   4 mg at 07/04/22 1344     Facility-Administered Medications Ordered in Other Encounters   Medication Dose Route Frequency Provider Last Rate Last Admin    sodium chloride (NS) flush 10 mL  10 mL IntraVENous PRN Donell Ellison MD   10 mL at 07/05/22 1000        Allergies   Allergen Reactions    Adhesive Other (comments)     Blisters     Advil [Ibuprofen] Rash     Rash all over, itching, trouble breathing     Celebrex [Celecoxib] Other (comments)     Swelling in legs, no rash, no itching, no trouble breathing    Cleocin [Clindamycin Hcl] Rash    Other Medication Unknown (comments)     \"Phosphate\"          Objective:     Patient Vitals for the past 8 hrs:   BP Temp Pulse Resp SpO2   07/05/22 1250 123/62 98 °F (36.7 °C) 67 20 94 %   07/05/22 0751 119/60 98 °F (36.7 °C) 66 18 99 %       Lab Results   Component Value Date/Time    WBC 7.9 07/05/2022 03:40 AM    HGB 8.5 (L) 07/05/2022 03:40 AM    HCT 25.8 (L) 07/05/2022 03:40 AM    PLATELET 689 (L) 59/15/2575 03:40 AM    MCV 95.2 07/05/2022 03:40 AM       Physical Exam:   Visit Vitals  /62 (BP 1 Location: Right arm)   Pulse 67   Temp 98 °F (36.7 °C)   Resp 20   Ht 6' 4\" (1.93 m)   Wt 277 lb 1.9 oz (125.7 kg)   SpO2 94%   BMI 33.73 kg/m²     General appearance: alert, cooperative, no distress, appears stated age, moderately obese  Head: Normocephalic, without obvious abnormality, atraumatic  Throat: abnormal findings: multiple purpura  - now resolved    Abdomen: soft, non-tender.  Bowel sounds normal. No masses,  no organomegaly  Extremities: extremities normal, atraumatic, no cyanosis or edema  Skin: petechiae - scattered to all extremities which are beginning to resolve   Neurologic: Grossly normal    Assessment:     Severe Thrombocytopenia  Sudden onset of scattered petechiae to all extremities and purpura, also melena to stool   Platelets found to be <3 in ED   Recently started bactrim for UTI prior to admission, stated has taken before with no issues   On Eliquis PTA for DVT/PE, last DVT in 8/2021    GI Bleed - resolved   Melena in ED, occult stool positive   Receiving IV Protonix   Appreciate GI evaluation     History of DVT and PE   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 car trips x2  PE of unknown origin   DVT after hip replacement   Last DVT 8/2021 - BLE dopplers confirm resolution   Patient reports his in immobile much of the time d/t debility   Has not had hypercoagulable work up     Acute Kidney Injury - improving   Managed by primary team     Atrial Fibrillation   Episode of A fib   Transferred to ICU and started on amiodarone drip  Now in NSR   Evaluation by Cardiology       Plan:     > Suspect severe thrombocytopenia r/t ITP  > Platelets continue to trend up, hgb stable - follow CBC daily   > Completed Dexamethasone 40 mg daily x 4 days   > Received 3 doses IVIG, did not tolerate final IVIG infusion despite pre-medications and slowed rate, was stopped early   > Follow hgb with GIB, would transfuse to keep hgb > 7   > Appreciate GI input - no invasive work up planned   > Cardiology evaluation for A fib - stress test pending  > As Hgb is stable an d platelets are 589 recommend restarting AC. No invasive testing per GI or Cardiology. Re-ordered eliquis. Consider resuming ASA soon if tolerating AC. Discussed with Dr. Amalia Main       Thank you for allowing us to participate in the care of Mr. Dane Mcduffie, will continue to follow while IP and arrange close OP follow up.       Sujata Hilario NP

## 2022-07-05 NOTE — PROGRESS NOTES
Transition of Care Plan:     RUR:14%  Disposition:Return to [facility] with  [address] ([address] [place] [address])  Follow up appointments: To be done prior to discharge if going home  DME needed:None  Transportation at Discharge: [address] or means to access home:  Wife has keys      IM Medicare Letter: To be given prior to discharge  Is patient a BCPI-A Bundle:  No                    If yes, was Bundle Letter given?:  N/A  Is patient a  and connected with the [place]? No              If yes, was [place] transfer form completed and [facility] notified? N/A   Caregiver Contact:Wife  Discharge Caregiver contacted prior to discharge? Caregiver to be contacted prior to discharge  Care Conference needed?: Not at this time    12:30pm- [address] [place] [address] accepted referral. AVS updated. 9:30am- CM met with pt at bedside to discuss d/c plan. CM inquired with pt about home health. Pt was in agreement with home health with [address] [place] [address]. [address] document signed by pt and placed in pt's bedside chart. Referral sent to [facility] via [facility]. Pt received and signed 2nd IM Letter. CM will continue to follow patient for discharge planning needs and arrange for services as deemed necessary.     Anant Bearden, [facility]  [phone]

## 2022-07-06 LAB
ANION GAP SERPL CALC-SCNC: 8 MMOL/L (ref 5–15)
BUN SERPL-MCNC: 32 MG/DL (ref 6–20)
BUN/CREAT SERPL: 29 (ref 12–20)
CALCIUM SERPL-MCNC: 7.8 MG/DL (ref 8.5–10.1)
CHLORIDE SERPL-SCNC: 103 MMOL/L (ref 97–108)
CO2 SERPL-SCNC: 24 MMOL/L (ref 21–32)
CREAT SERPL-MCNC: 1.12 MG/DL (ref 0.7–1.3)
ERYTHROCYTE [DISTWIDTH] IN BLOOD BY AUTOMATED COUNT: 15.8 % (ref 11.5–14.5)
GLUCOSE BLD STRIP.AUTO-MCNC: 117 MG/DL (ref 65–117)
GLUCOSE BLD STRIP.AUTO-MCNC: 127 MG/DL (ref 65–117)
GLUCOSE BLD STRIP.AUTO-MCNC: 141 MG/DL (ref 65–117)
GLUCOSE BLD STRIP.AUTO-MCNC: 163 MG/DL (ref 65–117)
GLUCOSE SERPL-MCNC: 129 MG/DL (ref 65–100)
HCT VFR BLD AUTO: 25.7 % (ref 36.6–50.3)
HGB BLD-MCNC: 8.4 G/DL (ref 12.1–17)
MAGNESIUM SERPL-MCNC: 2.4 MG/DL (ref 1.6–2.4)
MCH RBC QN AUTO: 31.2 PG (ref 26–34)
MCHC RBC AUTO-ENTMCNC: 32.7 G/DL (ref 30–36.5)
MCV RBC AUTO: 95.5 FL (ref 80–99)
NRBC # BLD: 0 K/UL (ref 0–0.01)
NRBC BLD-RTO: 0 PER 100 WBC
PHOSPHATE SERPL-MCNC: 2.8 MG/DL (ref 2.6–4.7)
PLATELET # BLD AUTO: 162 K/UL (ref 150–400)
PMV BLD AUTO: 10.2 FL (ref 8.9–12.9)
POTASSIUM SERPL-SCNC: 3.5 MMOL/L (ref 3.5–5.1)
RBC # BLD AUTO: 2.69 M/UL (ref 4.1–5.7)
SERVICE CMNT-IMP: ABNORMAL
SERVICE CMNT-IMP: NORMAL
SODIUM SERPL-SCNC: 135 MMOL/L (ref 136–145)
WBC # BLD AUTO: 5.8 K/UL (ref 4.1–11.1)

## 2022-07-06 PROCEDURE — 80048 BASIC METABOLIC PNL TOTAL CA: CPT

## 2022-07-06 PROCEDURE — 74011000250 HC RX REV CODE- 250: Performed by: NURSE PRACTITIONER

## 2022-07-06 PROCEDURE — 99232 SBSQ HOSP IP/OBS MODERATE 35: CPT | Performed by: INTERNAL MEDICINE

## 2022-07-06 PROCEDURE — 74011636637 HC RX REV CODE- 636/637: Performed by: HOSPITALIST

## 2022-07-06 PROCEDURE — C9113 INJ PANTOPRAZOLE SODIUM, VIA: HCPCS | Performed by: NURSE PRACTITIONER

## 2022-07-06 PROCEDURE — 82962 GLUCOSE BLOOD TEST: CPT

## 2022-07-06 PROCEDURE — 65270000046 HC RM TELEMETRY

## 2022-07-06 PROCEDURE — 74011250636 HC RX REV CODE- 250/636: Performed by: NURSE PRACTITIONER

## 2022-07-06 PROCEDURE — 93005 ELECTROCARDIOGRAM TRACING: CPT

## 2022-07-06 PROCEDURE — 85027 COMPLETE CBC AUTOMATED: CPT

## 2022-07-06 PROCEDURE — 84100 ASSAY OF PHOSPHORUS: CPT

## 2022-07-06 PROCEDURE — 74011250637 HC RX REV CODE- 250/637: Performed by: HOSPITALIST

## 2022-07-06 PROCEDURE — 74011000250 HC RX REV CODE- 250: Performed by: HOSPITALIST

## 2022-07-06 PROCEDURE — 36415 COLL VENOUS BLD VENIPUNCTURE: CPT

## 2022-07-06 PROCEDURE — 99231 SBSQ HOSP IP/OBS SF/LOW 25: CPT | Performed by: SURGERY

## 2022-07-06 PROCEDURE — 74011250637 HC RX REV CODE- 250/637: Performed by: NURSE PRACTITIONER

## 2022-07-06 PROCEDURE — 83735 ASSAY OF MAGNESIUM: CPT

## 2022-07-06 RX ORDER — TETRAKIS(2-METHOXYISOBUTYLISOCYANIDE)COPPER(I) TETRAFLUOROBORATE 1 MG/ML
31 INJECTION, POWDER, LYOPHILIZED, FOR SOLUTION INTRAVENOUS
Status: COMPLETED | OUTPATIENT
Start: 2022-07-06 | End: 2022-07-06

## 2022-07-06 RX ORDER — TETRAKIS(2-METHOXYISOBUTYLISOCYANIDE)COPPER(I) TETRAFLUOROBORATE 1 MG/ML
30.3 INJECTION, POWDER, LYOPHILIZED, FOR SOLUTION INTRAVENOUS
Status: COMPLETED | OUTPATIENT
Start: 2022-07-06 | End: 2022-07-06

## 2022-07-06 RX ADMIN — SODIUM CHLORIDE, PRESERVATIVE FREE 40 MG: 5 INJECTION INTRAVENOUS at 09:12

## 2022-07-06 RX ADMIN — SODIUM CHLORIDE, PRESERVATIVE FREE 10 ML: 5 INJECTION INTRAVENOUS at 21:58

## 2022-07-06 RX ADMIN — TETRAKIS(2-METHOXYISOBUTYLISOCYANIDE)COPPER(I) TETRAFLUOROBORATE 30.3 MILLICURIE: 1 INJECTION, POWDER, LYOPHILIZED, FOR SOLUTION INTRAVENOUS at 10:00

## 2022-07-06 RX ADMIN — APIXABAN 5 MG: 5 TABLET, FILM COATED ORAL at 06:23

## 2022-07-06 RX ADMIN — AMIODARONE HYDROCHLORIDE 200 MG: 200 TABLET ORAL at 09:12

## 2022-07-06 RX ADMIN — I-VITE, TAB 1000-60-2MG (60/BT) 1 TABLET: TAB at 09:24

## 2022-07-06 RX ADMIN — SODIUM CHLORIDE, PRESERVATIVE FREE 10 ML: 5 INJECTION INTRAVENOUS at 14:45

## 2022-07-06 RX ADMIN — ATORVASTATIN CALCIUM 20 MG: 20 TABLET, FILM COATED ORAL at 21:58

## 2022-07-06 RX ADMIN — Medication 3 UNITS: at 06:23

## 2022-07-06 RX ADMIN — APIXABAN 5 MG: 5 TABLET, FILM COATED ORAL at 17:56

## 2022-07-06 RX ADMIN — SODIUM CHLORIDE, PRESERVATIVE FREE 10 ML: 5 INJECTION INTRAVENOUS at 06:23

## 2022-07-06 RX ADMIN — TETRAKIS(2-METHOXYISOBUTYLISOCYANIDE)COPPER(I) TETRAFLUOROBORATE 31 MILLICURIE: 1 INJECTION, POWDER, LYOPHILIZED, FOR SOLUTION INTRAVENOUS at 08:00

## 2022-07-06 NOTE — PROGRESS NOTES
[address], 97 [facility] [place] Cristobal Rodriguez, [address]  [phone]      Hematology/ Oncology Progress Note      Reason for consult:     Thai Araujo is a 76 y.o. male who we have been asked to see by Dr. Nolan Duff for acute thrombocytopenia. Subjective:     Thai Araujo is a 76year old male who presented to the ED at [facility] for fatigue, scattered petechiae to all extremities, purpura, hematuria and rectal bleeding. He has a PMH of several DVT and PE since 2008 and has been on eliquis OP. He also has a PMH of CAD with PCI, diabetes type II, obesity and bilateral hip repair. He was recently started on bactrim OP for a UTI. Patient seen at bedside today in the ED with wife. Discussed potential causes of acute severe thrombocytopenia, work up and treatment. He stated that petechiae appeared over last 24 hours after a hot shower. Interval History:     6/28/2022 Patient seen at bedside, discussed another transfusion of platelets. 6/29/2022 Patient seen at bedside in ICU, transferred overnight with CP and afib. Reports he feels better. 6/30/2022 Patient seen at bedside, feeling better today. Happy to hear his platelets are improving. Did not tolerate IVIG infusion last night despite pre-medications. 7/1/2022 Patient seen at bedside, reports he is feeling well today. Hopeful to be moved out of ICU soon. 7/5/2022 Patient seen at bedside with wife. Discussed ITP, platelets trending up and restarting eliquis. Now on step down unit. Petechiae are beginning to resolve. 7/6/2022 Patient seen at bedside, up in recliner. Reports CT last night showed SBO but he had several hours of recurrent BMs this morning. Awaiting results of stress test. Wants to go home soon. Review of Systems:  Pertinent items are noted in the History of Present Illness.        Past Medical History:   Diagnosis Date  Arthritis     bilateral hips/knees    CAD (coronary artery disease) 13    PCI    Chronic pain     DJD; SILVIO KNEES    Diabetes ([facility]     \"BORDERLINE\" PER MD    GERD (gastroesophageal reflux disease)     Macular degeneration     Morbid obesity (HCC)     Other and unspecified symptoms and signs involving general sensations and perceptions     Bilat. legs \"give out\" intermittently; Bilat leg cramps; macular degenration (left).  Other ill-defined conditions(799.89)     HYPERLIPIDEMIA; LT LE DVT (); MORBID OBESITY    Pulmonary embolism ([address] 2021    Thromboembolus ([facility] [address]     left LE     Past Surgical History:   Procedure Laterality Date    MA CARDIAC SURG PROCEDURE UNLIST      STENTS TO RCA    MA TOTAL HIP ARTHROPLASTY  2007    right    MA TOTAL HIP ARTHROPLASTY  2007    left      Family History   Problem Relation Age of Onset    Heart Disease Mother     Arrhythmia Mother     Heart Disease Father     Cancer Sister     Lung Disease Sister      Social History     Tobacco Use    Smoking status: Former Smoker     Packs/day: 1.00     Quit date: 1983     Years since quittin.4    Smokeless tobacco: Never Used   Substance Use Topics    Alcohol use:  Yes     Alcohol/week: 2.5 standard drinks     Types: 2 Glasses of wine, 1 Cans of beer per week     Comment: 0-2 X PER WK WITH MEALS OR AFTER PLAYING GOLF      Current Facility-Administered Medications   Medication Dose Route Frequency Provider Last Rate Last Admin    apixaban (ELIQUIS) tablet 5 mg  5 mg Oral BID Seng VINSON NP   5 mg at 22 7679    amiodarone (CORDARONE) tablet 200 mg  200 mg Oral DAILY Mckenzie Olsen MD   200 mg at 22 0912    phenol throat spray (CHLORASEPTIC) 1 Spray  1 Spray Oral PRN Roman Calles MD        polyethylene glycol [facility]) packet 17 g  17 g Oral DAILY Cindy Melo NP   17 g at 22 0841    lactulose (CHRONULAC) 10 gram/15 mL solution 300 mL  200 g Rectal BID Maynor Pugh MD   300 mL at 07/02/22 1028    insulin lispro (HUMALOG) injection   SubCUTAneous Q6H Maynor Pugh MD   3 Units at 07/06/22 5526    pantoprazole (PROTONIX) 40 mg in 0.9% sodium chloride 10 mL injection  40 mg IntraVENous DAILY Arcelia Logan NP   40 mg at 07/06/22 0912    calcium carbonate (TUMS) chewable tablet 400 mg [elemental]  400 mg Oral TID PRN Atif Carr NP   400 mg at 07/01/22 0133    glucose chewable tablet 16 g  4 Tablet Oral PRN Arvel Kayser, NP        glucagon (GLUCAGEN) injection 1 mg  1 mg IntraMUSCular PRN Arvel Kayser, NP        dextrose 10% infusion 0-250 mL  0-250 mL IntraVENous PRN Arvel Kayser, NP        nitroglycerin (NITROSTAT) tablet 0.4 mg  0.4 mg SubLINGual PRN Simona Abbasi NP   0.4 mg at 06/28/22 2322    atorvastatin (LIPITOR) tablet 20 mg  20 mg Oral QHS Araeblla Saldivar MD   20 mg at 07/05/22 2105    vitamin F-H-A-lutein-minerals (OCUVITE) tablet 1 Tablet  1 Tablet Oral DAILY Arabella Saldivar MD   1 Tablet at 07/06/22 5288    sodium chloride (NS) flush 5-40 mL  5-40 mL IntraVENous Q8H Arabella Saldivar MD   10 mL at 07/06/22 9363    sodium chloride (NS) flush 5-40 mL  5-40 mL IntraVENous PRN Arabella Saldivar MD        acetaminophen (TYLENOL) tablet 650 mg  650 mg Oral Q6H PRN Arabella Saldivar MD   650 mg at 07/02/22 2246    Or    acetaminophen (TYLENOL) suppository 650 mg  650 mg Rectal Q6H PRN Arabella Saldivar MD        polyethylene glycol [facility]) packet 17 g  17 g Oral DAILY PRN Arabella Saldivar MD        ondansetron (ZOFRAN ODT) tablet 4 mg  4 mg Oral Q8H PRN Arabella Saldivar MD        Or    ondansetron [facility]) injection 4 mg  4 mg IntraVENous Q6H PRN Arabella Saldivar MD   4 mg at 07/05/22 1825     Facility-Administered Medications Ordered in Other Encounters   Medication Dose Route Frequency Provider Last Rate Last Admin    sodium chloride (NS) flush 10 mL  10 mL IntraVENous PRN June Pal MD   10 mL at 07/05/22 1000        Allergies   Allergen Reactions    Adhesive Other (comments)     Blisters     Advil [Ibuprofen] Rash     Rash all over, itching, trouble breathing     Celebrex [Celecoxib] Other (comments)     Swelling in legs, no rash, no itching, no trouble breathing    Cleocin [Clindamycin Hcl] Rash    Other Medication Unknown (comments)     \"Phosphate\"          Objective:     Patient Vitals for the past 8 hrs:   BP Temp Pulse Resp SpO2   07/06/22 1106 (!) 123/49 97.4 °F (36.3 °C) 60 18 100 %   07/06/22 0749 (!) 119/98 97.4 °F (36.3 °C) 74 20 97 %       Lab Results   Component Value Date/Time    WBC 5.8 07/06/2022 01:28 AM    HGB 8.4 (L) 07/06/2022 01:28 AM    HCT 25.7 (L) 07/06/2022 01:28 AM    PLATELET 702 22/55/6760 01:28 AM    MCV 95.5 07/06/2022 01:28 AM       Physical Exam:   Visit Vitals  BP (!) 123/49 (BP 1 Location: Left upper arm, BP Patient Position: Sitting)   Pulse 60   Temp 97.4 °F (36.3 °C)   Resp 18   Ht 6' 4\" (1.93 m)   Wt 277 lb 1.9 oz (125.7 kg)   SpO2 100%   BMI 33.73 kg/m²     General appearance: alert, cooperative, no distress, appears stated age, moderately obese  Head: Normocephalic, without obvious abnormality, atraumatic  Throat: abnormal findings: multiple purpura  - now resolved    Abdomen: soft, non-tender.  Bowel sounds normal. No masses,  no organomegaly  Extremities: extremities normal, atraumatic, no cyanosis or edema  Skin: petechiae - scattered to all extremities which are beginning to resolve   Neurologic: Grossly normal    Assessment:     Severe Thrombocytopenia r/t ITP - improving   Sudden onset of scattered petechiae to all extremities and purpura, also melena to stool   Platelets found to be <3 in ED   Recently started bactrim for UTI prior to admission, stated has taken before with no issues   On Eliquis PTA for DVT/PE, last DVT in 8/2021    GI Bleed - resolved   Melena in ED, occult stool positive   Receiving IV Protonix   Appreciate GI evaluation     History of DVT and PE 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 car trips x2  PE of unknown origin   DVT after hip replacement   Last DVT 8/2021 - BLE dopplers confirm resolution   Patient reports his in immobile much of the time d/t debility   Has not had hypercoagulable work up     Acute Kidney Injury - resolved   Managed by primary team     Atrial Fibrillation   Episode of A fib   Transferred to ICU and started on amiodarone drip  Now in NSR   Evaluation by Cardiology     SBO   SBO seen on CT 7/5  Since CT results has had several BMs  General Surgery input - ordered CT enterography in AM       Plan:     > Suspect severe thrombocytopenia r/t ITP  > Platelets continue to trend up, hgb stable - follow CBC daily   > Completed Dexamethasone 40 mg daily x 4 days   > Received 3 doses IVIG, did not tolerate final IVIG infusion despite pre-medications and slowed rate, was stopped early   > Follow hgb with GIB, would transfuse to keep hgb > 7   > Appreciate GI input - no invasive work up planned   > Cardiology evaluation for A fib - stress test pending  > As Hgb is stable an d platelets are 448 recommend restarting AC. Resumed Eliquis on 7/5. Consider resuming ASA soon if tolerating AC.   > Recent CT showed concern for SBO, has several BMs since  > Appreciate General Surgery input - plan for CT enterography in AM     Thank you for allowing us to participate in the care of Mr. Blane Durbin, will continue to follow while IP and arrange close OP follow up.       Dav Leslie NP

## 2022-07-06 NOTE — PROGRESS NOTES
Physical Therapy Note    Patient decliines mobility at this time. He expresses frustration related to prolonged admission and continued GI testing. Patient desires to D/C tomorrow. Go for blood tests as directed. Your doctor will do lab tests at regular visits to monitor the effects of this medicine. Please follow up with your doctor and keep your health care provider appointments

## 2022-07-06 NOTE — PROGRESS NOTES
0749 Bedside and Verbal shift change report given to Jet Almodovar Porshaingrid (oncoming nurse) by Nestor Rodriguez (offgoing nurse). Report included the following information SBAR, Kardex, ED Summary, Intake/Output, MAR, Recent Results and Cardiac Rhythm NSR.     0745 Patient off unit to nuclear for stress test     1638 Patient back on PCU     Rebecca Medrano MD about patient's CT of abdomen results and patient's \"new\" frequent bowel movements since exam.     0940 Per Casey Murray MD, Za Washington MD paged about patient's CT of abdomen results and change in condition. 1900 Emptied patient's urinal and urine color is now a reddish purple color, MD notified and next shift notified as well. End of Shift Note    Bedside shift change report given to  (oncoming nurse) by Linda Campbell RN (offgoing nurse). Report included the following information SBAR, Kardex, Procedure Summary, Intake/Output, MAR, Recent Results and Cardiac Rhythm NSR to sinus bradycardia    Shift worked:  7am-7pm     Shift summary and any significant changes:    Read note above      Concerns for physician to address:  n/a     Zone phone for oncoming shift:          Activity:  Activity Level: Up with Assistance  Number times ambulated in hallways past shift: 0  Number of times OOB to chair past shift: 3    Cardiac:   Cardiac Monitoring: Yes      Cardiac Rhythm: Sinus Rhythm    Access:   Current line(s): PIV     Genitourinary:   Urinary status: voiding    Respiratory:   O2 Device: None (Room air)  Chronic home O2 use?: NO  Incentive spirometer at bedside: YES       GI:  Last Bowel Movement Date: 07/06/22  Current diet:  DIET ONE TIME MESSAGE  ADULT DIET Clear Liquid  DIET ONE TIME MESSAGE  Passing flatus: YES  Tolerating current diet: YES       Pain Management:   Patient states pain is manageable on current regimen: YES    Skin:  Jose Guadalupe Score: 18  Interventions: increase time out of bed, PT/OT consult, limit briefs and nutritional support     Patient Safety:  Fall Score:  Total Score: 4  Interventions: bed/chair alarm, assistive device (walker, cane, etc), gripper socks, pt to call before getting OOB and stay with me (per policy)  High Fall Risk: Yes    Length of Stay:  Expected LOS: 3d 14h  Actual LOS: 9      Enrico Montiel RN

## 2022-07-06 NOTE — PROGRESS NOTES
Occupational Therapy    Patient chart reviewed up to date. Spoke with PT who attempted visit earlier today - reports patient deferring treatment as he is having a difficult day, overwhelmed/frustrated by admission, pending additional tests, and requiring frequent visits to commode 2/2 diarrhea. Will follow-up as able and appropriate.      Filomena Delgado, OTR/L

## 2022-07-06 NOTE — PROGRESS NOTES
Progress Note      7/6/2022 9:12 AM  NAME: Yani Olivera   MRN:  [id number]   Admit Diagnosis: Thrombocytopenia ([facility] [address] [D69.6]  GI bleed [K92.2]  UGO (acute kidney injury) ([facility] [address] [N17.9]      Problem List:     1. Idiopathic thrombocytopenia  2. Anemia  3. Rectal bleeding  4. Lactic acidosis  5. NSTEMI  6. Paroxysmal atrial fibrillation  7. Remote PE and DVT (separate events)  8. CAD w/ mild diffuse disease w/ patent stent in the RCA 7/17 cath. 9. XOL  10. Morbid obesity  11. DM  12. Former smoker     Assessment/Plan: TnI 2300  PLT 160s  HgB 8    1. Continue oral amio  2. No ASA, eliquis, plavix until ok with GI/hematology  3. Continue statin  4. For Daryle Block MPI (two day study); should have results today after resting pictures         [x]       High complexity decision making was performed in this patient at high risk for decompensation with multiple organ involvement. Subjective:     Yani Olivera denies chest pain, dyspnea. Discussed with RN events overnight. Review of Systems:    Symptom Y/N Comments  Symptom Y/N Comments   Fever/Chills N   Chest Pain N    Poor Appetite N   Edema N    Cough N   Abdominal Pain N    Sputum N   Joint Pain N    SOB/SHEARER N   Pruritis/Rash N    Nausea/vomit N   Tolerating PT/OT Y    Diarrhea N   Tolerating Diet Y    Constipation N   Other       Could NOT obtain due to:      Objective:      Physical Exam:    Last 24hrs VS reviewed since prior progress note.  Most recent are:    Visit Vitals  BP (!) 120/55 (BP 1 Location: Left upper arm, BP Patient Position: At rest)   Pulse 60   Temp 97.6 °F (36.4 °C)   Resp 18   Ht 6' 4\" (1.93 m)   Wt 125.7 kg (277 lb 1.9 oz)   SpO2 98%   BMI 33.73 kg/m²       Intake/Output Summary (Last 24 hours) at 7/6/2022 0616  Last data filed at 7/6/2022 0241  Gross per 24 hour   Intake --   Output 550 ml   Net -550 ml        General Appearance: Well developed, well nourished, alert & oriented x 3,    no acute distress. Ears/Nose/Mouth/Throat: Hearing grossly normal.  Neck: Supple. Chest: Lungs clear to auscultation bilaterally. Cardiovascular: Regular rate and rhythm, S1S2 normal, no murmur. Abdomen: Soft, non-tender, bowel sounds are active. Extremities: No edema bilaterally. Skin: Warm and dry. Petechiae. []         Post-cath site without hematoma, bruit, tenderness, or thrill. Distal pulses intact. PMH/ reviewed - no change compared to H&P    Data Review    Telemetry: sinus rhythm     EKG:   [x]  No new EKG for review    Lab Data Personally Reviewed:    Recent Labs     07/06/22  0128 07/05/22  0340   WBC 5.8 7.9   HGB 8.4* 8.5*   HCT 25.7* 25.8*    133*     No results for input(s): INR, PTP, APTT, INREXT, INREXT in the last 72 hours. Recent Labs     07/06/22  0128 07/05/22  0236 07/04/22  0358   * 133* 138   K 3.5 4.2 3.5    105 108   CO2 24 21 24   BUN 32* 33* 29*   CREA 1.12 1.26 1.06   * 159* 125*   CA 7.8* 8.0* 8.2*   MG 2.4 2.2 2.2     No results for input(s): CPK, CKNDX, TROIQ in the last 72 hours. No lab exists for component: CPKMB  Lab Results   Component Value Date/Time    Cholesterol, total 133 06/07/2013 02:40 PM    HDL Cholesterol 43 06/07/2013 02:40 PM    LDL, calculated 73.4 06/07/2013 02:40 PM    Triglyceride 83 06/07/2013 02:40 PM    CHOL/HDL Ratio 3.1 06/07/2013 02:40 PM       No results for input(s): AP, TBIL, TP, ALB, GLOB, GGT, AML, LPSE in the last 72 hours. No lab exists for component: SGOT, GPT, AMYP, HLPSE  No results for input(s): PH, PCO2, PO2 in the last 72 hours.     Medications Personally Reviewed:    Current Facility-Administered Medications   Medication Dose Route Frequency    apixaban (ELIQUIS) tablet 5 mg  5 mg Oral BID    amiodarone (CORDARONE) tablet 200 mg  200 mg Oral DAILY    phenol throat spray (CHLORASEPTIC) 1 Spray  1 Spray Oral PRN    polyethylene glycol (MIRALAX) packet 17 g  17 g Oral DAILY    lactulose (CHRONULAC) 10 gram/15 mL solution 300 mL  200 g Rectal BID    insulin lispro (HUMALOG) injection   SubCUTAneous Q6H    pantoprazole (PROTONIX) 40 mg in 0.9% sodium chloride 10 mL injection  40 mg IntraVENous DAILY    calcium carbonate (TUMS) chewable tablet 400 mg [elemental]  400 mg Oral TID PRN    glucose chewable tablet 16 g  4 Tablet Oral PRN    glucagon (GLUCAGEN) injection 1 mg  1 mg IntraMUSCular PRN    dextrose 10% infusion 0-250 mL  0-250 mL IntraVENous PRN    nitroglycerin (NITROSTAT) tablet 0.4 mg  0.4 mg SubLINGual PRN    atorvastatin (LIPITOR) tablet 20 mg  20 mg Oral QHS    vitamin Z-K-X-lutein-minerals (OCUVITE) tablet 1 Tablet  1 Tablet Oral DAILY    sodium chloride (NS) flush 5-40 mL  5-40 mL IntraVENous Q8H    sodium chloride (NS) flush 5-40 mL  5-40 mL IntraVENous PRN    acetaminophen (TYLENOL) tablet 650 mg  650 mg Oral Q6H PRN    Or    acetaminophen (TYLENOL) suppository 650 mg  650 mg Rectal Q6H PRN    polyethylene glycol (MIRALAX) packet 17 g  17 g Oral DAILY PRN    ondansetron (ZOFRAN ODT) tablet 4 mg  4 mg Oral Q8H PRN    Or    ondansetron (ZOFRAN) injection 4 mg  4 mg IntraVENous Q6H PRN     Facility-Administered Medications Ordered in Other Encounters   Medication Dose Route Frequency    sodium chloride (NS) flush 10 mL  10 mL IntraVENous PRN         Gail Tay III, DO

## 2022-07-06 NOTE — PROGRESS NOTES
End of Shift Note    Bedside shift change report given to Elana Oswald RN (oncoming nurse) by Jayashree Galvez RN (offgoing nurse). Report included the following information SBAR, Kardex, MAR and Recent Results    Shift worked:  5490-5950     Shift summary and any significant changes:    After 0600, Pt had multiple watery stools. Concerns for physician to address:       Zone phone for oncoming shift:          Activity:  Activity Level: Ambulate X (#),Bath Room Privileges  Number times ambulated in hallways past shift: 0  Number of times OOB to chair past shift: 2    Cardiac:   Cardiac Monitoring: Yes      Cardiac Rhythm: Sinus Rhythm    Access:   Current line(s): PIV     Genitourinary:   Urinary status: voiding    Respiratory:   O2 Device: None (Room air)  Chronic home O2 use?: NO  Incentive spirometer at bedside: NO       GI:  Last Bowel Movement Date: 07/06/22  Current diet:  DIET ONE TIME MESSAGE  ADULT DIET Clear Liquid  Passing flatus: YES  Tolerating current diet: YES       Pain Management:   Patient states pain is manageable on current regimen: YES    Skin:  Jose Guadalupe Score: 17  Interventions: increase time out of bed    Patient Safety:  Fall Score:  Total Score: 3  Interventions: assistive device (walker, cane, etc) and pt to call before getting OOB  High Fall Risk: Yes    Length of Stay:  Expected LOS: 3d 14h  Actual LOS: 9      Jayashree Galvez RN

## 2022-07-06 NOTE — PROGRESS NOTES
SURGERY PROGRESS NOTE      Admit Date: 2022      Subjective:     Surgery re-consulted for possible SBO. Patient states he had an episode of emesis las night. He denies nausea today. Raven is passing flatus and has had severe BMs overnight. He states he feels fine and wants to eat. Objective:     Visit Vitals  BP (!) 123/49 (BP 1 Location: Left upper arm, BP Patient Position: Sitting)   Pulse 60   Temp 97.4 °F (36.3 °C)   Resp 18   Ht 6' 4\" (1.93 m)   Wt 125.7 kg (277 lb 1.9 oz)   SpO2 100%   BMI 33.73 kg/m²        Temp (24hrs), Av.7 °F (36.5 °C), Min:97.4 °F (36.3 °C), Max:98.2 °F (36.8 °C)      701 - 1900  In: 60 [P.O.:60]  Out: -   1901 -  0700  In: -   Out: 750 [Urine:750]    Physical Exam:    General:  alert, cooperative, no distress, appears stated age   Abdomen: soft, bowel sounds active, non-tender           Lab Results   Component Value Date/Time    WBC 5.8 2022 01:28 AM    HGB 8.4 (L) 2022 01:28 AM    HCT 25.7 (L) 2022 01:28 AM    PLATELET 020  01:28 AM    MCV 95.5 2022 01:28 AM     Lab Results   Component Value Date/Time    GFR est non-AA >60 2022 01:28 AM    GFR est AA >60 2022 01:28 AM    Creatinine 1.12 2022 01:28 AM    BUN 32 (H) 2022 01:28 AM    Sodium 135 (L) 2022 01:28 AM    Potassium 3.5 2022 01:28 AM    Chloride 103 2022 01:28 AM    CO2 24 2022 01:28 AM    Magnesium 2.4 2022 01:28 AM    Phosphorus 2.8 2022 01:28 AM     CT (last night) -  No oral or IV contrast.  There  Is moderately dilated loops proximally and decompressed terminal ileum. There is old oral contrast throughout the colon. Compared to previous CT the transition at the TI is new. Assessment/plan: Active Problems:     Thrombocytopenia ([address] (2022)      GI bleed (2022)      UGO (acute kidney injury) ([address] (2022)      Atrial fibrillation ([address] (7/3/2022)      Elevated troponin (7/3/2022)    76year old with one episode vomiting last night and CT with partial SBO pattern. He denies SBO symptoms this morning. From a obstruction point of view I think it is either not significantly flow limiting or has resolved this morning. However, I do wonder if the recurrent transient SBO patterns and the lower GI bleed on admission are related. It is possible he has a lesion in his terminal ileum that would explain all the bowel findings. I will order a CT enterography for the morning since he just had a CT scan last night and nuclear medicine stress test today to assess this region in better detail. The patient can have a liquid diet today but, if emesis returns he will need to be NPO.

## 2022-07-06 NOTE — PROGRESS NOTES
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 Received 3 doses IVIG, did not tolerate final IVIG infusion despite. PLT wnl today  Hemo/onc on board     SBO  Repeat CT a/p on  showed partial SBO pattern. Having loose stools, no n/v this AM but 1 episode of emesis overight. Passing flatus this AM  reconsult surgery. [id number] Juani Muhammad for CLD. NPO if he develops n/v  CT enterography is ordered for tomorrow per surgery team    GI bleed  Was to due severe ITP  S/p blood transfusion  Continue with protonix 40 daily(can switch to famotidine on discharger)  GI on board, holding off on EGD for now, conservative management. Paroxysmal atrial fibrillation  Elevated trop  HR is controlled now   Continue with amiodarone  Eliquis resumed. ASA to be resumed if ok with heme/onc  Cardiac stress test done today, result pending  Appreciate cardiology following          30.0 - 39.9 Obese / Body mass index is 33.73 kg/m². Estimated discharge date:   Barriers:    Code status: Full  Prophylaxis: SCD's  Recommended Disposition: SAH/Rehab     Subjective:     Chief Complaint / Reason for Physician Visit  Pt sitting up in chair. NAD. Upset due to diet issue with CLD. Discussed with RN events overnight. Review of Systems:  Symptom Y/N Comments  Symptom Y/N Comments   Fever/Chills    Chest Pain     Poor Appetite    Edema     Cough    Abdominal Pain     Sputum    Joint Pain     SOB/SHEARER    Pruritis/Rash     Nausea/vomit    Tolerating PT/OT     Diarrhea y   Tolerating Diet     Constipation    Other       Could NOT obtain due to:      Objective:     VITALS:   Last 24hrs VS reviewed since prior progress note.  Most recent are:  Patient Vitals for the past 24 hrs:   Temp Pulse Resp BP SpO2   22 1106 97.4 °F (36.3 °C) 60 18 (!) 123/49 100 %   22 0749 97.4 °F (36.3 °C) 74 20 (!) 119/98 97 % 07/06/22 0241 97.6 °F (36.4 °C) 60 18 (!) 120/55 98 %   07/05/22 2332 97.5 °F (36.4 °C) 67 18 (!) 139/59 97 %   07/05/22 1935 98.2 °F (36.8 °C) 75 18 (!) 122/99 94 %   07/05/22 1630 98 °F (36.7 °C) 66 18 (!) 126/56 93 %       Intake/Output Summary (Last 24 hours) at 7/6/2022 1410  Last data filed at 7/6/2022 1106  Gross per 24 hour   Intake 60 ml   Output 300 ml   Net -240 ml        I had a face to face encounter and independently examined this patient on 7/6/2022, as outlined below:  PHYSICAL EXAM:  General: WD, WN. Alert, cooperative, no acute distress    EENT:  EOMI. Anicteric sclerae. MMM  Resp:  CTA bilaterally, no wheezing or rales. No accessory muscle use  CV:  Regular  rhythm,  No edema  GI:  Soft, Non distended, Non tender. +Bowel sounds  Neurologic:  Alert and oriented X 3, normal speech   Psych:   Good insight. Not anxious nor agitated  Skin:  No rashes. No jaundice      Reviewed most current lab test results and cultures  YES  Reviewed most current radiology test results   YES  Review and summation of old records today    NO  Reviewed patient's current orders and MAR    YES  PMH/SH reviewed - no change compared to H&P  ________________________________________________________________________  Care Plan discussed with:    Comments   Patient X    Family  X    RN X    Care Manager     Consultant                        Multidiciplinary team rounds were held today with , nursing, pharmacist and clinical coordinator. Patient's plan of care was discussed; medications were reviewed and discharge planning was addressed.      ________________________________________________________________________  Total NON critical care TIME:  35   Minutes    Total CRITICAL CARE TIME Spent:   Minutes non procedure based      Comments   >50% of visit spent in counseling and coordination of care X    ________________________________________________________________________  Luis Angel Grewal MD     Procedures: see electronic medical records for all procedures/Xrays and details which were not copied into this note but were reviewed prior to creation of Plan. LABS:  I reviewed today's most current labs and imaging studies.   Pertinent labs include:  Recent Labs     07/06/22 0128 07/05/22 0340 07/04/22  0358   WBC 5.8 7.9 7.7   HGB 8.4* 8.5* 8.1*   HCT 25.7* 25.8* 24.6*    133* 123*     Recent Labs     07/06/22 0128 07/05/22  0236 07/04/22  0358   * 133* 138   K 3.5 4.2 3.5    105 108   CO2 24 21 24   * 159* 125*   BUN 32* 33* 29*   CREA 1.12 1.26 1.06   CA 7.8* 8.0* 8.2*   MG 2.4 2.2 2.2   PHOS 2.8 3.7 3.4       Signed: Lynda Freeman MD

## 2022-07-06 NOTE — PROGRESS NOTES
Problem: Falls - Risk of  Goal: *Absence of Falls  Description: Document Shira Clayton Fall Risk and appropriate interventions in the flowsheet.   Outcome: Progressing Towards Goal  Note: Fall Risk Interventions:  Mobility Interventions: Communicate number of staff needed for ambulation/transfer,Patient to call before getting OOB,Utilize walker, cane, or other assistive device    Mentation Interventions: Adequate sleep, hydration, pain control,More frequent rounding,Reorient patient,Update white board    Medication Interventions: Patient to call before getting OOB,Teach patient to arise slowly    Elimination Interventions: Call light in reach,Patient to call for help with toileting needs,Stay With Me (per policy),Toilet paper/wipes in reach,Toileting schedule/hourly rounds,Urinal in reach    History of Falls Interventions: Door open when patient unattended

## 2022-07-06 NOTE — PROGRESS NOTES
NUC MED: Lexiscan MPI study completed. Results pending. Please consult cardiology regarding diet resumption.

## 2022-07-07 ENCOUNTER — APPOINTMENT (OUTPATIENT)
Dept: CT IMAGING | Age: 77
DRG: 981 | End: 2022-07-07
Attending: SURGERY
Payer: MEDICARE

## 2022-07-07 ENCOUNTER — ANESTHESIA EVENT (OUTPATIENT)
Dept: SURGERY | Age: 77
DRG: 981 | End: 2022-07-07
Payer: MEDICARE

## 2022-07-07 PROBLEM — K56.7 ILEUS (HCC): Status: ACTIVE | Noted: 2022-07-07

## 2022-07-07 LAB
ANION GAP SERPL CALC-SCNC: 7 MMOL/L (ref 5–15)
BUN SERPL-MCNC: 26 MG/DL (ref 6–20)
BUN/CREAT SERPL: 27 (ref 12–20)
CALCIUM SERPL-MCNC: 7.8 MG/DL (ref 8.5–10.1)
CALCULATED R AXIS, ECG10: -39 DEGREES
CALCULATED T AXIS, ECG11: 131 DEGREES
CHLORIDE SERPL-SCNC: 106 MMOL/L (ref 97–108)
CO2 SERPL-SCNC: 23 MMOL/L (ref 21–32)
COMMENT, HOLDF: NORMAL
CREAT SERPL-MCNC: 0.95 MG/DL (ref 0.7–1.3)
DIAGNOSIS, 93000: NORMAL
ERYTHROCYTE [DISTWIDTH] IN BLOOD BY AUTOMATED COUNT: 15.6 % (ref 11.5–14.5)
GLUCOSE BLD STRIP.AUTO-MCNC: 108 MG/DL (ref 65–117)
GLUCOSE BLD STRIP.AUTO-MCNC: 133 MG/DL (ref 65–117)
GLUCOSE BLD STRIP.AUTO-MCNC: 207 MG/DL (ref 65–117)
GLUCOSE SERPL-MCNC: 92 MG/DL (ref 65–100)
HCT VFR BLD AUTO: 24.3 % (ref 36.6–50.3)
HGB BLD-MCNC: 8 G/DL (ref 12.1–17)
MAGNESIUM SERPL-MCNC: 2.4 MG/DL (ref 1.6–2.4)
MCH RBC QN AUTO: 31.3 PG (ref 26–34)
MCHC RBC AUTO-ENTMCNC: 32.9 G/DL (ref 30–36.5)
MCV RBC AUTO: 94.9 FL (ref 80–99)
NRBC # BLD: 0 K/UL (ref 0–0.01)
NRBC BLD-RTO: 0 PER 100 WBC
PHOSPHATE SERPL-MCNC: 2 MG/DL (ref 2.6–4.7)
PLATELET # BLD AUTO: 172 K/UL (ref 150–400)
PMV BLD AUTO: 9.8 FL (ref 8.9–12.9)
POTASSIUM SERPL-SCNC: 3 MMOL/L (ref 3.5–5.1)
Q-T INTERVAL, ECG07: 406 MS
QRS DURATION, ECG06: 110 MS
QTC CALCULATION (BEZET), ECG08: 544 MS
RBC # BLD AUTO: 2.56 M/UL (ref 4.1–5.7)
SAMPLES BEING HELD,HOLD: NORMAL
SERVICE CMNT-IMP: ABNORMAL
SERVICE CMNT-IMP: ABNORMAL
SERVICE CMNT-IMP: NORMAL
SODIUM SERPL-SCNC: 136 MMOL/L (ref 136–145)
VENTRICULAR RATE, ECG03: 108 BPM
WBC # BLD AUTO: 7.4 K/UL (ref 4.1–11.1)

## 2022-07-07 PROCEDURE — 97530 THERAPEUTIC ACTIVITIES: CPT

## 2022-07-07 PROCEDURE — 99232 SBSQ HOSP IP/OBS MODERATE 35: CPT | Performed by: INTERNAL MEDICINE

## 2022-07-07 PROCEDURE — 97116 GAIT TRAINING THERAPY: CPT

## 2022-07-07 PROCEDURE — 83540 ASSAY OF IRON: CPT

## 2022-07-07 PROCEDURE — 74011250637 HC RX REV CODE- 250/637: Performed by: HOSPITALIST

## 2022-07-07 PROCEDURE — 74177 CT ABD & PELVIS W/CONTRAST: CPT

## 2022-07-07 PROCEDURE — 74011000250 HC RX REV CODE- 250: Performed by: NURSE PRACTITIONER

## 2022-07-07 PROCEDURE — 74011250636 HC RX REV CODE- 250/636: Performed by: STUDENT IN AN ORGANIZED HEALTH CARE EDUCATION/TRAINING PROGRAM

## 2022-07-07 PROCEDURE — 85027 COMPLETE CBC AUTOMATED: CPT

## 2022-07-07 PROCEDURE — 36415 COLL VENOUS BLD VENIPUNCTURE: CPT

## 2022-07-07 PROCEDURE — 74011250636 HC RX REV CODE- 250/636: Performed by: HOSPITALIST

## 2022-07-07 PROCEDURE — 99232 SBSQ HOSP IP/OBS MODERATE 35: CPT | Performed by: SURGERY

## 2022-07-07 PROCEDURE — 82746 ASSAY OF FOLIC ACID SERUM: CPT

## 2022-07-07 PROCEDURE — 80048 BASIC METABOLIC PNL TOTAL CA: CPT

## 2022-07-07 PROCEDURE — 51798 US URINE CAPACITY MEASURE: CPT

## 2022-07-07 PROCEDURE — 82728 ASSAY OF FERRITIN: CPT

## 2022-07-07 PROCEDURE — 74011636637 HC RX REV CODE- 636/637: Performed by: HOSPITALIST

## 2022-07-07 PROCEDURE — 82962 GLUCOSE BLOOD TEST: CPT

## 2022-07-07 PROCEDURE — 65270000046 HC RM TELEMETRY

## 2022-07-07 PROCEDURE — 74011250636 HC RX REV CODE- 250/636: Performed by: SURGERY

## 2022-07-07 PROCEDURE — 84100 ASSAY OF PHOSPHORUS: CPT

## 2022-07-07 PROCEDURE — 83735 ASSAY OF MAGNESIUM: CPT

## 2022-07-07 PROCEDURE — 74011250636 HC RX REV CODE- 250/636: Performed by: NURSE PRACTITIONER

## 2022-07-07 PROCEDURE — 82306 VITAMIN D 25 HYDROXY: CPT

## 2022-07-07 PROCEDURE — 74011000250 HC RX REV CODE- 250: Performed by: HOSPITALIST

## 2022-07-07 PROCEDURE — 74011000636 HC RX REV CODE- 636: Performed by: STUDENT IN AN ORGANIZED HEALTH CARE EDUCATION/TRAINING PROGRAM

## 2022-07-07 PROCEDURE — 74011000250 HC RX REV CODE- 250: Performed by: STUDENT IN AN ORGANIZED HEALTH CARE EDUCATION/TRAINING PROGRAM

## 2022-07-07 PROCEDURE — 82607 VITAMIN B-12: CPT

## 2022-07-07 PROCEDURE — C9113 INJ PANTOPRAZOLE SODIUM, VIA: HCPCS | Performed by: NURSE PRACTITIONER

## 2022-07-07 RX ORDER — POTASSIUM CHLORIDE 7.45 MG/ML
10 INJECTION INTRAVENOUS
Status: COMPLETED | OUTPATIENT
Start: 2022-07-07 | End: 2022-07-07

## 2022-07-07 RX ORDER — POTASSIUM CHLORIDE 7.45 MG/ML
10 INJECTION INTRAVENOUS
Status: DISCONTINUED | OUTPATIENT
Start: 2022-07-07 | End: 2022-07-07

## 2022-07-07 RX ORDER — POTASSIUM CHLORIDE 14.9 MG/ML
10 INJECTION INTRAVENOUS
Status: DISCONTINUED | OUTPATIENT
Start: 2022-07-07 | End: 2022-07-07 | Stop reason: SDUPTHER

## 2022-07-07 RX ORDER — POTASSIUM CHLORIDE 750 MG/1
40 TABLET, FILM COATED, EXTENDED RELEASE ORAL
Status: ACTIVE | OUTPATIENT
Start: 2022-07-07 | End: 2022-07-08

## 2022-07-07 RX ORDER — POTASSIUM CHLORIDE 7.45 MG/ML
10 INJECTION INTRAVENOUS
Status: DISCONTINUED | OUTPATIENT
Start: 2022-07-07 | End: 2022-07-07 | Stop reason: DRUGHIGH

## 2022-07-07 RX ADMIN — AMIODARONE HYDROCHLORIDE 200 MG: 200 TABLET ORAL at 09:41

## 2022-07-07 RX ADMIN — ONDANSETRON 4 MG: 2 INJECTION INTRAMUSCULAR; INTRAVENOUS at 16:44

## 2022-07-07 RX ADMIN — POTASSIUM CHLORIDE 10 MEQ: 7.46 INJECTION, SOLUTION INTRAVENOUS at 21:00

## 2022-07-07 RX ADMIN — ATORVASTATIN CALCIUM 20 MG: 20 TABLET, FILM COATED ORAL at 22:30

## 2022-07-07 RX ADMIN — POTASSIUM CHLORIDE 10 MEQ: 7.46 INJECTION, SOLUTION INTRAVENOUS at 20:00

## 2022-07-07 RX ADMIN — SODIUM CHLORIDE, PRESERVATIVE FREE 40 MG: 5 INJECTION INTRAVENOUS at 09:41

## 2022-07-07 RX ADMIN — POTASSIUM CHLORIDE 10 MEQ: 7.46 INJECTION, SOLUTION INTRAVENOUS at 09:43

## 2022-07-07 RX ADMIN — SODIUM CHLORIDE, PRESERVATIVE FREE 10 ML: 5 INJECTION INTRAVENOUS at 07:11

## 2022-07-07 RX ADMIN — POTASSIUM PHOSPHATE, MONOBASIC POTASSIUM PHOSPHATE, DIBASIC: 224; 236 INJECTION, SOLUTION, CONCENTRATE INTRAVENOUS at 15:07

## 2022-07-07 RX ADMIN — Medication 3 UNITS: at 00:10

## 2022-07-07 RX ADMIN — IOPAMIDOL 100 ML: 755 INJECTION, SOLUTION INTRAVENOUS at 08:56

## 2022-07-07 RX ADMIN — SODIUM CHLORIDE, PRESERVATIVE FREE 10 ML: 5 INJECTION INTRAVENOUS at 13:26

## 2022-07-07 RX ADMIN — Medication 4 UNITS: at 18:27

## 2022-07-07 RX ADMIN — Medication 1500 ML: at 08:56

## 2022-07-07 RX ADMIN — POTASSIUM CHLORIDE 10 MEQ: 7.46 INJECTION, SOLUTION INTRAVENOUS at 13:25

## 2022-07-07 RX ADMIN — I-VITE, TAB 1000-60-2MG (60/BT) 1 TABLET: TAB at 10:00

## 2022-07-07 RX ADMIN — SODIUM CHLORIDE, PRESERVATIVE FREE 10 ML: 5 INJECTION INTRAVENOUS at 22:39

## 2022-07-07 RX ADMIN — POTASSIUM CHLORIDE 10 MEQ: 7.46 INJECTION, SOLUTION INTRAVENOUS at 18:23

## 2022-07-07 RX ADMIN — POTASSIUM CHLORIDE 10 MEQ: 7.46 INJECTION, SOLUTION INTRAVENOUS at 19:00
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
## 2022-07-07 NOTE — CONSULTS
Urology Consult    Patient: Frankie Underwood MRN: [id number]  SSN: [id number]    YOB: 1945  Age: 76 y.o. Sex: male          Date of Consultation:  July 7, 2022  Requesting Physician: Nataly Gtz DO  Reason for Consultation: recurrent gross hematuria           Assessment/Plan:  Recurrent gross hematuria on severe thrombocytopenia  - Eliquis held today  Chart rounding only- will follow up in person tomorrow     -continue to rack urine overnight. If pt starts to pass blood clots or retain urine, place large bore bernard and hand irrigate. Nursing to check PVR today and record bladder scan   - daily cbc, transfuse plts and hgb per protocol   -continue to hold [facility] until hematuria resolves  -pt will require OP gross hematuria work up   - will follow up in person in AM    Supervising MD, Dr. Luis M Patel       History of Present Illness:  Patient is a 76 y.o. male admitted 6/27/2022 to the hospital for Thrombocytopenia ([facility] [address] [D69.6]  GI bleed [K92.2]  UGO (acute kidney injury) ([facility] [address] [N17.9]. He is not a known urologic patient of [facility]. He has a PMH of several DVT and PE since 2008 and has been on eliquis OP. He also has a PMH of CAD with PCI, diabetes type II, obesity and bilateral hip repair. He was recently started on bactrim OP for a UTI. Chart rounding only. Presented from NH with GI bleed, thrombocytopenia and oral bleeding. Previously had hematuria x3 weeks with urinary retention from clots. Hematuria resolved after course of bactrim. In ED on 6/27 his PLTs were less than 3. Chronic AC was held initially however Eliquis was resumed two days ago on 7/5/22 after thrombocytopenia resolved. S/p multiple transfusions over the past 1.5 weeks. hemonc following. CT a/p reviewed  SBO followed by General surgery  No  obstruction or hydronephrosis   UGO resolved. Past Medical History:   Allergies   Allergen Reactions    Adhesive Other (comments)     Blisters     Advil [Ibuprofen] Rash     Rash all over, itching, trouble breathing     Celebrex [Celecoxib] Other (comments)     Swelling in legs, no rash, no itching, no trouble breathing    Cleocin [Clindamycin Hcl] Rash    Other Medication Unknown (comments)     \"Phosphate\"      Prior to Admission medications    Medication Sig Start Date End Date Taking? Authorizing Provider   apixaban (Eliquis) 5 mg tablet Take 5 mg by mouth two (2) times a day. Yes Provider, Historical   trimethoprim-sulfamethoxazole (Bactrim DS) 160-800 mg per tablet Take 1 Tablet by mouth two (2) times a day. Yes Provider, Historical   amoxicillin (AMOXIL) 500 mg capsule Take 500 mg by mouth as needed. 4 capsules before dental procedure   Yes Provider, Historical   acetaminophen (Tylenol Extra Strength) 500 mg tablet Take 500 mg by mouth every six (6) hours as needed for Pain. Yes Provider, Historical   vit A/vit C/vit E/zinc/copper (ICAPS AREDS PO) Take 1 Tablet by mouth two (2) times a day. Yes Provider, Historical   omega 3-dha-epa-fish oil 100-160-1,000 mg cap Take 1 Capsule by mouth daily. Yes Provider, Historical   glucosamine-chondroitin (ELATION) 1,500-1,200 mg/30 mL liqd Take 1 Tablet by mouth two (2) times a day. Yes Provider, Historical   COVID-19 mRNA Vacc (MODERNA) 100 mcg/0.5 mL susp injection 0.5 mL by IntraMUSCular route once. 5/16/22  Yes Provider, Historical   simvastatin (ZOCOR) 40 mg tablet Take 40 mg by mouth nightly. 7/26/21  Yes Provider, Historical   aspirin 81 mg chewable tablet Take 81 mg by mouth daily. Yes Provider, Historical   metFORMIN (GLUCOPHAGE) 500 mg tablet Take 500 mg by mouth two (2) times daily (with meals). Yes Provider, Historical   multivitamin (ONE A DAY) tablet Take 1 Tab by mouth daily.    Yes Provider, Historical      PMHx:  has a past medical history of Arthritis, CAD (coronary artery disease) (6/7/13), Chronic pain, Diabetes ([facility] [address] GERD (gastroesophageal reflux disease), Macular degeneration, Morbid obesity ([facility] [address] Other and unspecified symptoms and signs involving general sensations and perceptions, Other ill-defined conditions(799.89), Pulmonary embolism ([facility] [address] (08/2021), and Thromboembolus ([facility] [address] (2008). PSurgHx:  has a past surgical history that includes pr cardiac surg procedure unlist (6/713); pr total hip arthroplasty (6/2007); and pr total hip arthroplasty (12/2007). PSocHx:  reports that he quit smoking about 39 years ago. He smoked 1.00 pack per day. He has never used smokeless tobacco. He reports current alcohol use of about 2.5 standard drinks of alcohol per week. He reports that he does not use drugs. ROS:  Admission ROS by Josué Lee MD from 6/27/2022 were reviewed with the patient and changes (other than per HPI) include: none.         Lab Results   Component Value Date/Time    WBC 7.4 07/07/2022 03:32 AM    HCT 24.3 (L) 07/07/2022 03:32 AM    PLATELET 490 80/07/9700 03:32 AM    Sodium 136 07/07/2022 03:32 AM    Potassium 3.0 (L) 07/07/2022 03:32 AM    Chloride 106 07/07/2022 03:32 AM    CO2 23 07/07/2022 03:32 AM    BUN 26 (H) 07/07/2022 03:32 AM    Creatinine 0.95 07/07/2022 03:32 AM    Glucose 92 07/07/2022 03:32 AM    Calcium 7.8 (L) 07/07/2022 03:32 AM    Magnesium 2.4 07/07/2022 03:32 AM    INR 1.1 06/28/2022 11:33 PM       UA:   Lab Results   Component Value Date/Time    Color YELLOW/STRAW 06/29/2022 02:12 PM    Appearance CLEAR 06/29/2022 02:12 PM    Specific gravity 1.010 06/29/2022 02:12 PM    Specific gravity 1.011 10/28/2018 11:43 AM    pH (UA) 5.0 06/29/2022 02:12 PM    Protein TRACE (A) 06/29/2022 02:12 PM    Glucose Negative 06/29/2022 02:12 PM    Ketone Negative 06/29/2022 02:12 PM    Bilirubin Negative 06/29/2022 02:12 PM    Urobilinogen 0.2 06/29/2022 02:12 PM    Nitrites Negative 06/29/2022 02:12 PM    Leukocyte Esterase Negative 06/29/2022 02:12 PM    Epithelial cells FEW 06/29/2022 02:12 PM    Bacteria Negative 06/29/2022 02:12 PM    WBC 0-4 06/29/2022 02:12 PM    RBC 0-5 06/29/2022 02:12 PM           Signed By: Suzette Harmon NP  - July 7, 2022

## 2022-07-07 NOTE — PROGRESS NOTES
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 Received 3 doses IVIG, did not tolerate final IVIG infusion despite. PLT wnl today  Hemo/onc on board     SBO  Repeat CT a/p on  showed partial SBO pattern. Having loose stools, no n/v this AM but 1 episode of emesis overight. Passing flatus/ had multiple BMS today  Surgery following. Diet advanced to low fiber  CT enterography showed distal partial SBO    GI bleed  Was to due severe ITP  S/p blood transfusion  Continue with protonix 40 daily(can switch to famotidine on discharge)  GI on board, holding off on EGD for now, conservative management. Paroxysmal atrial fibrillation  Elevated trop  HR is controlled now   Continue with amiodarone  Eliquis held due to hematuria. ASA to be resumed if ok with heme/onc  Cardiac stress test done, result pending  Appreciate cardiology following    Gross hematuria  -- Patient with recurrent gross hematuria, will consult urology  -- Eliquis held  -- Continue to monitor H&H and transfuse to maintain hemoglobin over 7  -- Anemia panel ordered        30.0 - 39.9 Obese / Body mass index is 33.73 kg/m². Estimated discharge date:   Barriers:    Code status: Full  Prophylaxis: SCD's  Recommended Disposition: SAH/Rehab     Subjective:     Chief Complaint / Reason for Physician Visit  Pt sitting up in chair. NAD. Patient reports hematuria, he reports this has been a recurrent problem. Reports that he has not had cystoscopy. Discussed with RN events overnight.      Review of Systems:  Symptom Y/N Comments  Symptom Y/N Comments   Fever/Chills    Chest Pain     Poor Appetite    Edema     Cough    Abdominal Pain     Sputum    Joint Pain     SOB/SHEARER    Pruritis/Rash     Nausea/vomit    Tolerating PT/OT     Diarrhea y   Tolerating Diet     Constipation    Other       Could NOT obtain due to: Objective:     VITALS:   Last 24hrs VS reviewed since prior progress note. Most recent are:  Patient Vitals for the past 24 hrs:   Temp Pulse Resp BP SpO2   07/07/22 1050 97.8 °F (36.6 °C) 65 14 (!) 149/62 95 %   07/07/22 0941 -- 63 -- (!) 149/62 --   07/07/22 0722 -- 62 18 (!) 140/66 100 %   07/07/22 0330 97.7 °F (36.5 °C) (!) 56 16 (!) 131/56 96 %   07/06/22 2231 97.9 °F (36.6 °C) (!) 105 19 121/63 96 %   07/06/22 1930 97.6 °F (36.4 °C) 62 19 (!) 125/54 99 %   07/06/22 1453 97.6 °F (36.4 °C) (!) 53 20 102/68 99 %       Intake/Output Summary (Last 24 hours) at 7/7/2022 1325  Last data filed at 7/6/2022 1841  Gross per 24 hour   Intake 240 ml   Output 400 ml   Net -160 ml        I had a face to face encounter and independently examined this patient on 7/6/2022, as outlined below:  PHYSICAL EXAM:  General: WD, WN. Alert, cooperative, no acute distress    EENT:  EOMI. Anicteric sclerae. MMM  Resp:  CTA bilaterally, no wheezing or rales. No accessory muscle use  CV:  Regular  rhythm,  No edema  GI:  Soft, Non distended, Non tender. +Bowel sounds  Neurologic:  Alert and oriented X 3, normal speech   Psych:   Good insight. Not anxious nor agitated  Skin:  No rashes. No jaundice      Reviewed most current lab test results and cultures  YES  Reviewed most current radiology test results   YES  Review and summation of old records today    NO  Reviewed patient's current orders and MAR    YES  PMH/SH reviewed - no change compared to H&P  ________________________________________________________________________  Care Plan discussed with:    Comments   Patient X    Family  X    RN X    Care Manager     Consultant                        Multidiciplinary team rounds were held today with , nursing, pharmacist and clinical coordinator. Patient's plan of care was discussed; medications were reviewed and discharge planning was addressed. ________________________________________________________________________  Total NON critical care TIME:  35   Minutes    Total CRITICAL CARE TIME Spent:   Minutes non procedure based      Comments   >50% of visit spent in counseling and coordination of care X    ________________________________________________________________________  Moise Wilks DO     Procedures: see electronic medical records for all procedures/Xrays and details which were not copied into this note but were reviewed prior to creation of Plan. LABS:  I reviewed today's most current labs and imaging studies. Pertinent labs include:  Recent Labs     07/07/22 0332 07/06/22  0128 07/05/22  0340   WBC 7.4 5.8 7.9   HGB 8.0* 8.4* 8.5*   HCT 24.3* 25.7* 25.8*    162 133*     Recent Labs     07/07/22  0332 07/06/22  0128 07/05/22  0236    135* 133*   K 3.0* 3.5 4.2    103 105   CO2 23 24 21   GLU 92 129* 159*   BUN 26* 32* 33*   CREA 0.95 1.12 1.26   CA 7.8* 7.8* 8.0*   MG 2.4 2.4 2.2   PHOS 2.0* 2.8 3.7       Signed:  Moise Wilks DO

## 2022-07-07 NOTE — PROGRESS NOTES
20: 62PM Notified Dr. Danay Claudio through [facility] with regards patient's episode of gross hematuria this evening. At 20:59 Dr. Danay Claudio gave a verbal order to hold off Eliquis this morning. End of Shift Note    Bedside shift change report given to Adelso Whitlock (oncoming nurse) by Ovidio Goodness. Lovely Romayne Bowers, RN (offgoing nurse). Report included the following information SBAR, Kardex, Procedure Summary, Intake/Output, MAR and Recent Results    Shift worked:  7p-7a     Shift summary and any significant changes:    Pt. Is due for a CT enterography - NPO until the procedure. Gross hematuria still noted. Eliquis held this am as per MD. Serum K 3.0 - MD informed. Concerns for physician to address: Pt. Wanted to know his treatment plan, why he still has the bleeding. Zone phone for oncoming shift:   0000       Activity:  Activity Level: Ambulate X (#)  Number times ambulated in hallways past shift: 0  Number of times OOB to chair past shift: 0    Cardiac:   Cardiac Monitoring:       Cardiac Rhythm: BBB,Atrial Fib    Access:   Current line(s): PIV     Genitourinary:   Urinary status: voiding    Respiratory:   O2 Device: None (Room air)  Chronic home O2 use?: NO  Incentive spirometer at bedside: NO       GI:  Last Bowel Movement Date: 07/06/22  Current diet:  DIET ONE TIME MESSAGE  ADULT DIET Clear Liquid  DIET ONE TIME MESSAGE  Passing flatus: YES  Tolerating current diet: YES       Pain Management:   Patient states pain is manageable on current regimen: YES    Skin:  Jose Guadalupe Score: 20  Interventions: increase time out of bed and limit briefs    Patient Safety:  Fall Score: Total Score: 2  Interventions: bed/chair alarm, assistive device (walker, cane, etc), gripper socks and pt to call before getting OOB  High Fall Risk: Yes    Length of Stay:  Expected LOS: 3d 14h  Actual LOS: [place] [address].  Carlos Haji RN

## 2022-07-07 NOTE — PROGRESS NOTES
Admit Date: 2022      POD * No surgery found *  * No surgery found *      Procedure:  * No surgery found *        HOSPITAL DAY:     ANTIBIOTICS:      HPI:  CT enterography results pending, patient denies nausea or vomiting and is having multiple bowel movements, he denies any significant abdominal pain at this point. Stress test results noted nondiagnostic  Temp:  [97.4 °F (36.3 °C)-98.6 °F (37 °C)]   Pulse (Heart Rate):  []   BP: (102-153)/(49-99)   Resp Rate:  [14-20]   O2 Sat (%):  [90 %-100 %]   Weight: --      Intake and Output:  Current Shift: No intake/output data recorded. Last three shifts: 1901 -  0700  In: 300 [P.O.:300]  Out: 700 [Urine:700]     Blood pressure (!) 149/62, pulse 65, temperature 97.8 °F (36.6 °C), resp. rate 14, height 6' 4\" (1.93 m), weight 125.7 kg (277 lb 1.9 oz), SpO2 95 %. Temp (24hrs), Av.7 °F (36.5 °C), Min:97.6 °F (36.4 °C), Max:97.9 °F (36.6 °C)        Review of Systems   Constitutional: Positive for fatigue. Gastrointestinal: Negative for abdominal pain, nausea and vomiting. Musculoskeletal: Positive for arthralgias and myalgias. Psychiatric/Behavioral: Negative. All other systems reviewed and are negative. Physical Exam  Vitals and nursing note reviewed. Constitutional:       General: He is not in acute distress. Appearance: Normal appearance. He is obese. He is not ill-appearing. Cardiovascular:      Rate and Rhythm: Normal rate. Pulmonary:      Effort: Pulmonary effort is normal.   Abdominal:      Comments: Obese but soft and nontender, no peritoneal signs guarding or hernias noted but exam for hernias could be compromised by his obesity. Nonsurgical at this point. No peritoneal signs   Skin:     General: Skin is warm and dry. Neurological:      General: No focal deficit present. Mental Status: He is alert and oriented to person, place, and time.    Psychiatric:         Mood and Affect: Mood normal. Behavior: Behavior normal.         Thought Content:  Thought content normal.         Judgment: Judgment normal.         Recent Results (from the past 48 hour(s))   GLUCOSE, POC    Collection Time: 07/05/22  1:29 PM   Result Value Ref Range    Glucose (POC) 175 (H) 65 - 117 mg/dL    Performed by Karen Villegas RN    GLUCOSE, POC    Collection Time: 07/05/22  6:01 PM   Result Value Ref Range    Glucose (POC) 174 (H) 65 - 117 mg/dL    Performed by Robi BEEBE    GLUCOSE, POC    Collection Time: 07/05/22 11:35 PM   Result Value Ref Range    Glucose (POC) 141 (H) 65 - 117 mg/dL    Performed by Yulisa Navarrete RN    CBC W/O DIFF    Collection Time: 07/06/22  1:28 AM   Result Value Ref Range    WBC 5.8 4.1 - 11.1 K/uL    RBC 2.69 (L) 4.10 - 5.70 M/uL    HGB 8.4 (L) 12.1 - 17.0 g/dL    HCT 25.7 (L) 36.6 - 50.3 %    MCV 95.5 80.0 - 99.0 FL    MCH 31.2 26.0 - 34.0 PG    MCHC 32.7 30.0 - 36.5 g/dL    RDW 15.8 (H) 11.5 - 14.5 %    PLATELET 211 927 - 931 K/uL    MPV 10.2 8.9 - 12.9 FL    NRBC 0.0 0  WBC    ABSOLUTE NRBC 0.00 0.00 - 6.68 K/uL   METABOLIC PANEL, BASIC    Collection Time: 07/06/22  1:28 AM   Result Value Ref Range    Sodium 135 (L) 136 - 145 mmol/L    Potassium 3.5 3.5 - 5.1 mmol/L    Chloride 103 97 - 108 mmol/L    CO2 24 21 - 32 mmol/L    Anion gap 8 5 - 15 mmol/L    Glucose 129 (H) 65 - 100 mg/dL    BUN 32 (H) 6 - 20 MG/DL    Creatinine 1.12 0.70 - 1.30 MG/DL    BUN/Creatinine ratio 29 (H) 12 - 20      GFR est AA >60 >60 ml/min/1.73m2    GFR est non-AA >60 >60 ml/min/1.73m2    Calcium 7.8 (L) 8.5 - 10.1 MG/DL   MAGNESIUM    Collection Time: 07/06/22  1:28 AM   Result Value Ref Range    Magnesium 2.4 1.6 - 2.4 mg/dL   PHOSPHORUS    Collection Time: 07/06/22  1:28 AM   Result Value Ref Range    Phosphorus 2.8 2.6 - 4.7 MG/DL   GLUCOSE, POC    Collection Time: 07/06/22  5:47 AM   Result Value Ref Range    Glucose (POC) 163 (H) 65 - 117 mg/dL    Performed by [address] TEST Collection Time: 07/06/22  9:48 AM   Result Value Ref Range    Stress Target  bpm    Exercise Duration Time 0 min    Exercuse Duration Seconds 45 sec    Stress Systolic  mmHg    Stress Diastolic BP 73 mmHg    Stress Peak  BPM    Baseline HR 68 BPM    Stress Estimated Workload 1.0 METS    Stress Percent HR Achieved 71 %    Stress Rate Pressure Product 14,100 BPM*mmHg    Baseline Systolic  mmHg    Baseline Diastolic BP 55 mmHg    Baseline O2 Sat 96 %    Stress O2 Sat 98 %    Baseline ST Depression 0 mm   GLUCOSE, POC    Collection Time: 07/06/22 11:42 AM   Result Value Ref Range    Glucose (POC) 117 65 - 117 mg/dL    Performed by Baljit England PCT    GLUCOSE, POC    Collection Time: 07/06/22  5:38 PM   Result Value Ref Range    Glucose (POC) 127 (H) 65 - 117 mg/dL    Performed by Baljit England PCT    GLUCOSE, POC    Collection Time: 07/06/22 11:27 PM   Result Value Ref Range    Glucose (POC) 141 (H) 65 - 117 mg/dL    Performed by Ramon Lugo PCT    CBC W/O DIFF    Collection Time: 07/07/22  3:32 AM   Result Value Ref Range    WBC 7.4 4.1 - 11.1 K/uL    RBC 2.56 (L) 4.10 - 5.70 M/uL    HGB 8.0 (L) 12.1 - 17.0 g/dL    HCT 24.3 (L) 36.6 - 50.3 %    MCV 94.9 80.0 - 99.0 FL    MCH 31.3 26.0 - 34.0 PG    MCHC 32.9 30.0 - 36.5 g/dL    RDW 15.6 (H) 11.5 - 14.5 %    PLATELET 377 923 - 555 K/uL    MPV 9.8 8.9 - 12.9 FL    NRBC 0.0 0  WBC    ABSOLUTE NRBC 0.00 0.00 - 6.70 K/uL   METABOLIC PANEL, BASIC    Collection Time: 07/07/22  3:32 AM   Result Value Ref Range    Sodium 136 136 - 145 mmol/L    Potassium 3.0 (L) 3.5 - 5.1 mmol/L    Chloride 106 97 - 108 mmol/L    CO2 23 21 - 32 mmol/L    Anion gap 7 5 - 15 mmol/L    Glucose 92 65 - 100 mg/dL    BUN 26 (H) 6 - 20 MG/DL    Creatinine 0.95 0.70 - 1.30 MG/DL    BUN/Creatinine ratio 27 (H) 12 - 20      GFR est AA >60 >60 ml/min/1.73m2    GFR est non-AA >60 >60 ml/min/1.73m2    Calcium 7.8 (L) 8.5 - 10.1 MG/DL   MAGNESIUM    Collection Time: 07/07/22  3:32 AM   Result Value Ref Range    Magnesium 2.4 1.6 - 2.4 mg/dL   PHOSPHORUS    Collection Time: 07/07/22  3:32 AM   Result Value Ref Range    Phosphorus 2.0 (L) 2.6 - 4.7 MG/DL   GLUCOSE, POC    Collection Time: 07/07/22  5:31 AM   Result Value Ref Range    Glucose (POC) 108 65 - 117 mg/dL    Performed by Judson Morales    GLUCOSE, POC    Collection Time: 07/07/22 11:03 AM   Result Value Ref Range    Glucose (POC) 133 (H) 65 - 117 mg/dL    Performed by Shanthi Martines PCT          XR Results (maximum last 3): Results from [place] encounter on 06/27/22    XR ABD (KUB)    Impression  1. Contrast material extending throughout the small and large bowel to the  rectum, with multiple mildly dilated loops of small bowel noted throughout the  abdomen. Given contrast passage, findings are favored to represent ileus. 2. Gastric tube terminates in the region of the GE junction. Consider  advancement. CT Results (maximum last 3): Results from [place] encounter on 06/27/22    CT ABD PELV WO CONT    Impression  1. Small bowel obstruction with a transition point in the right lower quadrant  in the terminal ileum. The terminal ileum now appears more narrowed/collapsed  than on the prior study. Oral contrast has made it to the colon however  indicating this is likely a partial obstruction. 2. Fat-containing and local hernia containing fluid as previously seen. MRI Results (maximum last 3): No results found for this or any previous visit. Nuclear Medicine Results (maximum last 3): No results found for this or any previous visit. US Results (maximum last 3): No results found for this or any previous visit. Active Problems:     Morbid obesity ([address] (6/7/2013)      Thrombocytopenia ([address] (6/27/2022)      GI bleed (6/27/2022)      UGO (acute kidney injury) ([address] (6/27/2022)      Atrial fibrillation ([address] (7/3/2022)      Elevated troponin (7/3/2022)      Ileus [facility]) (7/7/2022)          ASSESSMENT/PLAN  CT enterography pending results, more likely this is intestinal ileus rather than mechanical bowel obstruction especially since clinically improving. Further recommendations pending the above results, but try to avoid surgical intervention if at all possible. CT enterography films were reviewed awaiting report      FACE TO FACE time including review of any indicated imaging, discussion with patient, and other providers, exam and discussion with patient:   23         minutes    END:     Addendum, 1 PM July 7, 2022, I have met with patient after CT enterography report below. CT ENTEROGRAPHY W CONT: Patient Communication     Add Comments   Not seen       Study Result    Narrative & Impression   Clinical indication: Lower GI bleed, small bowel obstruction.     CT enterography of the abdomen and pelvis obtained, 100 cc of Isovue-370 IV,  1500 cc of SORBITOL MANITOL XANTHAN. Comparison CT abdomen and pelvis July 5. CT  dose reduction was achieved through the use of a standardized protocol tailored  for this examination and automatic exposure control for dose modulation .     There is a abrupt change in caliber of the distal ileum with normal ileum distal  to it toward the ileocecal valve and distention proximally compatible with the  site of partial obstruction. The colon appears unremarkable. There is no  definite masses seen. Possibly just stricture. There is no fluid collection. No  definite adenopathy. some mild sigmoid diverticulosis. Large fat-containing  anterior abdominal hernia once again demonstrated.     IMPRESSION  Distal partial small bowel obstruction as above. .      Patient clinically has not had any GI symptoms before admission, has had only minimal emesis and he really minimizes his symptoms of abdominal pain and no previous abdominal surgery or trauma.   It is possible he has some adhesions from inflammatory changes for some other reason in the past or congenital adhesions possible but less likely tumor or ischemic changes. This may all be ileus related to his other medical admission issues. I offered patient options of laparoscopy possible laparotomy possible bowel resection understanding we may find nothing surgical or may find something we need to release or remove and open laparotomy benefits risks alternatives reviewed with patient and wife by telephone, patient in person, patient really does not want surgery unless absolutely necessary and certainly is at increased risk of morbidity mortality and complications. We will advance diet to low fiber patient currently asymptomatic of abdominal pain nausea or vomiting and is having multiple bowel movements. If recurring symptoms will need to revisit possible surgery for exploration understanding above, in the meantime would appreciate cardiology risk factor stratification given recent stress test and history of stents and pacemaker.

## 2022-07-07 NOTE — PROGRESS NOTES
[facility]  [address], [facility], [address]  [phone]      Hematology/ Oncology Progress Note      Reason for consult:     Syd Erickson is a 76 y.o. male who we have been asked to see by Dr. Trina Crawford for acute thrombocytopenia. Subjective:     Syd Erickson is a 76year old male who [facility] [address] [facility] fatigue, scattered petechiae to all extremities, purpura, hematuria and rectal bleeding. He has a PMH of several DVT and PE since 2008 and has been on eliquis OP. He also has a PMH of CAD with PCI, diabetes type II, obesity and bilateral hip repair. He was recently started on bactrim OP for a UTI. Patient seen at bedside today in the ED with wife. Discussed potential causes of acute severe thrombocytopenia, work up and treatment. He stated that petechiae appeared over last 24 hours after a hot shower. Interval History:     6/28/2022 Patient seen at bedside, discussed another transfusion of platelets. 6/29/2022 Patient seen at bedside in ICU, transferred overnight with CP and afib. Reports he feels better. 6/30/2022 Patient seen at bedside, feeling better today. Happy to hear his platelets are improving. Did not tolerate IVIG infusion last night despite pre-medications. 7/1/2022 Patient seen at bedside, reports he is feeling well today. Hopeful to be moved out of ICU soon. 7/5/2022 Patient seen at bedside with wife. Discussed ITP, platelets trending up and restarting eliquis. Now on step down unit. Petechiae are beginning to resolve. 7/6/2022 Patient seen at bedside, up in recliner. Reports CT last night showed SBO but he had several hours of recurrent BMs this morning. Awaiting results of stress test. Wants to go home soon. Review of Systems:  Pertinent items are noted in the History of Present Illness.        Past Medical History:   Diagnosis Date  Arthritis     bilateral hips/knees    CAD (coronary artery disease) 13    PCI    Chronic pain     DJD; SILVIO KNEES    Diabetes ([facility]     \"BORDERLINE\" PER MD    GERD (gastroesophageal reflux disease)     Macular degeneration     Morbid obesity (HCC)     Other and unspecified symptoms and signs involving general sensations and perceptions     Bilat. legs \"give out\" intermittently; Bilat leg cramps; macular degenration (left).  Other ill-defined conditions(799.89)     HYPERLIPIDEMIA; LT LE DVT (); MORBID OBESITY    Pulmonary embolism ([facility] 2021    Thromboembolus ([facility]     left LE     Past Surgical History:   Procedure Laterality Date    NE CARDIAC SURG PROCEDURE UNLIST      STENTS TO RCA    NE TOTAL HIP ARTHROPLASTY  2007    right    NE TOTAL HIP ARTHROPLASTY  2007    left      Family History   Problem Relation Age of Onset    Heart Disease Mother     Arrhythmia Mother     Heart Disease Father     Cancer Sister     Lung Disease Sister      Social History     Tobacco Use    Smoking status: Former Smoker     Packs/day: 1.00     Quit date: 1983     Years since quittin.4    Smokeless tobacco: Never Used   Substance Use Topics    Alcohol use:  Yes     Alcohol/week: 2.5 standard drinks     Types: 2 Glasses of wine, 1 Cans of beer per week     Comment: 0-2 X PER WK WITH MEALS OR AFTER PLAYING GOLF      Current Facility-Administered Medications   Medication Dose Route Frequency Provider Last Rate Last Admin    potassium chloride 10 mEq in 100 ml IVPB  10 mEq IntraVENous Q2H Dariela Maldonado,         apixaban (ELIQUIS) tablet 5 mg  5 mg Oral BID Zafar James NP   5 mg at 22 1756    amiodarone (CORDARONE) tablet 200 mg  200 mg Oral DAILY No Gil MD   200 mg at 22 0941    phenol throat spray (CHLORASEPTIC) 1 Spray  1 [place] Oral PRN Nita Stephenson MD        polyethylene glycol [facility]) packet 17 g  17 g Oral DAILY Catia Melo NP 17 g at 07/04/22 0841    lactulose (CHRONULAC) 10 gram/15 mL solution 300 mL  200 g Rectal BID Selena Riley MD   300 mL at 07/02/22 1028    insulin lispro (HUMALOG) injection   SubCUTAneous Q6H Selena Riley MD   3 Units at 07/07/22 0010    pantoprazole (PROTONIX) 40 mg in 0.9% sodium chloride 10 mL injection  40 mg IntraVENous DAILY Saima IRMA Moreno   40 mg at 07/07/22 0941    calcium carbonate (TUMS) chewable tablet 400 mg [elemental]  400 mg Oral TID PRN Atif Carr NP   400 mg at 07/01/22 0133    glucose chewable tablet 16 g  4 Tablet Oral PRN Chrissy Fairbanks NP        glucagon (GLUCAGEN) injection 1 mg  1 mg IntraMUSCular PRN Chrissy Fairbanks NP        dextrose 10% infusion 0-250 mL  0-250 mL IntraVENous PRN Chrissy Fairbanks NP        nitroglycerin (NITROSTAT) tablet 0.4 mg  0.4 mg SubLINGual PRN Simona Hopson NP   0.4 mg at 06/28/22 2322    atorvastatin (LIPITOR) tablet 20 mg  20 mg Oral QHS Mina Monteiro MD   20 mg at 07/06/22 2158    vitamin J-I-Z-lutein-minerals (OCUVITE) tablet 1 Tablet  1 Tablet Oral DAILY Mina Monteiro MD   1 Tablet at 07/07/22 1000    sodium chloride (NS) flush 5-40 mL  5-40 mL IntraVENous Q8H Mina Monteiro MD   10 mL at 07/07/22 0711    sodium chloride (NS) flush 5-40 mL  5-40 mL IntraVENous PRN Mina Monteiro MD        acetaminophen (TYLENOL) tablet 650 mg  650 mg Oral Q6H PRN Mina Monteiro MD   650 mg at 07/02/22 2246    Or    acetaminophen (TYLENOL) suppository 650 mg  650 mg Rectal Q6H PRN Mina Monteiro MD        polyethylene glycol [facility]) packet 17 g  17 g Oral DAILY PRN Mina Monteiro MD        ondansetron (ZOFRAN ODT) tablet 4 mg  4 mg Oral Q8H PRN Mina Monteiro MD        Or    ondansetron [facility] injection 4 mg  4 mg IntraVENous Q6H PRN Mina Monteiro MD   4 mg at 07/05/22 1825     Facility-Administered Medications Ordered in Other Encounters   Medication Dose Route Frequency Provider Last Rate Last Admin    sodium chloride (NS) flush 10 mL  10 mL IntraVENous PRN German Dorsey MD   10 mL at 07/05/22 1000        Allergies   Allergen Reactions    Adhesive Other (comments)     Blisters     Advil [Ibuprofen] Rash     Rash all over, itching, trouble breathing     Celebrex [Celecoxib] Other (comments)     Swelling in legs, no rash, no itching, no trouble breathing    Cleocin [Clindamycin Hcl] Rash    Other Medication Unknown (comments)     \"Phosphate\"          Objective:     Patient Vitals for the past 8 hrs:   BP Temp Pulse Resp SpO2   07/07/22 1050 (!) 149/62 97.8 °F (36.6 °C) 65 14 95 %   07/07/22 0941 (!) 149/62 -- 63 -- --   07/07/22 0722 (!) 140/66 -- 62 18 100 %       Lab Results   Component Value Date/Time    WBC 7.4 07/07/2022 03:32 AM    HGB 8.0 (L) 07/07/2022 03:32 AM    HCT 24.3 (L) 07/07/2022 03:32 AM    PLATELET 497 08/00/7304 03:32 AM    MCV 94.9 07/07/2022 03:32 AM       Physical Exam:   Visit Vitals  BP (!) 149/62 (BP 1 Location: Left upper arm, BP Patient Position: Semi fowlers)   Pulse 65   Temp 97.8 °F (36.6 °C)   Resp 14   Ht 6' 4\" (1.93 m)   Wt 277 lb 1.9 oz (125.7 kg)   SpO2 95%   BMI 33.73 kg/m²     General appearance: alert, cooperative, no distress, appears stated age, moderately obese  Head: Normocephalic, without obvious abnormality, atraumatic  Throat: abnormal findings: multiple purpura  - now resolved    Abdomen: soft, non-tender.  Bowel sounds normal. No masses,  no organomegaly  Extremities: extremities normal, atraumatic, no cyanosis or edema  Skin: petechiae - scattered to all extremities which are beginning to resolve   Neurologic: Grossly normal    Assessment:     Severe Thrombocytopenia r/t ITP - improving   Sudden onset of scattered petechiae to all extremities and purpura, also melena to stool   Platelets found to be <3 in ED   Recently started bactrim for UTI prior to admission, stated has taken before with no issues   On Eliquis PTA for DVT/PE, last DVT in 8/2021    GI Bleed - resolved   Melena in ED, occult stool positive   Receiving IV Protonix   Appreciate GI evaluation     History of DVT and PE   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 car trips x2  PE of unknown origin   DVT after hip replacement   Last DVT 8/2021 - BLE dopplers confirm resolution   Patient reports his in immobile much of the time d/t debility   Has not had hypercoagulable work up     Acute Kidney Injury - resolved   Managed by primary team     Atrial Fibrillation   Episode of A fib   Transferred to ICU and started on amiodarone drip  Now in NSR   Evaluation by Cardiology     SBO   SBO seen on CT 7/5  Since CT results has had several BMs  General Surgery input - ordered CT enterography in AM       Plan:     > Suspect severe thrombocytopenia r/t ITP  > Platelets continue to trend up, hgb stable - follow CBC daily   > Completed Dexamethasone 40 mg daily x 4 days   > Received 3 doses IVIG, did not tolerate final IVIG infusion despite pre-medications and slowed rate, was stopped early   > Hgb stable, platelets continue to trend up  > Resumed eliquis on 7/5, held with hematuria 7/6. Recommend to continue as able with bleeding given high risk DVT/PE with history of several recurrent DVT/PE.   > Undergoing General Surgery work up for SBO seen on CT     Thank you for allowing us to participate in the care of Mr. Cathy Rodriguez, will continue to follow peripherally and arrange close OP follow up at [facility].      Katharine Mueller NP

## 2022-07-07 NOTE — PROGRESS NOTES
1756 patient had multiple emesis episodes - see associated flowsheet for more information. Aurora Telles MD and Manasa Chong MD paged. Per Aurora Telles MD patient to remain NPO.    3628 Patient oxygen requirements increased after emesis episode - patient O2 saturation 86%, 4 L oxygen NC placed on patient. O2 saturation is now at 95% on 4 L     1758 Patient has not voided since urology telephone orders to save urine. Patient was bladder scanned after emesis episodes - bladder scan showed 119 mL retained. 1800 Labs and new IV attempted by this RN and Charge nurse and unable to get the IV or labs. PICC team called for labs and possible endurance catheter. 1835 Received verbal orders from MD Juan Jose that Cefoxitin is due for tomorrow 7/8/2022 and not at the scheduled time of 1800 on 7/7/2022     End of Shift Note    Bedside shift change report given to Jacob Duffy (oncoming nurse) by Verena Nur RN (offgoing nurse). Report included the following information SBAR, Kardex, Procedure Summary, Intake/Output, MAR and Recent Results    Shift worked:  7am-7pm     Shift summary and any significant changes:     read note above     Concerns for physician to address: n/a     Zone phone for oncoming shift:          Activity:  Activity Level:  Up with Assistance  Number times ambulated in hallways past shift: 0  Number of times OOB to chair past shift: 3    Cardiac:   Cardiac Monitoring: Yes      Cardiac Rhythm: Atrial Fib    Access:   Current line(s): PIV and midline     Genitourinary:   Urinary status: voiding    Respiratory:   O2 Device: Nasal cannula  Chronic home O2 use?: NO  Incentive spirometer at bedside: YES       GI:  Last Bowel Movement Date: 07/07/22  Current diet:  DIET ONE TIME MESSAGE  DIET ONE TIME MESSAGE  DIET NPO Sips of Water with Meds  Passing flatus: YES  Tolerating current diet: NO       Pain Management:   Patient states pain is manageable on current regimen: YES    Skin:  Jose Guadalupe Score: 18  Interventions: increase time out of bed, PT/OT consult, limit briefs and nutritional support     Patient Safety:  Fall Score:  Total Score: 4  Interventions: bed/chair alarm, assistive device (walker, cane, etc), gripper socks, pt to call before getting OOB and stay with me (per policy)  High Fall Risk: Yes    Length of Stay:  Expected LOS: 3d 14h  Actual LOS: 10      Ilana Love RN

## 2022-07-07 NOTE — PROGRESS NOTES
Progress Note      7/7/2022 9:12 AM  NAME: Aguilar Worley   MRN:  [id number]   Admit Diagnosis: Thrombocytopenia ([facility] [D69.6]  GI bleed [K92.2]  UGO (acute kidney injury) ([facility] [N17.9]      Problem List:     1. Idiopathic thrombocytopenia  2. Anemia  3. Rectal bleeding  4. Lactic acidosis  5. NSTEMI  6. Paroxysmal atrial fibrillation  7. Remote PE and DVT (separate events)  8. CAD w/ mild diffuse disease w/ patent stent in the RCA 7/17 cath. 9. XOL  10. Morbid obesity  11. DM  12. Former smoker     Assessment/Plan: TnI 2300  PLT 170s  HgB 8    MPI w/ EF 55% and no definite ischemia. 1. Continue oral amio  2. Off ASA  3. Eliquis on hold  4. Continue statin  5. Low risk for a cardiovascular complication from an otherwise intermediate risk procedure. No further testing from my standpoint. Can proceed as clinically indicated. Discussed with Dr. Lionel Lebron. 6. Not much to add from a CV standpoint         [x]       High complexity decision making was performed in this patient at high risk for decompensation with multiple organ involvement. Subjective:     Aguilar Wolrey denies chest pain, dyspnea. Discussed with RN events overnight. Review of Systems:    Symptom Y/N Comments  Symptom Y/N Comments   Fever/Chills N   Chest Pain N    Poor Appetite N   Edema N    Cough N   Abdominal Pain N    Sputum N   Joint Pain N    SOB/SHEARER N   Pruritis/Rash N    Nausea/vomit N   Tolerating PT/OT Y    Diarrhea N   Tolerating Diet Y    Constipation N   Other       Could NOT obtain due to:      Objective:      Physical Exam:    Last 24hrs VS reviewed since prior progress note.  Most recent are:    Visit Vitals  BP (!) 149/62 (BP 1 Location: Left upper arm, BP Patient Position: Semi fowlers)   Pulse 65   Temp 97.8 °F (36.6 °C)   Resp 14   Ht 6' 4\" (1.93 m)   Wt 125.7 kg (277 lb 1.9 oz)   SpO2 95%   BMI 33.73 kg/m²       Intake/Output Summary (Last 24 hours) at 7/7/2022 1441  Last data filed at 7/6/2022 1841  Gross per 24 hour   Intake 240 ml   Output 400 ml   Net -160 ml        General Appearance: Well developed, well nourished, alert & oriented x 3,    no acute distress. Ears/Nose/Mouth/Throat: Hearing grossly normal.  Neck: Supple. Chest: Lungs clear to auscultation bilaterally. Cardiovascular: Regular rate and rhythm, S1S2 normal, no murmur. Abdomen: Soft, non-tender, bowel sounds are active. Extremities: No edema bilaterally. Skin: Warm and dry. Petechiae. []         Post-cath site without hematoma, bruit, tenderness, or thrill. Distal pulses intact. PMH/ reviewed - no change compared to H&P    Data Review    Telemetry: sinus rhythm     EKG:   [x]  No new EKG for review    Lab Data Personally Reviewed:    Recent Labs     07/07/22 0332 07/06/22  0128   WBC 7.4 5.8   HGB 8.0* 8.4*   HCT 24.3* 25.7*    162     No results for input(s): INR, PTP, APTT, INREXT, INREXT in the last 72 hours. Recent Labs     07/07/22 0332 07/06/22  0128 07/05/22  0236    135* 133*   K 3.0* 3.5 4.2    103 105   CO2 23 24 21   BUN 26* 32* 33*   CREA 0.95 1.12 1.26   GLU 92 129* 159*   CA 7.8* 7.8* 8.0*   MG 2.4 2.4 2.2     No results for input(s): CPK, CKNDX, TROIQ in the last 72 hours. No lab exists for component: CPKMB  Lab Results   Component Value Date/Time    Cholesterol, total 133 06/07/2013 02:40 PM    HDL Cholesterol 43 06/07/2013 02:40 PM    LDL, calculated 73.4 06/07/2013 02:40 PM    Triglyceride 83 06/07/2013 02:40 PM    CHOL/HDL Ratio 3.1 06/07/2013 02:40 PM       No results for input(s): AP, TBIL, TP, ALB, GLOB, GGT, AML, LPSE in the last 72 hours. No lab exists for component: SGOT, GPT, AMYP, HLPSE  No results for input(s): PH, PCO2, PO2 in the last 72 hours.     Medications Personally Reviewed:    Current Facility-Administered Medications   Medication Dose Route Frequency    potassium chloride 10 mEq in 100 ml IVPB  10 mEq IntraVENous Q2H    potassium phosphate 20 mmol in 0.9% sodium chloride 250 mL infusion   IntraVENous ONCE    [Held by provider] apixaban (ELIQUIS) tablet 5 mg  5 mg Oral BID    amiodarone (CORDARONE) tablet 200 mg  200 mg Oral DAILY    phenol throat spray (CHLORASEPTIC) 1 Spray  1 Spray Oral PRN    polyethylene glycol (MIRALAX) packet 17 g  17 g Oral DAILY    lactulose (CHRONULAC) 10 gram/15 mL solution 300 mL  200 g Rectal BID    insulin lispro (HUMALOG) injection   SubCUTAneous Q6H    pantoprazole (PROTONIX) 40 mg in 0.9% sodium chloride 10 mL injection  40 mg IntraVENous DAILY    calcium carbonate (TUMS) chewable tablet 400 mg [elemental]  400 mg Oral TID PRN    glucose chewable tablet 16 g  4 Tablet Oral PRN    glucagon (GLUCAGEN) injection 1 mg  1 mg IntraMUSCular PRN    dextrose 10% infusion 0-250 mL  0-250 mL IntraVENous PRN    nitroglycerin (NITROSTAT) tablet 0.4 mg  0.4 mg SubLINGual PRN    atorvastatin (LIPITOR) tablet 20 mg  20 mg Oral QHS    vitamin C-O-R-lutein-minerals (OCUVITE) tablet 1 Tablet  1 Tablet Oral DAILY    sodium chloride (NS) flush 5-40 mL  5-40 mL IntraVENous Q8H    sodium chloride (NS) flush 5-40 mL  5-40 mL IntraVENous PRN    acetaminophen (TYLENOL) tablet 650 mg  650 mg Oral Q6H PRN    Or    acetaminophen (TYLENOL) suppository 650 mg  650 mg Rectal Q6H PRN    polyethylene glycol (MIRALAX) packet 17 g  17 g Oral DAILY PRN    ondansetron (ZOFRAN ODT) tablet 4 mg  4 mg Oral Q8H PRN    Or    ondansetron (ZOFRAN) injection 4 mg  4 mg IntraVENous Q6H PRN     Facility-Administered Medications Ordered in Other Encounters   Medication Dose Route Frequency    sodium chloride (NS) flush 10 mL  10 mL IntraVENous PRN         Andre Simpson III, DO

## 2022-07-07 NOTE — PROGRESS NOTES
Problem: Mobility Impaired (Adult and Pediatric)  Goal: *Acute Goals and Plan of Care (Insert Text)  Description: FUNCTIONAL STATUS PRIOR TO ADMISSION: Patient was modified independent using a rollator for functional mobility. HOME SUPPORT PRIOR TO ADMISSION: The patient lived with wife at [address] but did not require assist.    Physical Therapy Goals  Initiated 7/2/2022  1. Patient will move from supine to sit and sit to supine , scoot up and down, and roll side to side in bed with modified independence within 7 day(s). 2.  Patient will transfer from bed to chair and chair to bed with modified independence using the least restrictive device within 7 day(s). 3.  Patient will perform sit to stand with modified independence within 7 day(s). 4.  Patient will ambulate with modified independence for 150 feet with the least restrictive device within 7 day(s). Outcome: Progressing Towards Goal   PHYSICAL THERAPY TREATMENT  Patient: Galilea Sage (80 y.o. male)  Date: 7/7/2022  Diagnosis: Thrombocyt[facility] [D69.6]  GI bleed [K92.2]  UGO (acute kidney injury) ([facility] [N17.9] <principal problem not specified>       Precautions:    Chart, physical therapy assessment, plan of care and goals were reviewed. ASSESSMENT  Patient continues with skilled PT services and is progressing towards goals. Pt presents with decreased strength and endurance. Pt performed bed mobility at [facility]. Pt able to stand at A. Pt ambulated 10ft and 90ft with RW at [facility]. Pt requiring cueing to stand upright and stay within walker. Pt able to correct. Pt moving well bed with some SOB and fatigued. Pts o2 stats at 87% after ambulation but able to improve to 100% after some deep breaths. Pt moving well with easily fatigued. Current Level of Function Impacting Discharge (mobility/balance): mobility at [facility]     Other factors to consider for discharge: decreased strength and endurance.           PLAN :  Patient continues to benefit from skilled intervention to address the above impairments. Continue treatment per established plan of care. to address goals. Recommendation for discharge: (in order for the patient to meet his/her long term goals)  Physical therapy at least 2 days/week in the home     This discharge recommendation:  Has been made in collaboration with the attending provider and/or case management    IF patient discharges home will need the following DME: rolling walker       SUBJECTIVE:   Patient stated  I just feel weak.     OBJECTIVE DATA SUMMARY:   Critical Behavior:  Neurologic State: Alert  Orientation Level: Oriented X4  Cognition: Appropriate for age attention/concentration  Safety/Judgement: Awareness of environment  Functional Mobility Training:  Bed Mobility:     Supine to Sit: Stand-by assistance; Additional time     Scooting: Stand-by assistance        Transfers:  Sit to Stand: Stand-by assistance  Stand to Sit: Stand-by assistance                             Balance:  Sitting: Intact  Standing: Impaired  Standing - Static: Good;Constant support  Standing - Dynamic : Good;Constant support  Ambulation/Gait Training:  Distance (ft): 90 Feet (ft)  Assistive Device: Gait belt;Walker, rolling  Ambulation - Level of Assistance: Contact guard assistance        Gait Abnormalities: Decreased step clearance        Base of Support: Widened     Speed/Beatriz: Pace decreased (<100 feet/min)  Step Length: Right shortened;Left shortened        Pain Rating:  Pt with no complaints of pain     Activity Tolerance:   Fair, SpO2 stable on RA, and observed SOB with activity    After treatment patient left in no apparent distress:   Sitting in chair and Call bell within reach    COMMUNICATION/COLLABORATION:   The patients plan of care was discussed with: Registered nurse.      [place] PTA   Time Calculation: 24 mins

## 2022-07-07 NOTE — PROGRESS NOTES
Patient apparently had nausea and vomiting with further advancement of diet, and patient has decided he has reconsidered surgical choice and would like to proceed with surgical intervention for possible intestinal obstruction. We will plan on laparoscopy possible laparotomy possible intestinal resection and indicated procedures July 8, 2022, patient is aware, is previous discussion benefits risks alternatives, reviewed with patient and previously with patient's wife. Cardiology has cleared him for procedure as well low risk. Called patient's wife cell phone and discussed with her as well.   Patient may elect to cancel surgery in the morning if he is feeling better

## 2022-07-08 ENCOUNTER — ANESTHESIA (OUTPATIENT)
Dept: SURGERY | Age: 77
DRG: 981 | End: 2022-07-08
Payer: MEDICARE

## 2022-07-08 PROBLEM — K43.9 VENTRAL HERNIA: Status: ACTIVE | Noted: 2022-07-08

## 2022-07-08 PROBLEM — K56.51 INTESTINAL ADHESIONS WITH PARTIAL OBSTRUCTION (HCC): Status: ACTIVE | Noted: 2022-07-08

## 2022-07-08 PROBLEM — K63.1 INTESTINAL PERFORATION (HCC): Status: ACTIVE | Noted: 2022-07-08

## 2022-07-08 LAB
25(OH)D3 SERPL-MCNC: 24.1 NG/ML (ref 30–100)
ABO + RH BLD: NORMAL
ANION GAP SERPL CALC-SCNC: 11 MMOL/L (ref 5–15)
BLOOD GROUP ANTIBODIES SERPL: NORMAL
BUN SERPL-MCNC: 22 MG/DL (ref 6–20)
BUN/CREAT SERPL: 18 (ref 12–20)
CALCIUM SERPL-MCNC: 8 MG/DL (ref 8.5–10.1)
CHLORIDE SERPL-SCNC: 101 MMOL/L (ref 97–108)
CO2 SERPL-SCNC: 21 MMOL/L (ref 21–32)
CREAT SERPL-MCNC: 1.19 MG/DL (ref 0.7–1.3)
ERYTHROCYTE [DISTWIDTH] IN BLOOD BY AUTOMATED COUNT: 15.8 % (ref 11.5–14.5)
FERRITIN SERPL-MCNC: 157 NG/ML (ref 26–388)
FOLATE SERPL-MCNC: 26.5 NG/ML (ref 5–21)
GLUCOSE BLD STRIP.AUTO-MCNC: 124 MG/DL (ref 65–117)
GLUCOSE BLD STRIP.AUTO-MCNC: 141 MG/DL (ref 65–117)
GLUCOSE BLD STRIP.AUTO-MCNC: 146 MG/DL (ref 65–117)
GLUCOSE BLD STRIP.AUTO-MCNC: 190 MG/DL (ref 65–117)
GLUCOSE BLD STRIP.AUTO-MCNC: 245 MG/DL (ref 65–117)
GLUCOSE SERPL-MCNC: 133 MG/DL (ref 65–100)
HCT VFR BLD AUTO: 29.2 % (ref 36.6–50.3)
HCT VFR BLD AUTO: 29.3 % (ref 36.6–50.3)
HGB BLD-MCNC: 9.6 G/DL (ref 12.1–17)
HGB BLD-MCNC: 9.8 G/DL (ref 12.1–17)
IRON SATN MFR SERPL: 11 % (ref 20–50)
IRON SERPL-MCNC: 33 UG/DL (ref 35–150)
MAGNESIUM SERPL-MCNC: 2.4 MG/DL (ref 1.6–2.4)
MCH RBC QN AUTO: 32.2 PG (ref 26–34)
MCHC RBC AUTO-ENTMCNC: 33.4 G/DL (ref 30–36.5)
MCV RBC AUTO: 96.4 FL (ref 80–99)
NRBC # BLD: 0 K/UL (ref 0–0.01)
NRBC BLD-RTO: 0 PER 100 WBC
PHOSPHATE SERPL-MCNC: 3.6 MG/DL (ref 2.6–4.7)
PLATELET # BLD AUTO: 225 K/UL (ref 150–400)
PMV BLD AUTO: 9.9 FL (ref 8.9–12.9)
POTASSIUM SERPL-SCNC: 3.7 MMOL/L (ref 3.5–5.1)
RBC # BLD AUTO: 3.04 M/UL (ref 4.1–5.7)
SERVICE CMNT-IMP: ABNORMAL
SODIUM SERPL-SCNC: 133 MMOL/L (ref 136–145)
SPECIMEN EXP DATE BLD: NORMAL
STRESS BASELINE DIAS BP: 55 MMHG
STRESS BASELINE HR: 68 BPM
STRESS BASELINE SYS BP: 123 MMHG
STRESS ESTIMATED WORKLOAD: 1 METS
STRESS EXERCISE DUR MIN: 0 MIN
STRESS EXERCISE DUR SEC: 45 SEC
STRESS O2 SAT PEAK: 98 %
STRESS O2 SAT REST: 96 %
STRESS PEAK DIAS BP: 73 MMHG
STRESS PEAK SYS BP: 141 MMHG
STRESS PERCENT HR ACHIEVED: 71 %
STRESS POST PEAK HR: 100 BPM
STRESS RATE PRESSURE PRODUCT: NORMAL BPM*MMHG
STRESS TARGET HR: 141 BPM
TIBC SERPL-MCNC: 299 UG/DL (ref 250–450)
VIT B12 SERPL-MCNC: 570 PG/ML (ref 193–986)
WBC # BLD AUTO: 10 K/UL (ref 4.1–11.1)

## 2022-07-08 PROCEDURE — 77030031139 HC SUT VCRL2 J&J -A: Performed by: SURGERY

## 2022-07-08 PROCEDURE — 77030020747 HC TU INSUF ENDOSC TELE -A: Performed by: SURGERY

## 2022-07-08 PROCEDURE — 77010033678 HC OXYGEN DAILY

## 2022-07-08 PROCEDURE — 77030008606 HC TRCR ENDOSC KII AMR -B: Performed by: SURGERY

## 2022-07-08 PROCEDURE — 77030008684 HC TU ET CUF COVD -B: Performed by: NURSE ANESTHETIST, CERTIFIED REGISTERED

## 2022-07-08 PROCEDURE — 74011250636 HC RX REV CODE- 250/636: Performed by: ANESTHESIOLOGY

## 2022-07-08 PROCEDURE — 74011000250 HC RX REV CODE- 250: Performed by: HOSPITALIST

## 2022-07-08 PROCEDURE — 88302 TISSUE EXAM BY PATHOLOGIST: CPT

## 2022-07-08 PROCEDURE — 0WQF0ZZ REPAIR ABDOMINAL WALL, OPEN APPROACH: ICD-10-PCS | Performed by: SURGERY

## 2022-07-08 PROCEDURE — 77030018809 HC RETRCTR ALXSO DISP AMR -B: Performed by: SURGERY

## 2022-07-08 PROCEDURE — 77030008771 HC TU NG SALEM SUMP -A: Performed by: NURSE ANESTHETIST, CERTIFIED REGISTERED

## 2022-07-08 PROCEDURE — 74011250636 HC RX REV CODE- 250/636: Performed by: SURGERY

## 2022-07-08 PROCEDURE — 74011250636 HC RX REV CODE- 250/636: Performed by: HOSPITALIST

## 2022-07-08 PROCEDURE — P9045 ALBUMIN (HUMAN), 5%, 250 ML: HCPCS | Performed by: NURSE ANESTHETIST, CERTIFIED REGISTERED

## 2022-07-08 PROCEDURE — 76010000131 HC OR TIME 2 TO 2.5 HR: Performed by: SURGERY

## 2022-07-08 PROCEDURE — 49561 PR REPAIR INCISIONAL HERNIA,STRANG: CPT | Performed by: SURGERY

## 2022-07-08 PROCEDURE — 74011000250 HC RX REV CODE- 250: Performed by: SURGERY

## 2022-07-08 PROCEDURE — 76210000017 HC OR PH I REC 1.5 TO 2 HR: Performed by: SURGERY

## 2022-07-08 PROCEDURE — 74011000250 HC RX REV CODE- 250: Performed by: NURSE ANESTHETIST, CERTIFIED REGISTERED

## 2022-07-08 PROCEDURE — 77030002996 HC SUT SLK J&J -A: Performed by: SURGERY

## 2022-07-08 PROCEDURE — 74011250637 HC RX REV CODE- 250/637: Performed by: SURGERY

## 2022-07-08 PROCEDURE — 77030040236 HC DEV DRSG WND PICO S&N -D: Performed by: SURGERY

## 2022-07-08 PROCEDURE — 74011250636 HC RX REV CODE- 250/636: Performed by: NURSE ANESTHETIST, CERTIFIED REGISTERED

## 2022-07-08 PROCEDURE — 80048 BASIC METABOLIC PNL TOTAL CA: CPT

## 2022-07-08 PROCEDURE — 77030011278 HC ELECTRD LIG IMPT COVD -F: Performed by: SURGERY

## 2022-07-08 PROCEDURE — 83735 ASSAY OF MAGNESIUM: CPT

## 2022-07-08 PROCEDURE — 77030016151 HC PROTCTR LNS DFOG COVD -B: Performed by: SURGERY

## 2022-07-08 PROCEDURE — 77030002986 HC SUT PROL J&J -A: Performed by: SURGERY

## 2022-07-08 PROCEDURE — 85027 COMPLETE CBC AUTOMATED: CPT

## 2022-07-08 PROCEDURE — 65270000046 HC RM TELEMETRY

## 2022-07-08 PROCEDURE — 74011636637 HC RX REV CODE- 636/637: Performed by: SURGERY

## 2022-07-08 PROCEDURE — 86900 BLOOD TYPING SEROLOGIC ABO: CPT

## 2022-07-08 PROCEDURE — 0DBB0ZZ EXCISION OF ILEUM, OPEN APPROACH: ICD-10-PCS | Performed by: SURGERY

## 2022-07-08 PROCEDURE — 77030013079 HC BLNKT BAIR HGGR 3M -A: Performed by: NURSE ANESTHETIST, CERTIFIED REGISTERED

## 2022-07-08 PROCEDURE — 82962 GLUCOSE BLOOD TEST: CPT

## 2022-07-08 PROCEDURE — 77030008463 HC STPLR SKN PROX J&J -B: Performed by: SURGERY

## 2022-07-08 PROCEDURE — 2709999900 HC NON-CHARGEABLE SUPPLY: Performed by: SURGERY

## 2022-07-08 PROCEDURE — 36415 COLL VENOUS BLD VENIPUNCTURE: CPT

## 2022-07-08 PROCEDURE — 77030011808 HC STPLR ENDOSCOPIC J&J -D: Performed by: SURGERY

## 2022-07-08 PROCEDURE — 77030036731 HC STPLR ENDOSC J&J -F: Performed by: SURGERY

## 2022-07-08 PROCEDURE — 77030038692 HC WND DEB SYS IRMX -B: Performed by: SURGERY

## 2022-07-08 PROCEDURE — 44202 LAP ENTERECTOMY: CPT | Performed by: SURGERY

## 2022-07-08 PROCEDURE — 85018 HEMOGLOBIN: CPT

## 2022-07-08 PROCEDURE — 51798 US URINE CAPACITY MEASURE: CPT

## 2022-07-08 PROCEDURE — 88307 TISSUE EXAM BY PATHOLOGIST: CPT

## 2022-07-08 PROCEDURE — 76060000035 HC ANESTHESIA 2 TO 2.5 HR: Performed by: SURGERY

## 2022-07-08 PROCEDURE — 77030009978 HC RELD STPLR TCR J&J -B: Performed by: SURGERY

## 2022-07-08 PROCEDURE — 74011636637 HC RX REV CODE- 636/637: Performed by: HOSPITALIST

## 2022-07-08 PROCEDURE — 84100 ASSAY OF PHOSPHORUS: CPT

## 2022-07-08 PROCEDURE — 74011000258 HC RX REV CODE- 258: Performed by: SURGERY

## 2022-07-08 PROCEDURE — 74011250637 HC RX REV CODE- 250/637: Performed by: ANESTHESIOLOGY

## 2022-07-08 RX ORDER — LIDOCAINE HYDROCHLORIDE 20 MG/ML
INJECTION, SOLUTION EPIDURAL; INFILTRATION; INTRACAUDAL; PERINEURAL AS NEEDED
Status: DISCONTINUED | OUTPATIENT
Start: 2022-07-08 | End: 2022-07-08 | Stop reason: HOSPADM

## 2022-07-08 RX ORDER — DIPHENHYDRAMINE HYDROCHLORIDE 50 MG/ML
12.5 INJECTION, SOLUTION INTRAMUSCULAR; INTRAVENOUS AS NEEDED
Status: DISCONTINUED | OUTPATIENT
Start: 2022-07-08 | End: 2022-07-08 | Stop reason: HOSPADM

## 2022-07-08 RX ORDER — FENTANYL CITRATE 50 UG/ML
INJECTION, SOLUTION INTRAMUSCULAR; INTRAVENOUS AS NEEDED
Status: DISCONTINUED | OUTPATIENT
Start: 2022-07-08 | End: 2022-07-08 | Stop reason: HOSPADM

## 2022-07-08 RX ORDER — LIDOCAINE HYDROCHLORIDE 10 MG/ML
0.1 INJECTION, SOLUTION EPIDURAL; INFILTRATION; INTRACAUDAL; PERINEURAL AS NEEDED
Status: DISCONTINUED | OUTPATIENT
Start: 2022-07-08 | End: 2022-07-08 | Stop reason: HOSPADM

## 2022-07-08 RX ORDER — DEXTROSE, SODIUM CHLORIDE, AND POTASSIUM CHLORIDE 5; .45; .15 G/100ML; G/100ML; G/100ML
50 INJECTION INTRAVENOUS CONTINUOUS
Status: DISCONTINUED | OUTPATIENT
Start: 2022-07-08 | End: 2022-07-15

## 2022-07-08 RX ORDER — SODIUM CHLORIDE 0.9 % (FLUSH) 0.9 %
5-40 SYRINGE (ML) INJECTION EVERY 8 HOURS
Status: DISCONTINUED | OUTPATIENT
Start: 2022-07-08 | End: 2022-07-08 | Stop reason: HOSPADM

## 2022-07-08 RX ORDER — DEXAMETHASONE SODIUM PHOSPHATE 4 MG/ML
INJECTION, SOLUTION INTRA-ARTICULAR; INTRALESIONAL; INTRAMUSCULAR; INTRAVENOUS; SOFT TISSUE AS NEEDED
Status: DISCONTINUED | OUTPATIENT
Start: 2022-07-08 | End: 2022-07-08 | Stop reason: HOSPADM

## 2022-07-08 RX ORDER — SODIUM CHLORIDE 0.9 % (FLUSH) 0.9 %
5-40 SYRINGE (ML) INJECTION AS NEEDED
Status: DISCONTINUED | OUTPATIENT
Start: 2022-07-08 | End: 2022-07-08 | Stop reason: HOSPADM

## 2022-07-08 RX ORDER — HYDROMORPHONE HYDROCHLORIDE 1 MG/ML
0.2 INJECTION, SOLUTION INTRAMUSCULAR; INTRAVENOUS; SUBCUTANEOUS
Status: DISCONTINUED | OUTPATIENT
Start: 2022-07-08 | End: 2022-07-08 | Stop reason: HOSPADM

## 2022-07-08 RX ORDER — HYDROMORPHONE HYDROCHLORIDE 2 MG/ML
INJECTION, SOLUTION INTRAMUSCULAR; INTRAVENOUS; SUBCUTANEOUS AS NEEDED
Status: DISCONTINUED | OUTPATIENT
Start: 2022-07-08 | End: 2022-07-08 | Stop reason: HOSPADM

## 2022-07-08 RX ORDER — PROPOFOL 10 MG/ML
INJECTION, EMULSION INTRAVENOUS AS NEEDED
Status: DISCONTINUED | OUTPATIENT
Start: 2022-07-08 | End: 2022-07-08 | Stop reason: HOSPADM

## 2022-07-08 RX ORDER — FENTANYL CITRATE 50 UG/ML
25 INJECTION, SOLUTION INTRAMUSCULAR; INTRAVENOUS
Status: COMPLETED | OUTPATIENT
Start: 2022-07-08 | End: 2022-07-08

## 2022-07-08 RX ORDER — ROCURONIUM BROMIDE 10 MG/ML
INJECTION, SOLUTION INTRAVENOUS AS NEEDED
Status: DISCONTINUED | OUTPATIENT
Start: 2022-07-08 | End: 2022-07-08 | Stop reason: HOSPADM

## 2022-07-08 RX ORDER — ONDANSETRON 2 MG/ML
INJECTION INTRAMUSCULAR; INTRAVENOUS AS NEEDED
Status: DISCONTINUED | OUTPATIENT
Start: 2022-07-08 | End: 2022-07-08 | Stop reason: HOSPADM

## 2022-07-08 RX ORDER — ALBUMIN HUMAN 50 G/1000ML
SOLUTION INTRAVENOUS AS NEEDED
Status: DISCONTINUED | OUTPATIENT
Start: 2022-07-08 | End: 2022-07-08 | Stop reason: HOSPADM

## 2022-07-08 RX ORDER — HYDROCODONE BITARTRATE AND ACETAMINOPHEN 5; 325 MG/1; MG/1
1-2 TABLET ORAL
Status: DISCONTINUED | OUTPATIENT
Start: 2022-07-08 | End: 2022-07-25 | Stop reason: HOSPADM

## 2022-07-08 RX ORDER — BUPIVACAINE HYDROCHLORIDE AND EPINEPHRINE 5; 5 MG/ML; UG/ML
INJECTION, SOLUTION PERINEURAL AS NEEDED
Status: DISCONTINUED | OUTPATIENT
Start: 2022-07-08 | End: 2022-07-08 | Stop reason: HOSPADM

## 2022-07-08 RX ORDER — SUCCINYLCHOLINE CHLORIDE 20 MG/ML
INJECTION INTRAMUSCULAR; INTRAVENOUS AS NEEDED
Status: DISCONTINUED | OUTPATIENT
Start: 2022-07-08 | End: 2022-07-08 | Stop reason: HOSPADM

## 2022-07-08 RX ORDER — HYDROMORPHONE HYDROCHLORIDE 1 MG/ML
.5-1 INJECTION, SOLUTION INTRAMUSCULAR; INTRAVENOUS; SUBCUTANEOUS
Status: DISCONTINUED | OUTPATIENT
Start: 2022-07-08 | End: 2022-07-25 | Stop reason: HOSPADM

## 2022-07-08 RX ORDER — SODIUM CHLORIDE, SODIUM LACTATE, POTASSIUM CHLORIDE, CALCIUM CHLORIDE 600; 310; 30; 20 MG/100ML; MG/100ML; MG/100ML; MG/100ML
25 INJECTION, SOLUTION INTRAVENOUS CONTINUOUS
Status: DISCONTINUED | OUTPATIENT
Start: 2022-07-08 | End: 2022-07-08 | Stop reason: HOSPADM

## 2022-07-08 RX ORDER — DEXMEDETOMIDINE HYDROCHLORIDE 100 UG/ML
INJECTION, SOLUTION INTRAVENOUS AS NEEDED
Status: DISCONTINUED | OUTPATIENT
Start: 2022-07-08 | End: 2022-07-08 | Stop reason: HOSPADM

## 2022-07-08 RX ORDER — PHENYLEPHRINE HCL IN 0.9% NACL 0.4MG/10ML
SYRINGE (ML) INTRAVENOUS AS NEEDED
Status: DISCONTINUED | OUTPATIENT
Start: 2022-07-08 | End: 2022-07-08 | Stop reason: HOSPADM

## 2022-07-08 RX ORDER — GLYCOPYRROLATE 0.2 MG/ML
INJECTION INTRAMUSCULAR; INTRAVENOUS AS NEEDED
Status: DISCONTINUED | OUTPATIENT
Start: 2022-07-08 | End: 2022-07-08 | Stop reason: HOSPADM

## 2022-07-08 RX ORDER — NEOSTIGMINE METHYLSULFATE 1 MG/ML
INJECTION, SOLUTION INTRAVENOUS AS NEEDED
Status: DISCONTINUED | OUTPATIENT
Start: 2022-07-08 | End: 2022-07-08 | Stop reason: HOSPADM

## 2022-07-08 RX ADMIN — CEFOXITIN 2 G: 2 INJECTION, POWDER, FOR SOLUTION INTRAVENOUS at 09:23

## 2022-07-08 RX ADMIN — LIDOCAINE HYDROCHLORIDE 100 MG: 20 INJECTION, SOLUTION EPIDURAL; INFILTRATION; INTRACAUDAL; PERINEURAL at 09:13

## 2022-07-08 RX ADMIN — SODIUM CHLORIDE, POTASSIUM CHLORIDE, SODIUM LACTATE AND CALCIUM CHLORIDE: 600; 310; 30; 20 INJECTION, SOLUTION INTRAVENOUS at 09:25

## 2022-07-08 RX ADMIN — FENTANYL CITRATE 50 MCG: 50 INJECTION, SOLUTION INTRAMUSCULAR; INTRAVENOUS at 09:57

## 2022-07-08 RX ADMIN — Medication 4 MG: at 11:05

## 2022-07-08 RX ADMIN — HYDROMORPHONE HYDROCHLORIDE 0.4 MG: 2 INJECTION, SOLUTION INTRAMUSCULAR; INTRAVENOUS; SUBCUTANEOUS at 11:02

## 2022-07-08 RX ADMIN — Medication 80 MCG: at 09:19

## 2022-07-08 RX ADMIN — FENTANYL CITRATE 25 MCG: 50 INJECTION, SOLUTION INTRAMUSCULAR; INTRAVENOUS at 11:44

## 2022-07-08 RX ADMIN — SODIUM CHLORIDE, POTASSIUM CHLORIDE, SODIUM LACTATE AND CALCIUM CHLORIDE 25 ML/HR: 600; 310; 30; 20 INJECTION, SOLUTION INTRAVENOUS at 08:48

## 2022-07-08 RX ADMIN — ALBUMIN (HUMAN) 12.5 G: 2.5 SOLUTION INTRAVENOUS at 10:39

## 2022-07-08 RX ADMIN — FENTANYL CITRATE 50 MCG: 50 INJECTION, SOLUTION INTRAMUSCULAR; INTRAVENOUS at 09:13

## 2022-07-08 RX ADMIN — ALBUMIN (HUMAN) 12.5 G: 2.5 SOLUTION INTRAVENOUS at 09:33

## 2022-07-08 RX ADMIN — Medication 1 AMPULE: at 23:10

## 2022-07-08 RX ADMIN — HYDROMORPHONE HYDROCHLORIDE 0.5 MG: 2 INJECTION, SOLUTION INTRAMUSCULAR; INTRAVENOUS; SUBCUTANEOUS at 11:20

## 2022-07-08 RX ADMIN — ROCURONIUM BROMIDE 30 MG: 10 INJECTION INTRAVENOUS at 09:23

## 2022-07-08 RX ADMIN — HYDROMORPHONE HYDROCHLORIDE 0.2 MG: 1 INJECTION, SOLUTION INTRAMUSCULAR; INTRAVENOUS; SUBCUTANEOUS at 12:59

## 2022-07-08 RX ADMIN — HYDROMORPHONE HYDROCHLORIDE 0.2 MG: 1 INJECTION, SOLUTION INTRAMUSCULAR; INTRAVENOUS; SUBCUTANEOUS at 13:27

## 2022-07-08 RX ADMIN — GLYCOPYRROLATE 0.6 MG: 0.2 INJECTION, SOLUTION INTRAMUSCULAR; INTRAVENOUS at 11:05

## 2022-07-08 RX ADMIN — FENTANYL CITRATE 25 MCG: 50 INJECTION, SOLUTION INTRAMUSCULAR; INTRAVENOUS at 12:05

## 2022-07-08 RX ADMIN — Medication 3 AMPULE: at 08:47

## 2022-07-08 RX ADMIN — MEPERIDINE HYDROCHLORIDE 12.5 MG: 25 INJECTION, SOLUTION INTRAMUSCULAR; INTRAVENOUS; SUBCUTANEOUS at 11:50

## 2022-07-08 RX ADMIN — Medication 3 UNITS: at 01:02

## 2022-07-08 RX ADMIN — DEXAMETHASONE SODIUM PHOSPHATE 4 MG: 4 INJECTION, SOLUTION INTRAMUSCULAR; INTRAVENOUS at 09:33

## 2022-07-08 RX ADMIN — SODIUM CHLORIDE, PRESERVATIVE FREE 10 ML: 5 INJECTION INTRAVENOUS at 06:59

## 2022-07-08 RX ADMIN — POTASSIUM CHLORIDE, DEXTROSE MONOHYDRATE AND SODIUM CHLORIDE 125 ML/HR: 150; 5; 450 INJECTION, SOLUTION INTRAVENOUS at 15:47

## 2022-07-08 RX ADMIN — HYDROMORPHONE HYDROCHLORIDE 0.5 MG: 1 INJECTION, SOLUTION INTRAMUSCULAR; INTRAVENOUS; SUBCUTANEOUS at 19:35

## 2022-07-08 RX ADMIN — ROCURONIUM BROMIDE 10 MG: 10 INJECTION INTRAVENOUS at 10:19

## 2022-07-08 RX ADMIN — DIPHENHYDRAMINE HYDROCHLORIDE 12.5 MG: 50 INJECTION, SOLUTION INTRAMUSCULAR; INTRAVENOUS at 12:51

## 2022-07-08 RX ADMIN — PIPERACILLIN AND TAZOBACTAM 4.5 G: 4; .5 INJECTION, POWDER, LYOPHILIZED, FOR SOLUTION INTRAVENOUS at 14:45

## 2022-07-08 RX ADMIN — HYDROMORPHONE HYDROCHLORIDE 0.2 MG: 1 INJECTION, SOLUTION INTRAMUSCULAR; INTRAVENOUS; SUBCUTANEOUS at 12:23

## 2022-07-08 RX ADMIN — Medication 4 UNITS: at 18:18

## 2022-07-08 RX ADMIN — FENTANYL CITRATE 50 MCG: 50 INJECTION, SOLUTION INTRAMUSCULAR; INTRAVENOUS at 09:50

## 2022-07-08 RX ADMIN — HYDROMORPHONE HYDROCHLORIDE 0.4 MG: 2 INJECTION, SOLUTION INTRAMUSCULAR; INTRAVENOUS; SUBCUTANEOUS at 11:06

## 2022-07-08 RX ADMIN — HYDROMORPHONE HYDROCHLORIDE 0.2 MG: 1 INJECTION, SOLUTION INTRAMUSCULAR; INTRAVENOUS; SUBCUTANEOUS at 12:41

## 2022-07-08 RX ADMIN — FENTANYL CITRATE 25 MCG: 50 INJECTION, SOLUTION INTRAMUSCULAR; INTRAVENOUS at 11:51

## 2022-07-08 RX ADMIN — HYDROMORPHONE HYDROCHLORIDE 0.2 MG: 1 INJECTION, SOLUTION INTRAMUSCULAR; INTRAVENOUS; SUBCUTANEOUS at 13:14

## 2022-07-08 RX ADMIN — SODIUM CHLORIDE, PRESERVATIVE FREE 10 ML: 5 INJECTION INTRAVENOUS at 21:42

## 2022-07-08 RX ADMIN — FENTANYL CITRATE 25 MCG: 50 INJECTION, SOLUTION INTRAMUSCULAR; INTRAVENOUS at 12:00

## 2022-07-08 RX ADMIN — SODIUM CHLORIDE, PRESERVATIVE FREE 10 ML: 5 INJECTION INTRAVENOUS at 14:24

## 2022-07-08 RX ADMIN — PIPERACILLIN AND TAZOBACTAM 3.38 G: 3; .375 INJECTION, POWDER, LYOPHILIZED, FOR SOLUTION INTRAVENOUS at 21:42

## 2022-07-08 RX ADMIN — FENTANYL CITRATE 25 MCG: 50 INJECTION, SOLUTION INTRAMUSCULAR; INTRAVENOUS at 11:55

## 2022-07-08 RX ADMIN — Medication 200 MCG: at 09:13

## 2022-07-08 RX ADMIN — ONDANSETRON 4 MG: 2 INJECTION INTRAMUSCULAR; INTRAVENOUS at 06:37

## 2022-07-08 RX ADMIN — ONDANSETRON HYDROCHLORIDE 4 MG: 2 INJECTION, SOLUTION INTRAMUSCULAR; INTRAVENOUS at 10:47

## 2022-07-08 RX ADMIN — POTASSIUM CHLORIDE, DEXTROSE MONOHYDRATE AND SODIUM CHLORIDE 125 ML/HR: 150; 5; 450 INJECTION, SOLUTION INTRAVENOUS at 23:10

## 2022-07-08 RX ADMIN — FENTANYL CITRATE 25 MCG: 50 INJECTION, SOLUTION INTRAMUSCULAR; INTRAVENOUS at 11:41

## 2022-07-08 RX ADMIN — Medication 120 MCG: at 09:25

## 2022-07-08 RX ADMIN — HYDROMORPHONE HYDROCHLORIDE 1 MG: 1 INJECTION, SOLUTION INTRAMUSCULAR; INTRAVENOUS; SUBCUTANEOUS at 14:24

## 2022-07-08 RX ADMIN — PROPOFOL 50 MG: 10 INJECTION, EMULSION INTRAVENOUS at 09:40

## 2022-07-08 RX ADMIN — FENTANYL CITRATE 50 MCG: 50 INJECTION, SOLUTION INTRAMUSCULAR; INTRAVENOUS at 09:40

## 2022-07-08 RX ADMIN — PROPOFOL 100 MG: 10 INJECTION, EMULSION INTRAVENOUS at 09:13

## 2022-07-08 RX ADMIN — Medication 3 UNITS: at 06:37

## 2022-07-08 RX ADMIN — MEPERIDINE HYDROCHLORIDE 12.5 MG: 25 INJECTION, SOLUTION INTRAMUSCULAR; INTRAVENOUS; SUBCUTANEOUS at 11:55

## 2022-07-08 RX ADMIN — FENTANYL CITRATE 25 MCG: 50 INJECTION, SOLUTION INTRAMUSCULAR; INTRAVENOUS at 11:35

## 2022-07-08 RX ADMIN — Medication 1 AMPULE: at 14:46

## 2022-07-08 RX ADMIN — SUCCINYLCHOLINE CHLORIDE 140 MG: 20 INJECTION, SOLUTION INTRAMUSCULAR; INTRAVENOUS at 09:13

## 2022-07-08 RX ADMIN — DEXMEDETOMIDINE HYDROCHLORIDE 14 MCG: 100 INJECTION, SOLUTION, CONCENTRATE INTRAVENOUS at 11:20

## 2022-07-08 RX ADMIN — DEXMEDETOMIDINE HYDROCHLORIDE 6 MCG: 100 INJECTION, SOLUTION, CONCENTRATE INTRAVENOUS at 10:06

## 2022-07-08 RX ADMIN — HYDROMORPHONE HYDROCHLORIDE 0.2 MG: 1 INJECTION, SOLUTION INTRAMUSCULAR; INTRAVENOUS; SUBCUTANEOUS at 12:10

## 2022-07-08 RX ADMIN — FENTANYL CITRATE 25 MCG: 50 INJECTION, SOLUTION INTRAMUSCULAR; INTRAVENOUS at 11:38

## 2022-07-08 RX ADMIN — SODIUM CHLORIDE 20 MCG/MIN: 900 INJECTION, SOLUTION INTRAVENOUS at 09:34

## 2022-07-08 RX ADMIN — SODIUM CHLORIDE 50 MCG/MIN: 900 INJECTION, SOLUTION INTRAVENOUS at 09:25

## 2022-07-08 NOTE — PROGRESS NOTES
Transition of Care Plan:     RUR:14%  Disposition:Return to [place] with  [address] ([place])  Follow up appointments: To be done prior to discharge if going home  DME needed:None  Transportation at [place] [address] or means to access home:  Wife has keys      IM Medicare Letter: To be given prior to discharge  Is patient a BCPI-A Bundle:  No                    If yes, was Bundle Letter given?:  N/A  Is patient a [place] and connected with the itie 6  If yes, was [employer] transfer form completed and [facility] notified? N/A   Caregiver Contact:Wife  Discharge Caregiver contacted prior to discharge? Caregiver to be contacted prior to discharge  Care Conference needed?: Not at this time    5:11pm-CM met with pt to discuss d/c plan. No new needs at this time. CM will continue to follow pt for d/c planning needs. CM will continue to follow patient for discharge planning needs and arrange for services as deemed necessary.     Steve Rome [address] [place] [address]  [phone]

## 2022-07-08 NOTE — PROGRESS NOTES
Pharmacy Note     Zosyn 3.375 gm IV Q8H ordered for treatment of SBO. Per Heather Marshall 10  will be changed to 4.5 gm once then 3.375 gm IV Q8H. Estimated Creatinine Clearance: Estimated Creatinine Clearance: 72.9 mL/min (based on SCr of 1.19 mg/dL). Dialysis Status, UGO, CKD: CrCl >65  BMI:  Body mass index is 33.73 kg/m². Rationale for Adjustment:  [facility] B-Lactam extended infusion policy    Pharmacy will continue to monitor and adjust dose as necessary. Please call with any questions.     Thank you,  Spencer Brunson, PHARMD

## 2022-07-08 NOTE — ANESTHESIA POSTPROCEDURE EVALUATION
Post-Anesthesia Evaluation and Assessment    Patient: Lilliana Ware MRN: [id number]  SSN: [id number]    YOB: 1945  Age: 76 y.o. Sex: male      I have evaluated the patient and they are stable and ready for discharge from the PACU. Cardiovascular Function/Vital Signs  Visit Vitals  BP (!) 155/80   Pulse 100   Temp 36.6 °C (97.8 °F)   Resp 13   Ht 6' 4\" (1.93 m)   Wt 125.7 kg (277 lb 1.9 oz)   SpO2 96%   BMI 33.73 kg/m²       Patient is status post General anesthesia for Procedure(s):  LAPAROSCOPY, LAPAROTOMY, SMALL BOWEL RESECTION, ADHESION LYSIS, REPAIR VENTRAL HERNIA. Nausea/Vomiting: None    Postoperative hydration reviewed and adequate. Pain:  Pain Scale 1: Numeric (0 - 10) (07/08/22 0812)  Pain Intensity 1: 3 (07/08/22 7249)   Managed    Neurological Status:   Neuro (WDL): Within Defined Limits (07/08/22 3563)  Neuro  Neurologic State: Alert (07/07/22 2326)  Orientation Level: Appropriate for age (07/08/22 3226)  Cognition: Appropriate decision making; Appropriate for age attention/concentration (07/08/22 9287)  Speech: Appropriate for age;Clear (07/08/22 0826)  LUE Motor Response: Purposeful (07/07/22 2326)  LLE Motor Response: Weak;Spontaneous  (07/08/22 0826)  RUE Motor Response: Purposeful (07/07/22 2326)  RLE Motor Response: Spontaneous ;Weak (07/08/22 0826)   At baseline    Mental Status, Level of Consciousness: Alert and  oriented to person, place, and time    Pulmonary Status:   O2 Device: Non-rebreather mask (07/08/22 1130)   Adequate oxygenation and airway patent    Complications related to anesthesia: None    Post-anesthesia assessment completed.  No concerns    Signed By: Viridiana Gutiérrez MD     July 8, 2022

## 2022-07-08 NOTE — PROGRESS NOTES
Progress Note    Patient: Ronni Maldonado MRN: [id number]  SSN: [id number]    YOB: 1945  Age: 76 y.o. Sex: male        ADMITTED:  2022 to Shaji Stratton MD  for Thrombocytopenia [facility]) [D69.6]  GI bleed [K92.2]  UGO (acute kidney injury) [facility]) [N17.9]         Ronni Maldonado was admitted for Thrombocytopenia [facility]) [D69.6]  GI bleed [K92.2]  UGO (acute kidney injury) ([facility] [N17.9]. GI bleed resolved  hemonc following for severe thrombocytopenia r/t ITP  SBO seen on CT . GS following, episodes of vomiting last night. To OR today for ex-lap    Urology following for recurrent gross hematuria x3 weeks. He presented with sever thrombocytopenia (3) now resolved to NL after mult transfusions. He is on chronic AC (Eliquis), it was held intiially upon admission and then resumed on  and hematuria started next day and AC was subsequently held . Remote PE and DVT hx with a-fib, CAD and stent. Cards following     Racked urine overnight demonstrating tea colored urine. Old blood, no active  bleeding. No clots visualized. Low PVRs on bladder scan. Overall low UOP last night ~300ml. No urologic hx per family at bedside. First episode of hematuria happened after UTI was discovered and improved with bactrim treatment a few weeks ago. In OR today upon rounding with GS for SBO. Chart reviewed. HD stable hgb improved to 9.8, plts up to 225 (3 on admission). No recent transfusion since     Vitals:  Temp (24hrs), Av °F (36.7 °C), Min:97.6 °F (36.4 °C), Max:98.6 °F (37 °C)     Blood pressure 137/67, pulse 74, temperature 98.2 °F (36.8 °C), resp. rate 18, height 6' 4\" (1.93 m), weight 125.7 kg (277 lb 1.9 oz), SpO2 97 %.       I&O's:  1901 - 700  In: -   Out: 3191 [Urine:325]   701 - 1900  In: 300 [I.V.:300]  Out: 750            Labs:   Recent Labs     22  0129 22  0332 22  0128   WBC 10.0 7.4 5.8   HGB 9.8* 8.0* 8.4*   HCT 29.3* 24.3* 25.7*    172 162     Recent Labs     07/08/22  0129 07/07/22  0332 07/06/22  0128   * 136 135*   K 3.7 3.0* 3.5    106 103   CO2 21 23 24   * 92 129*   BUN 22* 26* 32*   CREA 1.19 0.95 1.12   CA 8.0* 7.8* 7.8*        Cultures:      Imaging:       Assessment:     - Active Problems: Morbid obesity ([address] (6/7/2013)      Thrombocytopenia ([address] (6/27/2022)      GI bleed (6/27/2022)      UGO (acute kidney injury) ([address] (6/27/2022)      Atrial fibrillation ([address] (7/3/2022)      Elevated troponin (7/3/2022)      Ileus ([address] (7/7/2022)    gross hematuria   Plan:     -hematuria likely exacerbated from thrombocytopenia and chronic AC, now resolving with improved plts and off AC.  no active bleeding upon exam today. Hematuria resolving, HD stable, no acute urinary retention. To remain off AC through Monday, will reassess then, cards following  -if clots visualized in urine or urinary retention with PVR >400ml large bore bernard will need placed, irrigate and aspirate out all clots from bladder  -pt will ultimately require OP follow up for hematuria workup  -will see if called over the weekend.  Can rack urine x1 daily through weekend     Supervising Dr. Naman VASQUEZ By: Brock Garber NP - July 8, 2022

## 2022-07-08 NOTE — OP NOTES
FULL Operative Note    Patient: Nighat Munguia MRN: [id number]  SSN: [id number]    YOB: 1945  Age: 76 y.o. Sex: male      Date of Surgery: 7/8/2022     Preoperative Diagnosis: Small bowel obstruction    Postoperative Diagnosis: SMALL BOWEL OBSTRUCTION  SECONDARY TO ILEAL PERFORATION, AND ABSCESS AND ADHESION AND ABDOMINAL HERNIA     Surgeon(s) and Role:     Jaki Lewis MD - Primary    Surgical Staff: Circ-1: Maritza Bournener: Shults, Nels Buerger, RN  Scrub Tech-1: [address] [place], [address] Asst-1: Nadeen Corea     Anesthesia: General     Procedure: Procedure(s):  LAPAROSCOPY, LAPAROTOMY, SMALL BOWEL RESECTION, ADHESION LYSIS, REPAIR VENTRAL HERNIA, drainage of abdominal abscess    FINDINGS: Small bowel perforation of unknown etiology at level of the partial small bowel obstruction and contained abscess, the level of the distal ileum where if this was seen as a small bowel obstruction on CT enterography and moderate-sized omental ventral abdominal wall hernia    Indications: The patient was admitted to the hospital with   thrombocytopenia and other issues medically but has now developed intestinal obstruction of unknown origin. Discussed the risk of surgery including infection, hematoma, bleeding, recurrence or persistence of symptoms or and other risks listed in H and P,  and the risks of general anesthetic including Myocardial Infarction, Cerebrovascular Accident, sudden death, or even reaction to anesthetic medications. The patient understands the risk that the procedure could be an open procedure. The patient understands the risks, any and all questions were answered to the patient's satisfaction, and they freely signed the consent for operation.      Procedure in Detail: Patient was under general anesthesia received IV cefoxitin preoperatively, NG tube in place by anesthesia, abdomen prepped and draped with ChloraPrep and site verification and consent reviewed with the operative team.  Arms were in the normal airplane position patient received IV cefoxitin preoperatively, a 5 mm not ablated trocar and 5 mm, 0 degree laparoscope were inserted superior to the umbilicus and hernia noted, into the peritoneal cavity under direct visualization with no evidence of intra-abdominal or intestinal injury in the mid abdomen. An additional nonbloody trocar was placed in the epigastric area and left lower abdomen under direct visualization without injury to the intestinal structures. There was omentum in a [place] hernia near the umbilicus and the omentum was retracted laparoscopically with no complications. The liver was fatty replaced. The small bowel was dilated diffusely and started at the ligament of Treitz and run sequentially towards the distal ileum, by grasping the mesentery with the laparoscopic instruments taking care not to injure the small bowel, distally in the ileum, where reportedly there was a small bowel obstruction on CT enterography. Is immobilized distally in the ileum where the small bowel was still dilated, purulence then began to bubble up and then there was some succus drainage and further mobilization of the small bowel revealed to areas of perforation of the small bowel on the antimesenteric area approximately 1 cm apart, with inflamed edges suggesting this had been perforated for some time. This appeared to be a contained perforation or abscess and just distal to this was normal caliber normal-appearing small bowel. The small bowel here was mobilized and at this point given his body habitus and findings it was elected to perform a mini laparotomy. The mesentery was secured with a grasper laparoscopically from the epigastric trocar site.   A laparotomy was performed just inferior to the umbilicus extending superiorly and in addition the skin overlying the hernia which was tenuous, was resected and the fascia opened superiorly to the hernia including the hernia sac resected taking care not to injure the intestinal structures. The small bowel is marked with the laparoscopic instrument was brought into the field and the small bowel leak controlled. The abdomen was then irrigated with copious amounts of normal saline and evacuated of any purulent or intestinal contents. The small bowel was again run in its entirety proximal to distal with no other findings no evidence of Meckel's diverticulum. The cecum was examined carefully there was no evidence of large bowel or cecal diverticulitis and the appendix was somewhat retrocecal but was normal in appearance. The terminal ileum was somewhat tethered into the retroperitoneum and for further exam of this area this terminal ileum was mobilized sharply from its retroperitoneal attachments and examined and no evidence of complications and again the distal 12-20 cm of ileum was normal.  The area of the small bowel perforation underwent small bowel enterectomy with COSTA 75 blue cartridge staplers proximally and distally resecting approximately 12-15 cm of small bowel in the mesentery ligated and divided using large jaw LigaSure with good hemostasis. Using bowel clamps so there was no further leakage, the 2 ends of the small bowel were anastomosed side-to-side functional end-to-end using COSTA 75 blue cartridge staplers and the crotch of the anastomosis reinforced with 3-0 silk partial-thickness sutures and the common enterotomy was closed using a TA LX 60 blue cartridge stapler and the TA LX staple line was reinforced with interrupted 3-0 full-thickness sutures and the mesentery closed using 3-0 silk running suture. The anastomosis was widely patent healthy pink and viable and no leak. The small bowel was returned to its intra-abdominal location and abdomen further irrigated with saline and 500 mL of Irrisept antibacterial and then normal saline again.   All loculations and cloudy fluid have been evacuated and a drain placed through one of the left lower abdominal trocar sites into the pelvis and right lower abdomen using 19 round channel drain and drain secured to skin using 3-0 nylon suture. The omentum was placed over the small bowel which was in its normal location without twist or obstruction and the fascia including the ventral abdominal wall hernia closed with running #1 Prolene suture taking care not to injure the intraperitoneal structures subcutaneous tissue closed in layers using interrupted 2-0 Vicryl suture and skin staples at all other open midline and trocar sites and midline wound covered with caroline dressing patient awakened and taken to recovery room in stable condition and above reviewed with patient, patient's wife and son. Continue IV Zosyn for 7-10 days due to the perforation and contained small intra-abdominal abscess    Estimated Blood Loss: Less than 50 mL    Tourniquet Time: * No tourniquets in log *      Implants: * No implants in log *            Specimens:   ID Type Source Tests Collected by Time Destination   1 : Hernia sac Preservative Hernia Sac  Adilia Louis MD 7/8/2022 1006 Pathology   2 : Ileum perforation suture marks proximal Preservative Ileum  Adilia Louis MD 7/8/2022 1015 Pathology           Drains: Drain left lower abdomen into right abdomen                Complications: None    Counts: Sponge and needle counts were correct times two.     Abhishek Dove MD

## 2022-07-08 NOTE — PERIOP NOTES
36 Handoff Report from Operating Room to PACU    Report received from [address] regarding Jina Irving. Surgeon(s):  Trae Rich MD  And Procedure(s) (LRB):  LAPAROSCOPY, LAPAROTOMY, SMALL BOWEL RESECTION, ADHESION LYSIS, REPAIR VENTRAL HERNIA (N/A)  confirmed   with allergies, drains and dressings discussed. Anesthesia type, drugs, patient history, complications, estimated blood loss, vital signs, intake and output, and last pain medication, lines, reversal medications and temperature were reviewed. 1315 TRANSFER - OUT REPORT:    Verbal report given to Jose Antonio Doshi RN(name) on Jina Irving  being transferred to PCU(unit) for routine post - op       Report consisted of patients Situation, Background, Assessment and   Recommendations(SBAR). Information from the following report(s) SBAR, Kardex, OR Summary, Procedure Summary, Intake/Output and MAR was reviewed with the receiving nurse. Opportunity for questions and clarification was provided. Patient transported with:   Monitor  O2 @ 2 liters  Registered Nurse x 2  Patient chart  No belongings in PACU with patient.

## 2022-07-08 NOTE — PERIOP NOTES
Irrisept Wound Debridement and Cleansing System  Ref: Alaina Seip: [id number] LOT: [id number] Expiration Date: 01/31/2024

## 2022-07-08 NOTE — ANESTHESIA PREPROCEDURE EVALUATION
Relevant Problems   CARDIOVASCULAR   (+) Atrial fibrillation (HCC)   (+) HTN (hypertension)   (+) Unstable angina (HCC)      RENAL FAILURE   (+) UGO (acute kidney injury) ([facility] [address]      ENDOCRINE   (+) Morbid obesity (HCC)       Anesthetic History   No history of anesthetic complications            Review of Systems / Medical History  Patient summary reviewed, nursing notes reviewed and pertinent labs reviewed    Pulmonary                Comments: Former smoker - Quit 1983   Neuro/Psych             Comments: Macular degeneration  Cardiovascular    Hypertension        Dysrhythmias : atrial fibrillation  Past MI (NSTEMI), CAD, cardiac stents (RCA stent (6/7/13)) and hyperlipidemia      Comments: Recent Negative stress test.    Hx PE    Patient taking Eliquis    TTE (8/9/21):  LV: Estimated LVEF is 60 - 65%. Visually measured ejection fraction. Normal cavity size and systolic function (ejection fraction normal). Upper normal wall thickness. Wall motion: normal.    Cath (8/8/21):  ·Moderate CAD in a right dominant system with normal EF, mild AS. ·LVEDP 22, with mild AS with a peak gradient of 10 mm Hg. ·EF 55%, no sig MR. ·Right dominant system with a 50% diagonal stenosis. 50% mid LCx stenosis. Diffuse moderate disease. ·5Fr Radial hemostasis via TR band. ·Cont preventative care. No interventions documented.        GI/Hepatic/Renal     GERD          Comments: SBO  GI Bleed Endo/Other    Diabetes: type 2    Obesity, arthritis and anemia (Hgb = 8.0 on 7/7/22)    Comments: Idiopathic Thrombocytopenic Purpura   Other Findings            Physical Exam    Airway  Mallampati: II  TM Distance: 4 - 6 cm  Neck ROM: normal range of motion   Mouth opening: Normal     Cardiovascular    Rhythm: regular  Rate: normal         Dental    Dentition: Poor dentition     Pulmonary  Breath sounds clear to auscultation               Abdominal  Abdominal exam normal       Other Findings            Anesthetic Plan    ASA: 3  Anesthesia type: general          Induction: Intravenous and RSI  Anesthetic plan and risks discussed with: Patient

## 2022-07-08 NOTE — ROUTINE PROCESS
sbar in note. Pt to holding area identifies self and procedure for today. Pt has been npo. Exertional sob at times stops on rest nasal 3 liters in place. roberto carlos completed. mepilex scrum border preventatively applied skin intact. Pt noted to have petechie  From trunk body to feet  And bruising noted to   Abdomen.    Alert and oriented   x4    Glucose   124 in holding area

## 2022-07-08 NOTE — PROGRESS NOTES
2300 Bedside and Verbal shift change report given to Carmenza Bardales, [address] [facility] (oncoming nurse) by Ry Godfrey RN (offgoing nurse). Report included the following information SBAR, Kardex, Intake/Output, MAR, Recent Results and Cardiac Rhythm NSR.     0030 200 mL hazy/brown urine collected in urinal and hanging on bedrail. Bladder scanned 161 mL. Reminded pt to call after voiding so PVR can be obtained    0345 125 mL brown/hazy urine collected in urinal. PVR 13 mL. End of Shift Note    Bedside shift change report given to Lauren Meredith RN (oncoming nurse) by Amrik Fernandez RN (offgoing nurse). Report included the following information SBAR, Kardex, Intake/Output, MAR, Recent Results and Cardiac Rhythm NSR    Shift worked:  1900 - 0700     Shift summary and any significant changes: All urine collected and left by sink overnight per orders. Remains NPO for procedure today. CHG bath completed. Concerns for physician to address:      Zone phone for oncoming shift:          Activity:  Activity Level: Up with Assistance  Number times ambulated in hallways past shift: 0  Number of times OOB to chair past shift: 0    Cardiac:   Cardiac Monitoring: Yes      Cardiac Rhythm: Sinus Rhythm    Access:   Current line(s): PIV     Genitourinary:   Urinary status: voiding    Respiratory:   O2 Device: Nasal cannula  Chronic home O2 use?: NO  Incentive spirometer at bedside: N/A       GI:  Last Bowel Movement Date: 07/07/22  Current diet:  DIET ONE TIME MESSAGE  DIET ONE TIME MESSAGE  DIET NPO Sips of Water with Meds  Passing flatus: YES  Tolerating current diet: YES       Pain Management:   Patient states pain is manageable on current regimen: YES    Skin:  Jose Guadalupe Score: 18  Interventions: speciality bed, float heels, PT/OT consult and nutritional support     Patient Safety:  Fall Score:  Total Score: 4  Interventions: bed/chair alarm, assistive device (walker, cane, etc), gripper socks and pt to call before getting OOB  High Fall Risk: Yes    Length of Stay:  Expected LOS: 3d 14h  Actual LOS: 28831 Amrik Low, RN

## 2022-07-08 NOTE — PROGRESS NOTES
0754 Bedside and Verbal shift change report given to GILBERT Mata (oncoming nurse) by Maurilio Garcia (offgoing nurse). Report included the following information SBAR, Kardex, Intake/Output, MAR, Recent Results and Cardiac Rhythm NSR.    9178 Patient off unit to surgery - consent signed at bedside with this RN as witness. 1344 Patient back on PCU from surgery. Patient's VSS (see associated flowsheet), SCDs placed on him and he is resting quietly with family at bedside    1430 Attempted to draw patient's Q 12 H and H, patient stated that he would like to sleep for now and to come back for lab in a few hours. 1830 H&H sent   End of Shift Note    Bedside shift change report given to [facility], [address] [place] [address] (oncoming nurse) by Brett John RN (offgoing nurse). Report included the following information SBAR, Kardex, Procedure Summary, Intake/Output, MAR, Recent Results and Cardiac Rhythm NSR    Shift worked:  7am-7pm     Shift summary and any significant changes: The patient had surgery for small bowel obstruction - he now has a NGT, bernard, and NAHID drain. Concerns for physician to address:  n/a     Zone phone for oncoming shift:          Activity:  Activity Level:  Up with Assistance  Number times ambulated in hallways past shift: 0  Number of times OOB to chair past shift: 0    Cardiac:   Cardiac Monitoring: Yes      Cardiac Rhythm: Sinus Rhythm    Access:   Current line(s): PIV and midline     Genitourinary:   Urinary status: bernard    Respiratory:   O2 Device: Nasal cannula  Chronic home O2 use?: NO  Incentive spirometer at bedside: YES       GI:  Last Bowel Movement Date: 07/07/22  Current diet:  DIET ONE TIME MESSAGE  DIET ONE TIME MESSAGE  DIET NPO Sips of Water with Meds  Passing flatus: YES  Tolerating current diet: NO       Pain Management:   Patient states pain is manageable on current regimen: YES    Skin:  Jose Guadalupe Score: 17  Interventions: PT/OT consult, limit briefs, internal/external urinary devices and nutritional support     Patient Safety:  Fall Score:  Total Score: 4  Interventions: bed/chair alarm, assistive device (walker, cane, etc), gripper socks, pt to call before getting OOB and stay with me (per policy)  High Fall Risk: Yes    Length of Stay:  Expected LOS: 3d 14h  Actual LOS: 11      Jacklyn May RN

## 2022-07-08 NOTE — PERIOP NOTES
TRANSFER - IN REPORT:    Verbal report received from [address] [facility] on [facility]  being received from 2268 for ordered procedure      Report consisted of patients Situation, Background, Assessment and   Recommendations(SBAR). Information from the following report(s) SBAR, ED Summary, OR Summary, Procedure Summary, Intake/Output and MAR was reviewed with the receiving nurse. Opportunity for questions and clarification was provided. Assessment completed upon patients arrival to unit and care assumed.

## 2022-07-08 NOTE — PROGRESS NOTES
Occupational Therapy note:    Chart reviewed and noted patient JUNI for procedure. Will defer and continue to follow.     Bob Duarte, OTR/L

## 2022-07-08 NOTE — PROGRESS NOTES
Pt seen this am and abd soft, min tender, but pt continues with N/V and some intestinal colic, and pt concurs with surgical plans laparoscopy, possible laparotomy and indicated procedures. Benefits, risks, morbidity, mortality, reviewed with pt.

## 2022-07-08 NOTE — PROGRESS NOTES
Problem: Falls - Risk of  Goal: *Absence of Falls  Description: Document Gayle Wilkes Fall Risk and appropriate interventions in the flowsheet.   Outcome: Progressing Towards Goal  Note: Fall Risk Interventions:  Mobility Interventions: Patient to call before getting OOB    Mentation Interventions: Adequate sleep, hydration, pain control    Medication Interventions: Patient to call before getting OOB    Elimination Interventions: Call light in reach    History of Falls Interventions: Door open when patient unattended

## 2022-07-09 ENCOUNTER — APPOINTMENT (OUTPATIENT)
Dept: GENERAL RADIOLOGY | Age: 77
DRG: 981 | End: 2022-07-09
Attending: GENERAL ACUTE CARE HOSPITAL
Payer: MEDICARE

## 2022-07-09 LAB
ANION GAP SERPL CALC-SCNC: 7 MMOL/L (ref 5–15)
BNP SERPL-MCNC: 1914 PG/ML
BUN SERPL-MCNC: 25 MG/DL (ref 6–20)
BUN/CREAT SERPL: 18 (ref 12–20)
CALCIUM SERPL-MCNC: 7.7 MG/DL (ref 8.5–10.1)
CHLORIDE SERPL-SCNC: 105 MMOL/L (ref 97–108)
CO2 SERPL-SCNC: 21 MMOL/L (ref 21–32)
CREAT SERPL-MCNC: 1.42 MG/DL (ref 0.7–1.3)
ERYTHROCYTE [DISTWIDTH] IN BLOOD BY AUTOMATED COUNT: 16.6 % (ref 11.5–14.5)
GLUCOSE BLD STRIP.AUTO-MCNC: 193 MG/DL (ref 65–117)
GLUCOSE BLD STRIP.AUTO-MCNC: 205 MG/DL (ref 65–117)
GLUCOSE BLD STRIP.AUTO-MCNC: 216 MG/DL (ref 65–117)
GLUCOSE BLD STRIP.AUTO-MCNC: 290 MG/DL (ref 65–117)
GLUCOSE SERPL-MCNC: 231 MG/DL (ref 65–100)
HCT VFR BLD AUTO: 28.4 % (ref 36.6–50.3)
HGB BLD-MCNC: 9.3 G/DL (ref 12.1–17)
MAGNESIUM SERPL-MCNC: 2 MG/DL (ref 1.6–2.4)
MCH RBC QN AUTO: 31.8 PG (ref 26–34)
MCHC RBC AUTO-ENTMCNC: 32.7 G/DL (ref 30–36.5)
MCV RBC AUTO: 97.3 FL (ref 80–99)
NRBC # BLD: 0 K/UL (ref 0–0.01)
NRBC BLD-RTO: 0 PER 100 WBC
PHOSPHATE SERPL-MCNC: 2.2 MG/DL (ref 2.6–4.7)
PLATELET # BLD AUTO: 213 K/UL (ref 150–400)
PMV BLD AUTO: 9.7 FL (ref 8.9–12.9)
POTASSIUM SERPL-SCNC: 4.3 MMOL/L (ref 3.5–5.1)
RBC # BLD AUTO: 2.92 M/UL (ref 4.1–5.7)
SERVICE CMNT-IMP: ABNORMAL
SODIUM SERPL-SCNC: 133 MMOL/L (ref 136–145)
WBC # BLD AUTO: 13.9 K/UL (ref 4.1–11.1)

## 2022-07-09 PROCEDURE — 84100 ASSAY OF PHOSPHORUS: CPT

## 2022-07-09 PROCEDURE — C9113 INJ PANTOPRAZOLE SODIUM, VIA: HCPCS | Performed by: SURGERY

## 2022-07-09 PROCEDURE — 74011250636 HC RX REV CODE- 250/636: Performed by: SURGERY

## 2022-07-09 PROCEDURE — 74011250637 HC RX REV CODE- 250/637: Performed by: SURGERY

## 2022-07-09 PROCEDURE — 74011000250 HC RX REV CODE- 250: Performed by: SURGERY

## 2022-07-09 PROCEDURE — 74011000258 HC RX REV CODE- 258: Performed by: SURGERY

## 2022-07-09 PROCEDURE — 74011636637 HC RX REV CODE- 636/637: Performed by: SURGERY

## 2022-07-09 PROCEDURE — 65270000046 HC RM TELEMETRY

## 2022-07-09 PROCEDURE — 82962 GLUCOSE BLOOD TEST: CPT

## 2022-07-09 PROCEDURE — 83880 ASSAY OF NATRIURETIC PEPTIDE: CPT

## 2022-07-09 PROCEDURE — 71045 X-RAY EXAM CHEST 1 VIEW: CPT

## 2022-07-09 PROCEDURE — 83735 ASSAY OF MAGNESIUM: CPT

## 2022-07-09 PROCEDURE — 85027 COMPLETE CBC AUTOMATED: CPT

## 2022-07-09 PROCEDURE — 80048 BASIC METABOLIC PNL TOTAL CA: CPT

## 2022-07-09 PROCEDURE — 77010033678 HC OXYGEN DAILY

## 2022-07-09 PROCEDURE — 36415 COLL VENOUS BLD VENIPUNCTURE: CPT

## 2022-07-09 RX ADMIN — Medication 4 UNITS: at 12:35

## 2022-07-09 RX ADMIN — Medication 1 AMPULE: at 10:42

## 2022-07-09 RX ADMIN — ATORVASTATIN CALCIUM 20 MG: 20 TABLET, FILM COATED ORAL at 21:34

## 2022-07-09 RX ADMIN — SODIUM CHLORIDE, PRESERVATIVE FREE 40 MG: 5 INJECTION INTRAVENOUS at 10:38

## 2022-07-09 RX ADMIN — Medication 7 UNITS: at 00:13

## 2022-07-09 RX ADMIN — POTASSIUM CHLORIDE, DEXTROSE MONOHYDRATE AND SODIUM CHLORIDE 125 ML/HR: 150; 5; 450 INJECTION, SOLUTION INTRAVENOUS at 07:41

## 2022-07-09 RX ADMIN — HYDROMORPHONE HYDROCHLORIDE 0.5 MG: 1 INJECTION, SOLUTION INTRAMUSCULAR; INTRAVENOUS; SUBCUTANEOUS at 03:15

## 2022-07-09 RX ADMIN — Medication 3 UNITS: at 19:16

## 2022-07-09 RX ADMIN — HYDROMORPHONE HYDROCHLORIDE 1 MG: 1 INJECTION, SOLUTION INTRAMUSCULAR; INTRAVENOUS; SUBCUTANEOUS at 21:34

## 2022-07-09 RX ADMIN — Medication 4 UNITS: at 05:43

## 2022-07-09 RX ADMIN — AMIODARONE HYDROCHLORIDE 200 MG: 200 TABLET ORAL at 10:38

## 2022-07-09 RX ADMIN — SODIUM CHLORIDE, PRESERVATIVE FREE 10 ML: 5 INJECTION INTRAVENOUS at 05:43

## 2022-07-09 RX ADMIN — SODIUM CHLORIDE, PRESERVATIVE FREE 10 ML: 5 INJECTION INTRAVENOUS at 21:35

## 2022-07-09 RX ADMIN — I-VITE, TAB 1000-60-2MG (60/BT) 1 TABLET: TAB at 10:37

## 2022-07-09 RX ADMIN — PIPERACILLIN AND TAZOBACTAM 3.38 G: 3; .375 INJECTION, POWDER, LYOPHILIZED, FOR SOLUTION INTRAVENOUS at 05:16

## 2022-07-09 RX ADMIN — POTASSIUM CHLORIDE, DEXTROSE MONOHYDRATE AND SODIUM CHLORIDE 125 ML/HR: 150; 5; 450 INJECTION, SOLUTION INTRAVENOUS at 18:02

## 2022-07-09 RX ADMIN — PIPERACILLIN AND TAZOBACTAM 3.38 G: 3; .375 INJECTION, POWDER, LYOPHILIZED, FOR SOLUTION INTRAVENOUS at 21:35

## 2022-07-09 RX ADMIN — Medication 1 AMPULE: at 21:34

## 2022-07-09 RX ADMIN — PIPERACILLIN AND TAZOBACTAM 3.38 G: 3; .375 INJECTION, POWDER, LYOPHILIZED, FOR SOLUTION INTRAVENOUS at 13:18

## 2022-07-09 RX ADMIN — SODIUM CHLORIDE, PRESERVATIVE FREE 10 ML: 5 INJECTION INTRAVENOUS at 13:19

## 2022-07-09 NOTE — PROGRESS NOTES
End of Shift Note    Bedside shift change report given to [facility] (oncoming nurse) by Elisabeth Patel RN (offgoing nurse). Report included the following information SBAR, Kardex and MAR    Shift worked:  7a-7p     Shift summary and any significant changes:          Concerns for physician to address:       Zone phone for oncoming shift:          Activity:  Activity Level: Up with Assistance  Number times ambulated in hallways past shift: 0  Number of times OOB to chair past shift: 0    Cardiac:   Cardiac Monitoring: Yes      Cardiac Rhythm: Sinus Rhythm    Access:   Current line(s): PIV     Genitourinary:   Urinary status: bernard    Respiratory:   O2 Device: None (Room air)  Chronic home O2 use?: NO  Incentive spirometer at bedside: YES       GI:  Last Bowel Movement Date: 07/08/22  Current diet:  DIET ONE TIME MESSAGE  DIET ONE TIME MESSAGE  DIET NPO Sips of Water with Meds  Passing flatus: YES  Tolerating current diet: NO       Pain Management:   Patient states pain is manageable on current regimen: YES    Skin:  Jose Guadalupe Score: 16  Interventions: increase time out of bed and PT/OT consult    Patient Safety:  Fall Score:  Total Score: 4  Interventions: bed/chair alarm and pt to call before getting OOB  High Fall Risk: Yes    Length of Stay:  Expected LOS: 3d 14h  Actual LOS: 12      Elisabeth Patel RN

## 2022-07-09 NOTE — PROGRESS NOTES
1900 Bedside and Verbal shift change report given to Tona Butt, Counts include 234 beds at the [facility] (oncoming nurse) by Shannon Martinez RN (offgoing nurse). Report included the following information SBAR, Kardex, Intake/Output, MAR, Recent Results and Cardiac Rhythm NSR. End of Shift Note    Bedside shift change report given to [address] Cheryl Palomino RN (oncoming nurse) by Guzman Villalobos RN (offgoing nurse). Report included the following information SBAR, Kardex, Intake/Output, MAR, Recent Results and Cardiac Rhythm NSR    Shift worked:  1900 - 0700     Shift summary and any significant changes:    Remains NPO with NGT connected to low suction. PRN dilaudid given x2 for pain control. Concerns for physician to address:       Zone phone for oncoming shift:          Activity:  Activity Level: Up with Assistance  Number times ambulated in hallways past shift: 0  Number of times OOB to chair past shift: 0    Cardiac:   Cardiac Monitoring: Yes      Cardiac Rhythm: Sinus Rhythm    Access:   Current line(s): PIV     Genitourinary:   Urinary status: bernard    Respiratory:   O2 Device: Nasal cannula  Chronic home O2 use?: NO  Incentive spirometer at bedside: NO       GI:  Last Bowel Movement Date: 07/08/22  Current diet:  DIET ONE TIME MESSAGE  DIET ONE TIME MESSAGE  DIET NPO Sips of Water with Meds  Passing flatus: YES  Tolerating current diet: YES       Pain Management:   Patient states pain is manageable on current regimen: YES    Skin:  Jose Guadalupe Score: 16  Interventions: speciality bed, float heels, internal/external urinary devices and nutritional support     Patient Safety:  Fall Score:  Total Score: 4  Interventions: bed/chair alarm, assistive device (walker, cane, etc), gripper socks and pt to call before getting OOB  High Fall Risk: Yes    Length of Stay:  Expected LOS: 3d 14h  Actual LOS: 12      Guzman Villalobos RN

## 2022-07-09 NOTE — PROGRESS NOTES
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 Received 3 doses IVIG, did not tolerate final IVIG infusion despite. PLT wnl today  Hemo/onc on board     SBO  Repeat CT a/p on  showed partial SBO pattern. Having loose stools, no n/v this AM but 1 episode of emesis overight. Passing flatus/ had multiple BMS today  Surgery following. Diet advanced to low fiber  CT enterography showed distal partial SBO  Went to OR on  found to have SMALL BOWEL OBSTRUCTION  SECONDARY TO ILEAL PERFORATION, AND ABSCESS AND ADHESION AND ABDOMINAL HERNIA   Started on Zosyn on   Cont NGT  Cont NPO    GI bleed  Was to due severe ITP  S/p blood transfusion  Continue with protonix 40 daily(can switch to famotidine on discharge)  GI on board, holding off on EGD for now, conservative management. Paroxysmal atrial fibrillation  Elevated trop  HR is controlled now   Continue with amiodarone  Eliquis held due to hematuria. ASA to be resumed if ok with heme/onc  Cardiac stress test neg  Appreciate cardiology following    Gross hematuria  -- Patient with recurrent gross hematuria, will consult urology  -- Eliquis held, urology recs to hold [facility] till Monday   -- Continue to monitor H&H and transfuse to maintain hemoglobin over 7  -- Anemia panel ordered        30.0 - 39.9 Obese / Body mass index is 33.73 kg/m². Estimated discharge date: >48hrs  Code status: Full  Prophylaxis: SCD's  Recommended Disposition: SAH/Rehab     Subjective:     Chief Complaint / Reason for Physician Visit  NAD.     Hematuria improving  Seen after coming from OR  Not passing gas  Mild abd pain    Review of Systems:  Symptom Y/N Comments  Symptom Y/N Comments   Fever/Chills    Chest Pain     Poor Appetite    Edema     Cough    Abdominal Pain     Sputum    Joint Pain     SOB/SHEARER    Pruritis/Rash     Nausea/vomit    Tolerating PT/OT Diarrhea    Tolerating Diet     Constipation    Other       Could NOT obtain due to:      Objective:     VITALS:   Last 24hrs VS reviewed since prior progress note. Most recent are:  Patient Vitals for the past 24 hrs:   Temp Pulse Resp BP SpO2   07/08/22 2312 98 °F (36.7 °C) 90 19 (!) 143/67 97 %   07/08/22 1935 97.9 °F (36.6 °C) 89 13 (!) 144/67 97 %   07/08/22 1507 97.7 °F (36.5 °C) 85 13 (!) 132/56 98 %   07/08/22 1344 97.7 °F (36.5 °C) 91 17 (!) 151/71 97 %   07/08/22 1330 -- 90 21 (!) 154/57 94 %   07/08/22 1315 -- 91 15 (!) 170/62 98 %   07/08/22 1300 97.4 °F (36.3 °C) 94 16 (!) 160/66 94 %   07/08/22 1245 -- 93 14 135/86 97 %   07/08/22 1230 97.4 °F (36.3 °C) 90 12 (!) 160/61 97 %   07/08/22 1215 -- 91 20 (!) 157/58 98 %   07/08/22 1200 -- 97 17 (!) 155/72 96 %   07/08/22 1155 -- 100 13 (!) 155/80 96 %   07/08/22 1150 -- 98 14 (!) 175/63 96 %   07/08/22 1145 -- 99 14 (!) 177/69 95 %   07/08/22 1140 -- 98 14 (!) 161/61 95 %   07/08/22 1135 97.8 °F (36.6 °C) 97 14 94/73 96 %   07/08/22 1130 -- 96 20 122/78 99 %   07/08/22 0812 98.2 °F (36.8 °C) 74 18 137/67 97 %   07/08/22 0754 98.6 °F (37 °C) 76 17 (!) 141/67 99 %   07/08/22 0356 98.3 °F (36.8 °C) 65 18 (!) 109/59 99 %       Intake/Output Summary (Last 24 hours) at 7/9/2022 0310  Last data filed at 7/8/2022 1507  Gross per 24 hour   Intake 1400 ml   Output 3300 ml   Net -1900 ml        I had a face to face encounter and independently examined this patient as outlined below:  PHYSICAL EXAM:  General: WD, WN. Alert, cooperative, no acute distress    EENT:  EOMI. Anicteric sclerae. MMM  Resp:  CTA bilaterally, no wheezing or rales. No accessory muscle use  CV:  Regular  rhythm,  No edema  GI:  Soft, Non distended, Non tender. hypoactive Bowel sounds. NGT. NAHID drain. Midline dressing   Neurologic:  Alert and oriented X 3, normal speech   Psych:   Good insight. Not anxious nor agitated  Skin:  No rashes.   No jaundice      Reviewed most current lab test results and cultures  YES  Reviewed most current radiology test results   YES  Review and summation of old records today    NO  Reviewed patient's current orders and MAR    YES  PMH/SH reviewed - no change compared to H&P  ________________________________________________________________________  Care Plan discussed with:    Comments   Patient X    Family  X    RN X    Care Manager     Consultant                        Multidiciplinary team rounds were held today with , nursing, pharmacist and clinical coordinator. Patient's plan of care was discussed; medications were reviewed and discharge planning was addressed. ________________________________________________________________________  Total NON critical care TIME:  35   Minutes    Total CRITICAL CARE TIME Spent:   Minutes non procedure based      Comments   >50% of visit spent in counseling and coordination of care X    ________________________________________________________________________  Ishmael Salomon MD     Procedures: see electronic medical records for all procedures/Xrays and details which were not copied into this note but were reviewed prior to creation of Plan. LABS:  I reviewed today's most current labs and imaging studies.   Pertinent labs include:  Recent Labs     07/08/22  1827 07/08/22 0129 07/07/22 0332   WBC  --  10.0 7.4   HGB 9.6* 9.8* 8.0*   HCT 29.2* 29.3* 24.3*   PLT  --  225 172     Recent Labs     07/08/22  0129 07/07/22  0332   * 136   K 3.7 3.0*    106   CO2 21 23   * 92   BUN 22* 26*   CREA 1.19 0.95   CA 8.0* 7.8*   MG 2.4 2.4   PHOS 3.6 2.0*       Signed: Ishmael Salomon MD

## 2022-07-09 NOTE — PROGRESS NOTES
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 Received 3 doses IVIG, did not tolerate final IVIG infusion despite. PLT wnl   Hemo/onc on board     SBO  Repeat CT a/p on  showed partial SBO pattern. Having loose stools, no n/v this AM but 1 episode of emesis overight. Passing flatus/ had multiple BMS today  Surgery following. Diet advanced to low fiber  CT enterography showed distal partial SBO  Went to OR on  found to have SMALL BOWEL OBSTRUCTION  SECONDARY TO ILEAL PERFORATION, AND ABSCESS AND ADHESION AND ABDOMINAL HERNIA   Started on Zosyn on   Cont NGT  Cont NPO    GI bleed  Was to due severe ITP  S/p blood transfusion  Continue with protonix 40 daily(can switch to famotidine on discharge)  GI on board, holding off on EGD for now, conservative management. Paroxysmal atrial fibrillation  Elevated trop  HR is controlled now   Continue with amiodarone  Eliquis held due to hematuria. 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   Estimated discharge date: >48hrs  Code status: Full  Prophylaxis: SCD's  Recommended Disposition: SAH/Rehab Subjective:     Chief Complaint / Reason for Physician Visit  NAD. Hematuria improving  Mild abd pain  Not passing gas  No SOB  Sat low 90s on RA    Review of Systems:  Symptom Y/N Comments  Symptom Y/N Comments   Fever/Chills    Chest Pain     Poor Appetite    Edema     Cough    Abdominal Pain     Sputum    Joint Pain     SOB/SHEARER    Pruritis/Rash     Nausea/vomit    Tolerating PT/OT     Diarrhea    Tolerating Diet     Constipation    Other       Could NOT obtain due to:      Objective:     VITALS:   Last 24hrs VS reviewed since prior progress note. Most recent are:  Patient Vitals for the past 24 hrs:   Temp Pulse Resp BP SpO2   07/09/22 1103 98 °F (36.7 °C) 90 21 131/80 96 %   07/09/22 0745 98.8 °F (37.1 °C) 92 21 (!) 140/59 99 %   07/09/22 0315 98.2 °F (36.8 °C) 84 21 (!) 123/55 98 %   07/08/22 2312 98 °F (36.7 °C) 90 19 (!) 143/67 97 %   07/08/22 1935 97.9 °F (36.6 °C) 89 13 (!) 144/67 97 %   07/08/22 1507 97.7 °F (36.5 °C) 85 13 (!) 132/56 98 %   07/08/22 1344 97.7 °F (36.5 °C) 91 17 (!) 151/71 97 %   07/08/22 1330 -- 90 21 (!) 154/57 94 %   07/08/22 1315 -- 91 15 (!) 170/62 98 %   07/08/22 1300 97.4 °F (36.3 °C) 94 16 (!) 160/66 94 %   07/08/22 1245 -- 93 14 135/86 97 %   07/08/22 1230 97.4 °F (36.3 °C) 90 12 (!) 160/61 97 %   07/08/22 1215 -- 91 20 (!) 157/58 98 %   07/08/22 1200 -- 97 17 (!) 155/72 96 %   07/08/22 1155 -- 100 13 (!) 155/80 96 %   07/08/22 1150 -- 98 14 (!) 175/63 96 %   07/08/22 1145 -- 99 14 (!) 177/69 95 %   07/08/22 1140 -- 98 14 (!) 161/61 95 %   07/08/22 1135 97.8 °F (36.6 °C) 97 14 94/73 96 %   07/08/22 1130 -- 96 20 122/78 99 %       Intake/Output Summary (Last 24 hours) at 7/9/2022 1110  Last data filed at 7/9/2022 0600  Gross per 24 hour   Intake 1100 ml   Output 3235 ml   Net -2135 ml        I had a face to face encounter and independently examined this patient as outlined below:  PHYSICAL EXAM:  General: WD, WN. Alert, cooperative, no acute distress    EENT:  EOMI. Anicteric sclerae. MMM  Resp:  CTA bilaterally, no wheezing or rales. No accessory muscle use  CV:  Regular  rhythm,  No edema  GI:  Soft, Non distended, Non tender. hypoactive Bowel sounds. NGT. NAHID drain. Midline dressing   Neurologic:  Alert and oriented X 3, normal speech   Psych:   Good insight. Not anxious nor agitated  Skin:  No rashes. No jaundice      Reviewed most current lab test results and cultures  YES  Reviewed most current radiology test results   YES  Review and summation of old records today    NO  Reviewed patient's current orders and MAR    YES  PMH/SH reviewed - no change compared to H&P  ________________________________________________________________________  Care Plan discussed with:    Comments   Patient X    Family  X    RN X    Care Manager     Consultant                        Multidiciplinary team rounds were held today with , nursing, pharmacist and clinical coordinator. Patient's plan of care was discussed; medications were reviewed and discharge planning was addressed. ________________________________________________________________________  Total NON critical care TIME:  35   Minutes    Total CRITICAL CARE TIME Spent:   Minutes non procedure based      Comments   >50% of visit spent in counseling and coordination of care X    ________________________________________________________________________  Iraida Gonzalez MD     Procedures: see electronic medical records for all procedures/Xrays and details which were not copied into this note but were reviewed prior to creation of Plan. LABS:  I reviewed today's most current labs and imaging studies. Pertinent labs include:  Recent Labs     07/09/22  0529 07/08/22  1827 07/08/22  0129 07/07/22  0332 07/07/22  0332   WBC 13.9*  --  10.0  --  7.4   HGB 9.3* 9.6* 9.8*   < > 8.0*   HCT 28.4* 29.2* 29.3*   < > 24.3*     --  225  --  172    < > = values in this interval not displayed.      Recent Labs 07/09/22  0529 07/08/22  0129 07/07/22  0332   * 133* 136   K 4.3 3.7 3.0*    101 106   CO2 21 21 23   * 133* 92   BUN 25* 22* 26*   CREA 1.42* 1.19 0.95   CA 7.7* 8.0* 7.8*   MG 2.0 2.4 2.4   PHOS 2.2* 3.6 2.0*       Signed: Argenis Chaudhary MD

## 2022-07-09 NOTE — PROGRESS NOTES
Admit Date: 2022    POD 1 Day Post-Op    Procedure:  Procedure(s):  LAPAROSCOPY, LAPAROTOMY, SMALL BOWEL RESECTION, ADHESION LYSIS, REPAIR VENTRAL HERNIA    Subjective:     Patient alert and interactive, no complaints. Objective:     Blood pressure (!) 140/59, pulse 92, temperature 98.8 °F (37.1 °C), resp. rate 21, height 6' 4\" (1.93 m), weight 277 lb 1.9 oz (125.7 kg), SpO2 99 %.     Temp (24hrs), Av.9 °F (36.6 °C), Min:97.4 °F (36.3 °C), Max:98.8 °F (37.1 °C)      Physical Exam:  GENERAL: alert, cooperative, no distress, appears stated age, LUNG: clear to auscultation bilaterally, HEART: regular rate and rhythm, ABDOMEN: soft, appropriately tender, wounds dressed and dry, NAHID o/p serous, EXTREMITIES:  extremities normal, atraumatic, no cyanosis or edema    Labs:   Recent Results (from the past 24 hour(s))   GLUCOSE, POC    Collection Time: 22 11:28 AM   Result Value Ref Range    Glucose (POC) 190 (H) 65 - 117 mg/dL    Performed by Stanley Flores    GLUCOSE, POC    Collection Time: 22  5:49 PM   Result Value Ref Range    Glucose (POC) 245 (H) 65 - 117 mg/dL    Performed by Jaja Bhardwaj PCT    HGB & HCT    Collection Time: 22  6:27 PM   Result Value Ref Range    HGB 9.6 (L) 12.1 - 17.0 g/dL    HCT 29.2 (L) 36.6 - 50.3 %   GLUCOSE, POC    Collection Time: 22 12:08 AM   Result Value Ref Range    Glucose (POC) 290 (H) 65 - 117 mg/dL    Performed by Jon Vazquez RN    MAGNESIUM    Collection Time: 22  5:29 AM   Result Value Ref Range    Magnesium 2.0 1.6 - 2.4 mg/dL   PHOSPHORUS    Collection Time: 22  5:29 AM   Result Value Ref Range    Phosphorus 2.2 (L) 2.6 - 4.7 MG/DL   CBC W/O DIFF    Collection Time: 22  5:29 AM   Result Value Ref Range    WBC 13.9 (H) 4.1 - 11.1 K/uL    RBC 2.92 (L) 4.10 - 5.70 M/uL    HGB 9.3 (L) 12.1 - 17.0 g/dL    HCT 28.4 (L) 36.6 - 50.3 %    MCV 97.3 80.0 - 99.0 FL    MCH 31.8 26.0 - 34.0 PG    MCHC 32.7 30.0 - 36.5 g/dL    RDW 16.6 (H) 11.5 - 14.5 %    PLATELET 216 285 - 800 K/uL    MPV 9.7 8.9 - 12.9 FL    NRBC 0.0 0  WBC    ABSOLUTE NRBC 0.00 0.00 - 9.45 K/uL   METABOLIC PANEL, BASIC    Collection Time: 07/09/22  5:29 AM   Result Value Ref Range    Sodium 133 (L) 136 - 145 mmol/L    Potassium 4.3 3.5 - 5.1 mmol/L    Chloride 105 97 - 108 mmol/L    CO2 21 21 - 32 mmol/L    Anion gap 7 5 - 15 mmol/L    Glucose 231 (H) 65 - 100 mg/dL    BUN 25 (H) 6 - 20 MG/DL    Creatinine 1.42 (H) 0.70 - 1.30 MG/DL    BUN/Creatinine ratio 18 12 - 20      GFR est AA 58 (L) >60 ml/min/1.73m2    GFR est non-AA 48 (L) >60 ml/min/1.73m2    Calcium 7.7 (L) 8.5 - 10.1 MG/DL   GLUCOSE, POC    Collection Time: 07/09/22  5:32 AM   Result Value Ref Range    Glucose (POC) 216 (H) 65 - 117 mg/dL    Performed by Romain Melendez RN        Data Review images and reports reviewed    Assessment:     Active Problems: Morbid obesity ([address] (6/7/2013)      Thrombocytopenia ([address] (6/27/2022)      GI bleed (6/27/2022)      UGO (acute kidney injury) ([address] (6/27/2022)      Atrial fibrillation ([address] (7/3/2022)      Elevated troponin (7/3/2022)      Ileus ([address] (7/7/2022)      Intestinal adhesions with partial obstruction ([address] (7/8/2022)      Ventral hernia (7/8/2022)      Intestinal perforation ([address] (7/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   Increase activity  IS  Labs in am  Continue johnn    Mily Rehman MD  [id number] [address] [facility]

## 2022-07-09 NOTE — PROGRESS NOTES
1900 Bedside and Verbal shift change report given to Ameya Garcia, [facility] (oncoming nurse) by Kristine Ibarra RN (offgoing nurse). Report included the following information SBAR, Kardex, Intake/Output, MAR, Recent Results and Cardiac Rhythm NSR.     0543 Received call from [employer] that pt HR sustaining in 140's. EKG obtained, reading afib RVR. Pt asymptomatic with no c/o chest pain or palpitations. /98. Rapid response nurse called to bedside and Kathi Lazcano NP paged. Order received for 1x amio bolus. 0558 Amio bolus started. AM labs drawn and new PIV placed. 7998 Pt remains in afib RVR w/ HR sustaining between 120's-150's. Remains asymptomatic. Cardiology paged. [address] [facility] [employer] with Randee Gotti MD. Verbal orders received for 10 mg dilt bolus and gtt. Orders placed and pharmacy called. 9809 Dilt bolus administered. End of Shift Note    Bedside shift change report given to Kristine Ibarra RN (oncoming nurse) by Mariaa Queen RN (offgoing nurse). Report included the following information SBAR, Kardex, Intake/Output, MAR, Recent Results and Cardiac Rhythm Afib RVR    Shift worked:  1900 - 0700     Shift summary and any significant changes:     See above. Pt remains in asymptomatic afib RVR; dilt bolus initiated with oncoming nurse at shift change. BP tolerating and pt remains asymptomatic. Concerns for physician to address:  See above. Zone phone for oncoming shift:         Activity:  Activity Level:  Up with Assistance  Number times ambulated in hallways past shift: 0  Number of times OOB to chair past shift: 0    Cardiac:   Cardiac Monitoring: Yes      Cardiac Rhythm: Sinus Rhythm    Access:   Current line(s): PIV     Genitourinary:   Urinary status: bernard    Respiratory:   O2 Device: None (Room air)  Chronic home O2 use?: NO  Incentive spirometer at bedside: YES  Actual Volume (ml): 1500 ml    GI:  Last Bowel Movement Date: 07/08/22  Current diet:  DIET ONE TIME MESSAGE  DIET ONE TIME MESSAGE  DIET NPO Sips of Water with Meds  Passing flatus: YES  Tolerating current diet: YES       Pain Management:   Patient states pain is manageable on current regimen: YES    Skin:  Jose Guadalupe Score: 15  Interventions: speciality bed, float heels, internal/external urinary devices and nutritional support     Patient Safety:  Fall Score:  Total Score: 4  Interventions: assistive device (walker, cane, etc), gripper socks and pt to call before getting OOB  High Fall Risk: Yes    Length of Stay:  Expected LOS: 3d 14h  Actual LOS: Jason Walden RN

## 2022-07-10 LAB
ANION GAP SERPL CALC-SCNC: 5 MMOL/L (ref 5–15)
BASOPHILS # BLD: 0 K/UL (ref 0–0.1)
BASOPHILS NFR BLD: 0 % (ref 0–1)
BUN SERPL-MCNC: 21 MG/DL (ref 6–20)
BUN/CREAT SERPL: 19 (ref 12–20)
CALCIUM SERPL-MCNC: 7.9 MG/DL (ref 8.5–10.1)
CALCULATED R AXIS, ECG10: -51 DEGREES
CALCULATED T AXIS, ECG11: 135 DEGREES
CHLORIDE SERPL-SCNC: 107 MMOL/L (ref 97–108)
CO2 SERPL-SCNC: 22 MMOL/L (ref 21–32)
CREAT SERPL-MCNC: 1.1 MG/DL (ref 0.7–1.3)
DIAGNOSIS, 93000: NORMAL
DIFFERENTIAL METHOD BLD: ABNORMAL
EOSINOPHIL # BLD: 0 K/UL (ref 0–0.4)
EOSINOPHIL NFR BLD: 0 % (ref 0–7)
ERYTHROCYTE [DISTWIDTH] IN BLOOD BY AUTOMATED COUNT: 16.7 % (ref 11.5–14.5)
GLUCOSE BLD STRIP.AUTO-MCNC: 167 MG/DL (ref 65–117)
GLUCOSE BLD STRIP.AUTO-MCNC: 170 MG/DL (ref 65–117)
GLUCOSE BLD STRIP.AUTO-MCNC: 188 MG/DL (ref 65–117)
GLUCOSE BLD STRIP.AUTO-MCNC: 193 MG/DL (ref 65–117)
GLUCOSE BLD STRIP.AUTO-MCNC: 199 MG/DL (ref 65–117)
GLUCOSE SERPL-MCNC: 179 MG/DL (ref 65–100)
HCT VFR BLD AUTO: 26.2 % (ref 36.6–50.3)
HGB BLD-MCNC: 8.3 G/DL (ref 12.1–17)
IMM GRANULOCYTES # BLD AUTO: 0 K/UL (ref 0–0.04)
IMM GRANULOCYTES NFR BLD AUTO: 0 % (ref 0–0.5)
LYMPHOCYTES # BLD: 1.1 K/UL (ref 0.8–3.5)
LYMPHOCYTES NFR BLD: 8 % (ref 12–49)
MAGNESIUM SERPL-MCNC: 2.3 MG/DL (ref 1.6–2.4)
MCH RBC QN AUTO: 31.1 PG (ref 26–34)
MCHC RBC AUTO-ENTMCNC: 31.7 G/DL (ref 30–36.5)
MCV RBC AUTO: 98.1 FL (ref 80–99)
MONOCYTES # BLD: 0.5 K/UL (ref 0–1)
MONOCYTES NFR BLD: 4 % (ref 5–13)
NEUTS BAND NFR BLD MANUAL: 5 %
NEUTS SEG # BLD: 11.6 K/UL (ref 1.8–8)
NEUTS SEG NFR BLD: 83 % (ref 32–75)
NRBC # BLD: 0 K/UL (ref 0–0.01)
NRBC BLD-RTO: 0 PER 100 WBC
PHOSPHATE SERPL-MCNC: 1.6 MG/DL (ref 2.6–4.7)
PLATELET # BLD AUTO: 205 K/UL (ref 150–400)
PMV BLD AUTO: 9.7 FL (ref 8.9–12.9)
POTASSIUM SERPL-SCNC: 3.9 MMOL/L (ref 3.5–5.1)
Q-T INTERVAL, ECG07: 324 MS
QRS DURATION, ECG06: 150 MS
QTC CALCULATION (BEZET), ECG08: 511 MS
RBC # BLD AUTO: 2.67 M/UL (ref 4.1–5.7)
RBC MORPH BLD: ABNORMAL
SERVICE CMNT-IMP: ABNORMAL
SODIUM SERPL-SCNC: 134 MMOL/L (ref 136–145)
VENTRICULAR RATE, ECG03: 150 BPM
WBC # BLD AUTO: 13.2 K/UL (ref 4.1–11.1)

## 2022-07-10 PROCEDURE — 74011250636 HC RX REV CODE- 250/636: Performed by: INTERNAL MEDICINE

## 2022-07-10 PROCEDURE — 65270000046 HC RM TELEMETRY

## 2022-07-10 PROCEDURE — 74011250637 HC RX REV CODE- 250/637: Performed by: SURGERY

## 2022-07-10 PROCEDURE — 93005 ELECTROCARDIOGRAM TRACING: CPT

## 2022-07-10 PROCEDURE — 74011000250 HC RX REV CODE- 250: Performed by: SURGERY

## 2022-07-10 PROCEDURE — 74011250636 HC RX REV CODE- 250/636: Performed by: SURGERY

## 2022-07-10 PROCEDURE — 74011250636 HC RX REV CODE- 250/636: Performed by: GENERAL ACUTE CARE HOSPITAL

## 2022-07-10 PROCEDURE — 74011250637 HC RX REV CODE- 250/637: Performed by: INTERNAL MEDICINE

## 2022-07-10 PROCEDURE — 74011636637 HC RX REV CODE- 636/637: Performed by: SURGERY

## 2022-07-10 PROCEDURE — 84100 ASSAY OF PHOSPHORUS: CPT

## 2022-07-10 PROCEDURE — 36415 COLL VENOUS BLD VENIPUNCTURE: CPT

## 2022-07-10 PROCEDURE — 74011000250 HC RX REV CODE- 250: Performed by: INTERNAL MEDICINE

## 2022-07-10 PROCEDURE — 85025 COMPLETE CBC W/AUTO DIFF WBC: CPT

## 2022-07-10 PROCEDURE — 82962 GLUCOSE BLOOD TEST: CPT

## 2022-07-10 PROCEDURE — 80048 BASIC METABOLIC PNL TOTAL CA: CPT

## 2022-07-10 PROCEDURE — 74011000258 HC RX REV CODE- 258: Performed by: SURGERY

## 2022-07-10 PROCEDURE — 83735 ASSAY OF MAGNESIUM: CPT

## 2022-07-10 PROCEDURE — 74011000250 HC RX REV CODE- 250: Performed by: GENERAL ACUTE CARE HOSPITAL

## 2022-07-10 PROCEDURE — C9113 INJ PANTOPRAZOLE SODIUM, VIA: HCPCS | Performed by: SURGERY

## 2022-07-10 RX ORDER — DILTIAZEM HYDROCHLORIDE 5 MG/ML
10 INJECTION INTRAVENOUS ONCE
Status: COMPLETED | OUTPATIENT
Start: 2022-07-10 | End: 2022-07-10

## 2022-07-10 RX ORDER — AMIODARONE HYDROCHLORIDE 200 MG/1
400 TABLET ORAL 2 TIMES DAILY
Status: DISCONTINUED | OUTPATIENT
Start: 2022-07-10 | End: 2022-07-12

## 2022-07-10 RX ADMIN — HYDROMORPHONE HYDROCHLORIDE 0.5 MG: 1 INJECTION, SOLUTION INTRAMUSCULAR; INTRAVENOUS; SUBCUTANEOUS at 08:17

## 2022-07-10 RX ADMIN — POTASSIUM CHLORIDE, DEXTROSE MONOHYDRATE AND SODIUM CHLORIDE 125 ML/HR: 150; 5; 450 INJECTION, SOLUTION INTRAVENOUS at 11:00

## 2022-07-10 RX ADMIN — SODIUM CHLORIDE, PRESERVATIVE FREE 10 ML: 5 INJECTION INTRAVENOUS at 18:19

## 2022-07-10 RX ADMIN — Medication 3 UNITS: at 18:19

## 2022-07-10 RX ADMIN — AMIODARONE HYDROCHLORIDE 400 MG: 200 TABLET ORAL at 18:19

## 2022-07-10 RX ADMIN — DEXTROSE MONOHYDRATE 12.5 MG/HR: 50 INJECTION, SOLUTION INTRAVENOUS at 14:15

## 2022-07-10 RX ADMIN — Medication 3 UNITS: at 12:42

## 2022-07-10 RX ADMIN — POTASSIUM PHOSPHATE, MONOBASIC POTASSIUM PHOSPHATE, DIBASIC: 224; 236 INJECTION, SOLUTION, CONCENTRATE INTRAVENOUS at 11:59

## 2022-07-10 RX ADMIN — POTASSIUM CHLORIDE, DEXTROSE MONOHYDRATE AND SODIUM CHLORIDE 125 ML/HR: 150; 5; 450 INJECTION, SOLUTION INTRAVENOUS at 19:29

## 2022-07-10 RX ADMIN — AMIODARONE HYDROCHLORIDE 150 MG: 1.5 INJECTION, SOLUTION INTRAVENOUS at 05:58

## 2022-07-10 RX ADMIN — DILTIAZEM HYDROCHLORIDE 10 MG: 5 INJECTION INTRAVENOUS at 06:59

## 2022-07-10 RX ADMIN — HYDROMORPHONE HYDROCHLORIDE 0.5 MG: 1 INJECTION, SOLUTION INTRAMUSCULAR; INTRAVENOUS; SUBCUTANEOUS at 19:29

## 2022-07-10 RX ADMIN — DEXTROSE MONOHYDRATE 10 MG/HR: 50 INJECTION, SOLUTION INTRAVENOUS at 19:54

## 2022-07-10 RX ADMIN — PIPERACILLIN AND TAZOBACTAM 3.38 G: 3; .375 INJECTION, POWDER, LYOPHILIZED, FOR SOLUTION INTRAVENOUS at 20:57

## 2022-07-10 RX ADMIN — SODIUM CHLORIDE, PRESERVATIVE FREE 10 ML: 5 INJECTION INTRAVENOUS at 07:00

## 2022-07-10 RX ADMIN — SODIUM CHLORIDE, PRESERVATIVE FREE 10 ML: 5 INJECTION INTRAVENOUS at 21:02

## 2022-07-10 RX ADMIN — ATORVASTATIN CALCIUM 20 MG: 20 TABLET, FILM COATED ORAL at 21:01

## 2022-07-10 RX ADMIN — PIPERACILLIN AND TAZOBACTAM 3.38 G: 3; .375 INJECTION, POWDER, LYOPHILIZED, FOR SOLUTION INTRAVENOUS at 13:27

## 2022-07-10 RX ADMIN — PIPERACILLIN AND TAZOBACTAM 3.38 G: 3; .375 INJECTION, POWDER, LYOPHILIZED, FOR SOLUTION INTRAVENOUS at 06:58

## 2022-07-10 RX ADMIN — POTASSIUM CHLORIDE, DEXTROSE MONOHYDRATE AND SODIUM CHLORIDE 125 ML/HR: 150; 5; 450 INJECTION, SOLUTION INTRAVENOUS at 02:45

## 2022-07-10 RX ADMIN — SODIUM CHLORIDE, PRESERVATIVE FREE 40 MG: 5 INJECTION INTRAVENOUS at 10:06

## 2022-07-10 RX ADMIN — HYDROCODONE BITARTRATE AND ACETAMINOPHEN 2 TABLET: 5; 325 TABLET ORAL at 02:18

## 2022-07-10 RX ADMIN — DEXTROSE MONOHYDRATE 10 MG/HR: 50 INJECTION, SOLUTION INTRAVENOUS at 22:47

## 2022-07-10 RX ADMIN — DEXTROSE MONOHYDRATE 2.5 MG/HR: 50 INJECTION, SOLUTION INTRAVENOUS at 07:18

## 2022-07-10 RX ADMIN — Medication 3 UNITS: at 06:59

## 2022-07-10 RX ADMIN — Medication 3 UNITS: at 00:03

## 2022-07-10 RX ADMIN — I-VITE, TAB 1000-60-2MG (60/BT) 1 TABLET: TAB at 09:54

## 2022-07-10 RX ADMIN — Medication 1 AMPULE: at 20:59

## 2022-07-10 NOTE — PROGRESS NOTES
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 Received 3 doses IVIG, did not tolerate final IVIG infusion despite. Recently started bactrim for UTI prior to admission, stated has taken before with no issues   PLT wnl   Hemo/onc on board     SBO  Repeat CT a/p on  showed partial SBO pattern. Having loose stools, no n/v this AM but 1 episode of emesis overight. Passing flatus/ had multiple BMS today  Surgery following. Diet advanced to low fiber  CT enterography showed distal partial SBO  Went to OR on  found to have SMALL BOWEL OBSTRUCTION  SECONDARY TO ILEAL PERFORATION, AND ABSCESS AND ADHESION AND ABDOMINAL HERNIA   Started on Zosyn on   Cont NGT  Cont NPO    GI bleed  Was to due severe ITP  S/p blood transfusion  Continue with protonix 40 daily(can switch to famotidine on discharge)  GI on board, holding off on EGD for now, conservative management. Paroxysmal atrial fibrillation w RVR  Elevated trop  HR is controlled now   Increase Amio dose but on DC Continue with 200 mg daily  Back on Dilt ggt on 7/10  Eliquis held due to hematuria.   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 Estimated discharge date: >48hrs  Code status: Full  Prophylaxis: SCD's  Recommended Disposition: SAH/Rehab     Subjective:     Chief Complaint / Reason for Physician Visit  NAD. Pinkish urine in bernard cath/bag  Mild abd pain  Not passing gas  No SOB  Sat low 90s on RA    Review of Systems:  Symptom Y/N Comments  Symptom Y/N Comments   Fever/Chills    Chest Pain     Poor Appetite    Edema     Cough    Abdominal Pain     Sputum    Joint Pain     SOB/SHEARER    Pruritis/Rash     Nausea/vomit    Tolerating PT/OT     Diarrhea    Tolerating Diet     Constipation    Other       Could NOT obtain due to:      Objective:     VITALS:   Last 24hrs VS reviewed since prior progress note. Most recent are:  Patient Vitals for the past 24 hrs:   Temp Pulse Resp BP SpO2   07/10/22 0700 -- (!) 135 20 124/80 93 %   07/10/22 0645 -- (!) 126 18 104/81 92 %   07/10/22 0630 -- (!) 119 18 114/62 92 %   07/10/22 0615 -- (!) 142 20 (!) 118/53 92 %   07/10/22 0600 -- (!) 132 19 (!) 144/64 91 %   07/10/22 0544 -- (!) 141 18 (!) 139/98 94 %   07/10/22 0332 98.2 °F (36.8 °C) 77 14 (!) 124/49 95 %   07/09/22 2321 98.4 °F (36.9 °C) 91 19 (!) 148/63 91 %   07/09/22 1933 98.6 °F (37 °C) 92 21 133/62 93 %   07/09/22 1530 98.7 °F (37.1 °C) 87 17 135/64 93 %   07/09/22 1103 98 °F (36.7 °C) 90 21 131/80 96 %       Intake/Output Summary (Last 24 hours) at 7/10/2022 1015  Last data filed at 7/10/2022 0645  Gross per 24 hour   Intake 90 ml   Output 1810 ml   Net -1720 ml        I had a face to face encounter and independently examined this patient as outlined below:  PHYSICAL EXAM:  General: WD, WN. Alert, cooperative, no acute distress    EENT:  EOMI. Anicteric sclerae. MMM  Resp:  CTA bilaterally, no wheezing or rales. No accessory muscle use  CV:  Irregularly irregular rhythm,  No edema  GI/:  Soft, Non distended, Non tender. +Bowel sounds. NGT. NAHID drain. Midline dressing. Lopez drains pinkish urine    Neurologic:  Alert and oriented X 3, normal speech   Psych:   Good insight. Not anxious nor agitated  Skin:  Diffuse petechiae. No jaundice      Reviewed most current lab test results and cultures  YES  Reviewed most current radiology test results   YES  Review and summation of old records today    NO  Reviewed patient's current orders and MAR    YES  PMH/SH reviewed - no change compared to H&P  ________________________________________________________________________  Care Plan discussed with:    Comments   Patient X    Family  X    RN X    Care Manager     Consultant                        Multidiciplinary team rounds were held today with , nursing, pharmacist and clinical coordinator. Patient's plan of care was discussed; medications were reviewed and discharge planning was addressed. ________________________________________________________________________  Total NON critical care TIME:  35   Minutes    Total CRITICAL CARE TIME Spent:   Minutes non procedure based      Comments   >50% of visit spent in counseling and coordination of care X    ________________________________________________________________________  Ishmael Salomon MD     Procedures: see electronic medical records for all procedures/Xrays and details which were not copied into this note but were reviewed prior to creation of Plan. LABS:  I reviewed today's most current labs and imaging studies. Pertinent labs include:  Recent Labs     07/10/22  0559 07/09/22  0529 07/08/22  1827 07/08/22  0129 07/08/22  0129   WBC 13.2* 13.9*  --   --  10.0   HGB 8.3* 9.3* 9.6*   < > 9.8*   HCT 26.2* 28.4* 29.2*   < > 29.3*    213  --   --  225    < > = values in this interval not displayed.      Recent Labs     07/10/22  0559 07/09/22  0529 07/08/22  0129   * 133* 133*   K 3.9 4.3 3.7    105 101   CO2 22 21 21   * 231* 133*   BUN 21* 25* 22*   CREA 1.10 1.42* 1.19   CA 7.9* 7.7* 8.0*   MG 2.3 2.0 2.4   PHOS 1.6* 2.2* 3.6       Signed: Major Kaminski MD

## 2022-07-10 NOTE — PROGRESS NOTES
Nitin NP Note         [id number]- Notified by nursing the patient is in rapid A. fib. Has a history of this and cardiology is following, is on p.o. amio. Labs are still pending for this morning. Ordered a one-time amio bolus. Nursing to obtain a.m. labs and to reach out to [facility] for further orders. Please note that this note was dictated using Dragon computer voice recognition software. Quite often unanticipated grammatical, syntax, homophones, and other interpretive errors are inadvertently transcribed by the computer software. Please disregard these errors. Please excuse any errors that have escaped final proofreading.

## 2022-07-10 NOTE — PROGRESS NOTES
Admit Date: 2022    POD 2 Day Post-Op    Procedure:  Procedure(s):  LAPAROSCOPY, LAPAROTOMY, SMALL BOWEL RESECTION, ADHESION LYSIS, REPAIR VENTRAL HERNIA    Subjective:     Patient alert and interactive, no complaints. Objective:     Blood pressure 119/60, pulse (!) 114, temperature 98.7 °F (37.1 °C), resp. rate 19, height 6' 4\" (1.93 m), weight 277 lb 1.9 oz (125.7 kg), SpO2 97 %. Temp (24hrs), Av.5 °F (36.9 °C), Min:98.2 °F (36.8 °C), Max:98.7 °F (37.1 °C)    NGT o/p 900 cc, thin green bilious  Physical Exam:  GENERAL: alert, cooperative, no distress, appears stated age, LUNG: clear to auscultation bilaterally, HEART: regular rate and rhythm, ABDOMEN: soft, appropriately tender, wounds dressed and dry, NAHID o/p serous, EXTREMITIES:  extremities normal, atraumatic, no cyanosis or edema    Labs:   Recent Results (from the past 24 hour(s))   GLUCOSE, POC    Collection Time: 22  7:10 PM   Result Value Ref Range    Glucose (POC) 193 (H) 65 - 117 mg/dL    Performed by Yomi Crabtree RN    GLUCOSE, POC    Collection Time: 07/10/22 12:00 AM   Result Value Ref Range    Glucose (POC) 193 (H) 65 - 117 mg/dL    Performed by Lenka Fink PCT    CBC WITH AUTOMATED DIFF    Collection Time: 07/10/22  5:59 AM   Result Value Ref Range    WBC 13.2 (H) 4.1 - 11.1 K/uL    RBC 2.67 (L) 4.10 - 5.70 M/uL    HGB 8.3 (L) 12.1 - 17.0 g/dL    HCT 26.2 (L) 36.6 - 50.3 %    MCV 98.1 80.0 - 99.0 FL    MCH 31.1 26.0 - 34.0 PG    MCHC 31.7 30.0 - 36.5 g/dL    RDW 16.7 (H) 11.5 - 14.5 %    PLATELET 366 631 - 602 K/uL    MPV 9.7 8.9 - 12.9 FL    NRBC 0.0 0  WBC    ABSOLUTE NRBC 0.00 0.00 - 0.01 K/uL    NEUTROPHILS 83 (H) 32 - 75 %    BAND NEUTROPHILS 5 %    LYMPHOCYTES 8 (L) 12 - 49 %    MONOCYTES 4 (L) 5 - 13 %    EOSINOPHILS 0 0 - 7 %    BASOPHILS 0 0 - 1 %    IMMATURE GRANULOCYTES 0 0.0 - 0.5 %    ABS. NEUTROPHILS 11.6 (H) 1.8 - 8.0 K/UL    ABS. LYMPHOCYTES 1.1 0.8 - 3.5 K/UL    ABS. MONOCYTES 0.5 0.0 - 1.0 K/UL    ABS. EOSINOPHILS 0.0 0.0 - 0.4 K/UL    ABS. BASOPHILS 0.0 0.0 - 0.1 K/UL    ABS. IMM. GRANS. 0.0 0.00 - 0.04 K/UL    DF MANUAL      RBC COMMENTS NORMOCYTIC, NORMOCHROMIC     METABOLIC PANEL, BASIC    Collection Time: 07/10/22  5:59 AM   Result Value Ref Range    Sodium 134 (L) 136 - 145 mmol/L    Potassium 3.9 3.5 - 5.1 mmol/L    Chloride 107 97 - 108 mmol/L    CO2 22 21 - 32 mmol/L    Anion gap 5 5 - 15 mmol/L    Glucose 179 (H) 65 - 100 mg/dL    BUN 21 (H) 6 - 20 MG/DL    Creatinine 1.10 0.70 - 1.30 MG/DL    BUN/Creatinine ratio 19 12 - 20      GFR est AA >60 >60 ml/min/1.73m2    GFR est non-AA >60 >60 ml/min/1.73m2    Calcium 7.9 (L) 8.5 - 10.1 MG/DL   MAGNESIUM    Collection Time: 07/10/22  5:59 AM   Result Value Ref Range    Magnesium 2.3 1.6 - 2.4 mg/dL   PHOSPHORUS    Collection Time: 07/10/22  5:59 AM   Result Value Ref Range    Phosphorus 1.6 (L) 2.6 - 4.7 MG/DL   GLUCOSE, POC    Collection Time: 07/10/22  6:36 AM   Result Value Ref Range    Glucose (POC) 199 (H) 65 - 117 mg/dL    Performed by Clayton BEEBE    GLUCOSE, POC    Collection Time: 07/10/22 11:55 AM   Result Value Ref Range    Glucose (POC) 167 (H) 65 - 117 mg/dL    Performed by Librado Escobar RN        Data Review images and reports reviewed    Assessment:     Active Problems:     Morbid obesity ([facility] [address] (6/7/2013)      Thrombocytopenia ([address] (6/27/2022)      GI bleed (6/27/2022)      UGO (acute kidney injury) ([address] (6/27/2022)      Atrial fibrillation ([address] (7/3/2022)      Elevated troponin (7/3/2022)      Ileus ([address] (7/7/2022)      Intestinal adhesions with partial obstruction ([facility] [address] (7/8/2022)      Ventral hernia (7/8/2022)      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

## 2022-07-10 NOTE — PROGRESS NOTES
Progress Note      7/10/2022 9:12 AM  NAME: Frankie Underwood   MRN:  [id number]   Admit Diagnosis: Thrombocytopenia ([facility] [address] [D69.6]  GI bleed [K92.2]  UGO (acute kidney injury) ([facility] [address] [N17.9]      Problem List:     1. Idiopathic thrombocytopenia  2. Anemia  3. Rectal bleeding  4. Lactic acidosis  5. NSTEMI  6. Paroxysmal atrial fibrillation  7. Remote PE and DVT (separate events)  8. CAD w/ mild diffuse disease w/ patent stent in the RCA 7/17 cath. 9. XOL  10. Morbid obesity  11. DM  12. Former smoker     Assessment/Plan: TnI 2300  PLT recovered  HgB 9s    MPI w/ EF 55% and no definite ischemia. Recurrent afib 7/10. Back on dilt ggt    1. Continue oral amio will increase dose, but discharge on 200mg daily  2. Off ASA  3. Eliquis on hold  4. Continue statin  5. In the OR this AM with Dr. Rometta Kocher. 6. Dr. Margarita Vanegas will be available as needed over the wknd. [x]       High complexity decision making was performed in this patient at high risk for decompensation with multiple organ involvement. Subjective:     Frankie Underwood denies chest pain, dyspnea. Discussed with RN events overnight. Review of Systems:    Symptom Y/N Comments  Symptom Y/N Comments   Fever/Chills N   Chest Pain N    Poor Appetite N   Edema N    Cough N   Abdominal Pain N    Sputum N   Joint Pain N    SOB/SHEARER N   Pruritis/Rash N    Nausea/vomit N   Tolerating PT/OT Y    Diarrhea N   Tolerating Diet Y    Constipation N   Other       Could NOT obtain due to:      Objective:      Physical Exam:    Last 24hrs VS reviewed since prior progress note.  Most recent are:    Visit Vitals  /80   Pulse (!) 135   Temp 98.2 °F (36.8 °C)   Resp 20   Ht 6' 4\" (1.93 m)   Wt 125.7 kg (277 lb 1.9 oz)   SpO2 93%   BMI 33.73 kg/m²       Intake/Output Summary (Last 24 hours) at 7/10/2022 0974  Last data filed at 7/10/2022 0645  Gross per 24 hour   Intake 90 ml   Output 1810 ml   Net -1720 ml        General Appearance: Well developed, well nourished, alert & oriented x 3,    no acute distress. Ears/Nose/Mouth/Throat: Hearing grossly normal.  Neck: Supple. Chest: Lungs clear to auscultation bilaterally. Cardiovascular: Regular rate and rhythm, S1S2 normal, no murmur. Abdomen: Soft, non-tender, bowel sounds are active. Extremities: No edema bilaterally. Skin: Warm and dry. Petechiae. []         Post-cath site without hematoma, bruit, tenderness, or thrill. Distal pulses intact. PMH/ reviewed - no change compared to H&P    Data Review    Telemetry: sinus rhythm     EKG:   [x]  No new EKG for review    Lab Data Personally Reviewed:    Recent Labs     07/10/22  0559 07/09/22  0529   WBC 13.2* 13.9*   HGB 8.3* 9.3*   HCT 26.2* 28.4*    213     No results for input(s): INR, PTP, APTT, INREXT, INREXT in the last 72 hours. Recent Labs     07/10/22  0559 07/09/22  0529 07/08/22  0129   * 133* 133*   K 3.9 4.3 3.7    105 101   CO2 22 21 21   BUN 21* 25* 22*   CREA 1.10 1.42* 1.19   * 231* 133*   CA 7.9* 7.7* 8.0*   MG 2.3 2.0 2.4     No results for input(s): CPK, CKNDX, TROIQ in the last 72 hours. No lab exists for component: CPKMB  Lab Results   Component Value Date/Time    Cholesterol, total 133 06/07/2013 02:40 PM    HDL Cholesterol 43 06/07/2013 02:40 PM    LDL, calculated 73.4 06/07/2013 02:40 PM    Triglyceride 83 06/07/2013 02:40 PM    CHOL/HDL Ratio 3.1 06/07/2013 02:40 PM       No results for input(s): AP, TBIL, TP, ALB, GLOB, GGT, AML, LPSE in the last 72 hours. No lab exists for component: SGOT, GPT, AMYP, HLPSE  No results for input(s): PH, PCO2, PO2 in the last 72 hours.     Medications Personally Reviewed:    Current Facility-Administered Medications   Medication Dose Route Frequency    dilTIAZem (CARDIZEM) 100 mg in dextrose 5% (MBP/ADV) 100 mL infusion  0-15 mg/hr IntraVENous TITRATE    dextrose 5% - 0.45% NaCl with KCl 20 mEq/L infusion  125 mL/hr IntraVENous CONTINUOUS    HYDROmorphone (DILAUDID) injection 0.5-1 mg  0.5-1 mg IntraVENous Q2H PRN    HYDROcodone-acetaminophen (NORCO) 5-325 mg per tablet 1-2 Tablet  1-2 Tablet Oral Q4H PRN    alcohol 62% (NOZIN) nasal  1 Ampule  1 Ampule Topical Q12H    piperacillin-tazobactam (ZOSYN) 3.375 g in 0.9% sodium chloride (MBP/ADV) 100 mL MBP  3.375 g IntraVENous Q8H    [Held by provider] apixaban (ELIQUIS) tablet 5 mg  5 mg Oral BID    amiodarone (CORDARONE) tablet 200 mg  200 mg Oral DAILY    phenol throat spray (CHLORASEPTIC) 1 Spray  1 Spray Oral PRN    insulin lispro (HUMALOG) injection   SubCUTAneous Q6H    pantoprazole (PROTONIX) 40 mg in 0.9% sodium chloride 10 mL injection  40 mg IntraVENous DAILY    calcium carbonate (TUMS) chewable tablet 400 mg [elemental]  400 mg Oral TID PRN    glucose chewable tablet 16 g  4 Tablet Oral PRN    glucagon (GLUCAGEN) injection 1 mg  1 mg IntraMUSCular PRN    dextrose 10% infusion 0-250 mL  0-250 mL IntraVENous PRN    nitroglycerin (NITROSTAT) tablet 0.4 mg  0.4 mg SubLINGual PRN    atorvastatin (LIPITOR) tablet 20 mg  20 mg Oral QHS    vitamin J-L-E-lutein-minerals (OCUVITE) tablet 1 Tablet  1 Tablet Oral DAILY    sodium chloride (NS) flush 5-40 mL  5-40 mL IntraVENous Q8H    sodium chloride (NS) flush 5-40 mL  5-40 mL IntraVENous PRN    acetaminophen (TYLENOL) tablet 650 mg  650 mg Oral Q6H PRN    Or    acetaminophen (TYLENOL) suppository 650 mg  650 mg Rectal Q6H PRN    polyethylene glycol (MIRALAX) packet 17 g  17 g Oral DAILY PRN    ondansetron (ZOFRAN ODT) tablet 4 mg  4 mg Oral Q8H PRN    Or    ondansetron (ZOFRAN) injection 4 mg  4 mg IntraVENous Q6H PRN         Ayleen Ruiz MD

## 2022-07-11 LAB
ANION GAP SERPL CALC-SCNC: 7 MMOL/L (ref 5–15)
BUN SERPL-MCNC: 17 MG/DL (ref 6–20)
BUN/CREAT SERPL: 19 (ref 12–20)
CALCIUM SERPL-MCNC: 7.5 MG/DL (ref 8.5–10.1)
CHLORIDE SERPL-SCNC: 109 MMOL/L (ref 97–108)
CO2 SERPL-SCNC: 20 MMOL/L (ref 21–32)
CREAT SERPL-MCNC: 0.91 MG/DL (ref 0.7–1.3)
ERYTHROCYTE [DISTWIDTH] IN BLOOD BY AUTOMATED COUNT: 16.1 % (ref 11.5–14.5)
GLUCOSE BLD STRIP.AUTO-MCNC: 154 MG/DL (ref 65–117)
GLUCOSE BLD STRIP.AUTO-MCNC: 176 MG/DL (ref 65–117)
GLUCOSE BLD STRIP.AUTO-MCNC: 180 MG/DL (ref 65–117)
GLUCOSE BLD STRIP.AUTO-MCNC: 185 MG/DL (ref 65–117)
GLUCOSE SERPL-MCNC: 170 MG/DL (ref 65–100)
HCT VFR BLD AUTO: 24.8 % (ref 36.6–50.3)
HGB BLD-MCNC: 8.1 G/DL (ref 12.1–17)
MAGNESIUM SERPL-MCNC: 2.2 MG/DL (ref 1.6–2.4)
MCH RBC QN AUTO: 31.5 PG (ref 26–34)
MCHC RBC AUTO-ENTMCNC: 32.7 G/DL (ref 30–36.5)
MCV RBC AUTO: 96.5 FL (ref 80–99)
NRBC # BLD: 0 K/UL (ref 0–0.01)
NRBC BLD-RTO: 0 PER 100 WBC
PHOSPHATE SERPL-MCNC: 1.7 MG/DL (ref 2.6–4.7)
PLATELET # BLD AUTO: 183 K/UL (ref 150–400)
PMV BLD AUTO: 10 FL (ref 8.9–12.9)
POTASSIUM SERPL-SCNC: 4.1 MMOL/L (ref 3.5–5.1)
RBC # BLD AUTO: 2.57 M/UL (ref 4.1–5.7)
SERVICE CMNT-IMP: ABNORMAL
SODIUM SERPL-SCNC: 136 MMOL/L (ref 136–145)
WBC # BLD AUTO: 13 K/UL (ref 4.1–11.1)

## 2022-07-11 PROCEDURE — 74011000250 HC RX REV CODE- 250: Performed by: SURGERY

## 2022-07-11 PROCEDURE — 65270000046 HC RM TELEMETRY

## 2022-07-11 PROCEDURE — 97110 THERAPEUTIC EXERCISES: CPT

## 2022-07-11 PROCEDURE — 83735 ASSAY OF MAGNESIUM: CPT

## 2022-07-11 PROCEDURE — C9113 INJ PANTOPRAZOLE SODIUM, VIA: HCPCS | Performed by: SURGERY

## 2022-07-11 PROCEDURE — 74011250636 HC RX REV CODE- 250/636: Performed by: SURGERY

## 2022-07-11 PROCEDURE — 97530 THERAPEUTIC ACTIVITIES: CPT

## 2022-07-11 PROCEDURE — 74011000258 HC RX REV CODE- 258: Performed by: SURGERY

## 2022-07-11 PROCEDURE — 74011250637 HC RX REV CODE- 250/637: Performed by: SURGERY

## 2022-07-11 PROCEDURE — 84100 ASSAY OF PHOSPHORUS: CPT

## 2022-07-11 PROCEDURE — 74011000250 HC RX REV CODE- 250: Performed by: GENERAL ACUTE CARE HOSPITAL

## 2022-07-11 PROCEDURE — 80048 BASIC METABOLIC PNL TOTAL CA: CPT

## 2022-07-11 PROCEDURE — 74011636637 HC RX REV CODE- 636/637: Performed by: SURGERY

## 2022-07-11 PROCEDURE — 74011250637 HC RX REV CODE- 250/637: Performed by: INTERNAL MEDICINE

## 2022-07-11 PROCEDURE — 36415 COLL VENOUS BLD VENIPUNCTURE: CPT

## 2022-07-11 PROCEDURE — 74011250636 HC RX REV CODE- 250/636: Performed by: GENERAL ACUTE CARE HOSPITAL

## 2022-07-11 PROCEDURE — 85027 COMPLETE CBC AUTOMATED: CPT

## 2022-07-11 PROCEDURE — 82962 GLUCOSE BLOOD TEST: CPT

## 2022-07-11 PROCEDURE — 97535 SELF CARE MNGMENT TRAINING: CPT

## 2022-07-11 PROCEDURE — 97116 GAIT TRAINING THERAPY: CPT

## 2022-07-11 RX ORDER — PANTOPRAZOLE SODIUM 40 MG/1
40 TABLET, DELAYED RELEASE ORAL
Status: DISCONTINUED | OUTPATIENT
Start: 2022-07-12 | End: 2022-07-14

## 2022-07-11 RX ADMIN — POTASSIUM CHLORIDE, DEXTROSE MONOHYDRATE AND SODIUM CHLORIDE 125 ML/HR: 150; 5; 450 INJECTION, SOLUTION INTRAVENOUS at 04:17

## 2022-07-11 RX ADMIN — AMIODARONE HYDROCHLORIDE 400 MG: 200 TABLET ORAL at 17:48

## 2022-07-11 RX ADMIN — AMIODARONE HYDROCHLORIDE 400 MG: 200 TABLET ORAL at 09:49

## 2022-07-11 RX ADMIN — POTASSIUM CHLORIDE, DEXTROSE MONOHYDRATE AND SODIUM CHLORIDE 125 ML/HR: 150; 5; 450 INJECTION, SOLUTION INTRAVENOUS at 20:09

## 2022-07-11 RX ADMIN — POTASSIUM PHOSPHATE, MONOBASIC POTASSIUM PHOSPHATE, DIBASIC: 224; 236 INJECTION, SOLUTION, CONCENTRATE INTRAVENOUS at 11:08

## 2022-07-11 RX ADMIN — Medication 1 AMPULE: at 20:09

## 2022-07-11 RX ADMIN — Medication 3 UNITS: at 23:16

## 2022-07-11 RX ADMIN — Medication 3 UNITS: at 13:11

## 2022-07-11 RX ADMIN — SODIUM CHLORIDE, PRESERVATIVE FREE 10 ML: 5 INJECTION INTRAVENOUS at 13:12

## 2022-07-11 RX ADMIN — HYDROCODONE BITARTRATE AND ACETAMINOPHEN 1 TABLET: 5; 325 TABLET ORAL at 21:39

## 2022-07-11 RX ADMIN — SODIUM CHLORIDE, PRESERVATIVE FREE 10 ML: 5 INJECTION INTRAVENOUS at 05:12

## 2022-07-11 RX ADMIN — HYDROMORPHONE HYDROCHLORIDE 0.5 MG: 1 INJECTION, SOLUTION INTRAMUSCULAR; INTRAVENOUS; SUBCUTANEOUS at 04:17

## 2022-07-11 RX ADMIN — ATORVASTATIN CALCIUM 20 MG: 20 TABLET, FILM COATED ORAL at 21:35

## 2022-07-11 RX ADMIN — Medication 3 UNITS: at 05:12

## 2022-07-11 RX ADMIN — PIPERACILLIN AND TAZOBACTAM 3.38 G: 3; .375 INJECTION, POWDER, LYOPHILIZED, FOR SOLUTION INTRAVENOUS at 21:35

## 2022-07-11 RX ADMIN — Medication 3 UNITS: at 18:27

## 2022-07-11 RX ADMIN — PIPERACILLIN AND TAZOBACTAM 3.38 G: 3; .375 INJECTION, POWDER, LYOPHILIZED, FOR SOLUTION INTRAVENOUS at 12:49

## 2022-07-11 RX ADMIN — APIXABAN 5 MG: 5 TABLET, FILM COATED ORAL at 17:48

## 2022-07-11 RX ADMIN — PIPERACILLIN AND TAZOBACTAM 3.38 G: 3; .375 INJECTION, POWDER, LYOPHILIZED, FOR SOLUTION INTRAVENOUS at 04:13

## 2022-07-11 RX ADMIN — HYDROMORPHONE HYDROCHLORIDE 0.5 MG: 1 INJECTION, SOLUTION INTRAMUSCULAR; INTRAVENOUS; SUBCUTANEOUS at 12:50

## 2022-07-11 RX ADMIN — SODIUM CHLORIDE, PRESERVATIVE FREE 40 MG: 5 INJECTION INTRAVENOUS at 09:49

## 2022-07-11 RX ADMIN — POTASSIUM CHLORIDE, DEXTROSE MONOHYDRATE AND SODIUM CHLORIDE 125 ML/HR: 150; 5; 450 INJECTION, SOLUTION INTRAVENOUS at 12:49

## 2022-07-11 RX ADMIN — HYDROMORPHONE HYDROCHLORIDE 0.5 MG: 1 INJECTION, SOLUTION INTRAMUSCULAR; INTRAVENOUS; SUBCUTANEOUS at 08:22

## 2022-07-11 RX ADMIN — I-VITE, TAB 1000-60-2MG (60/BT) 1 TABLET: TAB at 09:50

## 2022-07-11 RX ADMIN — Medication 3 UNITS: at 00:03

## 2022-07-11 RX ADMIN — HYDROMORPHONE HYDROCHLORIDE 1 MG: 1 INJECTION, SOLUTION INTRAMUSCULAR; INTRAVENOUS; SUBCUTANEOUS at 17:48

## 2022-07-11 RX ADMIN — SODIUM CHLORIDE, PRESERVATIVE FREE 10 ML: 5 INJECTION INTRAVENOUS at 21:35

## 2022-07-11 RX ADMIN — HYDROCODONE BITARTRATE AND ACETAMINOPHEN 1 TABLET: 5; 325 TABLET ORAL at 00:03

## 2022-07-11 NOTE — PROGRESS NOTES
End of Shift Note    Bedside shift change report given to Yesika Gomez (oncoming nurse) by Flavia Fitzgerald RN (offgoing nurse). Report included the following information SBAR, Kardex and MAR    Shift worked:  7a-7p     Shift summary and any significant changes:          Concerns for physician to address:       Zone phone for oncoming shift:          Activity:  Activity Level: Up with Assistance  Number times ambulated in hallways past shift: 0  Number of times OOB to chair past shift: 0    Cardiac:   Cardiac Monitoring: Yes      Cardiac Rhythm: Atrial Fib    Access:   Current line(s): PIV     Genitourinary:   Urinary status: bernard    Respiratory:   O2 Device: None (Room air)  Chronic home O2 use?: NO  Incentive spirometer at bedside: YES  Actual Volume (ml): 1500 ml    GI:  Last Bowel Movement Date: 07/08/22  Current diet:  DIET ONE TIME MESSAGE  DIET ONE TIME MESSAGE  DIET NPO Sips of Water with Meds  Passing flatus: Tolerating current diet: npo       Pain Management:   Patient states pain is manageable on current regimen: YES    Skin:  Jose Guadalupe Score: 15  Interventions: turn team, increase time out of bed, PT/OT consult and internal/external urinary devices    Patient Safety:  Fall Score:  Total Score: 4  Interventions: bed/chair alarm, assistive device (walker, cane, etc), gripper socks and pt to call before getting OOB  High Fall Risk: Yes    Length of Stay:  Expected LOS: 3d 14h  Actual LOS: 13      Flavia Fitzgerald RN

## 2022-07-11 NOTE — PROGRESS NOTES
Transition of Care Plan:     RUR:16%  Disposition:Return to [facility]  Follow up appointments: To be done prior to discharge if going home  DME needed:None  Transportation at [place] [id number] or means to access home:  Wife has keys      IM Medicare Letter: Given 7/11/2022  Is patient a BCPI-A Bundle:  No                    If yes, was Bundle Letter given?:  N/A  Is patient a [place] and connected with the [facility]?  No              If yes, was [employer] transfer form completed and [facility] notified? N/A   Caregiver Contact:Wife  Discharge Caregiver contacted prior to discharge? Caregiver to be contacted prior to discharge  Care Conference needed?: Not at this time    4:45pm-CM met with pt to discuss d/c plan. No new needs at this time. pt received and signed 2nd  Letter. CM will continue to follow pt for d/c planning needs.      Steve Ramirez [address]  [phone]

## 2022-07-11 NOTE — PROGRESS NOTES
Problem: Self Care Deficits Care Plan (Adult)  Goal: *Acute Goals and Plan of Care (Insert Text)  Description:  FUNCTIONAL STATUS PRIOR TO ADMISSION: Patient was modified independent using a rollator for functional mobility. HOME SUPPORT PRIOR TO ADMISSION: The patient lived with wife at [address] but did not require assist. Assist with IADLs provided by [facility] as needed. Occupational Therapy Goals  Initiated 7/3/2022;  Weekly Reassessment 7/11/2022  1. Patient will perform lower body dressing with modified independence within 7 day(s). 2.  Patient will perform bathing with modified independence within 7 day(s). 3.  Patient will perform simple home management with modified independence within 7 day(s). 4.  Patient will perform toilet transfers with modified independence within 7 day(s). 5.  Patient will perform all aspects of toileting with modified independence within 7 day(s). 6.  Patient will utilize energy conservation techniques during functional activities with verbal cues within 7 day(s). Outcome: Progressing Towards Goal   OCCUPATIONAL THERAPY TREATMENT/WEEKLY RE-ASSESSMENT  Patient: Brett Preciado (56 y.o. male)  Date: 7/11/2022  Diagnosis: Thrombocytopenia ([facility] [address] [D69.6]  GI bleed [K92.2]  UGO (acute kidney injury) ([facility] [address] [N17.9] <principal problem not specified>  Procedure(s) (LRB):  LAPAROSCOPY, LAPAROTOMY, SMALL BOWEL RESECTION, ADHESION LYSIS, REPAIR VENTRAL HERNIA (N/A) 3 Days Post-Op  Precautions:    Chart, occupational therapy assessment, plan of care, and goals were reviewed. ASSESSMENT  Patient continues with skilled OT services and is slowly progressing towards goals. Note patient now POD 3 small bowel resection, adhesion lysis, and ventral hernia repair. Patient is received in bedside chair with spouse present, reporting surgical pain as primary limitation to greater activity at this time.  Patient participates in seated BUE exercises and is receptive to education regarding pain management, positioning, and strategies to reduce stress. Patient trials two folded towels in lap supporting abdominal fold with patient reporting decreased strain on incision sites. Patient also reporting improvement with under-chair footrest use to facilitate greater use of BLEs when repositioning self in chair. Throughout session, patient on room air (nasal cannula out of nose), however maintains VSS. Although no OT goals met at this time due to limited ability to engage in occupational therapy over past week as well as recent surgery (on 7/8), note patient has progressed well in physical therapy. With greater frequency of participation and improved pain management, hopeful for similar progress in self-care capacities over next 7 days. Current Level of Function Impacting Discharge (ADLs): up to max for distal lower body per pain levels    Other factors to consider for discharge: pain management         PLAN :  Goals have been updated based on progression since last assessment. Patient continues to benefit from skilled intervention to address the above impairments. Continue to follow patient 4 times a week to address goals. Recommendation for discharge: (in order for the patient to meet his/her long term goals)  Occupational therapy at least 2 days/week in the home AND ensure assist and/or supervision for safety with ADLs and IADLs    This discharge recommendation:  Has been made in collaboration with the attending provider and/or case management    IF patient discharges home will need the following DME: TBD (AE?)       SUBJECTIVE:   Patient stated every time I move, it hurts.     OBJECTIVE DATA SUMMARY:   Cognitive/Behavioral Status:  Neurologic State: Alert; Appropriate for age  Orientation Level: Oriented X4  Cognition: Follows commands  Perception: Appears intact  Perseveration: No perseveration noted  Safety/Judgement: Awareness of environment; Fall prevention;Home safety; Insight into deficits    Functional Mobility and Transfers for ADLs:  Bed Mobility:  Scooting: Stand-by assistance; Additional time    Balance:  Sitting: Intact  Standing: Impaired; With support (RW)  Standing - Static: Good;Constant support  Standing - Dynamic : Fair;Constant support    ADL Intervention:  Toileting  Bladder Hygiene: Total assistance (dependent) (w/ bernard)    Cognitive Retraining  Problem Solving: Identifying the problem;General alternative solution  Safety/Judgement: Awareness of environment; Fall prevention;Home safety; Insight into deficits    Education regarding pain management, positioning, strategies to reduce stress on abdomen. Patient trials two folded pillows supporting abdominal fold with patient reporting decreased strain on incision sites. Patient also reporting improvement with under-chair footrest to facilitate greater use of BLEs when repositioning self in chair. Therapeutic Exercises:   B rowing w/ therapist anchoring TB - 5 reps, 2 sets  RUE / LUE unilateral chest press w/ self-anchor of TB - 5 reps, 2 sets  RUE / LUE unilateral shoulder press 2/ self-anchor of TB - 5 reps, 2 sets    Patient benefits from rest breaks between each set with increased attention to breathing 2/2 dyspnea. Pain:  Patient reports post-op abdominal pain, not rated. Improved w/ support of folded towels under abdominal skin fold. Activity Tolerance:   Fair and requires rest breaks    After treatment patient left in no apparent distress:   Sitting in chair, Call bell within reach, and Caregiver / family present    COMMUNICATION/COLLABORATION:   The patients plan of care was discussed with: Physical therapist and Registered nurse.      Nicole Ferguson OT  Time Calculation: 34 mins

## 2022-07-11 NOTE — PROGRESS NOTES
Comprehensive Nutrition Assessment    Type and Reason for Visit: Reassess    Nutrition Recommendations/Plan:   1. Continue diet advancement as tolerated      Nutrition Assessment:    Chart reviewed; medically noted for SBO, ileal perforation, abscess, small bowel resection, AFIB, HTN, and UGO. NGT removed this morning and cleared for sips of clear liquids. Will monitor diet tolerance and advancement. If patient unable to tolerate diet advancement, recommend nutrition support given prolonged period of NPO/clear liquids. Nutrition Related Findings:    -[phone], phos 1.7  Atorvastatin, humalog, Protonix, Kphos, Ocuvite, D5% + KCl  BM 7/8   Wound Type: Surgical incision    Current Nutrition Intake & Therapies:        ADULT DIET Clear Liquid (sips only)    Anthropometric Measures:  Height: 6' 4\" (193 cm)  Ideal Body Weight (IBW): 202 lbs (92 kg)     Current Body Wt:  125.7 kg (277 lb 1.9 oz), 137.2 % IBW. Current BMI (kg/m2): 33.7                          BMI Category: Obese class 1 (BMI 30.0-34. 9)    Estimated Daily Nutrient Needs:  Energy Requirements Based On: Formula  Weight Used for Energy Requirements: Current  Energy (kcal/day): 2199 kcal (BMR 2077 x 1. 3AF -500kcal)  Weight Used for Protein Requirements: Current  Protein (g/day): 101g (0.8 g/kg bw)  Method Used for Fluid Requirements: 1 ml/kcal  Fluid (ml/day): 2200 mL    Nutrition Diagnosis:   · Inadequate protein-energy intake related to altered GI function as evidenced by NPO or clear liquid status due to medical condition    Nutrition Interventions:   Food and/or Nutrient Delivery: Modify current diet  Nutrition Education/Counseling: No recommendations at this time  Coordination of Nutrition Care: Continue to monitor while inpatient       Goals:  Previous Goal Met: Progressing toward goal(s)  Goals:  (diet advanced beyond clears and PO >50% of meals next 2-4 days)       Nutrition Monitoring and Evaluation:   Behavioral-Environmental Outcomes: None identified  Food/Nutrient Intake Outcomes: Food and nutrient intake,Diet advancement/tolerance  Physical Signs/Symptoms Outcomes: None identified,Weight,GI status    Discharge Planning:     Too soon to determine    Joshua Flowers RD  Contact: ext [phone]

## 2022-07-11 NOTE — PROGRESS NOTES
Problem: Mobility Impaired (Adult and Pediatric)  Goal: *Acute Goals and Plan of Care (Insert Text)  Description: FUNCTIONAL STATUS PRIOR TO ADMISSION: Patient was modified independent using a rollator for functional mobility. HOME SUPPORT PRIOR TO ADMISSION: The patient lived with wife at [address] but did not require assist.    Physical Therapy Goals  Initiated 7/2/2022 - remain appropriate 7/11/2022  1. Patient will move from supine to sit and sit to supine , scoot up and down, and roll side to side in bed with modified independence within 7 day(s). 2.  Patient will transfer from bed to chair and chair to bed with modified independence using the least restrictive device within 7 day(s). 3.  Patient will perform sit to stand with modified independence within 7 day(s). 4.  Patient will ambulate with modified independence for 150 feet with the least restrictive device within 7 day(s). Outcome: Progressing Towards Goal   PHYSICAL THERAPY TREATMENT: WEEKLY REASSESSMENT  Patient: Vee Wilson (89 y.o. male)  Date: 7/11/2022  Primary Diagnosis: Thrombocytopenia ([facility] [D69.6]  GI bleed [K92.2]  UGO (acute kidney injury) ([facility] [address] [N17.9]  Procedure(s) (LRB):  LAPAROSCOPY, LAPAROTOMY, SMALL BOWEL RESECTION, ADHESION LYSIS, REPAIR VENTRAL HERNIA (N/A) 3 Days Post-Op   Precautions:          ASSESSMENT  Patient continues with skilled PT services and is progressing towards goals however limited by increased pain levels and abdominal discomfort associated with constipation this date. Pt required max encouragement for minimal participation with therapy, greatly limited by pain this date. Overall, pt required CGAx1 throughout mobility, including brief ambulation trial from EOB>>bedside chair w/ RW support. Pt with increased trunk flexion in addition to slow, shuffled gait. No overt LOB noted however fair gait stability overall.  All mobility occurred on RA with O2 sats 85% post activity, requiring 2L O2 for recovery >90%. Anticipate that once pain is better controlled, pt will progress well with therapy and be appropriate to return to independent living facility w/ HHPT and family assist.    Patient's progression toward goals since last assessment: pt has been making steady progress. All goals reviewed and remain appropriate. Current Level of Function Impacting Discharge (mobility/balance): CGAx1 with use of rolling walker    Functional Outcome Measure: The patient scored 40/100 on the Barthel Index outcome measure which is indicative of moderate impairment in ADLs and functional mobility. Other factors to consider for discharge: pain levels, caregiver burden         PLAN :  Goals have been updated based on progression since last assessment. Patient continues to benefit from skilled intervention to address the above impairments. Recommendations and Planned Interventions: bed mobility training, transfer training, gait training, therapeutic exercises, patient and family training/education, and therapeutic activities      Frequency/Duration: Patient will be followed by physical therapy:  4 times a week to address goals. Recommendation for discharge: (in order for the patient to meet his/her long term goals)  Physical therapy at least 2 days/week in the home AND ensure assist and/or supervision for safety with functional mobility and ADLs    This discharge recommendation:  Has been made in collaboration with the attending provider and/or case management    IF patient discharges home will need the following DME: patient owns DME required for discharge         SUBJECTIVE:   Patient stated I just need to have a bowel movement, I am so uncomfortable.     OBJECTIVE DATA SUMMARY:   HISTORY:    Past Medical History:   Diagnosis Date    Arthritis     bilateral hips/knees    CAD (coronary artery disease) 6/7/13    PCI    Chronic pain     DJD; SILVIO KNEES    Diabetes ([facility] [address] \"BORDERLINE\" PER MD    GERD (gastroesophageal reflux disease)     Macular degeneration     Morbid obesity ([facility] [address]     Other and unspecified symptoms and signs involving general sensations and perceptions     Bilat. legs \"give out\" intermittently; Bilat leg cramps; macular degenration (left). Other ill-defined conditions(799.89)     HYPERLIPIDEMIA; LT LE DVT (2008); MORBID OBESITY    Pulmonary embolism ([facility] [address] 08/2021    Thromboembolus ([facility] [address] 2008    left LE     Past Surgical History:   Procedure Laterality Date    WI CARDIAC SURG PROCEDURE UNLIST  6/713    STENTS TO RCA    WI TOTAL HIP ARTHROPLASTY  6/2007    right    WI TOTAL HIP ARTHROPLASTY  12/2007    left       Personal factors and/or comorbidities impacting plan of care:     Home Situation  Home Environment: Assisted living  [address] Name: Flakito Hancock  One/Two Story Residence: One story  Living Alone: No  Support Systems: Spouse/Significant Other  Patient Expects to be Discharged to[de-identified] Home with home health  Current DME Used/Available at Home: [facility], [place], straight,Walker, rolling,Walker, rollator,Grab bars (molded bench in shower)  Tub or Shower Type: Shower    EXAMINATION/PRESENTATION/DECISION MAKING:   Critical Behavior:  Neurologic State: Appropriate for age  Orientation Level: Oriented X4  Cognition: Follows commands  Safety/Judgement: Awareness of environment  Hearing: Auditory  Auditory Impairment: None  Skin:  intact  Edema: none noted  Range Of Motion:                          Strength: Tone & Sensation:                                  Coordination:     Vision:      Functional Mobility:  Bed Mobility:  Rolling: Stand-by assistance  Supine to Sit: Stand-by assistance     Scooting: Stand-by assistance  Transfers:  Sit to Stand: Contact guard assistance  Stand to Sit: Contact guard assistance        Bed to Chair: Contact guard assistance              Balance:   Sitting: Intact  Standing: Impaired; With support (RW)  Standing - Static: Good;Constant support  Standing - Dynamic : Fair;Constant support  Ambulation/Gait Training:  Distance (ft): 3 Feet (ft)  Assistive Device: Walker, rolling;Gait belt  Ambulation - Level of Assistance: Contact guard assistance        Gait Abnormalities: Decreased step clearance;Shuffling gait        Base of Support: Widened     Speed/Beatriz: Pace decreased (<100 feet/min); Shuffled  Step Length: Left shortened;Right shortened                Functional Measure:  Barthel Index:    Bathin  Bladder: 0  Bowels: 5  Groomin  Dressin  Feeding: 10  Mobility: 0  Stairs: 0  Toilet Use: 5  Transfer (Bed to Chair and Back): 10  Total: 40/100       The Barthel ADL Index: Guidelines  1. The index should be used as a record of what a patient does, not as a record of what a patient could do. 2. The main aim is to establish degree of independence from any help, physical or verbal, however minor and for whatever reason. 3. The need for supervision renders the patient not independent. 4. A patient's performance should be established using the best available evidence. Asking the patient, friends/relatives and nurses are the usual sources, but direct observation and common sense are also important. However direct testing is not needed. 5. Usually the patient's performance over the preceding 24-48 hours is important, but occasionally longer periods will be relevant. 6. Middle categories imply that the patient supplies over 50 per cent of the effort. 7. Use of aids to be independent is allowed. Score Interpretation (from [address] [address])    Independent   60-79 Minimally independent   40-59 Partially dependent   20-39 Very dependent   <20 Totally dependent     -Alden Lees, Barthel, D.W. (1965). Functional evaluation: the Barthel Index. [facility] ([address] (1997). The Barthel activities of daily living index: self-reporting versus actual performance in the old (> or = 75 years). Journal of [address] [place] [address] 457), [facility], ...F, Adan Carlos., Vale Bond. (1999). Measuring the change in disability after inpatient rehabilitation; comparison of the responsiveness of the Barthel Index and Functional [place] Measure. Journal of Neurology, Neurosurgery, and Psychiatry, 66(4), 932-377. YUDELKA Tavera, JOAQUINA Mcdonald, & Perla Montes M.A. (2004) Assessment of post-stroke quality of life in cost-effectiveness studies: The usefulness of the Barthel Index and the EuroQoL-5D. Quality of Life Research, 13, 427-51          Pain Rating:  Reported pain in abdomen however did not quantify    Activity Tolerance:   O2 sats 85% on RA, requiring 2L O2 for recovery. All other VSS. Pt fatigues quickly    After treatment patient left in no apparent distress:   Sitting in chair, Call bell within reach, and Caregiver / family present    COMMUNICATION/EDUCATION:   The patients plan of care was discussed with: Registered nurse. Fall prevention education was provided and the patient/caregiver indicated understanding., Patient/family have participated as able in goal setting and plan of care. , and Patient/family agree to work toward stated goals and plan of care.     Thank you for this referral.  Ghazala Feng, PT, DPT   Time Calculation: 25 mins

## 2022-07-11 NOTE — PROGRESS NOTES
Progress Note    Patient: Douglas Brandon MRN: [id number]  SSN: [id number]    YOB: 1945  Age: 76 y.o. Sex: male        ADMITTED:  2022 to Kwan Dodson MD  for Thrombocytopenia [facility]) [D69.6]  GI bleed [K92.2]  UGO (acute kidney injury) [facility]) [N17.9]         Douglas Brandon was admitted for Thrombocytopenia [facility]) [D69.6]  GI bleed [K92.2]  UGO (acute kidney injury) ([address] [N17.9]. GI bleed resolved  hemonc following for severe thrombocytopenia r/t ITP now resolved  SBO seen on CT . GS following. Ex-lap 22    Urology following for recurrent gross hematuria x3 weeks. He presented with sever thrombocytopenia (3) now resolved to NL after mult transfusions. He is on chronic AC (Eliquis), it was held intiially upon admission and then resumed on  and hematuria started next day and AC was subsequently held again on . Remote PE and DVT hx with a-fib, CAD and stent. Cards following     No urologic hx. First episode of hematuria happened after UTI was discovered and improved with bactrim treatment a few weeks ago. hgb remains stable. Urine clear yellow in bernard. Bernard placed in OR. Draining well. Chronic AC remains held. NG tube clamp trial today . Vitals:  Temp (24hrs), Av.6 °F (37 °C), Min:98.4 °F (36.9 °C), Max:98.7 °F (37.1 °C)     Blood pressure 122/73, pulse 99, temperature 98.4 °F (36.9 °C), resp. rate 20, height 6' 4\" (1.93 m), weight 125.7 kg (277 lb 1.9 oz), SpO2 95 %. I&O's:   1901 -  0700  In: 1777.5 [I.V.:1702.5]  Out: 1710 [Urine:1100; Drains:10]   No intake/output data recorded.       Physical Exam  General: NAD, pleasant  Respiratory: no distress, breathing easily, NC  Abdomen: soft, no distention; non-tender to palpation  : no CVA tenderness, bernard, clear yellow UA  Neuro: Appropriate, no focal neurological deficits  Skin: warm, dry  Extremities: moves all, full ROM     Labs:   Recent Labs     07/11/22  0354 07/10/22  0559 07/09/22  0529   WBC 13.0* 13.2* 13.9*   HGB 8.1* 8.3* 9.3*   HCT 24.8* 26.2* 28.4*    205 213     Recent Labs     07/11/22  0354 07/10/22  0559 07/09/22  0529    134* 133*   K 4.1 3.9 4.3   * 107 105   CO2 20* 22 21   * 179* 231*   BUN 17 21* 25*   CREA 0.91 1.10 1.42*   CA 7.5* 7.9* 7.7*        Cultures:      Imaging:       Assessment:     - Active Problems: Morbid obesity ([address] (6/7/2013)      Thrombocytopenia ([address] (6/27/2022)      GI bleed (6/27/2022)      UGO (acute kidney injury) ([address] (6/27/2022)      Atrial fibrillation ([address] (7/3/2022)      Elevated troponin (7/3/2022)      Ileus ([address] (7/7/2022)      Intestinal adhesions with partial obstruction ([address] (7/8/2022)      Ventral hernia (7/8/2022)      Intestinal perforation ([address] (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 Primary team can conduct voiding trial once pt ambulating. Only replace bernard for retention >400ml.   -okay to resume AC today if needed  -will arrange OP follow up, no further urologic intervention.  Please call if needed      Supervising Dr. Jarod VASQUEZ By: Sim Valadez NP - July 11, 2022

## 2022-07-11 NOTE — PROGRESS NOTES
Progress Note      7/11/2022 9:12 AM  NAME: Bayron Holden   MRN:  [id number]   Admit Diagnosis: Thrombocytopenia ([facility] [D69.6]  GI bleed [K92.2]  UGO (acute kidney injury) ([facility] [address] [N17.9]      Problem List:     1. Idiopathic thrombocytopenia  2. Anemia  3. Rectal bleeding  4. Lactic acidosis  5. NSTEMI  6. Paroxysmal atrial fibrillation  7. Remote PE and DVT (separate events)  8. CAD w/ mild diffuse disease w/ patent stent in the RCA 7/17 cath. 9. XOL  10. Morbid obesity  11. DM  12. Former smoker     Assessment/Plan: TnI 2300  PLT recovered  HgB 9s    MPI w/ EF 55% and no definite ischemia. Recurrent afib 7/10. Back on dilt gtt. Converted to sinus. 1. Continue oral amio 400mg BID, but discharge on 200mg daily  2. Stop diltiazem gtt  3. Off ASA  4. Eliquis on hold  5. Continue statin         [x]       High complexity decision making was performed in this patient at high risk for decompensation with multiple organ involvement. Subjective:     Bayron Holden denies chest pain, dyspnea. Discussed with RN events overnight. Review of Systems:    Symptom Y/N Comments  Symptom Y/N Comments   Fever/Chills N   Chest Pain N    Poor Appetite N   Edema N    Cough N   Abdominal Pain N    Sputum N   Joint Pain N    SOB/SHEARER N   Pruritis/Rash N    Nausea/vomit N   Tolerating PT/OT Y    Diarrhea N   Tolerating Diet Y    Constipation N   Other       Could NOT obtain due to:      Objective:      Physical Exam:    Last 24hrs VS reviewed since prior progress note.  Most recent are:    Visit Vitals  /73   Pulse 99   Temp 98.4 °F (36.9 °C)   Resp 20   Ht 6' 4\" (1.93 m)   Wt 125.7 kg (277 lb 1.9 oz)   SpO2 95%   BMI 33.73 kg/m²       Intake/Output Summary (Last 24 hours) at 7/11/2022 1042  Last data filed at 7/11/2022 0600  Gross per 24 hour   Intake 1732.5 ml   Output 950 ml   Net 782.5 ml        General Appearance: Well developed, well nourished, alert & oriented x 3,    no acute distress. Ears/Nose/Mouth/Throat: Hearing grossly normal.  Neck: Supple. Chest: Lungs clear to auscultation bilaterally. Cardiovascular: Regular rate and rhythm, S1S2 normal, no murmur. Abdomen: Soft, non-tender, bowel sounds are active. Extremities: No edema bilaterally. Skin: Warm and dry. Petechiae. []         Post-cath site without hematoma, bruit, tenderness, or thrill. Distal pulses intact. PMH/ reviewed - no change compared to H&P    Data Review    Telemetry: sinus rhythm     EKG:   [x]  No new EKG for review    Lab Data Personally Reviewed:    Recent Labs     07/11/22  0354 07/10/22  0559   WBC 13.0* 13.2*   HGB 8.1* 8.3*   HCT 24.8* 26.2*    205     No results for input(s): INR, PTP, APTT, INREXT, INREXT in the last 72 hours. Recent Labs     07/11/22  0354 07/10/22  0559 07/09/22  0529    134* 133*   K 4.1 3.9 4.3   * 107 105   CO2 20* 22 21   BUN 17 21* 25*   CREA 0.91 1.10 1.42*   * 179* 231*   CA 7.5* 7.9* 7.7*   MG 2.2 2.3 2.0     No results for input(s): CPK, CKNDX, TROIQ in the last 72 hours. No lab exists for component: CPKMB  Lab Results   Component Value Date/Time    Cholesterol, total 133 06/07/2013 02:40 PM    HDL Cholesterol 43 06/07/2013 02:40 PM    LDL, calculated 73.4 06/07/2013 02:40 PM    Triglyceride 83 06/07/2013 02:40 PM    CHOL/HDL Ratio 3.1 06/07/2013 02:40 PM       No results for input(s): AP, TBIL, TP, ALB, GLOB, GGT, AML, LPSE in the last 72 hours. No lab exists for component: SGOT, GPT, AMYP, HLPSE  No results for input(s): PH, PCO2, PO2 in the last 72 hours.     Medications Personally Reviewed:    Current Facility-Administered Medications   Medication Dose Route Frequency    potassium phosphate 30 mmol in 0.9% sodium chloride 500 mL infusion   IntraVENous ONCE    amiodarone (CORDARONE) tablet 400 mg  400 mg Oral BID    dextrose 5% - 0.45% NaCl with KCl 20 mEq/L infusion  125 mL/hr IntraVENous CONTINUOUS    HYDROmorphone (DILAUDID) injection 0.5-1 mg  0.5-1 mg IntraVENous Q2H PRN    HYDROcodone-acetaminophen (NORCO) 5-325 mg per tablet 1-2 Tablet  1-2 Tablet Oral Q4H PRN    alcohol 62% (NOZIN) nasal  1 Ampule  1 Ampule Topical Q12H    piperacillin-tazobactam (ZOSYN) 3.375 g in 0.9% sodium chloride (MBP/ADV) 100 mL MBP  3.375 g IntraVENous Q8H    [Held by provider] apixaban (ELIQUIS) tablet 5 mg  5 mg Oral BID    phenol throat spray (CHLORASEPTIC) 1 Spray  1 Spray Oral PRN    insulin lispro (HUMALOG) injection   SubCUTAneous Q6H    pantoprazole (PROTONIX) 40 mg in 0.9% sodium chloride 10 mL injection  40 mg IntraVENous DAILY    calcium carbonate (TUMS) chewable tablet 400 mg [elemental]  400 mg Oral TID PRN    glucose chewable tablet 16 g  4 Tablet Oral PRN    glucagon (GLUCAGEN) injection 1 mg  1 mg IntraMUSCular PRN    dextrose 10% infusion 0-250 mL  0-250 mL IntraVENous PRN    nitroglycerin (NITROSTAT) tablet 0.4 mg  0.4 mg SubLINGual PRN    atorvastatin (LIPITOR) tablet 20 mg  20 mg Oral QHS    vitamin Y-R-J-lutein-minerals (OCUVITE) tablet 1 Tablet  1 Tablet Oral DAILY    sodium chloride (NS) flush 5-40 mL  5-40 mL IntraVENous Q8H    sodium chloride (NS) flush 5-40 mL  5-40 mL IntraVENous PRN    acetaminophen (TYLENOL) tablet 650 mg  650 mg Oral Q6H PRN    Or    acetaminophen (TYLENOL) suppository 650 mg  650 mg Rectal Q6H PRN    polyethylene glycol (MIRALAX) packet 17 g  17 g Oral DAILY PRN    ondansetron (ZOFRAN ODT) tablet 4 mg  4 mg Oral Q8H PRN    Or    ondansetron (ZOFRAN) injection 4 mg  4 mg IntraVENous Q6H PRN         Opal Ovalles III, DO

## 2022-07-11 NOTE — PROGRESS NOTES
Admit Date: 2022    POD 3 Day Post-Op    Procedure:  Procedure(s):  LAPAROSCOPY, LAPAROTOMY, SMALL BOWEL RESECTION, ADHESION LYSIS, REPAIR VENTRAL HERNIA    Subjective:     Patient alert and interactive, no complaints. Scant NG o/p past 24 hours. No BM yet    Objective:     Blood pressure 122/73, pulse 99, temperature 98.4 °F (36.9 °C), resp. rate 20, height 6' 4\" (1.93 m), weight 277 lb 1.9 oz (125.7 kg), SpO2 95 %.     Temp (24hrs), Av.6 °F (37 °C), Min:98.4 °F (36.9 °C), Max:98.7 °F (37.1 °C)    NGT o/p 900 cc, thin green bilious  Physical Exam:  GENERAL: alert, cooperative, no distress, appears stated age, LUNG: clear to auscultation bilaterally, HEART: regular rate and rhythm, ABDOMEN: soft, appropriately tender, wounds dressed and dry, NAHID o/p serous, EXTREMITIES:  extremities normal, atraumatic, no cyanosis or edema    Labs:   Recent Results (from the past 24 hour(s))   GLUCOSE, POC    Collection Time: 07/10/22 11:55 AM   Result Value Ref Range    Glucose (POC) 167 (H) 65 - 117 mg/dL    Performed by Renee Contreras RN    GLUCOSE, POC    Collection Time: 07/10/22  5:56 PM   Result Value Ref Range    Glucose (POC) 170 (H) 65 - 117 mg/dL    Performed by Renee Contreras RN    GLUCOSE, POC    Collection Time: 07/10/22 11:25 PM   Result Value Ref Range    Glucose (POC) 188 (H) 65 - 117 mg/dL    Performed by Kalyani Downs RN    MAGNESIUM    Collection Time: 22  3:54 AM   Result Value Ref Range    Magnesium 2.2 1.6 - 2.4 mg/dL   PHOSPHORUS    Collection Time: 22  3:54 AM   Result Value Ref Range    Phosphorus 1.7 (L) 2.6 - 4.7 MG/DL   CBC W/O DIFF    Collection Time: 22  3:54 AM   Result Value Ref Range    WBC 13.0 (H) 4.1 - 11.1 K/uL    RBC 2.57 (L) 4.10 - 5.70 M/uL    HGB 8.1 (L) 12.1 - 17.0 g/dL    HCT 24.8 (L) 36.6 - 50.3 %    MCV 96.5 80.0 - 99.0 FL    MCH 31.5 26.0 - 34.0 PG    MCHC 32.7 30.0 - 36.5 g/dL    RDW 16.1 (H) 11.5 - 14.5 %    PLATELET 637 874 - 444 K/uL    MPV 10.0 8.9 - 12.9 FL    NRBC 0.0 0  WBC    ABSOLUTE NRBC 0.00 0.00 - 5.03 K/uL   METABOLIC PANEL, BASIC    Collection Time: 07/11/22  3:54 AM   Result Value Ref Range    Sodium 136 136 - 145 mmol/L    Potassium 4.1 3.5 - 5.1 mmol/L    Chloride 109 (H) 97 - 108 mmol/L    CO2 20 (L) 21 - 32 mmol/L    Anion gap 7 5 - 15 mmol/L    Glucose 170 (H) 65 - 100 mg/dL    BUN 17 6 - 20 MG/DL    Creatinine 0.91 0.70 - 1.30 MG/DL    BUN/Creatinine ratio 19 12 - 20      GFR est AA >60 >60 ml/min/1.73m2    GFR est non-AA >60 >60 ml/min/1.73m2    Calcium 7.5 (L) 8.5 - 10.1 MG/DL   GLUCOSE, POC    Collection Time: 07/11/22  6:38 AM   Result Value Ref Range    Glucose (POC) 180 (H) 65 - 117 mg/dL    Performed by Low Mahmood RN        Data Review images and reports reviewed    Assessment:     Active Problems:     Morbid obesity ([address] (6/7/2013)      Thrombocytopenia ([address] (6/27/2022)      GI bleed (6/27/2022)      UGO (acute kidney injury) ([address] (6/27/2022)      Atrial fibrillation ([address] (7/3/2022)      Elevated troponin (7/3/2022)      Ileus ([address] (7/7/2022)      Intestinal adhesions with partial obstruction ([address] (7/8/2022)      Ventral hernia (7/8/2022)      Intestinal perforation ([address] (7/8/2022)        Plan/Recommendations/Medical Decision Making:     Continue present treatment   D/c NGT, sips of clear liquids  Increase activity  IS  Afib per cardiology  Continue Lydia Rodarte MD  [id number] [address] [facility]

## 2022-07-11 NOTE — PROGRESS NOTES
Problem: Discharge Planning  Goal: *Discharge to safe environment  7/11/2022 0220 by Marino Ackerman RN  Outcome: Progressing Towards Goal  7/11/2022 0219 by Marino Ackerman RN  Outcome: Progressing Towards Goal     Problem: Falls - Risk of  Goal: *Absence of Falls  Description: Document Jayjay Lee Fall Risk and appropriate interventions in the flowsheet.   7/11/2022 0220 by Marino Ackerman RN  Outcome: Progressing Towards Goal  Note: Fall Risk Interventions:  Mobility Interventions: Bed/chair exit alarm    Mentation Interventions: Adequate sleep, hydration, pain control,Room close to nurse's station    Medication Interventions: Bed/chair exit alarm    Elimination Interventions: Call light in reach    History of Falls Interventions: Bed/chair exit alarm      7/11/2022 0219 by Marino Ackerman RN  Outcome: Progressing Towards Goal  Note: Fall Risk Interventions:  Mobility Interventions: Bed/chair exit alarm    Mentation Interventions: Adequate sleep, hydration, pain control,Room close to nurse's station    Medication Interventions: Bed/chair exit alarm    Elimination Interventions: Call light in reach    History of Falls Interventions: Bed/chair exit alarm         Problem: Patient Education: Go to Patient Education Activity  Goal: Patient/Family Education  7/11/2022 0220 by Marino Ackerman RN  Outcome: Progressing Towards Goal  7/11/2022 0219 by Marino Ackerman RN  Outcome: Progressing Towards Goal     Problem: Patient Education: Go to Patient Education Activity  Goal: Patient/Family Education  7/11/2022 0220 by Marino Ackerman RN  Outcome: Progressing Towards Goal  7/11/2022 0219 by Marino Ackerman RN  Outcome: Progressing Towards Goal     Problem: Nutrition Deficit  Goal: *Optimize nutritional status  7/11/2022 0220 by Marino Ackerman RN  Outcome: Progressing Towards Goal  7/11/2022 0219 by Marino Ackerman RN  Outcome: Progressing Towards Goal     Problem: Risk for Spread of Infection  Goal: Prevent transmission of infectious organism to others  Description: Prevent the transmission of infectious organisms to other patients, staff members, and visitors.   7/11/2022 0220 by Amy Estevez RN  Outcome: Progressing Towards Goal  7/11/2022 0219 by Amy Estevez RN  Outcome: Progressing Towards Goal     Problem: Patient Education:  Go to Education Activity  Goal: Patient/Family Education  7/11/2022 0220 by Amy Estevez RN  Outcome: Progressing Towards Goal  7/11/2022 0219 by Amy Estevez RN  Outcome: Progressing Towards Goal     Problem: Patient Education: Go to Patient Education Activity  Goal: Patient/Family Education  7/11/2022 0220 by Amy Estevez RN  Outcome: Progressing Towards Goal  7/11/2022 0219 by Aym Estevez RN  Outcome: Progressing Towards Goal     Problem: Upper and Lower GI Bleed: Day 1  Goal: Off Pathway (Use only if patient is Off Pathway)  7/11/2022 0220 by Amy Estevez RN  Outcome: Progressing Towards Goal  7/11/2022 0219 by Amy Estevez RN  Outcome: Progressing Towards Goal  Goal: Activity/Safety  7/11/2022 0220 by Amy Estevez RN  Outcome: Progressing Towards Goal  7/11/2022 0219 by Amy Estevez RN  Outcome: Progressing Towards Goal  Goal: Consults, if ordered  7/11/2022 0220 by Amy Estevez RN  Outcome: Progressing Towards Goal  7/11/2022 0219 by Amy Estevez RN  Outcome: Progressing Towards Goal  Goal: Diagnostic Test/Procedures  7/11/2022 0220 by Amy Estevez RN  Outcome: Progressing Towards Goal  7/11/2022 0219 by Amy Estevez RN  Outcome: Progressing Towards Goal  Goal: Nutrition/Diet  7/11/2022 0220 by Amy Estevez RN  Outcome: Progressing Towards Goal  7/11/2022 0219 by Amy Estevez RN  Outcome: Progressing Towards Goal  Goal: Discharge Planning  7/11/2022 0220 by Amy Estevez RN  Outcome: Progressing Towards Goal  7/11/2022 0219 by Amy Estevez RN  Outcome: Progressing Towards Goal  Goal: Medications  7/11/2022 0220 by Jaylen Epperson, RN  Outcome: Progressing Towards Goal  7/11/2022 0219 by Jaylen Epperson, RN  Outcome: Progressing Towards Goal  Goal: Respiratory  7/11/2022 0220 by Jaylen Epperson, RN  Outcome: Progressing Towards Goal  7/11/2022 0219 by Jaylen Epperson, RN  Outcome: Progressing Towards Goal  Goal: Treatments/Interventions/Procedures  7/11/2022 0220 by Jaylen Epperson, RN  Outcome: Progressing Towards Goal  7/11/2022 0219 by Jaylen Epperson, RN  Outcome: Progressing Towards Goal  Goal: Psychosocial  7/11/2022 0220 by Jaylen Epperson, RN  Outcome: Progressing Towards Goal  7/11/2022 0219 by Jaylen Epperson, RN  Outcome: Progressing Towards Goal  Goal: *Optimal pain control at patient's stated goal  7/11/2022 0220 by Jaylen Epperson, RN  Outcome: Progressing Towards Goal  7/11/2022 0219 by Jaylen Epperson, RN  Outcome: Progressing Towards Goal  Goal: *Hemodynamically stable  7/11/2022 0220 by Jaylen Epperson, RN  Outcome: Progressing Towards Goal  7/11/2022 0219 by Jaylen Epperson, RN  Outcome: Progressing Towards Goal  Goal: *Demonstrates progressive activity  7/11/2022 0220 by Jaylen Epperson, RN  Outcome: Progressing Towards Goal  7/11/2022 0219 by Jaylen Epperson, RN  Outcome: Progressing Towards Goal     Problem: Upper and Lower GI Bleed: Day 2  Goal: Off Pathway (Use only if patient is Off Pathway)  7/11/2022 0220 by Jaylen Epperson, RN  Outcome: Progressing Towards Goal  7/11/2022 0219 by Jaylen Epperson, RN  Outcome: Progressing Towards Goal  Goal: Activity/Safety  7/11/2022 0220 by Jaylen Epperson, RN  Outcome: Progressing Towards Goal  7/11/2022 0219 by Jaylen Epperson, RN  Outcome: Progressing Towards Goal  Goal: Consults, if ordered  7/11/2022 0220 by Jaylen Epperson, RN  Outcome: Progressing Towards Goal  7/11/2022 0219 by Jaylen Epperson, RN  Outcome: Progressing Towards Goal  Goal: Diagnostic Test/Procedures  7/11/2022 0220 by Roma Dallas, RN  Outcome: Progressing Towards Goal  7/11/2022 0219 by Roma Dallas, RN  Outcome: Progressing Towards Goal  Goal: Nutrition/Diet  7/11/2022 0220 by Roma Dallas, RN  Outcome: Progressing Towards Goal  7/11/2022 0219 by Roma Dallas, RN  Outcome: Progressing Towards Goal  Goal: Discharge Planning  7/11/2022 0220 by Roma Dallas, RN  Outcome: Progressing Towards Goal  7/11/2022 0219 by Roma Dallas, RN  Outcome: Progressing Towards Goal  Goal: Medications  7/11/2022 0220 by Roma Dallas, RN  Outcome: Progressing Towards Goal  7/11/2022 0219 by Roma Dallas, RN  Outcome: Progressing Towards Goal  Goal: Respiratory  7/11/2022 0220 by Roma Dallas, RN  Outcome: Progressing Towards Goal  7/11/2022 0219 by Roma Dallas RN  Outcome: Progressing Towards Goal  Goal: Treatments/Interventions/Procedures  7/11/2022 0220 by Roma Dallas, RN  Outcome: Progressing Towards Goal  7/11/2022 0219 by Roma Dallas, RN  Outcome: Progressing Towards Goal  Goal: Psychosocial  7/11/2022 0220 by Roma Dallas, RN  Outcome: Progressing Towards Goal  7/11/2022 0219 by Roma Dallas, RN  Outcome: Progressing Towards Goal  Goal: *Optimal pain control at patient's stated goal  7/11/2022 0220 by Roma Dallas, RN  Outcome: Progressing Towards Goal  7/11/2022 0219 by Roma Dallas RN  Outcome: Progressing Towards Goal  Goal: *Hemodynamically stable  7/11/2022 0220 by Roma Dallas, RN  Outcome: Progressing Towards Goal  7/11/2022 0219 by Roma Dallas, RN  Outcome: Progressing Towards Goal  Goal: *Tolerating diet  7/11/2022 0220 by Roma Dallas, RN  Outcome: Progressing Towards Goal  7/11/2022 0219 by Roma Dallas, RN  Outcome: Progressing Towards Goal  Goal: *Demonstrates progressive activity  7/11/2022 0220 by Roma Dallas, RN  Outcome: Progressing Towards Goal  7/11/2022 0219 by Roma Dallas, RN  Outcome: Progressing Towards Goal     Problem: Upper and Lower GI Bleed: Day 3  Goal: Off Pathway (Use only if patient is Off Pathway)  7/11/2022 0220 by Salma Collins RN  Outcome: Progressing Towards Goal  7/11/2022 0219 by Salma Collins RN  Outcome: Progressing Towards Goal  Goal: Activity/Safety  7/11/2022 0220 by Salma Collins RN  Outcome: Progressing Towards Goal  7/11/2022 0219 by Salma Collins RN  Outcome: Progressing Towards Goal  Goal: Diagnostic Test/Procedures  7/11/2022 0220 by Salma Collins RN  Outcome: Progressing Towards Goal  7/11/2022 0219 by Salma Collins RN  Outcome: Progressing Towards Goal  Goal: Nutrition/Diet  7/11/2022 0220 by Salma Collins RN  Outcome: Progressing Towards Goal  7/11/2022 0219 by Salma Collins RN  Outcome: Progressing Towards Goal  Goal: Discharge Planning  7/11/2022 0220 by Salma Collins RN  Outcome: Progressing Towards Goal  7/11/2022 0219 by Salma Collins RN  Outcome: Progressing Towards Goal  Goal: Medications  7/11/2022 0220 by Salma Collins RN  Outcome: Progressing Towards Goal  7/11/2022 0219 by Salma Collins RN  Outcome: Progressing Towards Goal  Goal: Treatments/Interventions/Procedures  7/11/2022 0220 by Salma Collins RN  Outcome: Progressing Towards Goal  7/11/2022 0219 by Salma Collins RN  Outcome: Progressing Towards Goal  Goal: Psychosocial  7/11/2022 0220 by Salma Collins RN  Outcome: Progressing Towards Goal  7/11/2022 0219 by Salma Collins RN  Outcome: Progressing Towards Goal     Problem: Upper and Lower GI Bleed:  Discharge Outcomes  Goal: *Hemodynamically stable  7/11/2022 0220 by Salma Collins RN  Outcome: Progressing Towards Goal  7/11/2022 0219 by Salma Collins RN  Outcome: Progressing Towards Goal  Goal: *Lungs clear or at baseline  7/11/2022 0220 by Salma Collins RN  Outcome: Progressing Towards Goal  7/11/2022 0219 by Salma Collins RN  Outcome: Progressing Towards Goal  Goal: *Demonstrates independent activity or return to baseline  7/11/2022 0220 by Ramin Castelan RN  Outcome: Progressing Towards Goal  7/11/2022 0219 by Ramin Castelan RN  Outcome: Progressing Towards Goal  Goal: *Pain is controlled to three or less  7/11/2022 0220 by Ramin Castelan RN  Outcome: Progressing Towards Goal  7/11/2022 0219 by Ramin Castelan RN  Outcome: Progressing Towards Goal  Goal: *Verbalizes understanding and describes prescribed diet  7/11/2022 0220 by Ramin Castelan RN  Outcome: Progressing Towards Goal  7/11/2022 0219 by Ramin Castelan RN  Outcome: Progressing Towards Goal  Goal: *Tolerating diet  7/11/2022 0220 by Ramin Castelan RN  Outcome: Progressing Towards Goal  7/11/2022 0219 by Ramin Castelan RN  Outcome: Progressing Towards Goal  Goal: *Verbalizes name, dosage, time, side effects, and number of days to continue medications  7/11/2022 0220 by Ramin Castelan RN  Outcome: Progressing Towards Goal  7/11/2022 0219 by Ramin Castelan RN  Outcome: Progressing Towards Goal  Goal: *Anxiety reduced or absent  7/11/2022 0220 by Ramin Castelan RN  Outcome: Progressing Towards Goal  7/11/2022 0219 by Ramin Castelan RN  Outcome: Progressing Towards Goal  Goal: *Understands and describes signs and symptoms to report to providers(Stroke Metric)  7/11/2022 0220 by Ramin Castelan RN  Outcome: Progressing Towards Goal  7/11/2022 0219 by Ramin Castelan RN  Outcome: Progressing Towards Goal  Goal: *Describes follow-up/return visits to physicians  7/11/2022 0220 by Ramin Castelan RN  Outcome: Progressing Towards Goal  7/11/2022 0219 by Ramin Castelan RN  Outcome: Progressing Towards Goal  Goal: *Describes available resources and support systems  7/11/2022 0220 by Ramin Castelan RN  Outcome: Progressing Towards Goal  7/11/2022 0219 by Ramin Castelan RN  Outcome: Progressing Towards Goal     Problem: Diabetes Self-Management  Goal: *Disease process and treatment process  Description: Define diabetes and identify own type of diabetes; list 3 options for treating diabetes. 7/11/2022 0220 by Ramin Castelan RN  Outcome: Progressing Towards Goal  7/11/2022 0219 by Ramin Castelan RN  Outcome: Progressing Towards Goal  Goal: *Incorporating nutritional management into lifestyle  Description: Describe effect of type, amount and timing of food on blood glucose; list 3 methods for planning meals. 7/11/2022 0220 by Ramin Castelan RN  Outcome: Progressing Towards Goal  7/11/2022 0219 by Ramin Castelan RN  Outcome: Progressing Towards Goal  Goal: *Incorporating physical activity into lifestyle  Description: State effect of exercise on blood glucose levels. 7/11/2022 0220 by Ramin Castelan RN  Outcome: Progressing Towards Goal  7/11/2022 0219 by Ramin Castelan RN  Outcome: Progressing Towards Goal  Goal: *Developing strategies to promote health/change behavior  Description: Define the ABC's of diabetes; identify appropriate screenings, schedule and personal plan for screenings. 7/11/2022 0220 by Ramin Castelan RN  Outcome: Progressing Towards Goal  7/11/2022 0219 by Ramin Castelan RN  Outcome: Progressing Towards Goal  Goal: *Using medications safely  Description: State effect of diabetes medications on diabetes; name diabetes medication taking, action and side effects. 7/11/2022 0220 by Ramin Castelan RN  Outcome: Progressing Towards Goal  7/11/2022 0219 by Ramin Castelan RN  Outcome: Progressing Towards Goal  Goal: *Monitoring blood glucose, interpreting and using results  Description: Identify recommended blood glucose targets  and personal targets.   7/11/2022 0220 by Ramin Castelan RN  Outcome: Progressing Towards Goal  7/11/2022 0219 by Ramin Castelan RN  Outcome: Progressing Towards Goal  Goal: *Prevention, detection, treatment of acute complications  Description: List symptoms of hyper- and hypoglycemia; describe how to treat low blood sugar and actions for lowering  high blood glucose level. 7/11/2022 0220 by Renuka Gaitan RN  Outcome: Progressing Towards Goal  7/11/2022 0219 by Renuka Gaitan RN  Outcome: Progressing Towards Goal  Goal: *Prevention, detection and treatment of chronic complications  Description: Define the natural course of diabetes and describe the relationship of blood glucose levels to long term complications of diabetes.   7/11/2022 0220 by Renuka Gaitan RN  Outcome: Progressing Towards Goal  7/11/2022 0219 by Renuka Gaitan RN  Outcome: Progressing Towards Goal  Goal: *Developing strategies to address psychosocial issues  Description: Describe feelings about living with diabetes; identify support needed and support network  7/11/2022 0220 by Renuka Gaitan RN  Outcome: Progressing Towards Goal  7/11/2022 0219 by Renuka Gaitan RN  Outcome: Progressing Towards Goal  Goal: *Insulin pump training  7/11/2022 0220 by Renuka Gaitan RN  Outcome: Progressing Towards Goal  7/11/2022 0219 by Renuka Gaitan RN  Outcome: Progressing Towards Goal  Goal: *Sick day guidelines  7/11/2022 0220 by Renuka Gaitan RN  Outcome: Progressing Towards Goal  7/11/2022 0219 by Renuka Gaitan RN  Outcome: Progressing Towards Goal  Goal: *Patient Specific Goal (EDIT GOAL, INSERT TEXT)  7/11/2022 0220 by Renuka Gaitan RN  Outcome: Progressing Towards Goal  7/11/2022 0219 by Renuka Gaitan RN  Outcome: Progressing Towards Goal     Problem: Patient Education: Go to Patient Education Activity  Goal: Patient/Family Education  7/11/2022 0220 by Renuka Gaitan RN  Outcome: Progressing Towards Goal  7/11/2022 0219 by Renuka Gaitan RN  Outcome: Progressing Towards Goal

## 2022-07-12 LAB
GLUCOSE BLD STRIP.AUTO-MCNC: 147 MG/DL (ref 65–117)
GLUCOSE BLD STRIP.AUTO-MCNC: 150 MG/DL (ref 65–117)
GLUCOSE BLD STRIP.AUTO-MCNC: 183 MG/DL (ref 65–117)
SERVICE CMNT-IMP: ABNORMAL

## 2022-07-12 PROCEDURE — 82962 GLUCOSE BLOOD TEST: CPT

## 2022-07-12 PROCEDURE — 74011000258 HC RX REV CODE- 258: Performed by: SURGERY

## 2022-07-12 PROCEDURE — 65270000046 HC RM TELEMETRY

## 2022-07-12 PROCEDURE — 74011250636 HC RX REV CODE- 250/636: Performed by: INTERNAL MEDICINE

## 2022-07-12 PROCEDURE — 74011250637 HC RX REV CODE- 250/637: Performed by: SURGERY

## 2022-07-12 PROCEDURE — 74011250636 HC RX REV CODE- 250/636: Performed by: SURGERY

## 2022-07-12 PROCEDURE — 97116 GAIT TRAINING THERAPY: CPT

## 2022-07-12 PROCEDURE — 74011000250 HC RX REV CODE- 250: Performed by: SURGERY

## 2022-07-12 PROCEDURE — 74011250637 HC RX REV CODE- 250/637: Performed by: INTERNAL MEDICINE

## 2022-07-12 PROCEDURE — 74011250637 HC RX REV CODE- 250/637: Performed by: GENERAL ACUTE CARE HOSPITAL

## 2022-07-12 PROCEDURE — 74011636637 HC RX REV CODE- 636/637: Performed by: SURGERY

## 2022-07-12 PROCEDURE — 97530 THERAPEUTIC ACTIVITIES: CPT

## 2022-07-12 PROCEDURE — 94760 N-INVAS EAR/PLS OXIMETRY 1: CPT

## 2022-07-12 PROCEDURE — 74011000258 HC RX REV CODE- 258: Performed by: INTERNAL MEDICINE

## 2022-07-12 RX ORDER — AMIODARONE HYDROCHLORIDE 200 MG/1
400 TABLET ORAL EVERY 8 HOURS
Status: DISCONTINUED | OUTPATIENT
Start: 2022-07-12 | End: 2022-07-21

## 2022-07-12 RX ADMIN — AMIODARONE HYDROCHLORIDE 150 MG: 1.5 INJECTION, SOLUTION INTRAVENOUS at 18:06

## 2022-07-12 RX ADMIN — APIXABAN 5 MG: 5 TABLET, FILM COATED ORAL at 18:06

## 2022-07-12 RX ADMIN — Medication 3 UNITS: at 06:20

## 2022-07-12 RX ADMIN — Medication 1 AMPULE: at 10:05

## 2022-07-12 RX ADMIN — SODIUM CHLORIDE, PRESERVATIVE FREE 10 ML: 5 INJECTION INTRAVENOUS at 14:00

## 2022-07-12 RX ADMIN — PIPERACILLIN AND TAZOBACTAM 3.38 G: 3; .375 INJECTION, POWDER, LYOPHILIZED, FOR SOLUTION INTRAVENOUS at 05:09

## 2022-07-12 RX ADMIN — PIPERACILLIN AND TAZOBACTAM 3.38 G: 3; .375 INJECTION, POWDER, LYOPHILIZED, FOR SOLUTION INTRAVENOUS at 12:30

## 2022-07-12 RX ADMIN — ATORVASTATIN CALCIUM 20 MG: 20 TABLET, FILM COATED ORAL at 21:30

## 2022-07-12 RX ADMIN — APIXABAN 5 MG: 5 TABLET, FILM COATED ORAL at 06:20

## 2022-07-12 RX ADMIN — AMIODARONE HYDROCHLORIDE 400 MG: 200 TABLET ORAL at 21:30

## 2022-07-12 RX ADMIN — HYDROCODONE BITARTRATE AND ACETAMINOPHEN 1 TABLET: 5; 325 TABLET ORAL at 06:20

## 2022-07-12 RX ADMIN — PANTOPRAZOLE SODIUM 40 MG: 40 TABLET, DELAYED RELEASE ORAL at 10:05

## 2022-07-12 RX ADMIN — Medication 1 AMPULE: at 20:33

## 2022-07-12 RX ADMIN — PIPERACILLIN AND TAZOBACTAM 3.38 G: 3; .375 INJECTION, POWDER, LYOPHILIZED, FOR SOLUTION INTRAVENOUS at 21:30

## 2022-07-12 RX ADMIN — AMIODARONE HYDROCHLORIDE 150 MG: 1.5 INJECTION, SOLUTION INTRAVENOUS at 14:46

## 2022-07-12 RX ADMIN — I-VITE, TAB 1000-60-2MG (60/BT) 1 TABLET: TAB at 09:00

## 2022-07-12 RX ADMIN — AMIODARONE HYDROCHLORIDE 1 MG/MIN: 50 INJECTION, SOLUTION INTRAVENOUS at 15:27

## 2022-07-12 RX ADMIN — Medication 3 UNITS: at 12:30

## 2022-07-12 RX ADMIN — AMIODARONE HYDROCHLORIDE 400 MG: 200 TABLET ORAL at 10:05

## 2022-07-12 RX ADMIN — AMIODARONE HYDROCHLORIDE 0.5 MG/MIN: 50 INJECTION, SOLUTION INTRAVENOUS at 20:33

## 2022-07-12 RX ADMIN — SODIUM CHLORIDE, PRESERVATIVE FREE 10 ML: 5 INJECTION INTRAVENOUS at 05:11

## 2022-07-12 RX ADMIN — SODIUM CHLORIDE, PRESERVATIVE FREE 10 ML: 5 INJECTION INTRAVENOUS at 21:30

## 2022-07-12 RX ADMIN — HYDROCODONE BITARTRATE AND ACETAMINOPHEN 1 TABLET: 5; 325 TABLET ORAL at 02:28

## 2022-07-12 NOTE — PROGRESS NOTES
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 Received 3 doses IVIG, did not tolerate final IVIG infusion despite. Recently started bactrim for UTI prior to admission, stated has taken before with no issues   PLT wnl   Hemo/onc on board     SBO  Repeat CT a/p on  showed partial SBO pattern. Having loose stools, no n/v this AM but 1 episode of emesis overight. Passing flatus/ had multiple BMS today  Surgery following. Diet advanced to low fiber  CT enterography showed distal partial SBO  Went to OR on  found to have SMALL BOWEL OBSTRUCTION  SECONDARY TO ILEAL PERFORATION, AND ABSCESS AND ADHESION AND ABDOMINAL HERNIA   Started on Zosyn on   Off NGT and started on CLD    GI bleed  Was to due severe ITP  S/p blood transfusion  Continue with protonix 40 daily (can switch to famotidine on discharge)  GI on board, holding off on EGD for now, conservative management. Paroxysmal atrial fibrillation w RVR  Elevated trop  HR is controlled now   Eliquis resumed.   ASA to be resumed if ok with heme/onc  Cardiac stress test neg  Appreciate cardiology following  Increase Amio dose but on DC Continue with 200 mg daily  On  Started on amiodarone drip as per cardiology for recurrent A. 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 Estimated discharge date: >48hrs  Code status: Full  Prophylaxis: Eliquis   Recommended Disposition: [facility]/Rehab. Patient and wife requested PT eval prior to discharge, wife thinks the patient is too weak and need rehab     Subjective:     Chief Complaint / Reason for Physician Visit  NAD. Clearurine in bernard cath/bag  Mild abd pain  Passing gas, but no BMs yet  No SOB  Sat low 90s on RA  Started on CLD  A. fib RVR, again today    Review of Systems:  Symptom Y/N Comments  Symptom Y/N Comments   Fever/Chills    Chest Pain     Poor Appetite    Edema     Cough    Abdominal Pain     Sputum    Joint Pain     SOB/SHEARER    Pruritis/Rash     Nausea/vomit    Tolerating PT/OT     Diarrhea    Tolerating Diet     Constipation    Other       Could NOT obtain due to:      Objective:     VITALS:   Last 24hrs VS reviewed since prior progress note.  Most recent are:  Patient Vitals for the past 24 hrs:   Temp Pulse Resp BP SpO2   07/12/22 1700 -- (!) 142 26 103/71 91 %   07/12/22 1645 -- (!) 143 22 95/70 96 %   07/12/22 1630 -- (!) 137 19 102/72 98 %   07/12/22 1615 -- (!) 139 18 114/75 93 %   07/12/22 1600 -- (!) 125 19 94/64 100 %   07/12/22 1545 -- (!) 121 18 (!) 103/58 --   07/12/22 1530 -- (!) 132 22 107/63 --   07/12/22 1445 -- (!) 130 17 96/63 100 %   07/12/22 0727 97.9 °F (36.6 °C) 70 17 (!) 109/50 96 %   07/12/22 0305 98 °F (36.7 °C) 66 16 (!) 97/51 96 %   07/11/22 2300 98.6 °F (37 °C) 75 16 (!) 138/52 95 %   07/11/22 1900 98 °F (36.7 °C) 72 15 (!) 131/55 95 %       Intake/Output Summary (Last 24 hours) at 7/12/2022 1845  Last data filed at 7/12/2022 1800  Gross per 24 hour   Intake 500 ml   Output 980 ml   Net -480 ml        I had a face to face encounter and independently examined this patient as outlined below:  PHYSICAL EXAM:  General: WD, WN. Alert, cooperative, no acute distress    EENT:  EOMI. Anicteric sclerae. MMM  Resp:  CTA bilaterally, no wheezing or rales. No accessory muscle use  CV:  Irregularly irregular rhythm,  No edema  GI/:  Soft, Non distended, Non tender. hypoactive Bowel sounds. NAHID drain. Midline dressing. Lopez drains clear urine    Neurologic:  Alert and oriented X 3, normal speech   Psych:   Good insight. Not anxious nor agitated  Skin:  Diffuse petechiae. No jaundice      Reviewed most current lab test results and cultures  YES  Reviewed most current radiology test results   YES  Review and summation of old records today    NO  Reviewed patient's current orders and MAR    YES  PMH/SH reviewed - no change compared to H&P  ________________________________________________________________________  Care Plan discussed with:    Comments   Patient X    Family  X    RN X    Care Manager x    Consultant                       x Multidiciplinary team rounds were held today with , nursing, pharmacist and clinical coordinator. Patient's plan of care was discussed; medications were reviewed and discharge planning was addressed. ________________________________________________________________________  Total NON critical care TIME:  35   Minutes    Total CRITICAL CARE TIME Spent:   Minutes non procedure based      Comments   >50% of visit spent in counseling and coordination of care X    ________________________________________________________________________  Mariano Webster MD     Procedures: see electronic medical records for all procedures/Xrays and details which were not copied into this note but were reviewed prior to creation of Plan. LABS:  I reviewed today's most current labs and imaging studies.   Pertinent labs include:  Recent Labs     07/11/22  0354 07/10/22  0559   WBC 13.0* 13.2*   HGB 8.1* 8.3*   HCT 24.8* 26.2*    205     Recent Labs     07/11/22  0354 07/10/22  0559  134*   K 4.1 3.9   * 107   CO2 20* 22   * 179*   BUN 17 21*   CREA 0.91 1.10   CA 7.5* 7.9*   MG 2.2 2.3   PHOS 1.7* 1.6*       Signed: Sarmda Hall MD

## 2022-07-12 NOTE — PROGRESS NOTES
Problem: Mobility Impaired (Adult and Pediatric)  Goal: *Acute Goals and Plan of Care (Insert Text)  Description: FUNCTIONAL STATUS PRIOR TO ADMISSION: Patient was modified independent using a rollator for functional mobility. HOME SUPPORT PRIOR TO ADMISSION: The patient lived with wife at [address] but did not require assist.    Physical Therapy Goals  Initiated 7/2/2022 - remain appropriate 7/11/2022  1. Patient will move from supine to sit and sit to supine , scoot up and down, and roll side to side in bed with modified independence within 7 day(s). 2.  Patient will transfer from bed to chair and chair to bed with modified independence using the least restrictive device within 7 day(s). 3.  Patient will perform sit to stand with modified independence within 7 day(s). 4.  Patient will ambulate with modified independence for 150 feet with the least restrictive device within 7 day(s). Outcome: Progressing Towards Goal   PHYSICAL THERAPY TREATMENT  Patient: Alfie Torrez (51 y.o. male)  Date: 7/12/2022  Diagnosis: Thrombocytopenia ([facility] [address] [D69.6]  GI bleed [K92.2]  UGO (acute kidney injury) ([facility] [address] [N17.9] <principal problem not specified>  Procedure(s) (LRB):  LAPAROSCOPY, LAPAROTOMY, SMALL BOWEL RESECTION, ADHESION LYSIS, REPAIR VENTRAL HERNIA (N/A) 4 Days Post-Op  Precautions:    Chart, physical therapy assessment, plan of care and goals were reviewed. ASSESSMENT  Patient continues with skilled PT services and is progressing towards goals. Pt received in bed, agreeable to PT session. He reports his abdominal pain is less today. His wife is present observing the session. Pt demonstrates mobility overall at SBA to CGA. He is able to ambulate slightly farther today(3ft, then 8 ft), but remains limited by decreased endurance. O2 sats also remained stable this session on RA. Pt left sitting in bedside chair.   HHPT remains appropriate     Current Level of Function Impacting Discharge (mobility/balance): SBA bed mobility, CGA transfers/ambulation with RW    Other factors to consider for discharge: moving well, good support at home         PLAN :  Patient continues to benefit from skilled intervention to address the above impairments. Continue treatment per established plan of care. to address goals. Recommendation for discharge: (in order for the patient to meet his/her long term goals)  Physical therapy at least 2 days/week in the home     This discharge recommendation:  Has been made in collaboration with the attending provider and/or case management    IF patient discharges home will need the following DME: patient owns DME required for discharge       SUBJECTIVE:   Patient stated it doesn't hurt as bad today.     OBJECTIVE DATA SUMMARY:   Critical Behavior:  Neurologic State: Alert  Orientation Level: Oriented X4  Cognition: Follows commands  Safety/Judgement: Awareness of environment,Fall prevention,Home safety,Insight into deficits  Functional Mobility Training:  Bed Mobility:  Rolling: Stand-by assistance  Supine to Sit: Stand-by assistance     Scooting: Stand-by assistance        Transfers:  Sit to Stand: Contact guard assistance  Stand to Sit: Contact guard assistance        Bed to Chair: Contact guard assistance            Balance:  Sitting: Intact  Standing: Impaired  Standing - Static: Constant support;Good  Standing - Dynamic : Constant support;Good;Fair  Ambulation/Gait Training:  Distance (ft): 10 Feet (ft) (3,8)  Assistive Device: Gait belt;Walker, rolling  Ambulation - Level of Assistance: Contact guard assistance        Gait Abnormalities: Decreased step clearance; Step to gait        Base of Support: Widened     Speed/Beatriz: Pace decreased (<100 feet/min)  Step Length: Left shortened;Right shortened        Activity Tolerance:   Fair and requires rest breaks    After treatment patient left in no apparent distress:   Sitting in chair, Call bell within reach, and Caregiver / family present    COMMUNICATION/COLLABORATION:   The patients plan of care was discussed with: Registered nurse.      iWlliam Rivera PT   Time Calculation: 28 mins

## 2022-07-12 NOTE — PROGRESS NOTES
Progress Note      7/12/2022 9:12 AM  NAME: John Mendoza   MRN:  [id number]   Admit Diagnosis: Thrombocytopenia ([facility] [address] [D69.6]  GI bleed [K92.2]  UGO (acute kidney injury) ([facility] [address] [N17.9]      Problem List:     1. Idiopathic thrombocytopenia  2. Anemia  3. Rectal bleeding  4. Lactic acidosis  5. NSTEMI  6. Paroxysmal atrial fibrillation  7. Remote PE and DVT (separate events)  8. CAD w/ mild diffuse disease w/ patent stent in the RCA 7/17 cath. 9. XOL  10. Morbid obesity  11. DM  12. Former smoker     Assessment/Plan: TnI 2300  PLT recovered  HgB 9s    MPI w/ EF 55% and no definite ischemia. Recurrent afib 7/10. Back on dilt gtt. Converted to sinus. Recurrent Afib 7/12. 1. Continue oral amio 400mg; increase to TID, but discharge on 200mg daily  2. Bolus prn  3. Continue amio gtt  4. Off ASA  5. Continue eliquis 5mg BID  6. Continue statin         [x]       High complexity decision making was performed in this patient at high risk for decompensation with multiple organ involvement. Subjective:     John Mendoza denies chest pain, dyspnea. Discussed with RN events overnight. Review of Systems:    Symptom Y/N Comments  Symptom Y/N Comments   Fever/Chills N   Chest Pain N    Poor Appetite N   Edema N    Cough N   Abdominal Pain N    Sputum N   Joint Pain N    SOB/SHEARER N   Pruritis/Rash N    Nausea/vomit N   Tolerating PT/OT Y    Diarrhea N   Tolerating Diet Y    Constipation N   Other       Could NOT obtain due to:      Objective:      Physical Exam:    Last 24hrs VS reviewed since prior progress note.  Most recent are:    Visit Vitals  BP (!) 109/50 (BP 1 Location: Left upper arm, BP Patient Position: At rest)   Pulse 70   Temp 97.9 °F (36.6 °C)   Resp 17   Ht 6' 4\" (1.93 m)   Wt 125.7 kg (277 lb 1.9 oz)   SpO2 96%   BMI 33.73 kg/m²       Intake/Output Summary (Last 24 hours) at 7/12/2022 0976  Last data filed at 7/12/2022 6536  Gross per 24 hour   Intake 500 ml   Output 500 ml   Net 0 ml        General Appearance: Well developed, well nourished, alert & oriented x 3,    no acute distress. Ears/Nose/Mouth/Throat: Hearing grossly normal.  Neck: Supple. Chest: Lungs clear to auscultation bilaterally. Cardiovascular: Irregular rate and rhythm, S1S2 normal, no murmur. Abdomen: Soft, non-tender, bowel sounds are active. Extremities: No edema bilaterally. Skin: Warm and dry. Petechiae. []         Post-cath site without hematoma, bruit, tenderness, or thrill. Distal pulses intact. PMH/SH reviewed - no change compared to H&P    Data Review    Telemetry: af    EKG:   [x]  No new EKG for review    Lab Data Personally Reviewed:    Recent Labs     07/11/22  0354 07/10/22  0559   WBC 13.0* 13.2*   HGB 8.1* 8.3*   HCT 24.8* 26.2*    205     No results for input(s): INR, PTP, APTT, INREXT, INREXT in the last 72 hours. Recent Labs     07/11/22  0354 07/10/22  0559    134*   K 4.1 3.9   * 107   CO2 20* 22   BUN 17 21*   CREA 0.91 1.10   * 179*   CA 7.5* 7.9*   MG 2.2 2.3     No results for input(s): CPK, CKNDX, TROIQ in the last 72 hours. No lab exists for component: CPKMB  Lab Results   Component Value Date/Time    Cholesterol, total 133 06/07/2013 02:40 PM    HDL Cholesterol 43 06/07/2013 02:40 PM    LDL, calculated 73.4 06/07/2013 02:40 PM    Triglyceride 83 06/07/2013 02:40 PM    CHOL/HDL Ratio 3.1 06/07/2013 02:40 PM       No results for input(s): AP, TBIL, TP, ALB, GLOB, GGT, AML, LPSE in the last 72 hours. No lab exists for component: SGOT, GPT, AMYP, HLPSE  No results for input(s): PH, PCO2, PO2 in the last 72 hours.     Medications Personally Reviewed:    Current Facility-Administered Medications   Medication Dose Route Frequency    pantoprazole (PROTONIX) tablet 40 mg  40 mg Oral ACB    amiodarone (CORDARONE) tablet 400 mg  400 mg Oral BID    dextrose 5% - 0.45% NaCl with KCl 20 mEq/L infusion  75 mL/hr IntraVENous CONTINUOUS    HYDROmorphone (DILAUDID) injection 0.5-1 mg  0.5-1 mg IntraVENous Q2H PRN    HYDROcodone-acetaminophen (NORCO) 5-325 mg per tablet 1-2 Tablet  1-2 Tablet Oral Q4H PRN    alcohol 62% (NOZIN) nasal  1 Ampule  1 Ampule Topical Q12H    piperacillin-tazobactam (ZOSYN) 3.375 g in 0.9% sodium chloride (MBP/ADV) 100 mL MBP  3.375 g IntraVENous Q8H    apixaban (ELIQUIS) tablet 5 mg  5 mg Oral BID    phenol throat spray (CHLORASEPTIC) 1 Spray  1 Spray Oral PRN    insulin lispro (HUMALOG) injection   SubCUTAneous Q6H    calcium carbonate (TUMS) chewable tablet 400 mg [elemental]  400 mg Oral TID PRN    glucose chewable tablet 16 g  4 Tablet Oral PRN    glucagon (GLUCAGEN) injection 1 mg  1 mg IntraMUSCular PRN    dextrose 10% infusion 0-250 mL  0-250 mL IntraVENous PRN    nitroglycerin (NITROSTAT) tablet 0.4 mg  0.4 mg SubLINGual PRN    atorvastatin (LIPITOR) tablet 20 mg  20 mg Oral QHS    vitamin V-M-V-lutein-minerals (OCUVITE) tablet 1 Tablet  1 Tablet Oral DAILY    sodium chloride (NS) flush 5-40 mL  5-40 mL IntraVENous Q8H    sodium chloride (NS) flush 5-40 mL  5-40 mL IntraVENous PRN    acetaminophen (TYLENOL) tablet 650 mg  650 mg Oral Q6H PRN    Or    acetaminophen (TYLENOL) suppository 650 mg  650 mg Rectal Q6H PRN    polyethylene glycol (MIRALAX) packet 17 g  17 g Oral DAILY PRN    ondansetron (ZOFRAN ODT) tablet 4 mg  4 mg Oral Q8H PRN    Or    ondansetron (ZOFRAN) injection 4 mg  4 mg IntraVENous Q6H PRN         Pepe [place] III, DO

## 2022-07-12 NOTE — PROGRESS NOTES
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 Received 3 doses IVIG, did not tolerate final IVIG infusion despite. Recently started bactrim for UTI prior to admission, stated has taken before with no issues   PLT wnl   Hemo/onc on board     SBO  Repeat CT a/p on  showed partial SBO pattern. Having loose stools, no n/v this AM but 1 episode of emesis overight. Passing flatus/ had multiple BMS today  Surgery following. Diet advanced to low fiber  CT enterography showed distal partial SBO  Went to OR on  found to have SMALL BOWEL OBSTRUCTION  SECONDARY TO ILEAL PERFORATION, AND ABSCESS AND ADHESION AND ABDOMINAL HERNIA   Started on Zosyn on   Off NGT and started on CLD    GI bleed  Was to due severe ITP  S/p blood transfusion  Continue with protonix 40 daily (can switch to famotidine on discharge)  GI on board, holding off on EGD for now, conservative management. Paroxysmal atrial fibrillation w RVR  Elevated trop  HR is controlled now   Increase Amio dose but on DC Continue with 200 mg daily  Back on Dilt ggt on 7/10, now off  Eliquis resumed.   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 Estimated discharge date: >48hrs  Code status: Full  Prophylaxis: Eliquis   Recommended Disposition: SAH/Rehab     Subjective:     Chief Complaint / Reason for Physician Visit  NAD. Clearurine in bernard cath/bag  Mild abd pain  Not passing gas  No SOB  Sat low 90s on RA  Started on CLD    Review of Systems:  Symptom Y/N Comments  Symptom Y/N Comments   Fever/Chills    Chest Pain     Poor Appetite    Edema     Cough    Abdominal Pain     Sputum    Joint Pain     SOB/SHEARER    Pruritis/Rash     Nausea/vomit    Tolerating PT/OT     Diarrhea    Tolerating Diet     Constipation    Other       Could NOT obtain due to:      Objective:     VITALS:   Last 24hrs VS reviewed since prior progress note. Most recent are:  Patient Vitals for the past 24 hrs:   Temp Pulse Resp BP SpO2   07/11/22 2300 98.6 °F (37 °C) 75 16 (!) 138/52 95 %   07/11/22 1900 98 °F (36.7 °C) 72 15 (!) 131/55 95 %   07/11/22 1500 98.3 °F (36.8 °C) 68 13 (!) 109/58 --   07/11/22 1220 97.9 °F (36.6 °C) 74 26 122/66 95 %   07/11/22 0700 98.4 °F (36.9 °C) 99 20 122/73 100 %   07/11/22 0500 -- 93 16 (!) 110/57 95 %   07/11/22 0400 -- 93 19 (!) 121/56 95 %       Intake/Output Summary (Last 24 hours) at 7/12/2022 0304  Last data filed at 7/11/2022 0700  Gross per 24 hour   Intake 1602.5 ml   Output 550 ml   Net 1052.5 ml        I had a face to face encounter and independently examined this patient as outlined below:  PHYSICAL EXAM:  General: WD, WN. Alert, cooperative, no acute distress    EENT:  EOMI. Anicteric sclerae. MMM  Resp:  CTA bilaterally, no wheezing or rales. No accessory muscle use  CV:  Irregularly irregular rhythm,  No edema  GI/:  Soft, Non distended, Non tender. +Bowel sounds. NAHID drain. Midline dressing. Bernard drains clear urine    Neurologic:  Alert and oriented X 3, normal speech   Psych:   Good insight. Not anxious nor agitated  Skin:  Diffuse petechiae.   No jaundice      Reviewed most current lab test results and cultures  YES  Reviewed most current radiology test results   YES  Review and summation of old records today    NO  Reviewed patient's current orders and MAR    YES  PMH/SH reviewed - no change compared to H&P  ________________________________________________________________________  Care Plan discussed with:    Comments   Patient X    Family  X    RN X    Care Manager x    Consultant                       x Multidiciplinary team rounds were held today with , nursing, pharmacist and clinical coordinator. Patient's plan of care was discussed; medications were reviewed and discharge planning was addressed. ________________________________________________________________________  Total NON critical care TIME:  35   Minutes    Total CRITICAL CARE TIME Spent:   Minutes non procedure based      Comments   >50% of visit spent in counseling and coordination of care X    ________________________________________________________________________  Maryann Rachel MD     Procedures: see electronic medical records for all procedures/Xrays and details which were not copied into this note but were reviewed prior to creation of Plan. LABS:  I reviewed today's most current labs and imaging studies.   Pertinent labs include:  Recent Labs     07/11/22  0354 07/10/22  0559 07/09/22  0529   WBC 13.0* 13.2* 13.9*   HGB 8.1* 8.3* 9.3*   HCT 24.8* 26.2* 28.4*    205 213     Recent Labs     07/11/22  0354 07/10/22  0559 07/09/22  0529    134* 133*   K 4.1 3.9 4.3   * 107 105   CO2 20* 22 21   * 179* 231*   BUN 17 21* 25*   CREA 0.91 1.10 1.42*   CA 7.5* 7.9* 7.7*   MG 2.2 2.3 2.0   PHOS 1.7* 1.6* 2.2*       Signed: Maryann Rachel MD

## 2022-07-12 NOTE — PROGRESS NOTES
Problem: Falls - Risk of  Goal: *Absence of Falls  Description: Document Gayle Wilkes Fall Risk and appropriate interventions in the flowsheet. Outcome: Progressing Towards Goal  Note: Fall Risk Interventions:  Mobility Interventions: Patient to call before getting OOB,PT Consult for mobility concerns,PT Consult for assist device competence,OT consult for ADLs    Mentation Interventions: Bed/chair exit alarm,Room close to nurse's station    Medication Interventions: Patient to call before getting OOB    Elimination Interventions: Call light in reach,Patient to call for help with toileting needs,Stay With Me (per policy),Toilet paper/wipes in reach    History of Falls Interventions: Bed/chair exit alarm,Room close to nurse's station,Vital signs minimum Q4HRs X 24 hrs (comment for end date),Door open when patient unattended         Problem: Patient Education: Go to Patient Education Activity  Goal: Patient/Family Education  Outcome: Progressing Towards Goal     Problem: Pressure Injury - Risk of  Goal: *Prevention of pressure injury  Description: Document Jose Guadalupe Scale and appropriate interventions in the flowsheet. Outcome: Progressing Towards Goal  Note: Pressure Injury Interventions:  Sensory Interventions: Assess changes in LOC,Discuss PT/OT consult with provider,Check visual cues for pain,Float heels,Keep linens dry and wrinkle-free,Maintain/enhance activity level,Pressure redistribution bed/mattress (bed type),Turn and reposition approx. every two hours (pillows and wedges if needed)    Moisture Interventions: Absorbent underpads,Internal/External urinary devices,Apply protective barrier, creams and emollients,Assess need for specialty bed,Minimize layers    Activity Interventions: Increase time out of bed,Pressure redistribution bed/mattress(bed type),PT/OT evaluation    Mobility Interventions: HOB 30 degrees or less,Pressure redistribution bed/mattress (bed type),PT/OT evaluation,Turn and reposition approx.  every two hours(pillow and wedges),Float heels    Nutrition Interventions: Document food/fluid/supplement intake,Offer support with meals,snacks and hydration    Friction and Shear Interventions: Lift sheet,Apply protective barrier, creams and emollients,Minimize layers                Problem: Patient Education: Go to Patient Education Activity  Goal: Patient/Family Education  Outcome: Progressing Towards Goal

## 2022-07-12 NOTE — PROGRESS NOTES
1950: Bedside and Verbal shift change report given to rosalina (oncoming nurse) by Luz Maria Dc (offgoing nurse). Report included the following information SBAR, Kardex, Intake/Output, MAR, Recent Results and Cardiac Rhythm nsr.         0725: End of Shift Note    Bedside shift change report given to tatianna (oncoming nurse) by Sabiha Armstrong (offgoing nurse).   Report included the following information SBAR, Kardex, Intake/Output, MAR, Recent Results and Cardiac Rhythm nsr    Shift worked:  night     Shift summary and any significant changes:     remains nsr, veronica drain good output, bernard brown urine, no clots  Prn norco given , no BM, not able to get am labs attempted x3, oncoming nurse made aware    Concerns for physician to address:       Zone phone for oncoming shift:              Sabiha Armstrong

## 2022-07-12 NOTE — PROGRESS NOTES
End of Shift Note    Bedside shift change report given to [facility] (oncoming nurse) by Janelle Mckeon RN (offgoing nurse). Report included the following information SBAR, Kardex and MAR    Shift worked:  7a-7p     Shift summary and any significant changes:          Concerns for physician to address:       Zone phone for oncoming shift:          Activity:  Activity Level: Up with Assistance  Number times ambulated in hallways past shift: 0  Number of times OOB to chair past shift: 1    Cardiac:   Cardiac Monitoring: Yes      Cardiac Rhythm: Sinus Rhythm    Access:   Current line(s): PIV     Genitourinary:   Urinary status: bernard    Respiratory:   O2 Device: None (Room air)  Chronic home O2 use?: NO  Incentive spirometer at bedside: YES  Actual Volume (ml): 1500 ml    GI:  Last Bowel Movement Date: 07/08/22  Current diet:  ADULT DIET Clear Liquid  Passing flatus: NO  Tolerating current diet: YES       Pain Management:   Patient states pain is manageable on current regimen: YES    Skin:  Jose Guadalupe Score: 15  Interventions: increase time out of bed, PT/OT consult and internal/external urinary devices    Patient Safety:  Fall Score:  Total Score: 4  Interventions: bed/chair alarm, assistive device (walker, cane, etc), gripper socks and pt to call before getting OOB  High Fall Risk: Yes    Length of Stay:  Expected LOS: 3d 14h  Actual LOS: 14      Janelle Mckeon RN

## 2022-07-12 NOTE — PROGRESS NOTES
Admit Date: 2022    POD 4 Day Post-Op    Procedure:  Procedure(s):  LAPAROSCOPY, LAPAROTOMY, SMALL BOWEL RESECTION, ADHESION LYSIS, REPAIR VENTRAL HERNIA    Subjective:     Patient alert and interactive, no complaints. No nausea since NGT removed yesterday. No BM yet    Objective:     Blood pressure (!) 109/50, pulse 70, temperature 97.9 °F (36.6 °C), resp. rate 17, height 6' 4\" (1.93 m), weight 277 lb 1.9 oz (125.7 kg), SpO2 96 %. Temp (24hrs), Av.1 °F (36.7 °C), Min:97.9 °F (36.6 °C), Max:98.6 °F (37 °C)    NGT o/p 900 cc, thin green bilious  Physical Exam:  GENERAL: alert, cooperative, no distress, appears stated age, LUNG: clear to auscultation bilaterally, HEART: regular rate and rhythm, ABDOMEN: soft, appropriately tender, wounds dressed and dry, NAHID o/p serous, EXTREMITIES:  extremities normal, atraumatic, no cyanosis or edema    Labs:   Recent Results (from the past 24 hour(s))   GLUCOSE, POC    Collection Time: 22 12:16 PM   Result Value Ref Range    Glucose (POC) 176 (H) 65 - 117 mg/dL    Performed by Shital Marie PCT    GLUCOSE, POC    Collection Time: 22  6:10 PM   Result Value Ref Range    Glucose (POC) 185 (H) 65 - 117 mg/dL    Performed by Shital Marie PCT    GLUCOSE, POC    Collection Time: 22 11:11 PM   Result Value Ref Range    Glucose (POC) 154 (H) 65 - 117 mg/dL    Performed by Marge Deng, POC    Collection Time: 22  5:51 AM   Result Value Ref Range    Glucose (POC) 150 (H) 65 - 117 mg/dL    Performed by Cleopatra Garrison RN        Data Review images and reports reviewed    Assessment:     Active Problems:     Morbid obesity ([address] (2013)      Thrombocytopenia ([address] (2022)      GI bleed (2022)      UGO (acute kidney injury) ([address] (2022)      Atrial fibrillation ([address] (7/3/2022)      Elevated troponin (7/3/2022)      Ileus ([facility] [address] (2022)      Intestinal adhesions with partial obstruction ([facility] [address] (2022)      Ventral hernia (7/8/2022)      Intestinal perforation ([facility] [address] (7/8/2022)        Plan/Recommendations/Medical Decision Making:     Continue present treatment   Clear liquids until return of bowel function  Increase activity  IS  Afib per cardiology  Continue Esther Velarde MD  [id number] [facility]

## 2022-07-13 ENCOUNTER — APPOINTMENT (OUTPATIENT)
Dept: GENERAL RADIOLOGY | Age: 77
DRG: 981 | End: 2022-07-13
Attending: ANESTHESIOLOGY
Payer: MEDICARE

## 2022-07-13 LAB
ANION GAP SERPL CALC-SCNC: 7 MMOL/L (ref 5–15)
BUN SERPL-MCNC: 12 MG/DL (ref 6–20)
BUN/CREAT SERPL: 12 (ref 12–20)
CALCIUM SERPL-MCNC: 8.2 MG/DL (ref 8.5–10.1)
CHLORIDE SERPL-SCNC: 106 MMOL/L (ref 97–108)
CO2 SERPL-SCNC: 19 MMOL/L (ref 21–32)
CREAT SERPL-MCNC: 0.97 MG/DL (ref 0.7–1.3)
ERYTHROCYTE [DISTWIDTH] IN BLOOD BY AUTOMATED COUNT: 16.3 % (ref 11.5–14.5)
GLUCOSE BLD STRIP.AUTO-MCNC: 146 MG/DL (ref 65–117)
GLUCOSE BLD STRIP.AUTO-MCNC: 150 MG/DL (ref 65–117)
GLUCOSE BLD STRIP.AUTO-MCNC: 158 MG/DL (ref 65–117)
GLUCOSE BLD STRIP.AUTO-MCNC: 175 MG/DL (ref 65–117)
GLUCOSE SERPL-MCNC: 138 MG/DL (ref 65–100)
HCT VFR BLD AUTO: 25.7 % (ref 36.6–50.3)
HGB BLD-MCNC: 8.1 G/DL (ref 12.1–17)
MAGNESIUM SERPL-MCNC: 2 MG/DL (ref 1.6–2.4)
MCH RBC QN AUTO: 30.5 PG (ref 26–34)
MCHC RBC AUTO-ENTMCNC: 31.5 G/DL (ref 30–36.5)
MCV RBC AUTO: 96.6 FL (ref 80–99)
NRBC # BLD: 0 K/UL (ref 0–0.01)
NRBC BLD-RTO: 0 PER 100 WBC
PHOSPHATE SERPL-MCNC: 2.4 MG/DL (ref 2.6–4.7)
PLATELET # BLD AUTO: 157 K/UL (ref 150–400)
PMV BLD AUTO: 10.1 FL (ref 8.9–12.9)
POTASSIUM SERPL-SCNC: 4.2 MMOL/L (ref 3.5–5.1)
RBC # BLD AUTO: 2.66 M/UL (ref 4.1–5.7)
SERVICE CMNT-IMP: ABNORMAL
SODIUM SERPL-SCNC: 132 MMOL/L (ref 136–145)
WBC # BLD AUTO: 10.9 K/UL (ref 4.1–11.1)

## 2022-07-13 PROCEDURE — 74011250637 HC RX REV CODE- 250/637: Performed by: SURGERY

## 2022-07-13 PROCEDURE — 36556 INSERT NON-TUNNEL CV CATH: CPT

## 2022-07-13 PROCEDURE — 83735 ASSAY OF MAGNESIUM: CPT

## 2022-07-13 PROCEDURE — 74011250637 HC RX REV CODE- 250/637: Performed by: GENERAL ACUTE CARE HOSPITAL

## 2022-07-13 PROCEDURE — 36415 COLL VENOUS BLD VENIPUNCTURE: CPT

## 2022-07-13 PROCEDURE — 80048 BASIC METABOLIC PNL TOTAL CA: CPT

## 2022-07-13 PROCEDURE — 85027 COMPLETE CBC AUTOMATED: CPT

## 2022-07-13 PROCEDURE — 74011000250 HC RX REV CODE- 250: Performed by: SURGERY

## 2022-07-13 PROCEDURE — 65270000046 HC RM TELEMETRY

## 2022-07-13 PROCEDURE — 71045 X-RAY EXAM CHEST 1 VIEW: CPT

## 2022-07-13 PROCEDURE — 74011250636 HC RX REV CODE- 250/636: Performed by: GENERAL ACUTE CARE HOSPITAL

## 2022-07-13 PROCEDURE — 84100 ASSAY OF PHOSPHORUS: CPT

## 2022-07-13 PROCEDURE — 74011636637 HC RX REV CODE- 636/637: Performed by: SURGERY

## 2022-07-13 PROCEDURE — 74011250636 HC RX REV CODE- 250/636: Performed by: SURGERY

## 2022-07-13 PROCEDURE — 74011250637 HC RX REV CODE- 250/637: Performed by: INTERNAL MEDICINE

## 2022-07-13 PROCEDURE — 74011250637 HC RX REV CODE- 250/637: Performed by: NURSE PRACTITIONER

## 2022-07-13 PROCEDURE — 82962 GLUCOSE BLOOD TEST: CPT

## 2022-07-13 PROCEDURE — 74011000258 HC RX REV CODE- 258: Performed by: SURGERY

## 2022-07-13 RX ADMIN — APIXABAN 5 MG: 5 TABLET, FILM COATED ORAL at 05:04

## 2022-07-13 RX ADMIN — POTASSIUM CHLORIDE, DEXTROSE MONOHYDRATE AND SODIUM CHLORIDE 75 ML/HR: 150; 5; 450 INJECTION, SOLUTION INTRAVENOUS at 20:30

## 2022-07-13 RX ADMIN — AMIODARONE HYDROCHLORIDE 0.5 MG/MIN: 50 INJECTION, SOLUTION INTRAVENOUS at 04:55

## 2022-07-13 RX ADMIN — SODIUM CHLORIDE, PRESERVATIVE FREE 10 ML: 5 INJECTION INTRAVENOUS at 05:04

## 2022-07-13 RX ADMIN — Medication 3 UNITS: at 23:39

## 2022-07-13 RX ADMIN — Medication 1 AMPULE: at 09:04

## 2022-07-13 RX ADMIN — NALOXEGOL OXALATE 25 MG: 25 TABLET, FILM COATED ORAL at 12:59

## 2022-07-13 RX ADMIN — AMIODARONE HYDROCHLORIDE 400 MG: 200 TABLET ORAL at 21:01

## 2022-07-13 RX ADMIN — Medication 3 UNITS: at 18:00

## 2022-07-13 RX ADMIN — SODIUM CHLORIDE, PRESERVATIVE FREE 10 ML: 5 INJECTION INTRAVENOUS at 13:25

## 2022-07-13 RX ADMIN — APIXABAN 5 MG: 5 TABLET, FILM COATED ORAL at 17:35

## 2022-07-13 RX ADMIN — Medication 1 AMPULE: at 21:02

## 2022-07-13 RX ADMIN — PIPERACILLIN AND TAZOBACTAM 3.38 G: 3; .375 INJECTION, POWDER, LYOPHILIZED, FOR SOLUTION INTRAVENOUS at 21:01

## 2022-07-13 RX ADMIN — PANTOPRAZOLE SODIUM 40 MG: 40 TABLET, DELAYED RELEASE ORAL at 09:04

## 2022-07-13 RX ADMIN — PIPERACILLIN AND TAZOBACTAM 3.38 G: 3; .375 INJECTION, POWDER, LYOPHILIZED, FOR SOLUTION INTRAVENOUS at 12:59

## 2022-07-13 RX ADMIN — AMIODARONE HYDROCHLORIDE 400 MG: 200 TABLET ORAL at 13:15

## 2022-07-13 RX ADMIN — Medication 3 UNITS: at 12:39

## 2022-07-13 RX ADMIN — SODIUM CHLORIDE, PRESERVATIVE FREE 10 ML: 5 INJECTION INTRAVENOUS at 21:01

## 2022-07-13 RX ADMIN — Medication 3 UNITS: at 06:07

## 2022-07-13 RX ADMIN — ATORVASTATIN CALCIUM 20 MG: 20 TABLET, FILM COATED ORAL at 21:01

## 2022-07-13 RX ADMIN — PIPERACILLIN AND TAZOBACTAM 3.38 G: 3; .375 INJECTION, POWDER, LYOPHILIZED, FOR SOLUTION INTRAVENOUS at 05:04

## 2022-07-13 RX ADMIN — POTASSIUM CHLORIDE, DEXTROSE MONOHYDRATE AND SODIUM CHLORIDE 75 ML/HR: 150; 5; 450 INJECTION, SOLUTION INTRAVENOUS at 04:56

## 2022-07-13 RX ADMIN — Medication 3 UNITS: at 00:11

## 2022-07-13 RX ADMIN — I-VITE, TAB 1000-60-2MG (60/BT) 1 TABLET: TAB at 09:04

## 2022-07-13 NOTE — PERIOP NOTES
TRANSFER - IN REPORT:    Verbal report received from Mily HUNT(name) on Sheree Adam  being received from PCU Room 2268(unit) for ordered procedure      Report consisted of patients Situation, Background, Assessment and   Recommendations(SBAR). Information from the following report(s) SBAR, Kardex, Procedure Summary, Intake/Output, MAR, Recent Results, Med Rec Status and Procedure Verification was reviewed with the receiving nurse. Opportunity for questions and clarification was provided. Assessment completed upon patients arrival to unit and care assumed.

## 2022-07-13 NOTE — PROGRESS NOTES
Admit Date: 2022    POD 5 Day Post-Op    Procedure:  Procedure(s):  LAPAROSCOPY, LAPAROTOMY, SMALL BOWEL RESECTION, ADHESION LYSIS, REPAIR VENTRAL HERNIA    Subjective:     Patient alert and interactive, no complaints. No nausea since NGT removed . No BM yet.  + flatus    Objective:     Blood pressure (!) 116/47, pulse 72, temperature 97.8 °F (36.6 °C), resp. rate 21, height 6' 4\" (1.93 m), weight 276 lb 14.4 oz (125.6 kg), SpO2 100 %.     Temp (24hrs), Av.1 °F (36.7 °C), Min:97.8 °F (36.6 °C), Max:98.5 °F (36.9 °C)    NGT o/p 900 cc, thin green bilious  Physical Exam:  GENERAL: alert, cooperative, no distress, appears stated age, LUNG: clear to auscultation bilaterally, HEART: regular rate and rhythm, ABDOMEN: soft, appropriately tender, wounds dressed and dry, NAHID o/p serous, EXTREMITIES:  extremities normal, atraumatic, no cyanosis or edema    Labs:   Recent Results (from the past 24 hour(s))   GLUCOSE, POC    Collection Time: 22 11:56 AM   Result Value Ref Range    Glucose (POC) 147 (H) 65 - 117 mg/dL    Performed by Derik Veras PCT    GLUCOSE, POC    Collection Time: 22 11:46 PM   Result Value Ref Range    Glucose (POC) 183 (H) 65 - 117 mg/dL    Performed by Earl Oviedo PCT    MAGNESIUM    Collection Time: 22  2:51 AM   Result Value Ref Range    Magnesium 2.0 1.6 - 2.4 mg/dL   PHOSPHORUS    Collection Time: 22  2:51 AM   Result Value Ref Range    Phosphorus 2.4 (L) 2.6 - 4.7 MG/DL   CBC W/O DIFF    Collection Time: 22  2:51 AM   Result Value Ref Range    WBC 10.9 4.1 - 11.1 K/uL    RBC 2.66 (L) 4.10 - 5.70 M/uL    HGB 8.1 (L) 12.1 - 17.0 g/dL    HCT 25.7 (L) 36.6 - 50.3 %    MCV 96.6 80.0 - 99.0 FL    MCH 30.5 26.0 - 34.0 PG    MCHC 31.5 30.0 - 36.5 g/dL    RDW 16.3 (H) 11.5 - 14.5 %    PLATELET 082 277 - 522 K/uL    MPV 10.1 8.9 - 12.9 FL    NRBC 0.0 0  WBC    ABSOLUTE NRBC 0.00 0.00 - 0.42 K/uL   METABOLIC PANEL, BASIC    Collection Time: 22  2:51 AM Result Value Ref Range    Sodium 132 (L) 136 - 145 mmol/L    Potassium 4.2 3.5 - 5.1 mmol/L    Chloride 106 97 - 108 mmol/L    CO2 19 (L) 21 - 32 mmol/L    Anion gap 7 5 - 15 mmol/L    Glucose 138 (H) 65 - 100 mg/dL    BUN 12 6 - 20 MG/DL    Creatinine 0.97 0.70 - 1.30 MG/DL    BUN/Creatinine ratio 12 12 - 20      GFR est AA >60 >60 ml/min/1.73m2    GFR est non-AA >60 >60 ml/min/1.73m2    Calcium 8.2 (L) 8.5 - 10.1 MG/DL   GLUCOSE, POC    Collection Time: 07/13/22  5:46 AM   Result Value Ref Range    Glucose (POC) 158 (H) 65 - 117 mg/dL    Performed by Lorraine Pollack PCT        Data Review images and reports reviewed    Assessment:     Active Problems:     Morbid obesity ([address] (6/7/2013)      Thrombocytopenia ([address] (6/27/2022)      GI bleed (6/27/2022)      UGO (acute kidney injury) ([address] (6/27/2022)      Atrial fibrillation ([address] (7/3/2022)      Elevated troponin (7/3/2022)      Ileus ([address] (7/7/2022)      Intestinal adhesions with partial obstruction ([address] (7/8/2022)      Ventral hernia (7/8/2022)      Intestinal perforation ([address] (7/8/2022)        Plan/Recommendations/Medical Decision Making:     Continue present treatment   Clear liquids until return of bowel function  Increase activity  IS  Afib per cardiology  Continue errol Travis MD  [id number] [address] [facility]

## 2022-07-13 NOTE — PERIOP NOTES
17:46= arrival to Pre OP by bed from Room 2268; A&Ox4, VS assessed, consent form signed for central line insertion. 17:56= timeout performed with Dr Itzel Eugene and Sue Tyler RN; 2LO2NC currently on pt and frequent VS started. 18:12= portable chest xray arrived to assess placement of central line.

## 2022-07-13 NOTE — PROGRESS NOTES
VAT asked by primary nurse to asses RUE endurance catheter due to no blood flow and redness at site. Upon assessment, noted redness, tenderness with touch, no warmness. Ultrasound showed possible fluid around site. Endurance catheter no longer working, primary nurse in room recommended to stop infusion and infusion stopped due to extravasation. Left arm assessed with ultrasound for new access site. No vessels viable as no vessels would compress with pressure on LUE. Recommend SC or IJ due to poor peripheral access. Discussed with primary nurse who will contact MD for further orders.

## 2022-07-13 NOTE — PROGRESS NOTES
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 Received 3 doses IVIG, did not tolerate final IVIG infusion despite. Recently started bactrim for UTI prior to admission, stated has taken before with no issues   PLT wnl   Hemo/onc on board     SBO  Repeat CT a/p on  showed partial SBO pattern. Having loose stools, no n/v this AM but 1 episode of emesis overight. Passing flatus/ had multiple BMS today  Surgery following. Diet advanced to low fiber  CT enterography showed distal partial SBO  Went to OR on  found to have SMALL BOWEL OBSTRUCTION  SECONDARY TO ILEAL PERFORATION, AND ABSCESS AND ADHESION AND ABDOMINAL HERNIA   Started on Zosyn on    Off NGT and started on CLD    GI bleed  Was to due severe ITP  S/p blood transfusion  Continue with protonix 40 daily (can switch to famotidine on discharge)  GI on board, holding off on EGD for now, conservative management. Paroxysmal atrial fibrillation w RVR  Elevated trop  HR is controlled now   Eliquis resumed.   ASA to be resumed if ok with heme/onc  Cardiac stress test neg  Appreciate cardiology following  Increase Amio dose but on DC Continue with 200 mg daily  On  Started on amiodarone drip as per cardiology for recurrent A. 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 Estimated discharge date: >48hrs  Code status: Full  Prophylaxis: Eliquis   Recommended Disposition: [facility]H/Rehab. Patient and wife requested PT eval prior to discharge, wife thinks the patient is too weak and need rehab     Subjective:     Chief Complaint / Reason for Physician Visit  NAD. Clearurine in bernard cath/bag  Mild abd pain  Passing gas, but no BMs yet  No SOB  Sat low 90s on RA  Started on CLD  A. fib RVR, again today    Review of Systems:  Symptom Y/N Comments  Symptom Y/N Comments   Fever/Chills    Chest Pain     Poor Appetite    Edema     Cough    Abdominal Pain     Sputum    Joint Pain     SOB/SHEARER    Pruritis/Rash     Nausea/vomit    Tolerating PT/OT     Diarrhea    Tolerating Diet     Constipation    Other       Could NOT obtain due to:      Objective:     VITALS:   Last 24hrs VS reviewed since prior progress note.  Most recent are:  Patient Vitals for the past 24 hrs:   Temp Pulse Resp BP SpO2   07/13/22 1200 -- 71 22 (!) 113/46 97 %   07/13/22 1049 98.1 °F (36.7 °C) 65 17 (!) 101/44 100 %   07/13/22 0800 97.8 °F (36.6 °C) 72 21 (!) 116/47 100 %   07/13/22 0600 -- 73 21 106/71 94 %   07/13/22 0430 -- 70 16 (!) 108/48 100 %   07/13/22 0300 98.5 °F (36.9 °C) 71 20 (!) 108/47 97 %   07/13/22 0200 -- 67 19 (!) 111/44 99 %   07/13/22 0100 -- 65 16 (!) 107/44 99 %   07/13/22 0000 -- 71 18 (!) 106/48 100 %   07/12/22 2300 98 °F (36.7 °C) 71 16 (!) 123/46 100 %   07/12/22 2200 -- 72 17 (!) 112/46 95 %   07/12/22 2130 -- 74 19 106/72 100 %   07/12/22 2114 -- 72 20 (!) 95/48 100 %   07/12/22 2100 -- 77 22 (!) 111/49 98 %   07/12/22 2044 -- 75 15 (!) 112/48 95 %   07/12/22 2030 -- 75 18 (!) 105/48 100 %   07/12/22 2014 -- 76 18 (!) 108/52 100 % 07/12/22 1944 98 °F (36.7 °C) 75 17 (!) 106/50 96 %   07/12/22 1700 -- (!) 142 26 103/71 91 %   07/12/22 1645 -- (!) 143 22 95/70 96 %   07/12/22 1630 -- (!) 137 19 102/72 98 %   07/12/22 1615 -- (!) 139 18 114/75 93 %   07/12/22 1600 -- (!) 125 19 94/64 100 %   07/12/22 1545 -- (!) 121 18 (!) 103/58 --   07/12/22 1530 -- (!) 132 22 107/63 --   07/12/22 1445 -- (!) 130 17 96/63 100 %       Intake/Output Summary (Last 24 hours) at 7/13/2022 1319  Last data filed at 7/13/2022 1302  Gross per 24 hour   Intake 395 ml   Output 1785 ml   Net -1390 ml        I had a face to face encounter and independently examined this patient as outlined below:  PHYSICAL EXAM:  General: WD, WN. Alert, cooperative, no acute distress    EENT:  EOMI. Anicteric sclerae. MMM  Resp:  CTA bilaterally, no wheezing or rales. No accessory muscle use  CV:  Irregularly irregular rhythm,  No edema  GI/:  Soft, Non distended, Non tender. hypoactive Bowel sounds. NAHID drain. Midline dressing. Lopez drains clear urine    Neurologic:  Alert and oriented X 3, normal speech   Psych:   Good insight. Not anxious nor agitated  Skin:  Diffuse petechiae. No jaundice      Reviewed most current lab test results and cultures  YES  Reviewed most current radiology test results   YES  Review and summation of old records today    NO  Reviewed patient's current orders and MAR    YES  PMH/SH reviewed - no change compared to H&P  ________________________________________________________________________  Care Plan discussed with:    Comments   Patient X    Family  X    RN X    Care Manager x    Consultant                       x Multidiciplinary team rounds were held today with , nursing, pharmacist and clinical coordinator. Patient's plan of care was discussed; medications were reviewed and discharge planning was addressed.      ________________________________________________________________________  Total NON critical care TIME:  35   Minutes    Total CRITICAL CARE TIME Spent:   Minutes non procedure based      Comments   >50% of visit spent in counseling and coordination of care X    ________________________________________________________________________  Clifton Curtis MD     Procedures: see electronic medical records for all procedures/Xrays and details which were not copied into this note but were reviewed prior to creation of Plan. LABS:  I reviewed today's most current labs and imaging studies. Pertinent labs include:  Recent Labs     07/13/22  0251 07/11/22  0354   WBC 10.9 13.0*   HGB 8.1* 8.1*   HCT 25.7* 24.8*    183     Recent Labs     07/13/22  0251 07/11/22  0354   * 136   K 4.2 4.1    109*   CO2 19* 20*   * 170*   BUN 12 17   CREA 0.97 0.91   CA 8.2* 7.5*   MG 2.0 2.2   PHOS 2.4* 1.7*       Signed:  Clifton Curtis MD

## 2022-07-13 NOTE — PROGRESS NOTES
Occupational Therapy:    Chart reviewed and attempted skilled OT visit, however RN requesting OT follow back later as Pts R UE endurance catheter is unstable and she is paging PICC team for assistance. Will defer per RN request and follow back.      Sean Garcia, OTR/L

## 2022-07-13 NOTE — PROGRESS NOTES
Progress Note      7/13/2022 9:12 AM  NAME: Kayla Daniel   MRN:  [id number]   Admit Diagnosis: Thrombocytopenia ([facility] [D69.6]  GI bleed [K92.2]  UGO (acute kidney injury) ([facility] [N17.9]      Problem List:     1. Idiopathic thrombocytopenia  2. Anemia  3. Rectal bleeding  4. Lactic acidosis  5. NSTEMI  6. Paroxysmal atrial fibrillation  7. Remote PE and DVT (separate events)  8. CAD w/ mild diffuse disease w/ patent stent in the RCA 7/17 cath. 9. XOL  10. Morbid obesity  11. DM  12. Former smoker     Assessment/Plan: TnI 2300  PLT recovered  HgB 8/9s    MPI w/ EF 55% and no definite ischemia. Recurrent afib 7/10. Back on dilt gtt. Converted to sinus. Recurrent Afib 7/12. Converted to sinus overnight. 1. Continue oral amio 400mg; increase to TID, but discharge on 200mg daily  2. Continue amio gtt until discharge  3. Off ASA  4. Continue eliquis 5mg BID  5. Continue statin         [x]       High complexity decision making was performed in this patient at high risk for decompensation with multiple organ involvement. Subjective:     Kayla Daniel denies chest pain, dyspnea. Discussed with RN events overnight. Review of Systems:    Symptom Y/N Comments  Symptom Y/N Comments   Fever/Chills N   Chest Pain N    Poor Appetite N   Edema N    Cough N   Abdominal Pain N    Sputum N   Joint Pain N    SOB/SHEARER N   Pruritis/Rash N    Nausea/vomit N   Tolerating PT/OT Y    Diarrhea N   Tolerating Diet Y    Constipation N   Other       Could NOT obtain due to:      Objective:      Physical Exam:    Last 24hrs VS reviewed since prior progress note.  Most recent are:    Visit Vitals  /71   Pulse 73   Temp 98.5 °F (36.9 °C)   Resp 21   Ht 6' 4\" (1.93 m)   Wt 125.7 kg (277 lb 1.9 oz)   SpO2 94%   BMI 33.73 kg/m²       Intake/Output Summary (Last 24 hours) at 7/13/2022 0802  Last data filed at 7/13/2022 2100  Gross per 24 hour   Intake 200 ml   Output 1185 ml   Net -985 ml General Appearance: Well developed, well nourished, alert & oriented x 3,    no acute distress. Ears/Nose/Mouth/Throat: Hearing grossly normal.  Neck: Supple. Chest: Lungs clear to auscultation bilaterally. Cardiovascular: Regular rate and rhythm, S1S2 normal, no murmur. Abdomen: Soft, non-tender, bowel sounds are active. Extremities: No edema bilaterally. Skin: Warm and dry. Petechiae. []         Post-cath site without hematoma, bruit, tenderness, or thrill. Distal pulses intact. PMH/ reviewed - no change compared to H&P    Data Review    Telemetry:     EKG:   [x]  No new EKG for review    Lab Data Personally Reviewed:    Recent Labs     07/13/22 0251 07/11/22  0354   WBC 10.9 13.0*   HGB 8.1* 8.1*   HCT 25.7* 24.8*    183     No results for input(s): INR, PTP, APTT, INREXT, INREXT in the last 72 hours. Recent Labs     07/13/22 0251 07/11/22  0354   * 136   K 4.2 4.1    109*   CO2 19* 20*   BUN 12 17   CREA 0.97 0.91   * 170*   CA 8.2* 7.5*   MG 2.0 2.2     No results for input(s): CPK, CKNDX, TROIQ in the last 72 hours. No lab exists for component: CPKMB  Lab Results   Component Value Date/Time    Cholesterol, total 133 06/07/2013 02:40 PM    HDL Cholesterol 43 06/07/2013 02:40 PM    LDL, calculated 73.4 06/07/2013 02:40 PM    Triglyceride 83 06/07/2013 02:40 PM    CHOL/HDL Ratio 3.1 06/07/2013 02:40 PM       No results for input(s): AP, TBIL, TP, ALB, GLOB, GGT, AML, LPSE in the last 72 hours. No lab exists for component: SGOT, GPT, AMYP, HLPSE  No results for input(s): PH, PCO2, PO2 in the last 72 hours.     Medications Personally Reviewed:    Current Facility-Administered Medications   Medication Dose Route Frequency    amiodarone (CORDARONE) 375 mg/250 mL D5W infusion  0.5 mg/min IntraVENous CONTINUOUS    amiodarone (CORDARONE) tablet 400 mg  400 mg Oral Q8H    pantoprazole (PROTONIX) tablet 40 mg  40 mg Oral ACB    dextrose 5% - 0.45% NaCl with KCl 20 mEq/L infusion  75 mL/hr IntraVENous CONTINUOUS    HYDROmorphone (DILAUDID) injection 0.5-1 mg  0.5-1 mg IntraVENous Q2H PRN    HYDROcodone-acetaminophen (NORCO) 5-325 mg per tablet 1-2 Tablet  1-2 Tablet Oral Q4H PRN    alcohol 62% (NOZIN) nasal  1 Ampule  1 Ampule Topical Q12H    piperacillin-tazobactam (ZOSYN) 3.375 g in 0.9% sodium chloride (MBP/ADV) 100 mL MBP  3.375 g IntraVENous Q8H    apixaban (ELIQUIS) tablet 5 mg  5 mg Oral BID    phenol throat spray (CHLORASEPTIC) 1 Spray  1 Spray Oral PRN    insulin lispro (HUMALOG) injection   SubCUTAneous Q6H    calcium carbonate (TUMS) chewable tablet 400 mg [elemental]  400 mg Oral TID PRN    glucose chewable tablet 16 g  4 Tablet Oral PRN    glucagon (GLUCAGEN) injection 1 mg  1 mg IntraMUSCular PRN    dextrose 10% infusion 0-250 mL  0-250 mL IntraVENous PRN    nitroglycerin (NITROSTAT) tablet 0.4 mg  0.4 mg SubLINGual PRN    atorvastatin (LIPITOR) tablet 20 mg  20 mg Oral QHS    vitamin W-N-R-lutein-minerals (OCUVITE) tablet 1 Tablet  1 Tablet Oral DAILY    sodium chloride (NS) flush 5-40 mL  5-40 mL IntraVENous Q8H    sodium chloride (NS) flush 5-40 mL  5-40 mL IntraVENous PRN    acetaminophen (TYLENOL) tablet 650 mg  650 mg Oral Q6H PRN    Or    acetaminophen (TYLENOL) suppository 650 mg  650 mg Rectal Q6H PRN    polyethylene glycol (MIRALAX) packet 17 g  17 g Oral DAILY PRN    ondansetron (ZOFRAN ODT) tablet 4 mg  4 mg Oral Q8H PRN    Or    ondansetron (ZOFRAN) injection 4 mg  4 mg IntraVENous Q6H PRN         Armani Eller III, DO

## 2022-07-13 NOTE — PROGRESS NOTES
0700: Bedside shift change report given to [place], [facility] (oncoming nurse) by Adeola Rascon RN (offgoing nurse). Report included the following SBAR, Kardex, Intake/Output, MAR, Recent Results and Cardiac Rhythm NSR/A-Fib controlled. 0800: Pt assessed, A/0x4. ON     1150: Abbey Thayer RN of PICC Team at bedside to assess Indurance catheter. [address] unable to place a new Indurance Cath due to veins unable to collapse, suggest an IJ central line for Amiodarone gtt. Abbey Thayer RN stopped Amio gtt for now. Jamar Kirby RN will notified Dr. Jadiel Mcdonnell [facility]) of IV access. 1200: Pt reassessed. Amiodarone gtt currently stopped due to limited line access. 1240: Spoke to Dr. Jadiel Mcdonnell regarding line placement for Amiodarone gtt continuous. New orders placed for IR consult. 1250: Notified IR team and Anesthesia team for IJ centeral line placement. MD will come and assess patient. 1600: Pt reassessed, no changes. 1645: Spoke to Ksenia Vazquez RN from [place], will come to pick patient up to go downstairs to OR for IJ central line placement. 1735: OR Nurse and Dr. Ashok Tan transported patient to OR for IJ central line placement via patient bed/stretcher. 1845: Pt returned from OR for IJ central line placement. Pt tolerated procedure well.     1900: Bedside shift change report given to GILBERT Flores (oncoming nurse) by [place], RN (offgoing nurse). Report included SBAR, Kardex, Intake/Output, MAR, Recent Results and Cardiac Rhythm.

## 2022-07-13 NOTE — PROGRESS NOTES
Comprehensive Nutrition Assessment    Type and Reason for Visit: Reassess    Nutrition Recommendations/Plan:   1. Continue clear liquids with advancement as able. 2. RD added Ensure Clear po TID with meals while pt remains on clear liquids. 3. Please document % meals and supplements consumed in flowsheet I/O's under intake. Nutrition Assessment:     7/13: Chart reviewed; med noted for SBO on admission, POD#5 LAPAROSCOPY, LAPAROTOMY, SMALL BOWEL RESECTION, ADHESION LYSIS, REPAIR VENTRAL HERNIA. Pt remains on clear liquids and has been majority of hospital stay since 7/1/22. RD added Ensure Clear today to supplement liquid diet. BG trends -185, Phos 2.4 (improved from 1.7). Patient Vitals for the past 168 hrs:   % Diet Eaten   07/13/22 0800 0%   07/09/22 1530 0%     Last Weight Metric  Weight Loss Metrics 7/13/2022 8/9/2021 10/13/2019 11/27/2018 10/28/2018 7/17/2017 2/1/2014   Today's Wt 276 lb 14.4 oz 325 lb 13.4 oz 308 lb 305 lb 5.4 oz 304 lb 312 lb 306 lb   BMI 33.71 kg/m2 39.66 kg/m2 37.49 kg/m2 36.21 kg/m2 37 kg/m2 37 kg/m2 -     Nutrition Related Findings:    Labs: reviewed; Meds: reviewed Wound Type: Surgical incision    Current Nutrition Intake & Therapies:        ADULT DIET Clear Liquid  ADULT ORAL NUTRITION SUPPLEMENT Breakfast, Dinner, Lunch; Clear Liquid    Anthropometric Measures:  Height: 6' 4\" (193 cm)  Ideal Body Weight (IBW): 202 lbs (92 kg)     Current Body Wt:  125.7 kg (277 lb 1.9 oz), 137.2 % IBW. Current BMI (kg/m2): 33.7                          BMI Category: Obese class 1 (BMI 30.0-34. 9)    Estimated Daily Nutrient Needs:  Energy Requirements Based On: Formula  Weight Used for Energy Requirements: Current  Energy (kcal/day): 2199 kcal (BMR 2077 x 1. 3AF -500kcal)  Weight Used for Protein Requirements: Current  Protein (g/day): 101g (0.8 g/kg bw)  Method Used for Fluid Requirements: 1 ml/kcal  Fluid (ml/day): 2200 mL    Nutrition Diagnosis:   · Inadequate protein-energy intake related to altered GI function as evidenced by NPO or clear liquid status due to medical condition    Nutrition Interventions:   Food and/or Nutrient Delivery: Continue current diet,Start oral nutrition supplement  Nutrition Education/Counseling: No recommendations at this time  Coordination of Nutrition Care: Continue to monitor while inpatient       Goals:  Previous Goal Met: Progressing toward goal(s)  Goals: PO intake 50% or greater,by next RD assessment       Nutrition Monitoring and Evaluation:   Behavioral-Environmental Outcomes: None identified  Food/Nutrient Intake Outcomes: Food and nutrient intake,Diet advancement/tolerance  Physical Signs/Symptoms Outcomes: None identified,Weight,GI status    Discharge Planning:     Too soon to determine    Tigre Millers, RD  Contact:

## 2022-07-14 ENCOUNTER — APPOINTMENT (OUTPATIENT)
Dept: GENERAL RADIOLOGY | Age: 77
DRG: 981 | End: 2022-07-14
Attending: NURSE PRACTITIONER
Payer: MEDICARE

## 2022-07-14 LAB
ANION GAP SERPL CALC-SCNC: 6 MMOL/L (ref 5–15)
BUN SERPL-MCNC: 9 MG/DL (ref 6–20)
BUN/CREAT SERPL: 10 (ref 12–20)
CALCIUM SERPL-MCNC: 8.1 MG/DL (ref 8.5–10.1)
CHLORIDE SERPL-SCNC: 108 MMOL/L (ref 97–108)
CO2 SERPL-SCNC: 21 MMOL/L (ref 21–32)
CREAT SERPL-MCNC: 0.92 MG/DL (ref 0.7–1.3)
ERYTHROCYTE [DISTWIDTH] IN BLOOD BY AUTOMATED COUNT: 16.5 % (ref 11.5–14.5)
GLUCOSE BLD STRIP.AUTO-MCNC: 153 MG/DL (ref 65–117)
GLUCOSE BLD STRIP.AUTO-MCNC: 168 MG/DL (ref 65–117)
GLUCOSE BLD STRIP.AUTO-MCNC: 173 MG/DL (ref 65–117)
GLUCOSE BLD STRIP.AUTO-MCNC: 216 MG/DL (ref 65–117)
GLUCOSE SERPL-MCNC: 165 MG/DL (ref 65–100)
HCT VFR BLD AUTO: 23.5 % (ref 36.6–50.3)
HGB BLD-MCNC: 7.4 G/DL (ref 12.1–17)
MAGNESIUM SERPL-MCNC: 2.1 MG/DL (ref 1.6–2.4)
MCH RBC QN AUTO: 31.1 PG (ref 26–34)
MCHC RBC AUTO-ENTMCNC: 31.5 G/DL (ref 30–36.5)
MCV RBC AUTO: 98.7 FL (ref 80–99)
NRBC # BLD: 0 K/UL (ref 0–0.01)
NRBC BLD-RTO: 0 PER 100 WBC
PHOSPHATE SERPL-MCNC: 3.1 MG/DL (ref 2.6–4.7)
PLATELET # BLD AUTO: 174 K/UL (ref 150–400)
PMV BLD AUTO: 9.6 FL (ref 8.9–12.9)
POTASSIUM SERPL-SCNC: 4.1 MMOL/L (ref 3.5–5.1)
RBC # BLD AUTO: 2.38 M/UL (ref 4.1–5.7)
SERVICE CMNT-IMP: ABNORMAL
SODIUM SERPL-SCNC: 135 MMOL/L (ref 136–145)
WBC # BLD AUTO: 9 K/UL (ref 4.1–11.1)

## 2022-07-14 PROCEDURE — 80048 BASIC METABOLIC PNL TOTAL CA: CPT

## 2022-07-14 PROCEDURE — 83735 ASSAY OF MAGNESIUM: CPT

## 2022-07-14 PROCEDURE — 97110 THERAPEUTIC EXERCISES: CPT

## 2022-07-14 PROCEDURE — 82962 GLUCOSE BLOOD TEST: CPT

## 2022-07-14 PROCEDURE — 74011000250 HC RX REV CODE- 250: Performed by: SURGERY

## 2022-07-14 PROCEDURE — 97530 THERAPEUTIC ACTIVITIES: CPT

## 2022-07-14 PROCEDURE — 74011250637 HC RX REV CODE- 250/637: Performed by: SURGERY

## 2022-07-14 PROCEDURE — 74011250637 HC RX REV CODE- 250/637: Performed by: GENERAL ACUTE CARE HOSPITAL

## 2022-07-14 PROCEDURE — 74011000258 HC RX REV CODE- 258: Performed by: SURGERY

## 2022-07-14 PROCEDURE — 74018 RADEX ABDOMEN 1 VIEW: CPT

## 2022-07-14 PROCEDURE — 74011250636 HC RX REV CODE- 250/636: Performed by: INTERNAL MEDICINE

## 2022-07-14 PROCEDURE — 74011250637 HC RX REV CODE- 250/637: Performed by: PHYSICIAN ASSISTANT

## 2022-07-14 PROCEDURE — 85027 COMPLETE CBC AUTOMATED: CPT

## 2022-07-14 PROCEDURE — 74019 RADEX ABDOMEN 2 VIEWS: CPT

## 2022-07-14 PROCEDURE — 74011000258 HC RX REV CODE- 258: Performed by: INTERNAL MEDICINE

## 2022-07-14 PROCEDURE — 74011250637 HC RX REV CODE- 250/637: Performed by: NURSE PRACTITIONER

## 2022-07-14 PROCEDURE — 84100 ASSAY OF PHOSPHORUS: CPT

## 2022-07-14 PROCEDURE — 74011636637 HC RX REV CODE- 636/637: Performed by: SURGERY

## 2022-07-14 PROCEDURE — 74011250636 HC RX REV CODE- 250/636: Performed by: SURGERY

## 2022-07-14 PROCEDURE — 74011000250 HC RX REV CODE- 250: Performed by: NURSE PRACTITIONER

## 2022-07-14 PROCEDURE — 74011250636 HC RX REV CODE- 250/636: Performed by: NURSE PRACTITIONER

## 2022-07-14 PROCEDURE — 74011250637 HC RX REV CODE- 250/637: Performed by: INTERNAL MEDICINE

## 2022-07-14 PROCEDURE — 97116 GAIT TRAINING THERAPY: CPT

## 2022-07-14 PROCEDURE — 65270000046 HC RM TELEMETRY

## 2022-07-14 PROCEDURE — 74011000258 HC RX REV CODE- 258: Performed by: NURSE PRACTITIONER

## 2022-07-14 PROCEDURE — 36415 COLL VENOUS BLD VENIPUNCTURE: CPT

## 2022-07-14 RX ORDER — ENOXAPARIN SODIUM 150 MG/ML
120 INJECTION SUBCUTANEOUS EVERY 12 HOURS
Status: DISCONTINUED | OUTPATIENT
Start: 2022-07-14 | End: 2022-07-16

## 2022-07-14 RX ORDER — TRAZODONE HYDROCHLORIDE 50 MG/1
50 TABLET ORAL ONCE
Status: COMPLETED | OUTPATIENT
Start: 2022-07-14 | End: 2022-07-14

## 2022-07-14 RX ADMIN — BENZOCAINE 1 SPRAY: 200 SPRAY DENTAL; ORAL; PERIODONTAL at 16:15

## 2022-07-14 RX ADMIN — I-VITE, TAB 1000-60-2MG (60/BT) 1 TABLET: TAB at 10:11

## 2022-07-14 RX ADMIN — APIXABAN 5 MG: 5 TABLET, FILM COATED ORAL at 07:19

## 2022-07-14 RX ADMIN — AMIODARONE HYDROCHLORIDE 0.5 MG/MIN: 50 INJECTION, SOLUTION INTRAVENOUS at 00:12

## 2022-07-14 RX ADMIN — AMIODARONE HYDROCHLORIDE 400 MG: 200 TABLET ORAL at 14:21

## 2022-07-14 RX ADMIN — PIPERACILLIN AND TAZOBACTAM 3.38 G: 3; .375 INJECTION, POWDER, LYOPHILIZED, FOR SOLUTION INTRAVENOUS at 04:56

## 2022-07-14 RX ADMIN — AMIODARONE HYDROCHLORIDE 400 MG: 200 TABLET ORAL at 07:19

## 2022-07-14 RX ADMIN — SODIUM CHLORIDE, PRESERVATIVE FREE 10 ML: 5 INJECTION INTRAVENOUS at 06:57

## 2022-07-14 RX ADMIN — Medication 1 AMPULE: at 20:53

## 2022-07-14 RX ADMIN — ONDANSETRON 4 MG: 2 INJECTION INTRAMUSCULAR; INTRAVENOUS at 05:19

## 2022-07-14 RX ADMIN — NALOXEGOL OXALATE 25 MG: 25 TABLET, FILM COATED ORAL at 10:11

## 2022-07-14 RX ADMIN — Medication 3 UNITS: at 17:56

## 2022-07-14 RX ADMIN — Medication 3 UNITS: at 12:22

## 2022-07-14 RX ADMIN — SODIUM CHLORIDE, PRESERVATIVE FREE 10 ML: 5 INJECTION INTRAVENOUS at 14:37

## 2022-07-14 RX ADMIN — Medication 1 AMPULE: at 10:11

## 2022-07-14 RX ADMIN — SODIUM CHLORIDE, PRESERVATIVE FREE 10 ML: 5 INJECTION INTRAVENOUS at 22:15

## 2022-07-14 RX ADMIN — PANTOPRAZOLE SODIUM 40 MG: 40 TABLET, DELAYED RELEASE ORAL at 10:11

## 2022-07-14 RX ADMIN — ENOXAPARIN SODIUM 120 MG: 150 INJECTION SUBCUTANEOUS at 17:57

## 2022-07-14 RX ADMIN — AMIODARONE HYDROCHLORIDE 0.5 MG/MIN: 50 INJECTION, SOLUTION INTRAVENOUS at 11:04

## 2022-07-14 RX ADMIN — PIPERACILLIN AND TAZOBACTAM 3.38 G: 3; .375 INJECTION, POWDER, LYOPHILIZED, FOR SOLUTION INTRAVENOUS at 20:41

## 2022-07-14 RX ADMIN — Medication 3 UNITS: at 06:57

## 2022-07-14 RX ADMIN — TRAZODONE HYDROCHLORIDE 50 MG: 50 TABLET ORAL at 00:20

## 2022-07-14 RX ADMIN — MAGNESIUM SULFATE HEPTAHYDRATE: 500 INJECTION, SOLUTION INTRAMUSCULAR; INTRAVENOUS at 17:58

## 2022-07-14 RX ADMIN — PIPERACILLIN AND TAZOBACTAM 3.38 G: 3; .375 INJECTION, POWDER, LYOPHILIZED, FOR SOLUTION INTRAVENOUS at 12:22

## 2022-07-14 NOTE — PROGRESS NOTES
0193 Notified MD Reasoner: Pt is c/o difficulty sleeping. What do you recommend? MD instructed to place order for Trazodone 50mg x1    0515 Paged on call Gen surg MD.     [id number] Attempted to call again d/t No response. [id number] Attempted to page again d/t no response. Pt is in no distress at this time will pass along to oncoming RN. End of Shift Note    Bedside shift change report given to Chris Mcdonaldkylie Gallo (oncoming nurse) by Rafaela Plata (offgoing nurse). Report included the following information SBAR, Kardex, ED Summary, Intake/Output, MAR and Recent Results    Shift worked:  [id number]     Shift summary and any significant changes: This morning Pt had one episode of emesis about 700ml of green Bile. Pt in no distress at this time. Pt still hasn't had a BM and is now not passing any flatus. BS are hypoactive. Concerns for physician to address:       Zone phone for oncoming shift:          Activity:  Activity Level: Up with Assistance  Number times ambulated in hallways past shift: 0  Number of times OOB to chair past shift: 0    Cardiac:   Cardiac Monitoring: Yes      Cardiac Rhythm: Sinus Rhythm    Access:   Current line(s): PIV and central line     Genitourinary:   Urinary status: bernard    Respiratory:   O2 Device: None (Room air)  Chronic home O2 use?: NO  Incentive spirometer at bedside: YES  Actual Volume (ml): 1500 ml    GI:  Last Bowel Movement Date: 07/08/22  Current diet:  ADULT DIET Clear Liquid  ADULT ORAL NUTRITION SUPPLEMENT Breakfast, Dinner, Lunch; Clear Liquid  Passing flatus: YES  Tolerating current diet: YES       Pain Management:   Patient states pain is manageable on current regimen: YES    Skin:  Jose Guadalupe Score: 17  Interventions: speciality bed, float heels, increase time out of bed, foam dressing, PT/OT consult and internal/external urinary devices    Patient Safety:  Fall Score:  Total Score: 4  Interventions: bed/chair alarm, assistive device (walker, cane, etc), gripper socks, pt to call before getting OOB and stay with me (per policy)  High Fall Risk: Yes    Length of Stay:  Expected LOS: 8d 9h  Actual LOS: 12 [facility]

## 2022-07-14 NOTE — PROGRESS NOTES
Progress Note      7/14/2022 9:12 AM  NAME: Galilea Sage   MRN:  [id number]   Admit Diagnosis: Thrombocytopenia ([facility] [D69.6]  GI bleed [K92.2]  UGO (acute kidney injury) ([facility] [N17.9]      Problem List:     1. Idiopathic thrombocytopenia  2. Anemia  3. Rectal bleeding  4. Lactic acidosis  5. NSTEMI  6. Paroxysmal atrial fibrillation  7. Remote PE and DVT (separate events)  8. CAD w/ mild diffuse disease w/ patent stent in the RCA 7/17 cath. 9. XOL  10. Morbid obesity  11. DM  12. Former smoker     Assessment/Plan: TnI 2300  PLT recovered  HgB 8/9s    MPI w/ EF 55% and no definite ischemia. Recurrent afib 7/10. Back on dilt gtt. Converted to sinus. Recurrent Afib 7/12. Converted to sinus. 1. Continue oral amio 400mg; increase to TID, but discharge on 200mg daily  2. Continue amio gtt until discharge  3. Off ASA  4. Continue eliquis 5mg BID  5. Continue statin  6. TPN         [x]       High complexity decision making was performed in this patient at high risk for decompensation with multiple organ involvement. Subjective:     Galilea Sage denies chest pain, dyspnea. Discussed with RN events overnight. Review of Systems:    Symptom Y/N Comments  Symptom Y/N Comments   Fever/Chills N   Chest Pain N    Poor Appetite N   Edema N    Cough N   Abdominal Pain N    Sputum N   Joint Pain N    SOB/SHEARER N   Pruritis/Rash N    Nausea/vomit N   Tolerating PT/OT Y    Diarrhea N   Tolerating Diet Y    Constipation N   Other       Could NOT obtain due to:      Objective:      Physical Exam:    Last 24hrs VS reviewed since prior progress note.  Most recent are:    Visit Vitals  BP (!) 119/47 (BP 1 Location: Left upper arm, BP Patient Position: At rest)   Pulse 67   Temp 97.7 °F (36.5 °C)   Resp 21   Ht 6' 4\" (1.93 m)   Wt 126.8 kg (279 lb 8.7 oz)   SpO2 94%   BMI 34.03 kg/m²       Intake/Output Summary (Last 24 hours) at 7/14/2022 1309  Last data filed at 7/14/2022 0300  Gross per 24 hour   Intake --   Output 2060 ml   Net -2060 ml        General Appearance: Well developed, well nourished, alert & oriented x 3,    no acute distress. Ears/Nose/Mouth/Throat: Hearing grossly normal.  Neck: Supple. Chest: Lungs clear to auscultation bilaterally. Cardiovascular: Regular rate and rhythm, S1S2 normal, no murmur. Abdomen: Soft, non-tender, bowel sounds are active. Extremities: No edema bilaterally. Skin: Warm and dry. Petechiae. []         Post-cath site without hematoma, bruit, tenderness, or thrill. Distal pulses intact. PMH/SH reviewed - no change compared to H&P    Data Review    Telemetry: sr    EKG:   [x]  No new EKG for review    Lab Data Personally Reviewed:    Recent Labs     07/14/22  0503 07/13/22  0251   WBC 9.0 10.9   HGB 7.4* 8.1*   HCT 23.5* 25.7*    157     No results for input(s): INR, PTP, APTT, INREXT, INREXT in the last 72 hours. Recent Labs     07/14/22  0503 07/13/22  0251   * 132*   K 4.1 4.2    106   CO2 21 19*   BUN 9 12   CREA 0.92 0.97   * 138*   CA 8.1* 8.2*   MG 2.1 2.0     No results for input(s): CPK, CKNDX, TROIQ in the last 72 hours. No lab exists for component: CPKMB  Lab Results   Component Value Date/Time    Cholesterol, total 133 06/07/2013 02:40 PM    HDL Cholesterol 43 06/07/2013 02:40 PM    LDL, calculated 73.4 06/07/2013 02:40 PM    Triglyceride 83 06/07/2013 02:40 PM    CHOL/HDL Ratio 3.1 06/07/2013 02:40 PM       No results for input(s): AP, TBIL, TP, ALB, GLOB, GGT, AML, LPSE in the last 72 hours. No lab exists for component: SGOT, GPT, AMYP, HLPSE  No results for input(s): PH, PCO2, PO2 in the last 72 hours.     Medications Personally Reviewed:    Current Facility-Administered Medications   Medication Dose Route Frequency    TPN ADULT - CENTRAL   IntraVENous CONTINUOUS    naloxegoL (MOVANTIK) tablet 25 mg  25 mg Oral ACB    amiodarone (CORDARONE) 375 mg/250 mL D5W infusion  0.5 mg/min IntraVENous CONTINUOUS  amiodarone (CORDARONE) tablet 400 mg  400 mg Oral Q8H    pantoprazole (PROTONIX) tablet 40 mg  40 mg Oral ACB    dextrose 5% - 0.45% NaCl with KCl 20 mEq/L infusion  50 mL/hr IntraVENous CONTINUOUS    HYDROmorphone (DILAUDID) injection 0.5-1 mg  0.5-1 mg IntraVENous Q2H PRN    HYDROcodone-acetaminophen (NORCO) 5-325 mg per tablet 1-2 Tablet  1-2 Tablet Oral Q4H PRN    alcohol 62% (NOZIN) nasal  1 Ampule  1 Ampule Topical Q12H    piperacillin-tazobactam (ZOSYN) 3.375 g in 0.9% sodium chloride (MBP/ADV) 100 mL MBP  3.375 g IntraVENous Q8H    apixaban (ELIQUIS) tablet 5 mg  5 mg Oral BID    phenol throat spray (CHLORASEPTIC) 1 Spray  1 Spray Oral PRN    insulin lispro (HUMALOG) injection   SubCUTAneous Q6H    calcium carbonate (TUMS) chewable tablet 400 mg [elemental]  400 mg Oral TID PRN    glucose chewable tablet 16 g  4 Tablet Oral PRN    glucagon (GLUCAGEN) injection 1 mg  1 mg IntraMUSCular PRN    dextrose 10 % infusion 0-250 mL  0-250 mL IntraVENous PRN    nitroglycerin (NITROSTAT) tablet 0.4 mg  0.4 mg SubLINGual PRN    atorvastatin (LIPITOR) tablet 20 mg  20 mg Oral QHS    vitamin O-Y-X-lutein-minerals (OCUVITE) tablet 1 Tablet  1 Tablet Oral DAILY    sodium chloride (NS) flush 5-40 mL  5-40 mL IntraVENous Q8H    sodium chloride (NS) flush 5-40 mL  5-40 mL IntraVENous PRN    acetaminophen (TYLENOL) tablet 650 mg  650 mg Oral Q6H PRN    Or    acetaminophen (TYLENOL) suppository 650 mg  650 mg Rectal Q6H PRN    polyethylene glycol (MIRALAX) packet 17 g  17 g Oral DAILY PRN    ondansetron (ZOFRAN ODT) tablet 4 mg  4 mg Oral Q8H PRN    Or    ondansetron (ZOFRAN) injection 4 mg  4 mg IntraVENous Q6H PRN         Quincy Adamson III, DO

## 2022-07-14 NOTE — PROGRESS NOTES
Spoke to the Pharmacist regarding patient's PO meds. Will change Eliquis to Lovenox and continue Amio gtt.

## 2022-07-14 NOTE — PROGRESS NOTES
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 Received 3 doses IVIG, did not tolerate final IVIG infusion despite. PLT normalized  Hemo/onc on board     SBO  Repeat CT a/p on  showed partial SBO pattern. CT enterography showed distal partial SBO  Went to OR on  found to have SMALL BOWEL OBSTRUCTION  SECONDARY TO ILEAL PERFORATION, AND ABSCESS AND ADHESION AND ABDOMINAL HERNIA     N.p.o. by general surgery starting TPN tonight  Continue with Zosyn  Off NGT      GI bleed  Was to due severe ITP  S/p blood transfusion  Continue with protonix 40 daily (can switch to famotidine on discharge)  GI on board, holding off on EGD for now, conservative management. Paroxysmal atrial fibrillation w RVR  Elevated trop  HR is controlled now   Eliquis resumed. 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 Estimated discharge date: >48hrs  Code status: Full  Prophylaxis: Eliquis   Recommended Disposition: [facility]/Rehab.   Patient and wife requested PT eval prior to discharge, wife thinks the patient is too weak and need rehab     Subjective:     Chief Complaint / Reason for Physician Visit  Patient seen and examined today, he does not have abdominal pain but mention mild pain with touch    Review of Systems:  Symptom Y/N Comments  Symptom Y/N Comments   Fever/Chills    Chest Pain     Poor Appetite    Edema     Cough    Abdominal Pain     Sputum    Joint Pain     SOB/SHEARER    Pruritis/Rash     Nausea/vomit    Tolerating PT/OT     Diarrhea    Tolerating Diet     Constipation    Other       Could NOT obtain due to:      Objective:     VITALS:   Last 24hrs VS reviewed since prior progress note. Most recent are:  Patient Vitals for the past 24 hrs:   Temp Pulse Resp BP SpO2   07/14/22 1135 97.7 °F (36.5 °C) 67 21 (!) 119/47 94 %   07/14/22 0800 97.7 °F (36.5 °C) 76 20 (!) 123/54 95 %   07/14/22 0719 -- 75 -- 123/61 --   07/14/22 0300 98.6 °F (37 °C) 72 21 (!) 122/59 99 %   07/13/22 2254 98.4 °F (36.9 °C) (!) 107 22 (!) 119/50 95 %   07/13/22 2000 98.1 °F (36.7 °C) 70 23 (!) 117/55 99 %   07/13/22 1810 -- 74 20 (!) 147/65 98 %   07/13/22 1804 -- 79 25 (!) 147/71 99 %   07/13/22 1800 -- 78 26 (!) 129/57 99 %   07/13/22 1755 -- -- -- (!) 124/59 --   07/13/22 1749 98.2 °F (36.8 °C) 72 21 (!) 130/103 95 %   07/13/22 1602 97.8 °F (36.6 °C) 68 22 (!) 118/45 100 %       Intake/Output Summary (Last 24 hours) at 7/14/2022 1224  Last data filed at 7/14/2022 0300  Gross per 24 hour   Intake --   Output 2075 ml   Net -2075 ml        I had a face to face encounter and independently examined this patient as outlined below:  PHYSICAL EXAM:  General: WD, WN. Alert, cooperative, no acute distress    EENT:  EOMI. Anicteric sclerae. MMM  Resp:  CTA bilaterally, no wheezing or rales. No accessory muscle use  CV:  Irregularly irregular rhythm,  No edema  GI/:  Soft, Non distended, Non tender. hypoactive Bowel sounds. NAHID drain. Midline dressing.  Lopez in place    Neurologic:  Alert and oriented X 3, normal speech   Psych: Good insight. Not anxious nor agitated  Skin:    No jaundice      Reviewed most current lab test results and cultures  YES  Reviewed most current radiology test results   YES  Review and summation of old records today    NO  Reviewed patient's current orders and MAR    YES  PMH/SH reviewed - no change compared to H&P  ________________________________________________________________________  Care Plan discussed with:    Comments   Patient X    Family  X    RN X    Care Manager x    Consultant                       x Multidiciplinary team rounds were held today with , nursing, pharmacist and clinical coordinator. Patient's plan of care was discussed; medications were reviewed and discharge planning was addressed. ________________________________________________________________________  Total NON critical care TIME:  35   Minutes    Total CRITICAL CARE TIME Spent:   Minutes non procedure based      Comments   >50% of visit spent in counseling and coordination of care X    ________________________________________________________________________  Ashkan Ochoa MD     Procedures: see electronic medical records for all procedures/Xrays and details which were not copied into this note but were reviewed prior to creation of Plan. LABS:  I reviewed today's most current labs and imaging studies. Pertinent labs include:  Recent Labs     07/14/22  0503 07/13/22 0251   WBC 9.0 10.9   HGB 7.4* 8.1*   HCT 23.5* 25.7*    157     Recent Labs     07/14/22  0503 07/13/22  0251   * 132*   K 4.1 4.2    106   CO2 21 19*   * 138*   BUN 9 12   CREA 0.92 0.97   CA 8.1* 8.2*   MG 2.1 2.0   PHOS 3.1 2.4*       Signed:  Ashkan Ochoa MD

## 2022-07-14 NOTE — PROGRESS NOTES
NG tube placed by Meghna Craft RN awaiting KUB verification before placing NG tube to [facility].

## 2022-07-14 NOTE — PROGRESS NOTES
Admit Date: 2022    POD 6 Day Post-Op    Procedure:  Procedure(s): INFUSION CATHETER INSERTION    Subjective:     Patient alert and interactive, had recurrent emesis overnight, now feeling better. No BM. Has not been tolerating clears    Objective:     Blood pressure (!) 123/54, pulse 76, temperature 97.7 °F (36.5 °C), resp. rate 20, height 6' 4\" (1.93 m), weight 276 lb 14.4 oz (125.6 kg), SpO2 95 %.     Temp (24hrs), Av.1 °F (36.7 °C), Min:97.7 °F (36.5 °C), Max:98.6 °F (37 °C)    NGT o/p 900 cc, thin green bilious  Physical Exam:  GENERAL: alert, cooperative, no distress, appears stated age, LUNG: clear to auscultation bilaterally, HEART: regular rate and rhythm, ABDOMEN: soft, appropriately tender, wounds dressed and dry, NAHID o/p serous, EXTREMITIES:  extremities normal, atraumatic, no cyanosis or edema    Labs:   Recent Results (from the past 24 hour(s))   GLUCOSE, POC    Collection Time: 22 12:12 PM   Result Value Ref Range    Glucose (POC) 175 (H) 65 - 117 mg/dL    Performed by Theodor Leghorn PCT    GLUCOSE, POC    Collection Time: 22  5:36 PM   Result Value Ref Range    Glucose (POC) 146 (H) 65 - 117 mg/dL    Performed by Theodor Leghorn PCT    GLUCOSE, POC    Collection Time: 22 11:19 PM   Result Value Ref Range    Glucose (POC) 150 (H) 65 - 117 mg/dL    Performed by [facility]    MAGNESIUM    Collection Time: 22  5:03 AM   Result Value Ref Range    Magnesium 2.1 1.6 - 2.4 mg/dL   PHOSPHORUS    Collection Time: 22  5:03 AM   Result Value Ref Range    Phosphorus 3.1 2.6 - 4.7 MG/DL   CBC W/O DIFF    Collection Time: 22  5:03 AM   Result Value Ref Range    WBC 9.0 4.1 - 11.1 K/uL    RBC 2.38 (L) 4.10 - 5.70 M/uL    HGB 7.4 (L) 12.1 - 17.0 g/dL    HCT 23.5 (L) 36.6 - 50.3 %    MCV 98.7 80.0 - 99.0 FL    MCH 31.1 26.0 - 34.0 PG    MCHC 31.5 30.0 - 36.5 g/dL    RDW 16.5 (H) 11.5 - 14.5 %    PLATELET 127 679 - 511 K/uL    MPV 9.6 8.9 - 12.9 FL    NRBC 0.0 0 PER 100 WBC    ABSOLUTE NRBC 0.00 0.00 - 8.52 K/uL   METABOLIC PANEL, BASIC    Collection Time: 07/14/22  5:03 AM   Result Value Ref Range    Sodium 135 (L) 136 - 145 mmol/L    Potassium 4.1 3.5 - 5.1 mmol/L    Chloride 108 97 - 108 mmol/L    CO2 21 21 - 32 mmol/L    Anion gap 6 5 - 15 mmol/L    Glucose 165 (H) 65 - 100 mg/dL    BUN 9 6 - 20 MG/DL    Creatinine 0.92 0.70 - 1.30 MG/DL    BUN/Creatinine ratio 10 (L) 12 - 20      GFR est AA >60 >60 ml/min/1.73m2    GFR est non-AA >60 >60 ml/min/1.73m2    Calcium 8.1 (L) 8.5 - 10.1 MG/DL   GLUCOSE, POC    Collection Time: 07/14/22  6:20 AM   Result Value Ref Range    Glucose (POC) 173 (H) 65 - 117 mg/dL    Performed by Mauricio Ulrich PCT        Data Review images and reports reviewed    Assessment:     Active Problems:     Morbid obesity ([address] (6/7/2013)      Thrombocytopenia ([address] (6/27/2022)      GI bleed (6/27/2022)      UGO (acute kidney injury) ([address] (6/27/2022)      Atrial fibrillation ([address] (7/3/2022)      Elevated troponin (7/3/2022)      Ileus ([address] (7/7/2022)      Intestinal adhesions with partial obstruction ([address] (7/8/2022)      Ventral hernia (7/8/2022)      Intestinal perforation ([address] (7/8/2022)        Plan/Recommendations/Medical Decision Making:     Continue present treatment   NPO for now, start TPN  Abdominal films  Increase activity  IS  Afib per cardiology  Continue errol Ferrer MD  [facility]

## 2022-07-14 NOTE — PROGRESS NOTES
Problem: Mobility Impaired (Adult and Pediatric)  Goal: *Acute Goals and Plan of Care (Insert Text)  Description: FUNCTIONAL STATUS PRIOR TO ADMISSION: Patient was modified independent using a rollator for functional mobility. HOME SUPPORT PRIOR TO ADMISSION: The patient lived with wife at [address] but did not require assist.    Physical Therapy Goals  Initiated 7/2/2022 - remain appropriate 7/11/2022  1. Patient will move from supine to sit and sit to supine , scoot up and down, and roll side to side in bed with modified independence within 7 day(s). 2.  Patient will transfer from bed to chair and chair to bed with modified independence using the least restrictive device within 7 day(s). 3.  Patient will perform sit to stand with modified independence within 7 day(s). 4.  Patient will ambulate with modified independence for 150 feet with the least restrictive device within 7 day(s). Outcome: Progressing Towards Goal   PHYSICAL THERAPY TREATMENT  Patient: Alverto Michel (52 y.o. male)  Date: 7/14/2022  Diagnosis: Thrombocytopenia ([facility] [D69.6]  GI bleed [K92.2]  UGO (acute kidney injury) ([facility] [N17.9] <principal problem not specified>  Procedure(s) (LRB):  INFUSION CATHETER INSERTION (N/A) 1 Day Post-Op  Precautions:    Chart, physical therapy assessment, plan of care and goals were reviewed. ASSESSMENT  Patient continues with skilled PT services and is progressing towards mobility goals, but is most limited by decreased stamina/endurance/activity tolerance. Pt received sitting in recliner, agreeable to PT session. Session started with BLE exercises in long sit and sitting. He continues to move well, but is still not ambulating household distances and fatigues, requiring prolonged time to recover. Pt only able to ambulate 2ft farther this session compared to last session and declined a second gait trial after resting due to fatigue.  VSS t/o session and O2 sats balajil on RA. Son arrived and observed the session. He expressed concern about the pt returning home and stated they called the healthcare side of [facility] today. Pt will need rehab at discharge, as his wife will not be able to manage him at his current level of function. Current Level of Function Impacting Discharge (mobility/balance):     Transfers:  Sit to Stand: Stand-by assistance  Stand to Sit: Stand-by assistance          Ambulation/Gait Training:  Distance (ft): 12 Feet (ft)  Assistive Device: Gait belt;Walker, rolling  Ambulation - Level of Assistance: Stand-by assistance;Contact guard assistance     Other factors to consider for discharge: below his baseline, impaired activity tolerance         PLAN :  Patient continues to benefit from skilled intervention to address the above impairments. Continue treatment per established plan of care. to address goals. Recommendation for discharge: (in order for the patient to meet his/her long term goals)  Therapy up to 5 days/week in SNF setting    This discharge recommendation:  A follow-up discussion with the attending provider and/or case management is planned    IF patient discharges home will need the following DME: patient owns DME required for discharge       SUBJECTIVE:   Patient stated I told them I wanted to get up yesterday and they ignored me.     OBJECTIVE DATA SUMMARY:   Critical Behavior:  Neurologic State: Alert  Orientation Level: Oriented X4  Cognition: Appropriate decision making,Follows commands  Safety/Judgement: Awareness of environment,Fall prevention,Home safety,Insight into deficits  Functional Mobility Training:  Bed Mobility:   Deferred, pt seated in recliner on arrival         Transfers:  Sit to Stand: Stand-by assistance  Stand to Sit: Stand-by assistance          Ambulation/Gait Training:  Distance (ft): 12 Feet (ft)  Assistive Device: Gait belt;Walker, rolling  Ambulation - Level of Assistance: Stand-by assistance;Contact guard assistance        Gait Abnormalities: Decreased step clearance        Base of Support: Widened     Speed/Beatriz: Pace decreased (<100 feet/min)  Step Length: Left shortened;Right shortened            Therapeutic Exercises:   BLE exercises in long sit and sitting:  isometric and active    Pain Rating:  None reported    Activity Tolerance:   Fair and requires rest breaks    After treatment patient left in no apparent distress:   Sitting in chair, Call bell within reach, and Caregiver / family present    COMMUNICATION/COLLABORATION:   The patients plan of care was discussed with: Registered nurse.      Dalton Griffiths, PT   Time Calculation: 40 mins

## 2022-07-14 NOTE — PROGRESS NOTES
Transition of Care Plan:     RUR:15%  Disposition:[facility] [employer] side vs Return to [facility])  Follow up appointments: To be done prior to discharge if going home  DME needed:None  Transportation at [place] [id number] or means to access home:  Wife has keys      IM Medicare Letter: Given 7/11/2022  Is patient a BCPI-A Bundle:  No                    If yes, was Bundle Letter given?:  N/A  Is patient a  and connected with the [facility]?  No              If yes, was [facility] transfer form completed and [facility] notified? N/A   Caregiver Contact:Wife  Discharge Caregiver contacted prior to discharge? Caregiver to be contacted prior to discharge  Care Conference needed?: Not at this time    1:23pm Ino Katz in Admissions with [facility] called CM via phone to inquired about pt's discharge. 11:00am-CM met with pt, pt's wife and pt's son Savage James at bedside to discuss d/c plan. Pt's son inquired about the healthcare side of [facility]. Pt's son and pt's wife was in agreement of CM sending referral to [facility] for SNF as an option for pt. Pt's son signed [facility] document for pt and [facility] document placed in pt's bedside chart. Referral sent to SNF via [employer]. CM will continue to follow patient for discharge planning needs and arrange for services as deemed necessary.     Steve Morillo [address] [place] [address]  [phone]

## 2022-07-14 NOTE — PROGRESS NOTES
Nutrition: Chart reviewed and patient discussed during PCU rounds. Plans to start TPN this evening; patient now NPO. Patient has been on clear liquids/NPO for an extended period of time. Monitor lytes daily and advance TPN only if they're stable. Day 1: AA5% D20% @ 42 ml/hr  Day 2: AA5% D20% @ 63 mL/hr  Day 3: AA5% D20% @ 83 mL/hr + 500 mL 20% lipids 3x/week (provides 2182 kcal, 100g protein, 398g CHO). Estimated kcal needs: 6723-9489 kcals (BMR 2087 x 1. 2AF -300kcal)  Estimated protein needs: 102g protein (0.8 g/kg bw)    Nutrition will continue to follow. Thanks.    Alvin Hollins, [address]   Ext [phone]

## 2022-07-14 NOTE — PROGRESS NOTES
Spoke to Krishna Dietz NP from General Surgery regarding NG tube orders. Clarification received and notified Lizet Juarez NP of family requesting to speak to MD about plan of care.

## 2022-07-15 ENCOUNTER — APPOINTMENT (OUTPATIENT)
Dept: GENERAL RADIOLOGY | Age: 77
DRG: 981 | End: 2022-07-15
Attending: NURSE PRACTITIONER
Payer: MEDICARE

## 2022-07-15 LAB
ANION GAP SERPL CALC-SCNC: 7 MMOL/L (ref 5–15)
BASOPHILS # BLD: 0 K/UL (ref 0–0.1)
BASOPHILS NFR BLD: 0 % (ref 0–1)
BUN SERPL-MCNC: 12 MG/DL (ref 6–20)
BUN/CREAT SERPL: 12 (ref 12–20)
CALCIUM SERPL-MCNC: 7.9 MG/DL (ref 8.5–10.1)
CHLORIDE SERPL-SCNC: 108 MMOL/L (ref 97–108)
CO2 SERPL-SCNC: 22 MMOL/L (ref 21–32)
CREAT SERPL-MCNC: 0.97 MG/DL (ref 0.7–1.3)
DIFFERENTIAL METHOD BLD: ABNORMAL
EOSINOPHIL # BLD: 0.1 K/UL (ref 0–0.4)
EOSINOPHIL NFR BLD: 1 % (ref 0–7)
ERYTHROCYTE [DISTWIDTH] IN BLOOD BY AUTOMATED COUNT: 16.5 % (ref 11.5–14.5)
GLUCOSE BLD STRIP.AUTO-MCNC: 155 MG/DL (ref 65–117)
GLUCOSE BLD STRIP.AUTO-MCNC: 177 MG/DL (ref 65–117)
GLUCOSE BLD STRIP.AUTO-MCNC: 204 MG/DL (ref 65–117)
GLUCOSE BLD STRIP.AUTO-MCNC: 223 MG/DL (ref 65–117)
GLUCOSE SERPL-MCNC: 183 MG/DL (ref 65–100)
HCT VFR BLD AUTO: 21.7 % (ref 36.6–50.3)
HGB BLD-MCNC: 6.8 G/DL (ref 12.1–17)
HISTORY CHECKED?,CKHIST: NORMAL
IMM GRANULOCYTES # BLD AUTO: 0.1 K/UL (ref 0–0.04)
IMM GRANULOCYTES NFR BLD AUTO: 1 % (ref 0–0.5)
LYMPHOCYTES # BLD: 1.1 K/UL (ref 0.8–3.5)
LYMPHOCYTES NFR BLD: 14 % (ref 12–49)
MAGNESIUM SERPL-MCNC: 2.1 MG/DL (ref 1.6–2.4)
MCH RBC QN AUTO: 31.1 PG (ref 26–34)
MCHC RBC AUTO-ENTMCNC: 31.3 G/DL (ref 30–36.5)
MCV RBC AUTO: 99.1 FL (ref 80–99)
MONOCYTES # BLD: 0.5 K/UL (ref 0–1)
MONOCYTES NFR BLD: 6 % (ref 5–13)
NEUTS SEG # BLD: 6.3 K/UL (ref 1.8–8)
NEUTS SEG NFR BLD: 78 % (ref 32–75)
NRBC # BLD: 0 K/UL (ref 0–0.01)
NRBC BLD-RTO: 0 PER 100 WBC
PHOSPHATE SERPL-MCNC: 2.8 MG/DL (ref 2.6–4.7)
PLATELET # BLD AUTO: 173 K/UL (ref 150–400)
PMV BLD AUTO: 9.8 FL (ref 8.9–12.9)
POTASSIUM SERPL-SCNC: 3.9 MMOL/L (ref 3.5–5.1)
RBC # BLD AUTO: 2.19 M/UL (ref 4.1–5.7)
RBC MORPH BLD: ABNORMAL
RBC MORPH BLD: ABNORMAL
SERVICE CMNT-IMP: ABNORMAL
SODIUM SERPL-SCNC: 137 MMOL/L (ref 136–145)
WBC # BLD AUTO: 8.1 K/UL (ref 4.1–11.1)

## 2022-07-15 PROCEDURE — 86923 COMPATIBILITY TEST ELECTRIC: CPT

## 2022-07-15 PROCEDURE — 84100 ASSAY OF PHOSPHORUS: CPT

## 2022-07-15 PROCEDURE — 74011000258 HC RX REV CODE- 258: Performed by: INTERNAL MEDICINE

## 2022-07-15 PROCEDURE — 86900 BLOOD TYPING SEROLOGIC ABO: CPT

## 2022-07-15 PROCEDURE — 74011250636 HC RX REV CODE- 250/636: Performed by: NURSE PRACTITIONER

## 2022-07-15 PROCEDURE — 74019 RADEX ABDOMEN 2 VIEWS: CPT

## 2022-07-15 PROCEDURE — 74011250636 HC RX REV CODE- 250/636: Performed by: SURGERY

## 2022-07-15 PROCEDURE — 65270000046 HC RM TELEMETRY

## 2022-07-15 PROCEDURE — 36430 TRANSFUSION BLD/BLD COMPNT: CPT

## 2022-07-15 PROCEDURE — 74011250637 HC RX REV CODE- 250/637: Performed by: SURGERY

## 2022-07-15 PROCEDURE — 74011636637 HC RX REV CODE- 636/637: Performed by: SURGERY

## 2022-07-15 PROCEDURE — 74011250636 HC RX REV CODE- 250/636: Performed by: INTERNAL MEDICINE

## 2022-07-15 PROCEDURE — 74011000258 HC RX REV CODE- 258: Performed by: SURGERY

## 2022-07-15 PROCEDURE — 36415 COLL VENOUS BLD VENIPUNCTURE: CPT

## 2022-07-15 PROCEDURE — 74011000250 HC RX REV CODE- 250: Performed by: SURGERY

## 2022-07-15 PROCEDURE — 74011000258 HC RX REV CODE- 258: Performed by: NURSE PRACTITIONER

## 2022-07-15 PROCEDURE — 85025 COMPLETE CBC W/AUTO DIFF WBC: CPT

## 2022-07-15 PROCEDURE — 82962 GLUCOSE BLOOD TEST: CPT

## 2022-07-15 PROCEDURE — P9040 RBC LEUKOREDUCED IRRADIATED: HCPCS

## 2022-07-15 PROCEDURE — 74011000250 HC RX REV CODE- 250: Performed by: NURSE PRACTITIONER

## 2022-07-15 PROCEDURE — 80048 BASIC METABOLIC PNL TOTAL CA: CPT

## 2022-07-15 PROCEDURE — C9113 INJ PANTOPRAZOLE SODIUM, VIA: HCPCS | Performed by: NURSE PRACTITIONER

## 2022-07-15 PROCEDURE — 83735 ASSAY OF MAGNESIUM: CPT

## 2022-07-15 PROCEDURE — 94760 N-INVAS EAR/PLS OXIMETRY 1: CPT

## 2022-07-15 RX ORDER — SODIUM CHLORIDE 9 MG/ML
250 INJECTION, SOLUTION INTRAVENOUS AS NEEDED
Status: DISCONTINUED | OUTPATIENT
Start: 2022-07-15 | End: 2022-07-25 | Stop reason: HOSPADM

## 2022-07-15 RX ADMIN — AMIODARONE HYDROCHLORIDE 0.5 MG/MIN: 50 INJECTION, SOLUTION INTRAVENOUS at 00:54

## 2022-07-15 RX ADMIN — PIPERACILLIN AND TAZOBACTAM 3.38 G: 3; .375 INJECTION, POWDER, LYOPHILIZED, FOR SOLUTION INTRAVENOUS at 12:20

## 2022-07-15 RX ADMIN — SODIUM CHLORIDE, PRESERVATIVE FREE 10 ML: 5 INJECTION INTRAVENOUS at 06:13

## 2022-07-15 RX ADMIN — Medication 4 UNITS: at 01:11

## 2022-07-15 RX ADMIN — Medication 3 UNITS: at 18:01

## 2022-07-15 RX ADMIN — SODIUM CHLORIDE, PRESERVATIVE FREE 10 ML: 5 INJECTION INTRAVENOUS at 18:01

## 2022-07-15 RX ADMIN — MAGNESIUM SULFATE HEPTAHYDRATE: 500 INJECTION, SOLUTION INTRAMUSCULAR; INTRAVENOUS at 18:05

## 2022-07-15 RX ADMIN — Medication 4 UNITS: at 06:17

## 2022-07-15 RX ADMIN — Medication 3 UNITS: at 12:10

## 2022-07-15 RX ADMIN — ENOXAPARIN SODIUM 120 MG: 150 INJECTION SUBCUTANEOUS at 06:18

## 2022-07-15 RX ADMIN — AMIODARONE HYDROCHLORIDE 0.5 MG/MIN: 50 INJECTION, SOLUTION INTRAVENOUS at 18:40

## 2022-07-15 RX ADMIN — SODIUM CHLORIDE 40 MG: 9 INJECTION, SOLUTION INTRAMUSCULAR; INTRAVENOUS; SUBCUTANEOUS at 08:54

## 2022-07-15 RX ADMIN — Medication 1 AMPULE: at 20:53

## 2022-07-15 RX ADMIN — PIPERACILLIN AND TAZOBACTAM 3.38 G: 3; .375 INJECTION, POWDER, LYOPHILIZED, FOR SOLUTION INTRAVENOUS at 20:53

## 2022-07-15 RX ADMIN — PIPERACILLIN AND TAZOBACTAM 3.38 G: 3; .375 INJECTION, POWDER, LYOPHILIZED, FOR SOLUTION INTRAVENOUS at 05:57

## 2022-07-15 RX ADMIN — POTASSIUM CHLORIDE, DEXTROSE MONOHYDRATE AND SODIUM CHLORIDE 50 ML/HR: 150; 5; 450 INJECTION, SOLUTION INTRAVENOUS at 06:18

## 2022-07-15 RX ADMIN — Medication 1 AMPULE: at 08:53

## 2022-07-15 NOTE — PROGRESS NOTES
Progress Note      7/15/2022 9:12 AM  NAME: Jina Irving   MRN:  [id number]   Admit Diagnosis: Thrombocytopenia ([facility] [address] [D69.6]  GI bleed [K92.2]  UGO (acute kidney injury) ([facility] [address] [N17.9]      Problem List:     1. Idiopathic thrombocytopenia  2. Anemia  3. Rectal bleeding  4. Lactic acidosis  5. NSTEMI  6. Paroxysmal atrial fibrillation  7. Remote PE and DVT (separate events)  8. CAD w/ mild diffuse disease w/ patent stent in the RCA 7/17 cath. 9. XOL  10. Morbid obesity  11. DM  12. Former smoker     Assessment/Plan: TnI 2300  PLT recovered  HgB 8/9s    MPI w/ EF 55% and no definite ischemia. Recurrent afib 7/10. Back on dilt gtt. Converted to sinus. Recurrent Afib 7/12. Converted to sinus. 1. Continue oral amio 400mg; increase to TID, but discharge on 200mg daily  2. Continue amio gtt until discharge  3. Off ASA  4. Off eliquis 5mg BID; on lovenox  5. Continue statin  6. TPN         [x]       High complexity decision making was performed in this patient at high risk for decompensation with multiple organ involvement. Subjective:     Jina Irving denies chest pain, dyspnea. Had 2 BMs. Discussed with RN events overnight. Review of Systems:    Symptom Y/N Comments  Symptom Y/N Comments   Fever/Chills N   Chest Pain N    Poor Appetite N   Edema N    Cough N   Abdominal Pain N    Sputum N   Joint Pain N    SOB/SHEARER N   Pruritis/Rash N    Nausea/vomit N   Tolerating PT/OT Y    Diarrhea N   Tolerating Diet Y    Constipation N   Other       Could NOT obtain due to:      Objective:      Physical Exam:    Last 24hrs VS reviewed since prior progress note.  Most recent are:    Visit Vitals  BP (!) 134/50 (BP 1 Location: Left upper arm, BP Patient Position: At rest)   Pulse 64   Temp 98.6 °F (37 °C)   Resp 17   Ht 6' 4\" (1.93 m)   Wt 137 kg (302 lb 0.5 oz)   SpO2 96%   BMI 36.76 kg/m²       Intake/Output Summary (Last 24 hours) at 7/15/2022 1358  Last data filed at 7/15/2022 0700  Gross per 24 hour   Intake 0 ml   Output 1800 ml   Net -1800 ml        General Appearance: Well developed, well nourished, alert & oriented x 3,    no acute distress. Ears/Nose/Mouth/Throat: Hearing grossly normal.  Neck: Supple. Chest: Lungs clear to auscultation bilaterally. Cardiovascular: Regular rate and rhythm, S1S2 normal, no murmur. Abdomen: Soft, non-tender, bowel sounds are active. Extremities: No edema bilaterally. Skin: Warm and dry. Petechiae. []         Post-cath site without hematoma, bruit, tenderness, or thrill. Distal pulses intact. PMH/ reviewed - no change compared to H&P    Data Review    Telemetry: sr    EKG:   [x]  No new EKG for review    Lab Data Personally Reviewed:    Recent Labs     07/15/22  0515 07/14/22  0503   WBC 8.1 9.0   HGB 6.8* 7.4*   HCT 21.7* 23.5*    174     No results for input(s): INR, PTP, APTT, INREXT, INREXT in the last 72 hours. Recent Labs     07/15/22  0515 07/14/22  0503 07/13/22  0251    135* 132*   K 3.9 4.1 4.2    108 106   CO2 22 21 19*   BUN 12 9 12   CREA 0.97 0.92 0.97   * 165* 138*   CA 7.9* 8.1* 8.2*   MG 2.1 2.1 2.0     No results for input(s): CPK, CKNDX, TROIQ in the last 72 hours. No lab exists for component: CPKMB  Lab Results   Component Value Date/Time    Cholesterol, total 133 06/07/2013 02:40 PM    HDL Cholesterol 43 06/07/2013 02:40 PM    LDL, calculated 73.4 06/07/2013 02:40 PM    Triglyceride 83 06/07/2013 02:40 PM    CHOL/HDL Ratio 3.1 06/07/2013 02:40 PM       No results for input(s): AP, TBIL, TP, ALB, GLOB, GGT, AML, LPSE in the last 72 hours. No lab exists for component: SGOT, GPT, AMYP, HLPSE  No results for input(s): PH, PCO2, PO2 in the last 72 hours.     Medications Personally Reviewed:    Current Facility-Administered Medications   Medication Dose Route Frequency    TPN ADULT - CENTRAL   IntraVENous CONTINUOUS    0.9% sodium chloride infusion 250 mL  250 mL IntraVENous PRN    TPN ADULT - CENTRAL   IntraVENous CONTINUOUS    pantoprazole (PROTONIX) 40 mg in 0.9% sodium chloride 10 mL injection  40 mg IntraVENous DAILY    enoxaparin (LOVENOX) injection 120 mg  120 mg SubCUTAneous Q12H    amiodarone (CORDARONE) 375 mg/250 mL D5W infusion  0.5 mg/min IntraVENous CONTINUOUS    amiodarone (CORDARONE) tablet 400 mg  400 mg Oral Q8H    HYDROmorphone (DILAUDID) injection 0.5-1 mg  0.5-1 mg IntraVENous Q2H PRN    HYDROcodone-acetaminophen (NORCO) 5-325 mg per tablet 1-2 Tablet  1-2 Tablet Oral Q4H PRN    alcohol 62% (NOZIN) nasal  1 Ampule  1 Ampule Topical Q12H    piperacillin-tazobactam (ZOSYN) 3.375 g in 0.9% sodium chloride (MBP/ADV) 100 mL MBP  3.375 g IntraVENous Q8H    [Held by provider] apixaban (ELIQUIS) tablet 5 mg  5 mg Oral BID    phenol throat spray (CHLORASEPTIC) 1 Spray  1 Spray Oral PRN    insulin lispro (HUMALOG) injection   SubCUTAneous Q6H    calcium carbonate (TUMS) chewable tablet 400 mg [elemental]  400 mg Oral TID PRN    glucose chewable tablet 16 g  4 Tablet Oral PRN    glucagon (GLUCAGEN) injection 1 mg  1 mg IntraMUSCular PRN    dextrose 10 % infusion 0-250 mL  0-250 mL IntraVENous PRN    nitroglycerin (NITROSTAT) tablet 0.4 mg  0.4 mg SubLINGual PRN    [Held by provider] atorvastatin (LIPITOR) tablet 20 mg  20 mg Oral QHS    [Held by provider] vitamin O-Y-Q-lutein-minerals (OCUVITE) tablet 1 Tablet  1 Tablet Oral DAILY    sodium chloride (NS) flush 5-40 mL  5-40 mL IntraVENous Q8H    sodium chloride (NS) flush 5-40 mL  5-40 mL IntraVENous PRN    acetaminophen (TYLENOL) tablet 650 mg  650 mg Oral Q6H PRN    Or    acetaminophen (TYLENOL) suppository 650 mg  650 mg Rectal Q6H PRN    polyethylene glycol (MIRALAX) packet 17 g  17 g Oral DAILY PRN    ondansetron (ZOFRAN ODT) tablet 4 mg  4 mg Oral Q8H PRN    Or    ondansetron (ZOFRAN) injection 4 mg  4 mg IntraVENous Q6H PRN         Lauren Danker III, DO

## 2022-07-15 NOTE — PROGRESS NOTES
Physical Therapy    Pt still awaiting his blood transfusion and now has a NG tube. Will defer and continue to follow.

## 2022-07-15 NOTE — ROUTINE PROCESS
End of Shift Note    Bedside shift change report given to  (oncoming nurse) by Elise Hwang RN (offgoing nurse). Report included the following information SBAR, Kardex, MAR, Accordion, Recent Results, Cardiac Rhythm NSR., Alarm Parameters  and Quality Measures    Shift worked: 07/15 [id number]     Shift summary and any significant changes:     Pt sat in chair most of day with visitors. Hgb 6.8 Blood transfusion to be completed around 2050. Concerns for physician to address: No new changes. Zone phone for oncoming shift:         Activity:  Activity Level: Bed Rest,Ambulate X (#)  Number times ambulated in hallways past shift: 0  Number of times OOB to chair past shift: 2    Cardiac:   Cardiac Monitoring: Yes      Cardiac Rhythm: Sinus Rhythm    Access:   Current line(s): central line     Genitourinary:   Urinary status: bernard    Respiratory:   O2 Device: None (Room air)  Chronic home O2 use?: NO  Incentive spirometer at bedside: NO  Actual Volume (ml): 1500 ml    GI:  Last Bowel Movement Date: 07/14/22  Current diet:  TPN ADULT - CENTRAL  DIET NPO  TPN ADULT - CENTRAL  Passing flatus: YES  Tolerating current diet: YES       Pain Management:   Patient states pain is manageable on current regimen: N/A    Skin:  Jose Guadalupe Score: 16  Interventions: speciality bed, float heels, increase time out of bed, limit briefs and internal/external urinary devices    Patient Safety:  Fall Score:  Total Score: 4  Interventions: bed/chair alarm, gripper socks and pt to call before getting OOB  High Fall Risk: Yes    Length of Stay:  Expected LOS: 8d 9h  Actual LOS: Vimal Barber RN

## 2022-07-15 NOTE — PROGRESS NOTES
End of Shift Note    Bedside shift change report given to Jade Gallegos RN (oncoming nurse) by Antonio Storey (offgoing nurse). Report included the following information SBAR, Kardex, ED Summary, Intake/Output, MAR and Recent Results    Shift worked:  [id number]     Shift summary and any significant changes:     Informed oncoming RN that hgb 6.8. Concerns for physician to address:       Zone phone for oncoming shift:          Activity:  Activity Level: Up with Assistance  Number times ambulated in hallways past shift: 0  Number of times OOB to chair past shift: 1    Cardiac:   Cardiac Monitoring: Yes      Cardiac Rhythm: Sinus Rhythm    Access:   Current line(s): PIV and central line     Genitourinary:   Urinary status: bernard    Respiratory:   O2 Device: None (Room air)  Chronic home O2 use?: NO  Incentive spirometer at bedside: YES  Actual Volume (ml): 1500 ml    GI:  Last Bowel Movement Date: 07/08/22  Current diet:  ADULT ORAL NUTRITION SUPPLEMENT Breakfast, Dinner, Lunch; Clear Liquid  TPN ADULT - CENTRAL  DIET NPO  Passing flatus: YES  Tolerating current diet: YES       Pain Management:   Patient states pain is manageable on current regimen: YES    Skin:  Jose Guadalupe Score: 17  Interventions: turn team, speciality bed, float heels, increase time out of bed, PT/OT consult and internal/external urinary devices    Patient Safety:  Fall Score:  Total Score: 4  Interventions: bed/chair alarm, assistive device (walker, cane, etc), gripper socks, pt to call before getting OOB and stay with me (per policy)  High Fall Risk: Yes    Length of Stay:  Expected LOS: 8d 9h  Actual LOS: [address]

## 2022-07-15 NOTE — PROGRESS NOTES
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 Received 3 doses IVIG, did not tolerate final IVIG infusion despite. PLT normalized  Hemo/onc on board     SBO  Repeat CT a/p on  showed partial SBO pattern. CT enterography showed distal partial SBO  Went to OR on  found to have SMALL BOWEL OBSTRUCTION  SECONDARY TO ILEAL PERFORATION, AND ABSCESS AND ADHESION AND ABDOMINAL HERNIA     N.p.o. by general surgery starting TPN tonight  Continue with Zosyn  Off NGT      GI bleed  Was to due severe ITP  S/p blood transfusion  Continue with protonix 40 daily (can switch to famotidine on discharge)  GI on board, holding off on EGD for now, conservative management. 7/15 again hemoglobin 6.8 but there is no blood in the stool has no hematuria we will transfuse 1 unit of blood  Hold tonight Lovenox dose    Paroxysmal atrial fibrillation w RVR  Elevated trop  HR is controlled now   On enoxaparin at the patient n.p.o.  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   Estimated discharge date: >48hrs  Code status: Full  Prophylaxis: Eliquis   Recommended Disposition: SAH/Rehab. Patient and wife requested PT eval prior to discharge, wife thinks the patient is too weak and need rehab     Subjective:     Chief Complaint / Reason for Physician Visit  Patient seen and examined today, he does not have abdominal pain but mention mild pain with touch    Review of Systems:  Symptom Y/N Comments  Symptom Y/N Comments   Fever/Chills    Chest Pain     Poor Appetite    Edema     Cough    Abdominal Pain     Sputum    Joint Pain     SOB/SHEARER    Pruritis/Rash     Nausea/vomit    Tolerating PT/OT     Diarrhea    Tolerating Diet     Constipation    Other       Could NOT obtain due to:      Objective:     VITALS:   Last 24hrs VS reviewed since prior progress note. Most recent are:  Patient Vitals for the past 24 hrs:   Temp Pulse Resp BP SpO2   07/15/22 1040 -- 71 20 -- 95 %   07/15/22 1038 98.6 °F (37 °C) 71 20 (!) 134/50 95 %   07/15/22 0700 98.5 °F (36.9 °C) (!) 58 14 (!) 121/43 98 %   07/15/22 0600 -- 65 19 (!) 106/40 95 %   07/15/22 0500 -- 70 24 (!) 108/46 96 %   07/15/22 0300 98.1 °F (36.7 °C) 64 14 (!) 112/40 100 %   07/15/22 0000 98.4 °F (36.9 °C) 70 18 127/75 96 %   07/14/22 2000 98 °F (36.7 °C) 67 19 (!) 113/47 96 %   07/14/22 1558 98 °F (36.7 °C) 71 21 (!) 121/45 94 %   07/14/22 1421 -- 71 -- (!) 126/55 --       Intake/Output Summary (Last 24 hours) at 7/15/2022 1157  Last data filed at 7/15/2022 0700  Gross per 24 hour   Intake 0 ml   Output 1835 ml   Net -1835 ml        I had a face to face encounter and independently examined this patient as outlined below:  PHYSICAL EXAM:  General: WD, WN. Alert, cooperative, no acute distress    EENT:  EOMI. Anicteric sclerae. MMM  Resp:  CTA bilaterally, no wheezing or rales. No accessory muscle use  CV:  Irregularly irregular rhythm,  No edema  GI/:  Soft, Non distended, Non tender. hypoactive Bowel sounds. NAHID drain. Midline dressing.  Lopez in place With pink  Urine  Neurologic:  Alert and oriented X 3, normal speech   Psych:   Good insight. Not anxious nor agitated  Skin:    No jaundice      Reviewed most current lab test results and cultures  YES  Reviewed most current radiology test results   YES  Review and summation of old records today    NO  Reviewed patient's current orders and MAR    YES  PMH/SH reviewed - no change compared to H&P  ________________________________________________________________________  Care Plan discussed with:    Comments   Patient X    Family  X    RN X    Care Manager x    Consultant                       x Multidiciplinary team rounds were held today with , nursing, pharmacist and clinical coordinator. Patient's plan of care was discussed; medications were reviewed and discharge planning was addressed. ________________________________________________________________________  Total NON critical care TIME:  35   Minutes    Total CRITICAL CARE TIME Spent:   Minutes non procedure based      Comments   >50% of visit spent in counseling and coordination of care X    ________________________________________________________________________  Komal Kitchen MD     Procedures: see electronic medical records for all procedures/Xrays and details which were not copied into this note but were reviewed prior to creation of Plan. LABS:  I reviewed today's most current labs and imaging studies. Pertinent labs include:  Recent Labs     07/15/22  0515 07/14/22  0503 07/13/22  0251   WBC 8.1 9.0 10.9   HGB 6.8* 7.4* 8.1*   HCT 21.7* 23.5* 25.7*    174 157     Recent Labs     07/15/22  0515 07/14/22  0503 07/13/22  0251    135* 132*   K 3.9 4.1 4.2    108 106   CO2 22 21 19*   * 165* 138*   BUN 12 9 12   CREA 0.97 0.92 0.97   CA 7.9* 8.1* 8.2*   MG 2.1 2.1 2.0   PHOS 2.8 3.1 2.4*       Signed:  Komal Kitchen MD

## 2022-07-15 NOTE — ROUTINE PROCESS
[id number]: Blood transfusion has been verified with a second RN and consent can be found in patient chart. Obtained vitals prior to infusion and provided education. Miguel Angel Pfeiffer stayed with pt until [phone] for the first 15 minutes of transfusion. Pt shows no signs of distress and increased rate to 125ml/hr per order.

## 2022-07-15 NOTE — PROGRESS NOTES
Admit Date: 2022    POD 7 Day Post-Op    Procedure:  Procedure(s): INFUSION CATHETER INSERTION    Subjective:     Patient alert and interactive, now with 2 large BMs overnight    Objective:     Blood pressure (!) 121/43, pulse (!) 58, temperature 98.5 °F (36.9 °C), resp. rate 14, height 6' 4\" (1.93 m), weight 302 lb 0.5 oz (137 kg), SpO2 98 %.     Temp (24hrs), Av.1 °F (36.7 °C), Min:97.7 °F (36.5 °C), Max:98.5 °F (36.9 °C)    NGT o/p 450 cc    Physical Exam:  GENERAL: alert, cooperative, no distress, appears stated age, LUNG: clear to auscultation bilaterally, HEART: regular rate and rhythm, ABDOMEN: soft, appropriately tender, wounds dressed and dry, NAHID o/p serous, EXTREMITIES:  extremities normal, atraumatic, no cyanosis or edema    Labs:   Recent Results (from the past 24 hour(s))   GLUCOSE, POC    Collection Time: 22 11:34 AM   Result Value Ref Range    Glucose (POC) 168 (H) 65 - 117 mg/dL    Performed by Kajal Hennessy PCT    GLUCOSE, POC    Collection Time: 22  5:11 PM   Result Value Ref Range    Glucose (POC) 153 (H) 65 - 117 mg/dL    Performed by Autumn Velasquez PCT    GLUCOSE, POC    Collection Time: 22 11:18 PM   Result Value Ref Range    Glucose (POC) 216 (H) 65 - 117 mg/dL    Performed by Amalia Underwood PCT    GLUCOSE, POC    Collection Time: 07/15/22  1:01 AM   Result Value Ref Range    Glucose (POC) 223 (H) 65 - 117 mg/dL    Performed by Brazilian Kj PCT    MAGNESIUM    Collection Time: 07/15/22  5:15 AM   Result Value Ref Range    Magnesium 2.1 1.6 - 2.4 mg/dL   PHOSPHORUS    Collection Time: 07/15/22  5:15 AM   Result Value Ref Range    Phosphorus 2.8 2.6 - 4.7 MG/DL   METABOLIC PANEL, BASIC    Collection Time: 07/15/22  5:15 AM   Result Value Ref Range    Sodium 137 136 - 145 mmol/L    Potassium 3.9 3.5 - 5.1 mmol/L    Chloride 108 97 - 108 mmol/L    CO2 22 21 - 32 mmol/L    Anion gap 7 5 - 15 mmol/L    Glucose 183 (H) 65 - 100 mg/dL    BUN 12 6 - 20 MG/DL    Creatinine 0.97 0.70 - 1.30 MG/DL    BUN/Creatinine ratio 12 12 - 20      GFR est AA >60 >60 ml/min/1.73m2    GFR est non-AA >60 >60 ml/min/1.73m2    Calcium 7.9 (L) 8.5 - 10.1 MG/DL   CBC WITH AUTOMATED DIFF    Collection Time: 07/15/22  5:15 AM   Result Value Ref Range    WBC 8.1 4.1 - 11.1 K/uL    RBC 2.19 (L) 4.10 - 5.70 M/uL    HGB 6.8 (L) 12.1 - 17.0 g/dL    HCT 21.7 (L) 36.6 - 50.3 %    MCV 99.1 (H) 80.0 - 99.0 FL    MCH 31.1 26.0 - 34.0 PG    MCHC 31.3 30.0 - 36.5 g/dL    RDW 16.5 (H) 11.5 - 14.5 %    PLATELET 230 960 - 873 K/uL    MPV 9.8 8.9 - 12.9 FL    NRBC 0.0 0  WBC    ABSOLUTE NRBC 0.00 0.00 - 0.01 K/uL    NEUTROPHILS 78 (H) 32 - 75 %    LYMPHOCYTES 14 12 - 49 %    MONOCYTES 6 5 - 13 %    EOSINOPHILS 1 0 - 7 %    BASOPHILS 0 0 - 1 %    IMMATURE GRANULOCYTES 1 (H) 0.0 - 0.5 %    ABS. NEUTROPHILS 6.3 1.8 - 8.0 K/UL    ABS. LYMPHOCYTES 1.1 0.8 - 3.5 K/UL    ABS. MONOCYTES 0.5 0.0 - 1.0 K/UL    ABS. EOSINOPHILS 0.1 0.0 - 0.4 K/UL    ABS. BASOPHILS 0.0 0.0 - 0.1 K/UL    ABS. IMM. GRANS. 0.1 (H) 0.00 - 0.04 K/UL    DF SMEAR SCANNED      RBC COMMENTS HYPOCHROMIA  1+        RBC COMMENTS ANISOCYTOSIS  1+       GLUCOSE, POC    Collection Time: 07/15/22  6:13 AM   Result Value Ref Range    Glucose (POC) 204 (H) 65 - 117 mg/dL    Performed by [facility]        Data Review images and reports reviewed    Assessment:     Active Problems:     Morbid obesity ([facility] [address] (6/7/2013)      Thrombocytopenia ([facility] [address] (6/27/2022)      GI bleed (6/27/2022)      UGO (acute kidney injury) ([address] (6/27/2022)      Atrial fibrillation ([address] (7/3/2022)      Elevated troponin (7/3/2022)      Ileus ([address] (7/7/2022)      Intestinal adhesions with partial obstruction ([address] (7/8/2022)      Ventral hernia (7/8/2022)      Intestinal perforation ([address] (7/8/2022)        Plan/Recommendations/Medical Decision Making:     Continue present treatment   continue TPN  Abdominal films  Increase activity  IS  Afib per cardiology  Continue zosyn  Suspect may be opening up finally    Danette Ren MD  [id number] [address] [facility]

## 2022-07-15 NOTE — PROGRESS NOTES
0700: Report received from Fatmata Maxwell, [address] [place] [address].    0800: Pt assess, see flow sheet. 1200: Pt reassessed, no change. See flowsheet. 1600: Pt reassessed, no changes. [id number]: NG tube placed, awaiting KUB to verify placement. Output green, bile, total of 300 to LWS.    1900: Report given to Fatmata Maxwell RN.

## 2022-07-16 LAB
ABO + RH BLD: NORMAL
ANION GAP SERPL CALC-SCNC: 6 MMOL/L (ref 5–15)
BASOPHILS # BLD: 0 K/UL (ref 0–0.1)
BASOPHILS NFR BLD: 0 % (ref 0–1)
BLD PROD TYP BPU: NORMAL
BLOOD GROUP ANTIBODIES SERPL: NORMAL
BPU ID: NORMAL
BUN SERPL-MCNC: 13 MG/DL (ref 6–20)
BUN/CREAT SERPL: 14 (ref 12–20)
CALCIUM SERPL-MCNC: 7.9 MG/DL (ref 8.5–10.1)
CHLORIDE SERPL-SCNC: 108 MMOL/L (ref 97–108)
CO2 SERPL-SCNC: 21 MMOL/L (ref 21–32)
CREAT SERPL-MCNC: 0.92 MG/DL (ref 0.7–1.3)
CROSSMATCH RESULT,%XM: NORMAL
DIFFERENTIAL METHOD BLD: ABNORMAL
EOSINOPHIL # BLD: 0.1 K/UL (ref 0–0.4)
EOSINOPHIL NFR BLD: 1 % (ref 0–7)
ERYTHROCYTE [DISTWIDTH] IN BLOOD BY AUTOMATED COUNT: 17.2 % (ref 11.5–14.5)
GLUCOSE BLD STRIP.AUTO-MCNC: 181 MG/DL (ref 65–117)
GLUCOSE BLD STRIP.AUTO-MCNC: 206 MG/DL (ref 65–117)
GLUCOSE BLD STRIP.AUTO-MCNC: 209 MG/DL (ref 65–117)
GLUCOSE BLD STRIP.AUTO-MCNC: 217 MG/DL (ref 65–117)
GLUCOSE BLD STRIP.AUTO-MCNC: 233 MG/DL (ref 65–117)
GLUCOSE BLD STRIP.AUTO-MCNC: 234 MG/DL (ref 65–117)
GLUCOSE SERPL-MCNC: 227 MG/DL (ref 65–100)
HCT VFR BLD AUTO: 24 % (ref 36.6–50.3)
HGB BLD-MCNC: 7.6 G/DL (ref 12.1–17)
IMM GRANULOCYTES # BLD AUTO: 0.1 K/UL (ref 0–0.04)
IMM GRANULOCYTES NFR BLD AUTO: 1 % (ref 0–0.5)
LYMPHOCYTES # BLD: 0.8 K/UL (ref 0.8–3.5)
LYMPHOCYTES NFR BLD: 11 % (ref 12–49)
MAGNESIUM SERPL-MCNC: 1.9 MG/DL (ref 1.6–2.4)
MCH RBC QN AUTO: 30.9 PG (ref 26–34)
MCHC RBC AUTO-ENTMCNC: 31.7 G/DL (ref 30–36.5)
MCV RBC AUTO: 97.6 FL (ref 80–99)
MONOCYTES # BLD: 0.4 K/UL (ref 0–1)
MONOCYTES NFR BLD: 6 % (ref 5–13)
NEUTS SEG # BLD: 5.8 K/UL (ref 1.8–8)
NEUTS SEG NFR BLD: 81 % (ref 32–75)
NRBC # BLD: 0 K/UL (ref 0–0.01)
NRBC BLD-RTO: 0 PER 100 WBC
PHOSPHATE SERPL-MCNC: 2.7 MG/DL (ref 2.6–4.7)
PLATELET # BLD AUTO: 182 K/UL (ref 150–400)
PMV BLD AUTO: 10 FL (ref 8.9–12.9)
POTASSIUM SERPL-SCNC: 3.9 MMOL/L (ref 3.5–5.1)
RBC # BLD AUTO: 2.46 M/UL (ref 4.1–5.7)
SERVICE CMNT-IMP: ABNORMAL
SODIUM SERPL-SCNC: 135 MMOL/L (ref 136–145)
SPECIMEN EXP DATE BLD: NORMAL
STATUS OF UNIT,%ST: NORMAL
UNIT DIVISION, %UDIV: 0
WBC # BLD AUTO: 7.3 K/UL (ref 4.1–11.1)

## 2022-07-16 PROCEDURE — 74011000250 HC RX REV CODE- 250: Performed by: SURGERY

## 2022-07-16 PROCEDURE — 74011250637 HC RX REV CODE- 250/637: Performed by: INTERNAL MEDICINE

## 2022-07-16 PROCEDURE — 74011000258 HC RX REV CODE- 258: Performed by: INTERNAL MEDICINE

## 2022-07-16 PROCEDURE — 74011250636 HC RX REV CODE- 250/636: Performed by: NURSE PRACTITIONER

## 2022-07-16 PROCEDURE — C9113 INJ PANTOPRAZOLE SODIUM, VIA: HCPCS | Performed by: NURSE PRACTITIONER

## 2022-07-16 PROCEDURE — 83735 ASSAY OF MAGNESIUM: CPT

## 2022-07-16 PROCEDURE — 74011000258 HC RX REV CODE- 258: Performed by: SURGERY

## 2022-07-16 PROCEDURE — 80048 BASIC METABOLIC PNL TOTAL CA: CPT

## 2022-07-16 PROCEDURE — 36415 COLL VENOUS BLD VENIPUNCTURE: CPT

## 2022-07-16 PROCEDURE — 82962 GLUCOSE BLOOD TEST: CPT

## 2022-07-16 PROCEDURE — 74011636637 HC RX REV CODE- 636/637: Performed by: STUDENT IN AN ORGANIZED HEALTH CARE EDUCATION/TRAINING PROGRAM

## 2022-07-16 PROCEDURE — 74011250636 HC RX REV CODE- 250/636: Performed by: SURGERY

## 2022-07-16 PROCEDURE — 74011636637 HC RX REV CODE- 636/637: Performed by: SURGERY

## 2022-07-16 PROCEDURE — 84100 ASSAY OF PHOSPHORUS: CPT

## 2022-07-16 PROCEDURE — 85025 COMPLETE CBC W/AUTO DIFF WBC: CPT

## 2022-07-16 PROCEDURE — 74011000250 HC RX REV CODE- 250: Performed by: NURSE PRACTITIONER

## 2022-07-16 PROCEDURE — 74011250636 HC RX REV CODE- 250/636: Performed by: INTERNAL MEDICINE

## 2022-07-16 PROCEDURE — 74011250637 HC RX REV CODE- 250/637: Performed by: SURGERY

## 2022-07-16 PROCEDURE — 65270000046 HC RM TELEMETRY

## 2022-07-16 RX ORDER — INSULIN LISPRO 100 [IU]/ML
INJECTION, SOLUTION INTRAVENOUS; SUBCUTANEOUS
Status: DISCONTINUED | OUTPATIENT
Start: 2022-07-16 | End: 2022-07-25 | Stop reason: HOSPADM

## 2022-07-16 RX ADMIN — AMIODARONE HYDROCHLORIDE 400 MG: 200 TABLET ORAL at 21:06

## 2022-07-16 RX ADMIN — AMIODARONE HYDROCHLORIDE 400 MG: 200 TABLET ORAL at 13:58

## 2022-07-16 RX ADMIN — PIPERACILLIN AND TAZOBACTAM 3.38 G: 3; .375 INJECTION, POWDER, LYOPHILIZED, FOR SOLUTION INTRAVENOUS at 12:24

## 2022-07-16 RX ADMIN — Medication 1 AMPULE: at 08:31

## 2022-07-16 RX ADMIN — AMIODARONE HYDROCHLORIDE 0.5 MG/MIN: 50 INJECTION, SOLUTION INTRAVENOUS at 18:44

## 2022-07-16 RX ADMIN — APIXABAN 5 MG: 5 TABLET, FILM COATED ORAL at 18:35

## 2022-07-16 RX ADMIN — SODIUM CHLORIDE 40 MG: 9 INJECTION, SOLUTION INTRAMUSCULAR; INTRAVENOUS; SUBCUTANEOUS at 08:31

## 2022-07-16 RX ADMIN — PIPERACILLIN AND TAZOBACTAM 3.38 G: 3; .375 INJECTION, POWDER, LYOPHILIZED, FOR SOLUTION INTRAVENOUS at 05:51

## 2022-07-16 RX ADMIN — SODIUM CHLORIDE, PRESERVATIVE FREE 10 ML: 5 INJECTION INTRAVENOUS at 13:59

## 2022-07-16 RX ADMIN — Medication 4 UNITS: at 05:52

## 2022-07-16 RX ADMIN — MAGNESIUM SULFATE HEPTAHYDRATE: 500 INJECTION, SOLUTION INTRAMUSCULAR; INTRAVENOUS at 18:36

## 2022-07-16 RX ADMIN — Medication 4 UNITS: at 15:08

## 2022-07-16 RX ADMIN — Medication 2 UNITS: at 21:12

## 2022-07-16 RX ADMIN — ENOXAPARIN SODIUM 120 MG: 150 INJECTION SUBCUTANEOUS at 06:02

## 2022-07-16 RX ADMIN — SODIUM CHLORIDE, PRESERVATIVE FREE 10 ML: 5 INJECTION INTRAVENOUS at 05:51

## 2022-07-16 RX ADMIN — Medication 4 UNITS: at 00:22

## 2022-07-16 RX ADMIN — SODIUM CHLORIDE, PRESERVATIVE FREE 30 ML: 5 INJECTION INTRAVENOUS at 21:13

## 2022-07-16 RX ADMIN — PIPERACILLIN AND TAZOBACTAM 3.38 G: 3; .375 INJECTION, POWDER, LYOPHILIZED, FOR SOLUTION INTRAVENOUS at 21:13

## 2022-07-16 RX ADMIN — AMIODARONE HYDROCHLORIDE 0.5 MG/MIN: 50 INJECTION, SOLUTION INTRAVENOUS at 05:51

## 2022-07-16 NOTE — PROGRESS NOTES
Progress Note      7/16/2022 9:12 AM  NAME: Tanna Felix   MRN:  [id number]   Admit Diagnosis: Thrombocytopenia ([facility] [address] [D69.6]  GI bleed [K92.2]  UGO (acute kidney injury) ([facility] [address] [N17.9]      Problem List:     1. Idiopathic thrombocytopenia  2. Anemia  3. Rectal bleeding  4. Lactic acidosis  5. NSTEMI  6. Paroxysmal atrial fibrillation  7. Remote PE and DVT (separate events)  8. CAD w/ mild diffuse disease w/ patent stent in the RCA 7/17 cath. 9. XOL  10. Morbid obesity  11. DM  12. Former smoker     Assessment/Plan: TnI 2300  PLT recovered  HgB 8/9s    MPI w/ EF 55% and no definite ischemia. Recurrent afib 7/10. Back on dilt gtt. Converted to sinus. Recurrent Afib 7/12. Converted to sinus. Continue to require PRBC    Continues to have hematuria; better today    1. Continue oral amio 400mg; increased to TID, but discharge on 200mg daily  2. Continue amio gtt until discharge; concern for gut uptake  3. Off ASA  4. Off lovenox, back on eliquis 5mg BID Need to carefully consider holding AC given his ongoing transfusion requirements and hematuria albeit clearing today. 5. Continue statin  6. TPN  7. Will need to be considered for a Watchman as an outpt         [x]       High complexity decision making was performed in this patient at high risk for decompensation with multiple organ involvement. Subjective:     Tanna Felix denies chest pain, dyspnea. Discussed with RN events overnight. Review of Systems:    Symptom Y/N Comments  Symptom Y/N Comments   Fever/Chills N   Chest Pain N    Poor Appetite N   Edema N    Cough N   Abdominal Pain N    Sputum N   Joint Pain N    SOB/SHEARER N   Pruritis/Rash N    Nausea/vomit N   Tolerating PT/OT Y    Diarrhea N   Tolerating Diet Y    Constipation N   Other       Could NOT obtain due to:      Objective:      Physical Exam:    Last 24hrs VS reviewed since prior progress note.  Most recent are:    Visit Vitals  BP (!) 134/48 (BP 1 Location: Left upper arm, BP Patient Position: At rest)   Pulse 71   Temp 98.6 °F (37 °C)   Resp 19   Ht 6' 4\" (1.93 m)   Wt 137 kg (302 lb 0.5 oz)   SpO2 98%   BMI 36.76 kg/m²       Intake/Output Summary (Last 24 hours) at 7/16/2022 1011  Last data filed at 7/16/2022 0259  Gross per 24 hour   Intake 241.3 ml   Output 1370 ml   Net -1128.7 ml        General Appearance: Well developed, well nourished, alert & oriented x 3,    no acute distress. Ears/Nose/Mouth/Throat: Hearing grossly normal.  Neck: Supple. Chest: Lungs clear to auscultation bilaterally. Cardiovascular: Regular rate and rhythm, S1S2 normal, no murmur. Abdomen: Soft, non-tender, bowel sounds are active. Extremities: No edema bilaterally. Skin: Warm and dry. Petechiae. []         Post-cath site without hematoma, bruit, tenderness, or thrill. Distal pulses intact. PMH/ reviewed - no change compared to H&P    Data Review    Telemetry: sr    EKG:   [x]  No new EKG for review    Lab Data Personally Reviewed:    Recent Labs     07/16/22  0557 07/15/22  0515   WBC 7.3 8.1   HGB 7.6* 6.8*   HCT 24.0* 21.7*    173     No results for input(s): INR, PTP, APTT, INREXT, INREXT in the last 72 hours. Recent Labs     07/16/22  0557 07/15/22  0515 07/14/22  0503   * 137 135*   K 3.9 3.9 4.1    108 108   CO2 21 22 21   BUN 13 12 9   CREA 0.92 0.97 0.92   * 183* 165*   CA 7.9* 7.9* 8.1*   MG 1.9 2.1 2.1     No results for input(s): CPK, CKNDX, TROIQ in the last 72 hours. No lab exists for component: CPKMB  Lab Results   Component Value Date/Time    Cholesterol, total 133 06/07/2013 02:40 PM    HDL Cholesterol 43 06/07/2013 02:40 PM    LDL, calculated 73.4 06/07/2013 02:40 PM    Triglyceride 83 06/07/2013 02:40 PM    CHOL/HDL Ratio 3.1 06/07/2013 02:40 PM       No results for input(s): AP, TBIL, TP, ALB, GLOB, GGT, AML, LPSE in the last 72 hours.     No lab exists for component: SGOT, GPT, AMYP, HLPSE  No results for input(s): PH, PCO2, PO2 in the last 72 hours.     Medications Personally Reviewed:    Current Facility-Administered Medications   Medication Dose Route Frequency    TPN ADULT - CENTRAL   IntraVENous CONTINUOUS    TPN ADULT - CENTRAL   IntraVENous CONTINUOUS    0.9% sodium chloride infusion 250 mL  250 mL IntraVENous PRN    pantoprazole (PROTONIX) 40 mg in 0.9% sodium chloride 10 mL injection  40 mg IntraVENous DAILY    enoxaparin (LOVENOX) injection 120 mg  120 mg SubCUTAneous Q12H    amiodarone (CORDARONE) 375 mg/250 mL D5W infusion  0.5 mg/min IntraVENous CONTINUOUS    amiodarone (CORDARONE) tablet 400 mg  400 mg Oral Q8H    HYDROmorphone (DILAUDID) injection 0.5-1 mg  0.5-1 mg IntraVENous Q2H PRN    HYDROcodone-acetaminophen (NORCO) 5-325 mg per tablet 1-2 Tablet  1-2 Tablet Oral Q4H PRN    alcohol 62% (NOZIN) nasal  1 Ampule  1 Ampule Topical Q12H    piperacillin-tazobactam (ZOSYN) 3.375 g in 0.9% sodium chloride (MBP/ADV) 100 mL MBP  3.375 g IntraVENous Q8H    [Held by provider] apixaban (ELIQUIS) tablet 5 mg  5 mg Oral BID    phenol throat spray (CHLORASEPTIC) 1 Spray  1 Spray Oral PRN    insulin lispro (HUMALOG) injection   SubCUTAneous Q6H    calcium carbonate (TUMS) chewable tablet 400 mg [elemental]  400 mg Oral TID PRN    glucose chewable tablet 16 g  4 Tablet Oral PRN    glucagon (GLUCAGEN) injection 1 mg  1 mg IntraMUSCular PRN    dextrose 10 % infusion 0-250 mL  0-250 mL IntraVENous PRN    nitroglycerin (NITROSTAT) tablet 0.4 mg  0.4 mg SubLINGual PRN    [Held by provider] atorvastatin (LIPITOR) tablet 20 mg  20 mg Oral QHS    [Held by provider] vitamin Y-N-O-lutein-minerals (OCUVITE) tablet 1 Tablet  1 Tablet Oral DAILY    sodium chloride (NS) flush 5-40 mL  5-40 mL IntraVENous Q8H    sodium chloride (NS) flush 5-40 mL  5-40 mL IntraVENous PRN    acetaminophen (TYLENOL) tablet 650 mg  650 mg Oral Q6H PRN    Or    acetaminophen (TYLENOL) suppository 650 mg  650 mg Rectal Q6H PRN  polyethylene glycol (MIRALAX) packet 17 g  17 g Oral DAILY PRN    ondansetron (ZOFRAN ODT) tablet 4 mg  4 mg Oral Q8H PRN    Or    ondansetron (ZOFRAN) injection 4 mg  4 mg IntraVENous Q6H PRN         Eddie Gudino III, DO

## 2022-07-16 NOTE — PROGRESS NOTES
End of Shift Note    Bedside shift change report given to Angel Pritchard RN (oncoming nurse) by Brock Hyman (offgoing nurse). Report included the following information SBAR, Kardex, ED Summary, Intake/Output and MAR    Shift worked:  [id number]     Shift summary and any significant changes:     Nothing overnight     Concerns for physician to address:       Zone phone for oncoming shift:          Activity:  Activity Level: Bed Rest  Number times ambulated in hallways past shift: 0  Number of times OOB to chair past shift: 1    Cardiac:   Cardiac Monitoring: Yes      Cardiac Rhythm: Sinus Rhythm    Access:   Current line(s): PIV and central line     Genitourinary:   Urinary status: bernard    Respiratory:   O2 Device: None (Room air)  Chronic home O2 use?: NO  Incentive spirometer at bedside: YES  Actual Volume (ml): 1500 ml    GI:  Last Bowel Movement Date: 07/14/22  Current diet:  DIET NPO  TPN ADULT - CENTRAL  Passing flatus: YES  Tolerating current diet: YES       Pain Management:   Patient states pain is manageable on current regimen: YES    Skin:  Jose Guadalupe Score: 16  Interventions: turn team, float heels, increase time out of bed, PT/OT consult and internal/external urinary devices    Patient Safety:  Fall Score:  Total Score: 4  Interventions: bed/chair alarm, gripper socks, pt to call before getting OOB and stay with me (per policy)  High Fall Risk: Yes    Length of Stay:  Expected LOS: 8d 9h  Actual LOS: 2234 [facility]

## 2022-07-16 NOTE — PROGRESS NOTES
SURGERY PROGRESS NOTE      Admit Date: 2022    POD 3 Days Post-Op    Procedure: Procedure(s): INFUSION CATHETER INSERTION      Subjective:     Patient denies pain and nausea. Passing flatus and had a BM. Objective:     Visit Vitals  BP (!) 119/54   Pulse 68   Temp 97.6 °F (36.4 °C)   Resp 14   Ht 6' 4\" (1.93 m)   Wt 137 kg (302 lb 0.5 oz)   SpO2 97%   BMI 36.76 kg/m²        Temp (24hrs), Av.9 °F (36.6 °C), Min:97.6 °F (36.4 °C), Max:98.6 °F (37 °C)      No intake/output data recorded.  1901 -  0700  In: 241.3   Out: 3170 [Urine:2550; Drains:70]    Physical Exam:    General:  alert, cooperative, no distress, appears stated age   Abdomen: soft, bowel sounds active, non-tender  NAHID - serosanguinous    Incision:   negative pressure dressing clean/dry/intact            Lab Results   Component Value Date/Time    WBC 7.3 2022 05:57 AM    HGB 7.6 (L) 2022 05:57 AM    HCT 24.0 (L) 2022 05:57 AM    PLATELET 641  05:57 AM    MCV 97.6 2022 05:57 AM     Lab Results   Component Value Date/Time    GFR est non-AA >60 2022 05:57 AM    GFR est AA >60 2022 05:57 AM    Creatinine 0.92 2022 05:57 AM    BUN 13 2022 05:57 AM    Sodium 135 (L) 2022 05:57 AM    Potassium 3.9 2022 05:57 AM    Chloride 108 2022 05:57 AM    CO2 21 2022 05:57 AM    Magnesium 1.9 2022 05:57 AM    Phosphorus 2.7 2022 05:57 AM        last note  -     -gross hematuria resolved. Primary team can conduct voiding trial once pt ambulating. Only replace bernard for retention >400ml.   -okay to resume AC today if needed  -will arrange OP follow up, no further urologic intervention. Please call if needed    PT note () - He continues to move well, but is still not ambulating household distances and fatigues, requiring prolonged time to recover.   Pt only able to ambulate 2ft farther this session compared to last session and declined a second gait trial after resting due to fatigue. Assessment/plan: Active Problems: Morbid obesity ([address] (6/7/2013)      Thrombocytopenia ([address] (6/27/2022)      GI bleed (6/27/2022)      UGO (acute kidney injury) ([address] (6/27/2022)      Atrial fibrillation ([address] (7/3/2022)      Elevated troponin (7/3/2022)      Ileus ([address] (7/7/2022)      Intestinal adhesions with partial obstruction ([address] (7/8/2022)      Ventral hernia (7/8/2022)      Intestinal perforation ([address] (7/8/2022)      Has return of bowel function. NG removed. Diet ordered. Keep bernard until ambulating more.

## 2022-07-16 NOTE — PROGRESS NOTES
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 Received 3 doses IVIG, did not tolerate final IVIG infusion despite. PLT normalized  Hemo/onc on board     SBO  Repeat CT a/p on  showed partial SBO pattern. CT enterography showed distal partial SBO  Went to OR on  found to have SMALL BOWEL OBSTRUCTION  SECONDARY TO ILEAL PERFORATION, AND ABSCESS AND ADHESION AND ABDOMINAL HERNIA     Started on fluid diet today. by general surgery   Continue with Zosyn  Off NGT      GI bleed  Was to due severe ITP  S/p blood transfusion  Continue with protonix 40 daily (can switch to famotidine on discharge)  GI on board, holding off on EGD for now, conservative management. 7/15 again hemoglobin 6.8 but there is no blood in the stool has no hematuria we will transfuse 1 unit of blood  Hold tonight Lovenox dose    Paroxysmal atrial fibrillation w RVR  Elevated trop  HR is controlled now   On enoxaparin at the patient n.p.o.  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   Estimated discharge date: >48hrs  Code status: Full  Prophylaxis: Eliquis   Recommended Disposition: SAH/Rehab. Patient and wife requested PT eval prior to discharge, wife thinks the patient is too weak and need rehab     Subjective:     Chief Complaint / Reason for Physician Visit  Patient seen and examined today, he does not have abdominal pain but mention mild pain with touch    Review of Systems:  Symptom Y/N Comments  Symptom Y/N Comments   Fever/Chills    Chest Pain     Poor Appetite    Edema     Cough    Abdominal Pain     Sputum    Joint Pain     SOB/SHEARER    Pruritis/Rash     Nausea/vomit    Tolerating PT/OT     Diarrhea    Tolerating Diet     Constipation    Other       Could NOT obtain due to:      Objective:     VITALS:   Last 24hrs VS reviewed since prior progress note. Most recent are:  Patient Vitals for the past 24 hrs:   Temp Pulse Resp BP SpO2   07/16/22 0720 98.6 °F (37 °C) 71 19 (!) 134/48 98 %   07/16/22 0259 97.8 °F (36.6 °C) 62 20 133/61 95 %   07/15/22 2246 98 °F (36.7 °C) 65 20 (!) 139/45 100 %   07/15/22 1956 97.9 °F (36.6 °C) 64 19 (!) 145/56 100 %   07/15/22 1917 97.9 °F (36.6 °C) 66 20 (!) 146/56 99 %   07/15/22 1705 97.7 °F (36.5 °C) 65 19 (!) 145/53 97 %   07/15/22 1643 97.6 °F (36.4 °C) 70 20 (!) 143/60 99 %   07/15/22 1400 -- 66 13 -- 100 %   07/15/22 1200 -- 65 21 -- (!) 89 %       Intake/Output Summary (Last 24 hours) at 7/16/2022 1111  Last data filed at 7/16/2022 0259  Gross per 24 hour   Intake 241.3 ml   Output 1370 ml   Net -1128.7 ml        I had a face to face encounter and independently examined this patient as outlined below:  PHYSICAL EXAM:  General: WD, WN. Alert, cooperative, no acute distress    EENT:  EOMI. Anicteric sclerae. MMM  Resp:  CTA bilaterally, no wheezing or rales. No accessory muscle use  CV:  Irregularly irregular rhythm,  No edema  GI/:  Soft, Non distended, Non tender. hypoactive Bowel sounds. NAHID drain. Midline dressing.  Lopez in place  With pink  Urine  Neurologic: Alert and oriented X 3, normal speech   Psych:   Good insight. Not anxious nor agitated  Skin:    No jaundice      Reviewed most current lab test results and cultures  YES  Reviewed most current radiology test results   YES  Review and summation of old records today    NO  Reviewed patient's current orders and MAR    YES  PMH/SH reviewed - no change compared to H&P  ________________________________________________________________________  Care Plan discussed with:    Comments   Patient X    Family  X    RN X    Care Manager x    Consultant                       x Multidiciplinary team rounds were held today with , nursing, pharmacist and clinical coordinator. Patient's plan of care was discussed; medications were reviewed and discharge planning was addressed. ________________________________________________________________________  Total NON critical care TIME:  35   Minutes    Total CRITICAL CARE TIME Spent:   Minutes non procedure based      Comments   >50% of visit spent in counseling and coordination of care X    ________________________________________________________________________  Arnold Simms MD     Procedures: see electronic medical records for all procedures/Xrays and details which were not copied into this note but were reviewed prior to creation of Plan. LABS:  I reviewed today's most current labs and imaging studies. Pertinent labs include:  Recent Labs     07/16/22  0557 07/15/22  0515 07/14/22  0503   WBC 7.3 8.1 9.0   HGB 7.6* 6.8* 7.4*   HCT 24.0* 21.7* 23.5*    173 174     Recent Labs     07/16/22  0557 07/15/22  0515 07/14/22  0503   * 137 135*   K 3.9 3.9 4.1    108 108   CO2 21 22 21   * 183* 165*   BUN 13 12 9   CREA 0.92 0.97 0.92   CA 7.9* 7.9* 8.1*   MG 1.9 2.1 2.1   PHOS 2.7 2.8 3.1       Signed:  Arnold Simms MD

## 2022-07-17 LAB
ANION GAP SERPL CALC-SCNC: 6 MMOL/L (ref 5–15)
BASOPHILS # BLD: 0 K/UL (ref 0–0.1)
BASOPHILS NFR BLD: 0 % (ref 0–1)
BUN SERPL-MCNC: 15 MG/DL (ref 6–20)
BUN/CREAT SERPL: 16 (ref 12–20)
CALCIUM SERPL-MCNC: 8 MG/DL (ref 8.5–10.1)
CHLORIDE SERPL-SCNC: 107 MMOL/L (ref 97–108)
CO2 SERPL-SCNC: 22 MMOL/L (ref 21–32)
CREAT SERPL-MCNC: 0.93 MG/DL (ref 0.7–1.3)
DIFFERENTIAL METHOD BLD: ABNORMAL
EOSINOPHIL # BLD: 0.1 K/UL (ref 0–0.4)
EOSINOPHIL NFR BLD: 1 % (ref 0–7)
ERYTHROCYTE [DISTWIDTH] IN BLOOD BY AUTOMATED COUNT: 16.7 % (ref 11.5–14.5)
GLUCOSE BLD STRIP.AUTO-MCNC: 188 MG/DL (ref 65–117)
GLUCOSE BLD STRIP.AUTO-MCNC: 195 MG/DL (ref 65–117)
GLUCOSE BLD STRIP.AUTO-MCNC: 213 MG/DL (ref 65–117)
GLUCOSE BLD STRIP.AUTO-MCNC: 248 MG/DL (ref 65–117)
GLUCOSE SERPL-MCNC: 199 MG/DL (ref 65–100)
HCT VFR BLD AUTO: 25.2 % (ref 36.6–50.3)
HGB BLD-MCNC: 7.9 G/DL (ref 12.1–17)
IMM GRANULOCYTES # BLD AUTO: 0.1 K/UL (ref 0–0.04)
IMM GRANULOCYTES NFR BLD AUTO: 1 % (ref 0–0.5)
LYMPHOCYTES # BLD: 0.8 K/UL (ref 0.8–3.5)
LYMPHOCYTES NFR BLD: 10 % (ref 12–49)
MAGNESIUM SERPL-MCNC: 2 MG/DL (ref 1.6–2.4)
MCH RBC QN AUTO: 30.4 PG (ref 26–34)
MCHC RBC AUTO-ENTMCNC: 31.3 G/DL (ref 30–36.5)
MCV RBC AUTO: 96.9 FL (ref 80–99)
MONOCYTES # BLD: 0.6 K/UL (ref 0–1)
MONOCYTES NFR BLD: 7 % (ref 5–13)
NEUTS SEG # BLD: 7.1 K/UL (ref 1.8–8)
NEUTS SEG NFR BLD: 81 % (ref 32–75)
NRBC # BLD: 0 K/UL (ref 0–0.01)
NRBC BLD-RTO: 0 PER 100 WBC
PHOSPHATE SERPL-MCNC: 3.1 MG/DL (ref 2.6–4.7)
PLATELET # BLD AUTO: 190 K/UL (ref 150–400)
PMV BLD AUTO: 10 FL (ref 8.9–12.9)
POTASSIUM SERPL-SCNC: 3.9 MMOL/L (ref 3.5–5.1)
RBC # BLD AUTO: 2.6 M/UL (ref 4.1–5.7)
SERVICE CMNT-IMP: ABNORMAL
SODIUM SERPL-SCNC: 135 MMOL/L (ref 136–145)
WBC # BLD AUTO: 8.7 K/UL (ref 4.1–11.1)

## 2022-07-17 PROCEDURE — 83735 ASSAY OF MAGNESIUM: CPT

## 2022-07-17 PROCEDURE — 74011000258 HC RX REV CODE- 258: Performed by: INTERNAL MEDICINE

## 2022-07-17 PROCEDURE — C9113 INJ PANTOPRAZOLE SODIUM, VIA: HCPCS | Performed by: NURSE PRACTITIONER

## 2022-07-17 PROCEDURE — 80048 BASIC METABOLIC PNL TOTAL CA: CPT

## 2022-07-17 PROCEDURE — 74011000258 HC RX REV CODE- 258: Performed by: SURGERY

## 2022-07-17 PROCEDURE — 74011250636 HC RX REV CODE- 250/636: Performed by: SURGERY

## 2022-07-17 PROCEDURE — 65270000046 HC RM TELEMETRY

## 2022-07-17 PROCEDURE — 74011250637 HC RX REV CODE- 250/637: Performed by: INTERNAL MEDICINE

## 2022-07-17 PROCEDURE — 74011250636 HC RX REV CODE- 250/636: Performed by: INTERNAL MEDICINE

## 2022-07-17 PROCEDURE — 82962 GLUCOSE BLOOD TEST: CPT

## 2022-07-17 PROCEDURE — 85025 COMPLETE CBC W/AUTO DIFF WBC: CPT

## 2022-07-17 PROCEDURE — 74011250637 HC RX REV CODE- 250/637: Performed by: SURGERY

## 2022-07-17 PROCEDURE — 74011250636 HC RX REV CODE- 250/636: Performed by: NURSE PRACTITIONER

## 2022-07-17 PROCEDURE — 74011000250 HC RX REV CODE- 250: Performed by: NURSE PRACTITIONER

## 2022-07-17 PROCEDURE — 36415 COLL VENOUS BLD VENIPUNCTURE: CPT

## 2022-07-17 PROCEDURE — 74011636637 HC RX REV CODE- 636/637: Performed by: STUDENT IN AN ORGANIZED HEALTH CARE EDUCATION/TRAINING PROGRAM

## 2022-07-17 PROCEDURE — 74011636637 HC RX REV CODE- 636/637: Performed by: SURGERY

## 2022-07-17 PROCEDURE — 74011000250 HC RX REV CODE- 250: Performed by: SURGERY

## 2022-07-17 PROCEDURE — 84100 ASSAY OF PHOSPHORUS: CPT

## 2022-07-17 RX ADMIN — AMIODARONE HYDROCHLORIDE 400 MG: 200 TABLET ORAL at 13:25

## 2022-07-17 RX ADMIN — APIXABAN 5 MG: 5 TABLET, FILM COATED ORAL at 05:41

## 2022-07-17 RX ADMIN — ONDANSETRON 4 MG: 2 INJECTION INTRAMUSCULAR; INTRAVENOUS at 13:19

## 2022-07-17 RX ADMIN — AMIODARONE HYDROCHLORIDE 400 MG: 200 TABLET ORAL at 21:32

## 2022-07-17 RX ADMIN — SODIUM CHLORIDE, PRESERVATIVE FREE 10 ML: 5 INJECTION INTRAVENOUS at 21:37

## 2022-07-17 RX ADMIN — ONDANSETRON 4 MG: 2 INJECTION INTRAMUSCULAR; INTRAVENOUS at 21:32

## 2022-07-17 RX ADMIN — Medication 3 UNITS: at 17:01

## 2022-07-17 RX ADMIN — AMIODARONE HYDROCHLORIDE 0.5 MG/MIN: 50 INJECTION, SOLUTION INTRAVENOUS at 06:58

## 2022-07-17 RX ADMIN — PIPERACILLIN AND TAZOBACTAM 3.38 G: 3; .375 INJECTION, POWDER, LYOPHILIZED, FOR SOLUTION INTRAVENOUS at 13:20

## 2022-07-17 RX ADMIN — AMIODARONE HYDROCHLORIDE 400 MG: 200 TABLET ORAL at 05:41

## 2022-07-17 RX ADMIN — PIPERACILLIN AND TAZOBACTAM 3.38 G: 3; .375 INJECTION, POWDER, LYOPHILIZED, FOR SOLUTION INTRAVENOUS at 21:32

## 2022-07-17 RX ADMIN — MAGNESIUM SULFATE HEPTAHYDRATE: 500 INJECTION, SOLUTION INTRAMUSCULAR; INTRAVENOUS at 17:20

## 2022-07-17 RX ADMIN — APIXABAN 5 MG: 5 TABLET, FILM COATED ORAL at 17:01

## 2022-07-17 RX ADMIN — Medication 4 UNITS: at 09:50

## 2022-07-17 RX ADMIN — PIPERACILLIN AND TAZOBACTAM 3.38 G: 3; .375 INJECTION, POWDER, LYOPHILIZED, FOR SOLUTION INTRAVENOUS at 04:58

## 2022-07-17 RX ADMIN — SODIUM CHLORIDE, PRESERVATIVE FREE 10 ML: 5 INJECTION INTRAVENOUS at 13:21

## 2022-07-17 RX ADMIN — POLYETHYLENE GLYCOL 3350 17 G: 17 POWDER, FOR SOLUTION ORAL at 01:32

## 2022-07-17 RX ADMIN — Medication 1 AMPULE: at 09:41

## 2022-07-17 RX ADMIN — AMIODARONE HYDROCHLORIDE 0.5 MG/MIN: 50 INJECTION, SOLUTION INTRAVENOUS at 20:36

## 2022-07-17 RX ADMIN — Medication 4 UNITS: at 12:14

## 2022-07-17 RX ADMIN — Medication 1 AMPULE: at 21:32

## 2022-07-17 RX ADMIN — SODIUM CHLORIDE, PRESERVATIVE FREE 40 ML: 5 INJECTION INTRAVENOUS at 05:00

## 2022-07-17 RX ADMIN — SODIUM CHLORIDE 40 MG: 9 INJECTION, SOLUTION INTRAMUSCULAR; INTRAVENOUS; SUBCUTANEOUS at 09:42

## 2022-07-17 NOTE — PROGRESS NOTES
Bedside shift change report given to Rodrigo Zuleta (oncoming nurse) by Zachary Lama (offgoing nurse). Report included the following information SBAR, Kardex, MAR, Recent Results and Cardiac Rhythm NSR.

## 2022-07-17 NOTE — PROGRESS NOTES
Progress Note      7/17/2022 9:12 AM  NAME: Melvina Aguilera   MRN:  [id number]   Admit Diagnosis: Thrombocytopenia ([facility] [address] [D69.6]  GI bleed [K92.2]  UGO (acute kidney injury) ([facility] [address] [N17.9]      Problem List:     1. Idiopathic thrombocytopenia  2. Anemia  3. Rectal bleeding  4. Lactic acidosis  5. NSTEMI  6. Paroxysmal atrial fibrillation  7. Remote PE and DVT (separate events)  8. CAD w/ mild diffuse disease w/ patent stent in the RCA 7/17 cath. 9. XOL  10. Morbid obesity  11. DM  12. Former smoker     Assessment/Plan:   PLT recovered    MPI w/ EF 55% and no definite ischemia. Recurrent afib 7/10. Back on dilt gtt. Converted to sinus. Recurrent Afib 7/12. Converted to sinus. Continue to require PRBC    Hematuria; much better today    1. Continue oral amio 400mg; increased to TID, but discharge on 200mg daily  2. Continue amio gtt until discharge  3. Off ASA  4. Off lovenox, back on eliquis 5mg BID Need to carefully consider holding AC given his ongoing transfusion requirements and hematuria albeit clearing. 5. Continue statin  6. TPN  7. Will need to be considered for a Watchman as an outpt         [x]       High complexity decision making was performed in this patient at high risk for decompensation with multiple organ involvement. Subjective:     Melvina Aguilera denies chest pain, dyspnea. Discussed with RN events overnight. Review of Systems:    Symptom Y/N Comments  Symptom Y/N Comments   Fever/Chills N   Chest Pain N    Poor Appetite N   Edema N    Cough N   Abdominal Pain N    Sputum N   Joint Pain N    SOB/SHEARER N   Pruritis/Rash N    Nausea/vomit N   Tolerating PT/OT Y    Diarrhea N   Tolerating Diet Y    Constipation N   Other       Could NOT obtain due to:      Objective:      Physical Exam:    Last 24hrs VS reviewed since prior progress note.  Most recent are:    Visit Vitals  BP (!) 128/47 (BP 1 Location: Left upper arm, BP Patient Position: At rest)   Pulse 62 Temp 98.8 °F (37.1 °C)   Resp 23   Ht 6' 4\" (1.93 m)   Wt 138.2 kg (304 lb 10.8 oz)   SpO2 95%   BMI 37.09 kg/m²       Intake/Output Summary (Last 24 hours) at 7/17/2022 1150  Last data filed at 7/17/2022 8307  Gross per 24 hour   Intake 0 ml   Output 2385 ml   Net -2385 ml        General Appearance: Well developed, well nourished, alert & oriented x 3,    no acute distress. Ears/Nose/Mouth/Throat: Hearing grossly normal.  Neck: Supple. Chest: Lungs clear to auscultation bilaterally. Cardiovascular: Regular rate and rhythm, S1S2 normal, no murmur. Abdomen: Soft, non-tender, bowel sounds are active. Extremities: No edema bilaterally. Skin: Warm and dry. []         Post-cath site without hematoma, bruit, tenderness, or thrill. Distal pulses intact. PMH/ reviewed - no change compared to H&P    Data Review    Telemetry:     EKG:   [x]  No new EKG for review    Lab Data Personally Reviewed:    Recent Labs     07/17/22  0236 07/16/22  0557   WBC 8.7 7.3   HGB 7.9* 7.6*   HCT 25.2* 24.0*    182     No results for input(s): INR, PTP, APTT, INREXT, INREXT in the last 72 hours. Recent Labs     07/17/22  0236 07/16/22  0557 07/15/22  0515   * 135* 137   K 3.9 3.9 3.9    108 108   CO2 22 21 22   BUN 15 13 12   CREA 0.93 0.92 0.97   * 227* 183*   CA 8.0* 7.9* 7.9*   MG 2.0 1.9 2.1     No results for input(s): CPK, CKNDX, TROIQ in the last 72 hours. No lab exists for component: CPKMB  Lab Results   Component Value Date/Time    Cholesterol, total 133 06/07/2013 02:40 PM    HDL Cholesterol 43 06/07/2013 02:40 PM    LDL, calculated 73.4 06/07/2013 02:40 PM    Triglyceride 83 06/07/2013 02:40 PM    CHOL/HDL Ratio 3.1 06/07/2013 02:40 PM       No results for input(s): AP, TBIL, TP, ALB, GLOB, GGT, AML, LPSE in the last 72 hours. No lab exists for component: SGOT, GPT, AMYP, HLPSE  No results for input(s): PH, PCO2, PO2 in the last 72 hours.     Medications Personally Reviewed:    Current Facility-Administered Medications   Medication Dose Route Frequency    TPN ADULT - CENTRAL   IntraVENous CONTINUOUS    TPN ADULT - CENTRAL   IntraVENous CONTINUOUS    insulin lispro (HUMALOG) injection   SubCUTAneous AC&HS    0.9% sodium chloride infusion 250 mL  250 mL IntraVENous PRN    pantoprazole (PROTONIX) 40 mg in 0.9% sodium chloride 10 mL injection  40 mg IntraVENous DAILY    amiodarone (CORDARONE) 375 mg/250 mL D5W infusion  0.5 mg/min IntraVENous CONTINUOUS    amiodarone (CORDARONE) tablet 400 mg  400 mg Oral Q8H    HYDROmorphone (DILAUDID) injection 0.5-1 mg  0.5-1 mg IntraVENous Q2H PRN    HYDROcodone-acetaminophen (NORCO) 5-325 mg per tablet 1-2 Tablet  1-2 Tablet Oral Q4H PRN    alcohol 62% (NOZIN) nasal  1 Ampule  1 Ampule Topical Q12H    piperacillin-tazobactam (ZOSYN) 3.375 g in 0.9% sodium chloride (MBP/ADV) 100 mL MBP  3.375 g IntraVENous Q8H    apixaban (ELIQUIS) tablet 5 mg  5 mg Oral BID    phenol throat spray (CHLORASEPTIC) 1 Spray  1 Spray Oral PRN    calcium carbonate (TUMS) chewable tablet 400 mg [elemental]  400 mg Oral TID PRN    glucose chewable tablet 16 g  4 Tablet Oral PRN    glucagon (GLUCAGEN) injection 1 mg  1 mg IntraMUSCular PRN    dextrose 10 % infusion 0-250 mL  0-250 mL IntraVENous PRN    nitroglycerin (NITROSTAT) tablet 0.4 mg  0.4 mg SubLINGual PRN    [Held by provider] atorvastatin (LIPITOR) tablet 20 mg  20 mg Oral QHS    [Held by provider] vitamin C-F-A-lutein-minerals (OCUVITE) tablet 1 Tablet  1 Tablet Oral DAILY    sodium chloride (NS) flush 5-40 mL  5-40 mL IntraVENous Q8H    sodium chloride (NS) flush 5-40 mL  5-40 mL IntraVENous PRN    acetaminophen (TYLENOL) tablet 650 mg  650 mg Oral Q6H PRN    Or    acetaminophen (TYLENOL) suppository 650 mg  650 mg Rectal Q6H PRN    polyethylene glycol (MIRALAX) packet 17 g  17 g Oral DAILY PRN    ondansetron (ZOFRAN ODT) tablet 4 mg  4 mg Oral Q8H PRN    Or    ondansetron (ZOFRAN) injection 4 mg  4 mg IntraVENous Q6H PRN         Tiffani Linares III, DO

## 2022-07-17 NOTE — PROGRESS NOTES
Pt is having loose stools, vomited after eating full liquid lunch. Medicated with zofran.  Pt is sitting up in the chair at this time

## 2022-07-17 NOTE — PROGRESS NOTES
SURGERY PROGRESS NOTE      Admit Date: 2022        Subjective:     Patient has no complaints . Passing flatus and having BMs. Tolerating PO. Objective:     Visit Vitals  BP (!) 128/47 (BP 1 Location: Left upper arm, BP Patient Position: At rest)   Pulse 62   Temp 98.8 °F (37.1 °C)   Resp 23   Ht 6' 4\" (1.93 m)   Wt 138.2 kg (304 lb 10.8 oz)   SpO2 95%   BMI 37.09 kg/m²        Temp (24hrs), Av.7 °F (37.1 °C), Min:98.2 °F (36.8 °C), Max:99.4 °F (37.4 °C)      701 - 1900  In: -   Out: 540 [Urine:450; Drains:90]  07/15 1901 - 700  In: 241.3   Out: 2004 [Urine:2335; Drains:170]    Physical Exam:    General:  alert, cooperative, no distress, appears stated age   Abdomen: soft, bowel sounds active, non-tender   Incision:   negative pressure dressing intact            Lab Results   Component Value Date/Time    WBC 8.7 2022 02:36 AM    HGB 7.9 (L) 2022 02:36 AM    HCT 25.2 (L) 2022 02:36 AM    PLATELET 535  02:36 AM    MCV 96.9 2022 02:36 AM     Lab Results   Component Value Date/Time    GFR est non-AA >60 2022 02:36 AM    GFR est AA >60 2022 02:36 AM    Creatinine 0.93 2022 02:36 AM    BUN 15 2022 02:36 AM    Sodium 135 (L) 2022 02:36 AM    Potassium 3.9 2022 02:36 AM    Chloride 107 2022 02:36 AM    CO2 22 2022 02:36 AM    Magnesium 2.0 2022 02:36 AM    Phosphorus 3.1 2022 02:36 AM       Assessment:     Active Problems: Morbid obesity ([address] (2013)      Thrombocytopenia ([address] (2022)      GI bleed (2022)      UGO (acute kidney injury) ([address] (2022)      Atrial fibrillation ([address] (7/3/2022)      Elevated troponin (7/3/2022)      Ileus ([address] (2022)      Intestinal adhesions with partial obstruction ([address] (2022)      Ventral hernia (2022)      Intestinal perforation ([address] (2022)    Doing well    Plan:     1.   Wean TPN  2.. D/C bernard  3.  advance diet

## 2022-07-17 NOTE — PROGRESS NOTES
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 Received 3 doses IVIG, did not tolerate final IVIG infusion despite. PLT normalized  Hemo/onc on board     SBO, improving  Repeat CT a/p on  showed partial SBO pattern. CT enterography showed distal partial SBO  Went to OR on  found to have SMALL BOWEL OBSTRUCTION  SECONDARY TO ILEAL PERFORATION, AND ABSCESS AND ADHESION AND ABDOMINAL HERNIA     Started on fluid diet today. by general surgery   Patient has 2 bowel movement overnight  Tolerating full liquid diet  Continue with Zosyn  Off NGT      GI bleed  Was to due severe ITP  S/p blood transfusion  Continue with protonix 40 daily (can switch to famotidine on discharge)  GI on board, holding off on EGD for now, conservative management.     7/15 again hemoglobin 6.8 but there is no blood in the stool has no hematuria we will transfuse 1 unit of blood  Hold tonight Lovenox dose    Paroxysmal atrial fibrillation w RVR  Elevated trop  HR is controlled now   On enoxaparin at the patient n.p.o.  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 Estimated discharge date: >48hrs  Code status: Full  Prophylaxis: Eliquis   Recommended Disposition: [facility]/Rehab. Patient and wife requested PT eval prior to discharge, wife thinks the patient is too weak and need rehab     Subjective:     Chief Complaint / Reason for Physician Visit  Patient seen and examined today, doing much better tolerating full liquid diet since yesterday, and had 2 bowel movement over the night. Review of Systems:  Symptom Y/N Comments  Symptom Y/N Comments   Fever/Chills    Chest Pain     Poor Appetite    Edema     Cough    Abdominal Pain     Sputum    Joint Pain     SOB/SHEARER    Pruritis/Rash     Nausea/vomit    Tolerating PT/OT     Diarrhea    Tolerating Diet     Constipation    Other       Could NOT obtain due to:      Objective:     VITALS:   Last 24hrs VS reviewed since prior progress note. Most recent are:  Patient Vitals for the past 24 hrs:   Temp Pulse Resp BP SpO2   07/17/22 0321 98.2 °F (36.8 °C) 63 20 (!) 129/58 96 %   07/16/22 2237 98.2 °F (36.8 °C) 64 16 (!) 148/57 97 %   07/16/22 2106 -- 66 -- (!) 136/55 --   07/16/22 1929 99.4 °F (37.4 °C) 69 16 (!) 132/49 96 %   07/16/22 1502 98.9 °F (37.2 °C) 67 25 (!) 140/47 98 %   07/16/22 1100 97.6 °F (36.4 °C) 68 14 (!) 119/54 97 %       Intake/Output Summary (Last 24 hours) at 7/17/2022 0834  Last data filed at 7/17/2022 0321  Gross per 24 hour   Intake 0 ml   Output 1885 ml   Net -1885 ml        I had a face to face encounter and independently examined this patient as outlined below:  PHYSICAL EXAM:  General: WD, WN. Alert, cooperative, no acute distress    EENT:  EOMI. Anicteric sclerae. MMM  Resp:  CTA bilaterally, no wheezing or rales. No accessory muscle use  CV:  Irregularly irregular rhythm,  No edema  GI/:  Soft, Non distended, Non tender. hypoactive Bowel sounds. NAHID drain. Midline dressing.  Lopez in place urine is starting to clear up today  Neurologic:  Alert and oriented X 3, normal speech   Psych:   Good insight. Not anxious nor agitated  Skin:    No jaundice      Reviewed most current lab test results and cultures  YES  Reviewed most current radiology test results   YES  Review and summation of old records today    NO  Reviewed patient's current orders and MAR    YES  PMH/SH reviewed - no change compared to H&P  ________________________________________________________________________  Care Plan discussed with:    Comments   Patient X    Family  X    RN X    Care Manager x    Consultant                       x Multidiciplinary team rounds were held today with , nursing, pharmacist and clinical coordinator. Patient's plan of care was discussed; medications were reviewed and discharge planning was addressed. ________________________________________________________________________  Total NON critical care TIME:  35   Minutes    Total CRITICAL CARE TIME Spent:   Minutes non procedure based      Comments   >50% of visit spent in counseling and coordination of care X    ________________________________________________________________________  Lima Randall MD     Procedures: see electronic medical records for all procedures/Xrays and details which were not copied into this note but were reviewed prior to creation of Plan. LABS:  I reviewed today's most current labs and imaging studies. Pertinent labs include:  Recent Labs     07/17/22  0236 07/16/22  0557 07/15/22  0515   WBC 8.7 7.3 8.1   HGB 7.9* 7.6* 6.8*   HCT 25.2* 24.0* 21.7*    182 173     Recent Labs     07/17/22  0236 07/16/22  0557 07/15/22  0515   * 135* 137   K 3.9 3.9 3.9    108 108   CO2 22 21 22   * 227* 183*   BUN 15 13 12   CREA 0.93 0.92 0.97   CA 8.0* 7.9* 7.9*   MG 2.0 1.9 2.1   PHOS 3.1 2.7 2.8       Signed:  Lima Randall MD

## 2022-07-18 ENCOUNTER — APPOINTMENT (OUTPATIENT)
Dept: CT IMAGING | Age: 77
DRG: 981 | End: 2022-07-18
Attending: SURGERY
Payer: MEDICARE

## 2022-07-18 LAB
ANION GAP SERPL CALC-SCNC: 6 MMOL/L (ref 5–15)
BUN SERPL-MCNC: 14 MG/DL (ref 6–20)
BUN/CREAT SERPL: 14 (ref 12–20)
CALCIUM SERPL-MCNC: 7.9 MG/DL (ref 8.5–10.1)
CHLORIDE SERPL-SCNC: 107 MMOL/L (ref 97–108)
CO2 SERPL-SCNC: 22 MMOL/L (ref 21–32)
CREAT SERPL-MCNC: 0.97 MG/DL (ref 0.7–1.3)
GLUCOSE BLD STRIP.AUTO-MCNC: 156 MG/DL (ref 65–117)
GLUCOSE BLD STRIP.AUTO-MCNC: 161 MG/DL (ref 65–117)
GLUCOSE BLD STRIP.AUTO-MCNC: 193 MG/DL (ref 65–117)
GLUCOSE BLD STRIP.AUTO-MCNC: 222 MG/DL (ref 65–117)
GLUCOSE SERPL-MCNC: 216 MG/DL (ref 65–100)
MAGNESIUM SERPL-MCNC: 2.1 MG/DL (ref 1.6–2.4)
PHOSPHATE SERPL-MCNC: 3.2 MG/DL (ref 2.6–4.7)
POTASSIUM SERPL-SCNC: 4.2 MMOL/L (ref 3.5–5.1)
SERVICE CMNT-IMP: ABNORMAL
SODIUM SERPL-SCNC: 135 MMOL/L (ref 136–145)

## 2022-07-18 PROCEDURE — 74011000250 HC RX REV CODE- 250: Performed by: SURGERY

## 2022-07-18 PROCEDURE — 74011636637 HC RX REV CODE- 636/637: Performed by: SURGERY

## 2022-07-18 PROCEDURE — 74011000258 HC RX REV CODE- 258: Performed by: SURGERY

## 2022-07-18 PROCEDURE — C9113 INJ PANTOPRAZOLE SODIUM, VIA: HCPCS | Performed by: NURSE PRACTITIONER

## 2022-07-18 PROCEDURE — 74011250636 HC RX REV CODE- 250/636: Performed by: SURGERY

## 2022-07-18 PROCEDURE — 80048 BASIC METABOLIC PNL TOTAL CA: CPT

## 2022-07-18 PROCEDURE — 83735 ASSAY OF MAGNESIUM: CPT

## 2022-07-18 PROCEDURE — 74011250637 HC RX REV CODE- 250/637: Performed by: INTERNAL MEDICINE

## 2022-07-18 PROCEDURE — 74011636637 HC RX REV CODE- 636/637: Performed by: STUDENT IN AN ORGANIZED HEALTH CARE EDUCATION/TRAINING PROGRAM

## 2022-07-18 PROCEDURE — 65270000046 HC RM TELEMETRY

## 2022-07-18 PROCEDURE — 74011000250 HC RX REV CODE- 250: Performed by: NURSE PRACTITIONER

## 2022-07-18 PROCEDURE — 74011000258 HC RX REV CODE- 258: Performed by: INTERNAL MEDICINE

## 2022-07-18 PROCEDURE — 82962 GLUCOSE BLOOD TEST: CPT

## 2022-07-18 PROCEDURE — 74011000636 HC RX REV CODE- 636: Performed by: STUDENT IN AN ORGANIZED HEALTH CARE EDUCATION/TRAINING PROGRAM

## 2022-07-18 PROCEDURE — 97116 GAIT TRAINING THERAPY: CPT

## 2022-07-18 PROCEDURE — 97530 THERAPEUTIC ACTIVITIES: CPT

## 2022-07-18 PROCEDURE — 36415 COLL VENOUS BLD VENIPUNCTURE: CPT

## 2022-07-18 PROCEDURE — 74011250636 HC RX REV CODE- 250/636: Performed by: NURSE PRACTITIONER

## 2022-07-18 PROCEDURE — 74011250637 HC RX REV CODE- 250/637: Performed by: SURGERY

## 2022-07-18 PROCEDURE — 74011250636 HC RX REV CODE- 250/636: Performed by: INTERNAL MEDICINE

## 2022-07-18 PROCEDURE — 74177 CT ABD & PELVIS W/CONTRAST: CPT

## 2022-07-18 PROCEDURE — 84100 ASSAY OF PHOSPHORUS: CPT

## 2022-07-18 RX ADMIN — HYDROMORPHONE HYDROCHLORIDE 1 MG: 1 INJECTION, SOLUTION INTRAMUSCULAR; INTRAVENOUS; SUBCUTANEOUS at 11:54

## 2022-07-18 RX ADMIN — APIXABAN 5 MG: 5 TABLET, FILM COATED ORAL at 05:32

## 2022-07-18 RX ADMIN — SODIUM CHLORIDE, PRESERVATIVE FREE 10 ML: 5 INJECTION INTRAVENOUS at 14:00

## 2022-07-18 RX ADMIN — MAGNESIUM SULFATE HEPTAHYDRATE: 500 INJECTION, SOLUTION INTRAMUSCULAR; INTRAVENOUS at 17:10

## 2022-07-18 RX ADMIN — APIXABAN 5 MG: 5 TABLET, FILM COATED ORAL at 17:32

## 2022-07-18 RX ADMIN — AMIODARONE HYDROCHLORIDE 400 MG: 200 TABLET ORAL at 13:19

## 2022-07-18 RX ADMIN — Medication 3 UNITS: at 16:00

## 2022-07-18 RX ADMIN — SODIUM CHLORIDE 40 MG: 9 INJECTION, SOLUTION INTRAMUSCULAR; INTRAVENOUS; SUBCUTANEOUS at 08:15

## 2022-07-18 RX ADMIN — Medication 4 UNITS: at 08:15

## 2022-07-18 RX ADMIN — AMIODARONE HYDROCHLORIDE 400 MG: 200 TABLET ORAL at 05:32

## 2022-07-18 RX ADMIN — IOPAMIDOL 100 ML: 755 INJECTION, SOLUTION INTRAVENOUS at 12:18

## 2022-07-18 RX ADMIN — PIPERACILLIN AND TAZOBACTAM 3.38 G: 3; .375 INJECTION, POWDER, LYOPHILIZED, FOR SOLUTION INTRAVENOUS at 05:32

## 2022-07-18 RX ADMIN — IOHEXOL 50 ML: 240 INJECTION, SOLUTION INTRATHECAL; INTRAVASCULAR; INTRAVENOUS; ORAL at 10:30

## 2022-07-18 RX ADMIN — PIPERACILLIN AND TAZOBACTAM 3.38 G: 3; .375 INJECTION, POWDER, LYOPHILIZED, FOR SOLUTION INTRAVENOUS at 21:24

## 2022-07-18 RX ADMIN — PIPERACILLIN AND TAZOBACTAM 3.38 G: 3; .375 INJECTION, POWDER, LYOPHILIZED, FOR SOLUTION INTRAVENOUS at 13:19

## 2022-07-18 RX ADMIN — ONDANSETRON 4 MG: 2 INJECTION INTRAMUSCULAR; INTRAVENOUS at 08:15

## 2022-07-18 RX ADMIN — SODIUM CHLORIDE, PRESERVATIVE FREE 10 ML: 5 INJECTION INTRAVENOUS at 05:32

## 2022-07-18 RX ADMIN — AMIODARONE HYDROCHLORIDE 400 MG: 200 TABLET ORAL at 21:24

## 2022-07-18 RX ADMIN — Medication 1 AMPULE: at 09:00

## 2022-07-18 RX ADMIN — Medication 1 AMPULE: at 21:27

## 2022-07-18 RX ADMIN — AMIODARONE HYDROCHLORIDE 0.5 MG/MIN: 50 INJECTION, SOLUTION INTRAVENOUS at 09:38

## 2022-07-18 RX ADMIN — SODIUM CHLORIDE, PRESERVATIVE FREE 10 ML: 5 INJECTION INTRAVENOUS at 21:25

## 2022-07-18 RX ADMIN — Medication 3 UNITS: at 11:58

## 2022-07-18 NOTE — PROGRESS NOTES
Transition of Care Plan:     RUR:15%  Disposition:[facility] [employer] side vs Return to [facility])  Follow up appointments: To be done prior to discharge if going home  DME needed:None  Transportation at [place] [address] or means to access home:  Wife has keys      IM Medicare Letter: Needed at discharge   Is patient a BCPI-A Bundle:  No                    If yes, was Bundle Letter given?:  N/A  Is patient a  and connected with the [facility]  If yes, was [facility] transfer form completed and [facility] notified? N/A   Caregiver Contact:Wife  Discharge Caregiver contacted prior to discharge? Caregiver to be contacted prior to discharge  Care Conference needed?: Not at this time       10:40am- Consuelo with [facility] called CM via phone to discuss d/c plan. Consuelo inquired with CM about the status of pt's discharge. Consuelo stated that they have a bed available for pt. CM will continue to follow patient for discharge planning needs and arrange for services as deemed necessary.     Joanan Conde, [facility] [place]  [phone]

## 2022-07-18 NOTE — PROGRESS NOTES
SURGERY PROGRESS NOTE      Admit Date: 2022    POD #10      Subjective:     Patient complains of nausea, vomiting and abdominal bloating      Objective:     Visit Vitals  BP (!) 136/52 (BP 1 Location: Left upper arm, BP Patient Position: At rest)   Pulse 63   Temp 98.4 °F (36.9 °C)   Resp 18   Ht 6' 4\" (1.93 m)   Wt 138.2 kg (304 lb 10.8 oz)   SpO2 94%   BMI 37.09 kg/m²        Temp (24hrs), Av.6 °F (37 °C), Min:98.4 °F (36.9 °C), Max:99.1 °F (37.3 °C)      701 -  1900  In: 909.1 [I.V.:909.1]  Out: -   1901 -  0700  In: 3911.5 [P.O.:120; I.V.:3791.5]  Out: 3835 [Urine:3175; Drains:260]    Physical Exam:    General:  alert, cooperative, no distress, appears stated age   Abdomen: soft, distended, bowel sounds hypoactive, non-tender   Incision:   negative pressure dressing is saturated with purulent. Dressing removed. Staples removed. Large subcutaneous cavity fill with purulent fluid, fascia intact. Wound clean of all purulent material and then pack WTD. Lab Results   Component Value Date/Time    WBC 8.7 2022 02:36 AM    HGB 7.9 (L) 2022 02:36 AM    HCT 25.2 (L) 2022 02:36 AM    PLATELET 864 65/15/7279 02:36 AM    MCV 96.9 2022 02:36 AM     Lab Results   Component Value Date/Time    GFR est non-AA >60 2022 05:21 AM    GFR est AA >60 2022 05:21 AM    Creatinine 0.97 2022 05:21 AM    BUN 14 2022 05:21 AM    Sodium 135 (L) 2022 05:21 AM    Potassium 4.2 2022 05:21 AM    Chloride 107 2022 05:21 AM    CO2 22 2022 05:21 AM    Magnesium 2.1 2022 05:21 AM    Phosphorus 3.2 2022 05:21 AM       Assessment:     Active Problems:     Morbid obesity ([facility] (2013)      Thrombocytopenia ([address] (2022)      GI bleed (2022)      UGO (acute kidney injury) ([facility] (2022)      Atrial fibrillation ([facility] (7/3/2022)      Elevated troponin (7/3/2022)      Ileus ([address] (2022)      Intestinal adhesions with partial obstruction ([address] (7/8/2022)      Ventral hernia (7/8/2022)      Intestinal perforation ([address] (7/8/2022)    Patient has had an ileus for 10 days now. Today his [address] went from clean to saturated with purulent material.  He had a large wound infection that is now drained. Hopefully he will now have return of bowel function. Will get a CT of the abdomen to ensure no intraabdominal abscess.       Plan:     1) renew TPN  2) PO as tolerated   3) CT abdomen/pelvis

## 2022-07-18 NOTE — PROGRESS NOTES
Comprehensive Nutrition Assessment    Type and Reason for Visit: Reassess    Nutrition Recommendations/Plan:   1. Consider increasing TPN to 83 mL/hr until PO better tolerated      Nutrition Assessment:    Chart reviewed; patient's diet advanced from fulls to solids over the weekend. Vomited twice yesterday per notes. Patient reports nausea this morning; did not eat anything from his breakfast tray. TPN weaned down yesterday with diet advancement. Would increase TPN today given PO intolerance and patient's prolonged NPO/clear liquid diet orders prior to this diet advancement. Will continue to monitor PO tolerance/intake. Nutrition Related Findings:   Na 135,  -[phone]  BM 7/17  Humalog, Protonix    Wound Type: Surgical incision    Current Nutrition Intake & Therapies:  Average Meal Intake: 1-25%     TPN ADULT - CENTRAL  ADULT DIET Regular; 4 carb choices (60 gm/meal)  Current Parenteral Nutrition Orders:  · Type and Formula: AA5% D20%   · Lipids: None  · Duration: Continuous  · Rate/Volume: 42 ml/hr  · Current PN Order Provides: 887 kcal, 50g protein, 202g CHO  · Goal PN Orders Provides: 1753 kcal, 100g protein, 398g CHO    Anthropometric Measures:  Height: 6' 4\" (193 cm)  Ideal Body Weight (IBW): 202 lbs (92 kg)     Current Body Wt:  138.2 kg (304 lb 10.8 oz), 137.2 % IBW. Current BMI (kg/m2): 37.1                          BMI Category: Obese class 2 (BMI 35.0-39. 9)    Estimated Daily Nutrient Needs:  Energy Requirements Based On: Formula  Weight Used for Energy Requirements: Other (specify) (prior wt 277#)  Energy (kcal/day): 2199 kcal (BMR 2077 x 1. 3AF -500kcal)  Weight Used for Protein Requirements: Current  Protein (g/day): 101g (0.8 g/kg bw)  Method Used for Fluid Requirements: 1 ml/kcal  Fluid (ml/day): 2200 mL    Nutrition Diagnosis:   · Inadequate protein-energy intake related to altered GI function as evidenced by NPO or clear liquid status due to medical condition,nutrition support-parenteral nutrition    Nutrition Interventions:   Food and/or Nutrient Delivery: Modify parenteral nutrition  Nutrition Education/Counseling: No recommendations at this time  Coordination of Nutrition Care: Continue to monitor while inpatient       Goals:  Previous Goal Met: Progressing toward goal(s)  Goals:  (TPN advanced to goal rate and PO >25% of meals next 1-3 days)       Nutrition Monitoring and Evaluation:   Behavioral-Environmental Outcomes: None identified  Food/Nutrient Intake Outcomes: Food and nutrient intake,Parenteral nutrition intake/tolerance  Physical Signs/Symptoms Outcomes: Biochemical data,Weight,GI status,Nausea/vomiting    Discharge Planning:     Too soon to determine    Patience Screen, RD  Contact: ext [phone]

## 2022-07-18 NOTE — PROGRESS NOTES
Problem: Mobility Impaired (Adult and Pediatric)  Goal: *Acute Goals and Plan of Care (Insert Text)  Description: FUNCTIONAL STATUS PRIOR TO ADMISSION: Patient was modified independent using a rollator for functional mobility. HOME SUPPORT PRIOR TO ADMISSION: The patient lived with wife at [address] but did not require assist.    Physical Therapy Goals  Initiated 7/2/2022 - remain appropriate 7/11/2022, remain appropriate 7/18/22  1. Patient will move from supine to sit and sit to supine , scoot up and down, and roll side to side in bed with modified independence within 7 day(s). 2.  Patient will transfer from bed to chair and chair to bed with modified independence using the least restrictive device within 7 day(s). 3.  Patient will perform sit to stand with modified independence within 7 day(s). 4.  Patient will ambulate with modified independence for 150 feet with the least restrictive device within 7 day(s). Outcome: Progressing Towards Goal   PHYSICAL THERAPY TREATMENT: WEEKLY REASSESSMENT  Patient: Porsche Pineda (39 y.o. male)  Date: 7/18/2022  Primary Diagnosis: Thrombocytopenia ([facility] [D69.6]  GI bleed [K92.2]  UGO (acute kidney injury) ([facility] [N17.9]  Procedure(s) (LRB):  INFUSION CATHETER INSERTION (N/A) 5 Days Post-Op   Precautions: fall         ASSESSMENT  Patient continues with skilled PT services and is progressing towards goals. Pt doing well this session, motivated to participate. He was able to complete bed mobility mainly at a SBA level of function but needed brief HHA min A in bed to scoot toward side. He was able to transfer with SBA. He amb with the rolling walker with SBA/CGA for increased distance to 30' today. Sats on room air remained in 90s and HR 70s-80s. Pt left in chair with call bell in reach. Aware of LE AROM exercises to complete throughout the day.     Patient's progression toward goals since last assessment: Pt improved to SBA for transfers and is showing much improved amb distance up to 30' this session. His vitals remained stable on room air. He is appropriate for SNF rehab post d/c to allow him to improve to a level at which he can return to independent living with his wife. Functional Outcome Measure: The patient scored 50/100 on the barthel outcome measure which is indicative of 50% functional impairment. Other factors to consider for discharge: wife unable to fully assist, fall risk, limited activity tolerance         PLAN :  Goals have been updated based on progression since last assessment. Patient continues to benefit from skilled intervention to address the above impairments. Recommendations and Planned Interventions: bed mobility training, transfer training, gait training, therapeutic exercises, patient and family training/education, and therapeutic activities      Frequency/Duration: Patient will be followed by physical therapy:  4 times a week to address goals. Recommendation for discharge: (in order for the patient to meet his/her long term goals)  Therapy up to 5 days/week in SNF setting    This discharge recommendation:  Has been made in collaboration with the attending provider and/or case management    IF patient discharges home will need the following DME: bariatric rolling walker         SUBJECTIVE:   Patient stated I'll walk over to the door.     OBJECTIVE DATA SUMMARY:   HISTORY:    Past Medical History:   Diagnosis Date    Arthritis     bilateral hips/knees    CAD (coronary artery disease) 6/7/13    PCI    Chronic pain     DJD; SILVIO KNEES    Diabetes ([address]     \"BORDERLINE\" PER MD    GERD (gastroesophageal reflux disease)     Macular degeneration     Morbid obesity ([address]     Other and unspecified symptoms and signs involving general sensations and perceptions     Bilat. legs \"give out\" intermittently; Bilat leg cramps; macular degenration (left).     Other ill-defined conditions(799.89)     HYPERLIPIDEMIA; LT LE DVT (2008); MORBID OBESITY    Pulmonary embolism ([facility] 08/2021    Thromboembolus ([facility] 2008    left LE     Past Surgical History:   Procedure Laterality Date    MS CARDIAC SURG PROCEDURE UNLIST  6/713    STENTS TO RCA    MS TOTAL HIP ARTHROPLASTY  6/2007    right    MS TOTAL HIP ARTHROPLASTY  12/2007    left       Personal factors and/or comorbidities impacting plan of care: lives in [address] [place] [address] Road at [facility]: [address] Name: [place]  One/Two Story Residence: One story  Living Alone: No  Support Systems: Spouse/Significant Other  Patient Expects to be Discharged to[de-identified] Rehab hospital/unit acute  Current DME Used/Available at Home: Cane, straight,Walker, rolling,Walker, rollator,Grab bars (molded bench in shower)  Tub or Shower Type: Shower    EXAMINATION/PRESENTATION/DECISION MAKING:   Critical Behavior:  Neurologic State: Alert  Orientation Level: Oriented X4  Cognition: Follows commands  Safety/Judgement: Awareness of environment,Fall prevention,Home safety,Insight into deficits  Hearing: Auditory  Auditory Impairment: None    Range Of Motion:  AROM: Generally decreased, functional                       Strength:    Strength: Generally decreased, functional                    Tone & Sensation:   Tone: Normal                              Coordination:  Coordination: Within functional limits       Functional Mobility:  Bed Mobility:  Rolling: Stand-by assistance  Supine to Sit: Minimum assistance; Other (comment) (brief min HHA to scoot on bed)     Scooting: Stand-by assistance  Transfers:  Sit to Stand: Stand-by assistance  Stand to Sit: Stand-by assistance        Bed to Chair: Contact guard assistance; Other (comment) (with RW)              Balance:   Sitting: Intact  Standing: Impaired  Standing - Static: Constant support;Good; Other (comment) (with RW)  Standing - Dynamic : Constant support;Good; Other (comment) (with RW)  Ambulation/Gait Training:  Distance (ft): 30 Feet (ft)  Assistive Device: Gait belt;Walker, rolling  Ambulation - Level of Assistance: Contact guard assistance;Stand-by assistance        Gait Abnormalities: Decreased step clearance        Base of Support: Widened     Speed/Beatriz: Slow;Pace decreased (<100 feet/min)  Step Length: Right shortened;Left shortened             Functional Measure:  Barthel Index:    Bathin  Bladder: 10  Bowels: 5  Groomin  Dressin  Feeding: 10  Mobility: 0  Stairs: 0  Toilet Use: 5  Transfer (Bed to Chair and Back): 10  Total: 50/100       The Barthel ADL Index: Guidelines  1. The index should be used as a record of what a patient does, not as a record of what a patient could do. 2. The main aim is to establish degree of independence from any help, physical or verbal, however minor and for whatever reason. 3. The need for supervision renders the patient not independent. 4. A patient's performance should be established using the best available evidence. Asking the patient, friends/relatives and nurses are the usual sources, but direct observation and common sense are also important. However direct testing is not needed. 5. Usually the patient's performance over the preceding 24-48 hours is important, but occasionally longer periods will be relevant. 6. Middle categories imply that the patient supplies over 50 per cent of the effort. 7. Use of aids to be independent is allowed. Score Interpretation (from [address] [address])    Independent   60-79 Minimally independent   40-59 Partially dependent   20-39 Very dependent   <20 Totally dependent     -Gracie Lees., Barthel, D.W. (1965). Functional evaluation: the Barthel Index. [facility] ([address] (1997). The Barthel activities of daily living index: self-reporting versus actual performance in the old (> or = 75 years).  Journal of [address] [place] [address] 45(7), [facility], JOMAR, Spencer Rm., Carter Jensen. (1999). Measuring the change in disability after inpatient rehabilitation; comparison of the responsiveness of the Barthel Index and Functional [place] Measure. Journal of Neurology, Neurosurgery, and Psychiatry, 66(4), 513-629. YUDELKA Tripathi, JOAQUINA Mcdonald, & Dee Arambula M.A. (2004) Assessment of post-stroke quality of life in cost-effectiveness studies: The usefulness of the Barthel Index and the EuroQoL-5D. Quality of Life Research, 13, 427-43          Pain Rating:  No c/o    Activity Tolerance:   Fair; VSS stable on RA    After treatment patient left in no apparent distress:   Sitting in chair and Call bell within reach    COMMUNICATION/EDUCATION:   The patients plan of care was discussed with: Registered nurse. Fall prevention education was provided and the patient/caregiver indicated understanding., Patient/family have participated as able in goal setting and plan of care. , and Patient/family agree to work toward stated goals and plan of care.     Thank you for this referral.  Allan Lara, PT   Time Calculation: 23 mins

## 2022-07-18 NOTE — PROGRESS NOTES
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 Received 3 doses IVIG, did not tolerate final IVIG infusion despite. PLT normalized  Hemo/onc on board     SBO, improving  Repeat CT a/p on  showed partial SBO pattern. CT enterography showed distal partial SBO  Went to OR on  found to have SMALL BOWEL OBSTRUCTION  SECONDARY TO ILEAL PERFORATION, AND ABSCESS AND ADHESION AND ABDOMINAL HERNIA     Started on fluid diet today. by general surgery   Patient has 2 bowel movement overnight  Continue with Zosyn  Off NGT  May be will cut back to full liquid diet if could not tolerate regular diet today  Follow surgery \"recommendation today      GI bleed  Was to due severe ITP  S/p blood transfusion  Continue with protonix 40 daily (can switch to famotidine on discharge)  GI on board, holding off on EGD for now, conservative management.     7/15 again hemoglobin 6.8 but there is no blood in the stool has no hematuria we will transfuse 1 unit of blood  Hold tonight Lovenox dose    Paroxysmal atrial fibrillation w RVR  Elevated trop  HR is controlled now   On enoxaparin at the patient n.p.o.  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 Estimated discharge date: >48hrs  Code status: Full  Prophylaxis: Eliquis   Recommended Disposition: [facility]/Rehab. Patient and wife requested PT eval prior to discharge, wife thinks the patient is too weak and need rehab     Subjective:     Chief Complaint / Reason for Physician Visit  Patient seen and examined today, doing the same also he vomited 2 times yesterday at midnight, . Review of Systems:  Symptom Y/N Comments  Symptom Y/N Comments   Fever/Chills    Chest Pain     Poor Appetite    Edema     Cough    Abdominal Pain     Sputum    Joint Pain     SOB/SHEARER    Pruritis/Rash     Nausea/vomit    Tolerating PT/OT     Diarrhea    Tolerating Diet     Constipation    Other       Could NOT obtain due to:      Objective:     VITALS:   Last 24hrs VS reviewed since prior progress note. Most recent are:  Patient Vitals for the past 24 hrs:   Temp Pulse Resp BP SpO2   07/18/22 0803 98.4 °F (36.9 °C) 63 18 (!) 136/52 94 %   07/18/22 0543 98.6 °F (37 °C) 64 21 (!) 136/58 95 %   07/17/22 2250 98.5 °F (36.9 °C) 69 27 137/66 94 %   07/17/22 1951 99.1 °F (37.3 °C) 64 16 (!) 144/55 97 %   07/17/22 1704 98.5 °F (36.9 °C) 64 16 (!) 131/44 96 %   07/17/22 1038 98.8 °F (37.1 °C) 62 23 (!) 128/47 95 %   07/17/22 0930 98.7 °F (37.1 °C) 63 20 (!) 130/51 96 %       Intake/Output Summary (Last 24 hours) at 7/18/2022 7303  Last data filed at 7/18/2022 1168  Gross per 24 hour   Intake 4820.6 ml   Output 2600 ml   Net 2220.6 ml        I had a face to face encounter and independently examined this patient as outlined below:  PHYSICAL EXAM:  General: WD, WN. Alert, cooperative, no acute distress    EENT:  EOMI. Anicteric sclerae. MMM  Resp:  CTA bilaterally, no wheezing or rales.   No accessory muscle use  CV:  Irregularly irregular rhythm,  No edema  GI/:  Soft, Non distended, Non tender. hypoactive Bowel sounds. NAHID drain. Midline dressing. Lopez in place urine is starting to clear up today  Neurologic:  Alert and oriented X 3, normal speech   Psych:   Good insight. Not anxious nor agitated  Skin:    No jaundice      Reviewed most current lab test results and cultures  YES  Reviewed most current radiology test results   YES  Review and summation of old records today    NO  Reviewed patient's current orders and MAR    YES  PMH/SH reviewed - no change compared to H&P  ________________________________________________________________________  Care Plan discussed with:    Comments   Patient X    Family  X    RN X    Care Manager x    Consultant                       x Multidiciplinary team rounds were held today with , nursing, pharmacist and clinical coordinator. Patient's plan of care was discussed; medications were reviewed and discharge planning was addressed. ________________________________________________________________________  Total NON critical care TIME:  35   Minutes    Total CRITICAL CARE TIME Spent:   Minutes non procedure based      Comments   >50% of visit spent in counseling and coordination of care X    ________________________________________________________________________  Harshad Kwan MD     Procedures: see electronic medical records for all procedures/Xrays and details which were not copied into this note but were reviewed prior to creation of Plan. LABS:  I reviewed today's most current labs and imaging studies. Pertinent labs include:  Recent Labs     07/17/22  0236 07/16/22  0557   WBC 8.7 7.3   HGB 7.9* 7.6*   HCT 25.2* 24.0*    182     Recent Labs     07/18/22  0521 07/17/22  0236 07/16/22  0557   * 135* 135*   K 4.2 3.9 3.9    107 108   CO2 22 22 21   * 199* 227*   BUN 14 15 13   CREA 0.97 0.93 0.92   CA 7.9* 8.0* 7.9*   MG 2.1 2.0 1.9   PHOS 3.2 3.1 2.7       Signed:  Zoey Samson Paniagua MD

## 2022-07-18 NOTE — PROGRESS NOTES
Progress Note      7/18/2022 9:12 AM  NAME: Porsche Pineda   MRN:  [id number]   Admit Diagnosis: Thrombocytopenia ([facility] [D69.6]  GI bleed [K92.2]  UGO (acute kidney injury) ([facility] [N17.9]      Problem List:     1. Idiopathic thrombocytopenia  2. Anemia  3. Rectal bleeding  4. Lactic acidosis  5. NSTEMI  6. Paroxysmal atrial fibrillation  7. Remote PE and DVT (separate events)  8. CAD w/ mild diffuse disease w/ patent stent in the RCA 7/17 cath. 9. XOL  10. Morbid obesity  11. DM  12. Former smoker     Assessment/Plan:   PLT recovered    MPI w/ EF 55% and no definite ischemia. Recurrent afib 7/10. Back on dilt gtt. Converted to sinus. Recurrent Afib 7/12. Converted to sinus. Continue to require PRBC    Hematuria; much better today    Nausea, belching, reflux an issue. 1. Continue oral amio 400mg; increased to TID, but discharge on 200mg daily  2. Continue amio gtt until discharge  3. Off ASA  4. Continue eliquis  5. Continue statin  6. TPN  7. Will need to be considered for a Watchman as an outpt         [x]       High complexity decision making was performed in this patient at high risk for decompensation with multiple organ involvement. Subjective:     Porsche Pineda denies chest pain, dyspnea. Discussed with RN events overnight. Review of Systems:    Symptom Y/N Comments  Symptom Y/N Comments   Fever/Chills N   Chest Pain N    Poor Appetite N   Edema N    Cough N   Abdominal Pain N    Sputum N   Joint Pain N    SOB/SHEARER N   Pruritis/Rash N    Nausea/vomit N   Tolerating PT/OT Y    Diarrhea N   Tolerating Diet Y    Constipation N   Other       Could NOT obtain due to:      Objective:      Physical Exam:    Last 24hrs VS reviewed since prior progress note.  Most recent are:    Visit Vitals  BP (!) 136/58   Pulse 64   Temp 98.6 °F (37 °C)   Resp 21   Ht 6' 4\" (1.93 m)   Wt 138.2 kg (304 lb 10.8 oz)   SpO2 95%   BMI 37.09 kg/m²       Intake/Output Summary (Last 24 hours) at 7/18/2022 0746  Last data filed at 7/18/2022 0532  Gross per 24 hour   Intake 3911.5 ml   Output 2600 ml   Net 1311.5 ml        General Appearance: Well developed, well nourished, alert & oriented x 3,    no acute distress. Ears/Nose/Mouth/Throat: Hearing grossly normal.  Neck: Supple. Chest: Lungs clear to auscultation bilaterally. Cardiovascular: Regular rate and rhythm, S1S2 normal, no murmur. Abdomen: Soft, non-tender, bowel sounds are active. Extremities: No edema bilaterally. Skin: Warm and dry. []         Post-cath site without hematoma, bruit, tenderness, or thrill. Distal pulses intact. PMH/ reviewed - no change compared to H&P    Data Review    Telemetry:     EKG:   [x]  No new EKG for review    Lab Data Personally Reviewed:    Recent Labs     07/17/22  0236 07/16/22  0557   WBC 8.7 7.3   HGB 7.9* 7.6*   HCT 25.2* 24.0*    182     No results for input(s): INR, PTP, APTT, INREXT, INREXT in the last 72 hours. Recent Labs     07/18/22  0521 07/17/22  0236 07/16/22  0557   * 135* 135*   K 4.2 3.9 3.9    107 108   CO2 22 22 21   BUN 14 15 13   CREA 0.97 0.93 0.92   * 199* 227*   CA 7.9* 8.0* 7.9*   MG 2.1 2.0 1.9     No results for input(s): CPK, CKNDX, TROIQ in the last 72 hours. No lab exists for component: CPKMB  Lab Results   Component Value Date/Time    Cholesterol, total 133 06/07/2013 02:40 PM    HDL Cholesterol 43 06/07/2013 02:40 PM    LDL, calculated 73.4 06/07/2013 02:40 PM    Triglyceride 83 06/07/2013 02:40 PM    CHOL/HDL Ratio 3.1 06/07/2013 02:40 PM       No results for input(s): AP, TBIL, TP, ALB, GLOB, GGT, AML, LPSE in the last 72 hours. No lab exists for component: SGOT, GPT, AMYP, HLPSE  No results for input(s): PH, PCO2, PO2 in the last 72 hours.     Medications Personally Reviewed:    Current Facility-Administered Medications   Medication Dose Route Frequency    TPN ADULT - CENTRAL   IntraVENous CONTINUOUS    insulin lispro (HUMALOG) injection   SubCUTAneous AC&HS    0.9% sodium chloride infusion 250 mL  250 mL IntraVENous PRN    pantoprazole (PROTONIX) 40 mg in 0.9% sodium chloride 10 mL injection  40 mg IntraVENous DAILY    amiodarone (CORDARONE) 375 mg/250 mL D5W infusion  0.5 mg/min IntraVENous CONTINUOUS    amiodarone (CORDARONE) tablet 400 mg  400 mg Oral Q8H    HYDROmorphone (DILAUDID) injection 0.5-1 mg  0.5-1 mg IntraVENous Q2H PRN    HYDROcodone-acetaminophen (NORCO) 5-325 mg per tablet 1-2 Tablet  1-2 Tablet Oral Q4H PRN    alcohol 62% (NOZIN) nasal  1 Ampule  1 Ampule Topical Q12H    piperacillin-tazobactam (ZOSYN) 3.375 g in 0.9% sodium chloride (MBP/ADV) 100 mL MBP  3.375 g IntraVENous Q8H    apixaban (ELIQUIS) tablet 5 mg  5 mg Oral BID    phenol throat spray (CHLORASEPTIC) 1 Spray  1 Spray Oral PRN    calcium carbonate (TUMS) chewable tablet 400 mg [elemental]  400 mg Oral TID PRN    glucose chewable tablet 16 g  4 Tablet Oral PRN    glucagon (GLUCAGEN) injection 1 mg  1 mg IntraMUSCular PRN    dextrose 10 % infusion 0-250 mL  0-250 mL IntraVENous PRN    nitroglycerin (NITROSTAT) tablet 0.4 mg  0.4 mg SubLINGual PRN    [Held by provider] atorvastatin (LIPITOR) tablet 20 mg  20 mg Oral QHS    [Held by provider] vitamin H-R-O-lutein-minerals (OCUVITE) tablet 1 Tablet  1 Tablet Oral DAILY    sodium chloride (NS) flush 5-40 mL  5-40 mL IntraVENous Q8H    sodium chloride (NS) flush 5-40 mL  5-40 mL IntraVENous PRN    acetaminophen (TYLENOL) tablet 650 mg  650 mg Oral Q6H PRN    Or    acetaminophen (TYLENOL) suppository 650 mg  650 mg Rectal Q6H PRN    polyethylene glycol (MIRALAX) packet 17 g  17 g Oral DAILY PRN    ondansetron (ZOFRAN ODT) tablet 4 mg  4 mg Oral Q8H PRN    Or    ondansetron (ZOFRAN) injection 4 mg  4 mg IntraVENous Q6H PRN         Bj Sink III, DO

## 2022-07-19 LAB
ANION GAP SERPL CALC-SCNC: 5 MMOL/L (ref 5–15)
BUN SERPL-MCNC: 14 MG/DL (ref 6–20)
BUN/CREAT SERPL: 15 (ref 12–20)
CALCIUM SERPL-MCNC: 8.3 MG/DL (ref 8.5–10.1)
CHLORIDE SERPL-SCNC: 105 MMOL/L (ref 97–108)
CO2 SERPL-SCNC: 23 MMOL/L (ref 21–32)
CREAT SERPL-MCNC: 0.96 MG/DL (ref 0.7–1.3)
GLUCOSE BLD STRIP.AUTO-MCNC: 146 MG/DL (ref 65–117)
GLUCOSE BLD STRIP.AUTO-MCNC: 170 MG/DL (ref 65–117)
GLUCOSE BLD STRIP.AUTO-MCNC: 198 MG/DL (ref 65–117)
GLUCOSE BLD STRIP.AUTO-MCNC: 207 MG/DL (ref 65–117)
GLUCOSE SERPL-MCNC: 232 MG/DL (ref 65–100)
MAGNESIUM SERPL-MCNC: 2 MG/DL (ref 1.6–2.4)
PHOSPHATE SERPL-MCNC: 2.9 MG/DL (ref 2.6–4.7)
POTASSIUM SERPL-SCNC: 4 MMOL/L (ref 3.5–5.1)
SERVICE CMNT-IMP: ABNORMAL
SODIUM SERPL-SCNC: 133 MMOL/L (ref 136–145)

## 2022-07-19 PROCEDURE — 74011250636 HC RX REV CODE- 250/636: Performed by: NURSE PRACTITIONER

## 2022-07-19 PROCEDURE — 84100 ASSAY OF PHOSPHORUS: CPT

## 2022-07-19 PROCEDURE — 74011000250 HC RX REV CODE- 250: Performed by: SURGERY

## 2022-07-19 PROCEDURE — 97535 SELF CARE MNGMENT TRAINING: CPT

## 2022-07-19 PROCEDURE — 74011250636 HC RX REV CODE- 250/636: Performed by: SURGERY

## 2022-07-19 PROCEDURE — 82962 GLUCOSE BLOOD TEST: CPT

## 2022-07-19 PROCEDURE — 83735 ASSAY OF MAGNESIUM: CPT

## 2022-07-19 PROCEDURE — 74011636637 HC RX REV CODE- 636/637: Performed by: STUDENT IN AN ORGANIZED HEALTH CARE EDUCATION/TRAINING PROGRAM

## 2022-07-19 PROCEDURE — 65270000046 HC RM TELEMETRY

## 2022-07-19 PROCEDURE — 74011000250 HC RX REV CODE- 250: Performed by: NURSE PRACTITIONER

## 2022-07-19 PROCEDURE — 36415 COLL VENOUS BLD VENIPUNCTURE: CPT

## 2022-07-19 PROCEDURE — 97530 THERAPEUTIC ACTIVITIES: CPT

## 2022-07-19 PROCEDURE — 74011250637 HC RX REV CODE- 250/637: Performed by: SURGERY

## 2022-07-19 PROCEDURE — 74011250637 HC RX REV CODE- 250/637: Performed by: INTERNAL MEDICINE

## 2022-07-19 PROCEDURE — 74011000258 HC RX REV CODE- 258: Performed by: SURGERY

## 2022-07-19 PROCEDURE — 80048 BASIC METABOLIC PNL TOTAL CA: CPT

## 2022-07-19 PROCEDURE — 97116 GAIT TRAINING THERAPY: CPT

## 2022-07-19 PROCEDURE — C9113 INJ PANTOPRAZOLE SODIUM, VIA: HCPCS | Performed by: NURSE PRACTITIONER

## 2022-07-19 PROCEDURE — 74011636637 HC RX REV CODE- 636/637: Performed by: SURGERY

## 2022-07-19 RX ADMIN — SODIUM CHLORIDE, PRESERVATIVE FREE 10 ML: 5 INJECTION INTRAVENOUS at 22:35

## 2022-07-19 RX ADMIN — MAGNESIUM SULFATE HEPTAHYDRATE: 500 INJECTION, SOLUTION INTRAMUSCULAR; INTRAVENOUS at 17:00

## 2022-07-19 RX ADMIN — Medication 4 UNITS: at 08:04

## 2022-07-19 RX ADMIN — PIPERACILLIN AND TAZOBACTAM 3.38 G: 3; .375 INJECTION, POWDER, LYOPHILIZED, FOR SOLUTION INTRAVENOUS at 13:15

## 2022-07-19 RX ADMIN — HYDROMORPHONE HYDROCHLORIDE 1 MG: 1 INJECTION, SOLUTION INTRAMUSCULAR; INTRAVENOUS; SUBCUTANEOUS at 08:04

## 2022-07-19 RX ADMIN — PIPERACILLIN AND TAZOBACTAM 3.38 G: 3; .375 INJECTION, POWDER, LYOPHILIZED, FOR SOLUTION INTRAVENOUS at 22:35

## 2022-07-19 RX ADMIN — APIXABAN 5 MG: 5 TABLET, FILM COATED ORAL at 17:05

## 2022-07-19 RX ADMIN — AMIODARONE HYDROCHLORIDE 400 MG: 200 TABLET ORAL at 13:14

## 2022-07-19 RX ADMIN — AMIODARONE HYDROCHLORIDE 400 MG: 200 TABLET ORAL at 22:35

## 2022-07-19 RX ADMIN — Medication 1 AMPULE: at 22:35

## 2022-07-19 RX ADMIN — Medication 1 AMPULE: at 09:00

## 2022-07-19 RX ADMIN — PIPERACILLIN AND TAZOBACTAM 3.38 G: 3; .375 INJECTION, POWDER, LYOPHILIZED, FOR SOLUTION INTRAVENOUS at 05:18

## 2022-07-19 RX ADMIN — ONDANSETRON 4 MG: 2 INJECTION INTRAMUSCULAR; INTRAVENOUS at 08:04

## 2022-07-19 RX ADMIN — SODIUM CHLORIDE 40 MG: 9 INJECTION, SOLUTION INTRAMUSCULAR; INTRAVENOUS; SUBCUTANEOUS at 08:04

## 2022-07-19 RX ADMIN — Medication 3 UNITS: at 16:02

## 2022-07-19 RX ADMIN — Medication 3 UNITS: at 11:00

## 2022-07-19 RX ADMIN — AMIODARONE HYDROCHLORIDE 400 MG: 200 TABLET ORAL at 05:18

## 2022-07-19 RX ADMIN — SODIUM CHLORIDE, PRESERVATIVE FREE 10 ML: 5 INJECTION INTRAVENOUS at 05:19

## 2022-07-19 RX ADMIN — APIXABAN 5 MG: 5 TABLET, FILM COATED ORAL at 05:18

## 2022-07-19 RX ADMIN — SODIUM CHLORIDE, PRESERVATIVE FREE 10 ML: 5 INJECTION INTRAVENOUS at 14:00

## 2022-07-19 NOTE — PROGRESS NOTES
Progress Note      7/19/2022 9:12 AM  NAME: Melvina Aguilera   MRN:  [id number]   Admit Diagnosis: Thrombocytopenia ([facility] [D69.6]  GI bleed [K92.2]  UGO (acute kidney injury) ([facility] [address] [N17.9]      Problem List:     1. Idiopathic thrombocytopenia  2. Anemia  3. Rectal bleeding  4. Lactic acidosis  5. NSTEMI  6. Paroxysmal atrial fibrillation  7. Remote PE and DVT (separate events)  8. CAD w/ mild diffuse disease w/ patent stent in the RCA 7/17 cath. 9. XOL  10. Morbid obesity  11. DM  12. Former smoker     Assessment/Plan:   PLT recovered    MPI w/ EF 55% and no definite ischemia. Recurrent afib 7/10. Back on dilt gtt. Converted to sinus. Recurrent Afib 7/12. Converted to sinus. Continue to require PRBC    Hematuria. Nausea, belching, reflux an issue. 1. Continue oral amio 400mg; increased to TID, but discharge on 200mg daily  2. Continue amio gtt until discharge  3. Off ASA  4. Continue eliquis  5. Continue statin  6. Will need to be considered for a Watchman as an outpt         [x]       High complexity decision making was performed in this patient at high risk for decompensation with multiple organ involvement. Subjective:     Melvina Aguilera denies chest pain, dyspnea. Discussed with RN events overnight. Review of Systems:    Symptom Y/N Comments  Symptom Y/N Comments   Fever/Chills N   Chest Pain N    Poor Appetite N   Edema N    Cough N   Abdominal Pain N    Sputum N   Joint Pain N    SOB/SHEARER N   Pruritis/Rash N    Nausea/vomit N   Tolerating PT/OT Y    Diarrhea N   Tolerating Diet Y    Constipation N   Other       Could NOT obtain due to:      Objective:      Physical Exam:    Last 24hrs VS reviewed since prior progress note.  Most recent are:    Visit Vitals  /81 (BP 1 Location: Left upper arm, BP Patient Position: At rest)   Pulse 65   Temp 97.7 °F (36.5 °C)   Resp 18   Ht 6' 4\" (1.93 m)   Wt 138.2 kg (304 lb 10.8 oz)   SpO2 95%   BMI 37.09 kg/m² Intake/Output Summary (Last 24 hours) at 7/19/2022 0810  Last data filed at 7/19/2022 0809  Gross per 24 hour   Intake 1595.15 ml   Output 1270 ml   Net 325.15 ml        General Appearance: Well developed, well nourished, alert & oriented x 3,    no acute distress. Ears/Nose/Mouth/Throat: Hearing grossly normal.  Neck: Supple. Chest: Lungs clear to auscultation bilaterally. Cardiovascular: Regular rate and rhythm, S1S2 normal, no murmur. Abdomen: Soft, non-tender, bowel sounds are active. Extremities: No edema bilaterally. Skin: Warm and dry. []         Post-cath site without hematoma, bruit, tenderness, or thrill. Distal pulses intact. PMH/SH reviewed - no change compared to H&P    Data Review    Telemetry:     EKG:   [x]  No new EKG for review    Lab Data Personally Reviewed:    Recent Labs     07/17/22  0236   WBC 8.7   HGB 7.9*   HCT 25.2*        No results for input(s): INR, PTP, APTT, INREXT, INREXT in the last 72 hours. Recent Labs     07/19/22  0230 07/18/22  0521 07/17/22  0236   * 135* 135*   K 4.0 4.2 3.9    107 107   CO2 23 22 22   BUN 14 14 15   CREA 0.96 0.97 0.93   * 216* 199*   CA 8.3* 7.9* 8.0*   MG 2.0 2.1 2.0     No results for input(s): CPK, CKNDX, TROIQ in the last 72 hours. No lab exists for component: CPKMB  Lab Results   Component Value Date/Time    Cholesterol, total 133 06/07/2013 02:40 PM    HDL Cholesterol 43 06/07/2013 02:40 PM    LDL, calculated 73.4 06/07/2013 02:40 PM    Triglyceride 83 06/07/2013 02:40 PM    CHOL/HDL Ratio 3.1 06/07/2013 02:40 PM       No results for input(s): AP, TBIL, TP, ALB, GLOB, GGT, AML, LPSE in the last 72 hours. No lab exists for component: SGOT, GPT, AMYP, HLPSE  No results for input(s): PH, PCO2, PO2 in the last 72 hours.     Medications Personally Reviewed:    Current Facility-Administered Medications   Medication Dose Route Frequency    TPN ADULT - CENTRAL   IntraVENous CONTINUOUS    insulin lispro (HUMALOG) injection   SubCUTAneous AC&HS    0.9% sodium chloride infusion 250 mL  250 mL IntraVENous PRN    pantoprazole (PROTONIX) 40 mg in 0.9% sodium chloride 10 mL injection  40 mg IntraVENous DAILY    amiodarone (CORDARONE) tablet 400 mg  400 mg Oral Q8H    HYDROmorphone (DILAUDID) injection 0.5-1 mg  0.5-1 mg IntraVENous Q2H PRN    HYDROcodone-acetaminophen (NORCO) 5-325 mg per tablet 1-2 Tablet  1-2 Tablet Oral Q4H PRN    alcohol 62% (NOZIN) nasal  1 Ampule  1 Ampule Topical Q12H    piperacillin-tazobactam (ZOSYN) 3.375 g in 0.9% sodium chloride (MBP/ADV) 100 mL MBP  3.375 g IntraVENous Q8H    apixaban (ELIQUIS) tablet 5 mg  5 mg Oral BID    phenol throat spray (CHLORASEPTIC) 1 Spray  1 Spray Oral PRN    calcium carbonate (TUMS) chewable tablet 400 mg [elemental]  400 mg Oral TID PRN    glucose chewable tablet 16 g  4 Tablet Oral PRN    glucagon (GLUCAGEN) injection 1 mg  1 mg IntraMUSCular PRN    dextrose 10 % infusion 0-250 mL  0-250 mL IntraVENous PRN    nitroglycerin (NITROSTAT) tablet 0.4 mg  0.4 mg SubLINGual PRN    [Held by provider] atorvastatin (LIPITOR) tablet 20 mg  20 mg Oral QHS    [Held by provider] vitamin S-Y-Z-lutein-minerals (OCUVITE) tablet 1 Tablet  1 Tablet Oral DAILY    sodium chloride (NS) flush 5-40 mL  5-40 mL IntraVENous Q8H    sodium chloride (NS) flush 5-40 mL  5-40 mL IntraVENous PRN    acetaminophen (TYLENOL) tablet 650 mg  650 mg Oral Q6H PRN    Or    acetaminophen (TYLENOL) suppository 650 mg  650 mg Rectal Q6H PRN    polyethylene glycol (MIRALAX) packet 17 g  17 g Oral DAILY PRN    ondansetron (ZOFRAN ODT) tablet 4 mg  4 mg Oral Q8H PRN    Or    ondansetron (ZOFRAN) injection 4 mg  4 mg IntraVENous Q6H PRN         Sherly Garcia III, DO

## 2022-07-19 NOTE — PROGRESS NOTES
SURGERY PROGRESS NOTE      Admit Date: 2022      Subjective:     Patient feels better today. Has  Some nausea with PO. Passing flatus and has had multiple loose BMs     Objective:     Visit Vitals  /60 (BP 1 Location: Left upper arm)   Pulse 62   Temp 97.8 °F (36.6 °C)   Resp 18   Ht 6' 4\" (1.93 m)   Wt 138.2 kg (304 lb 10.8 oz)   SpO2 94%   BMI 37.09 kg/m²        Temp (24hrs), Av.4 °F (36.9 °C), Min:97.7 °F (36.5 °C), Max:99.2 °F (37.3 °C)      701 - 0  In: 1477.4 [I.V.:1477.4]  Out: 451 [Urine:450]  1901 -  0700  In: 1338.1 [P.O.:240; I.V.:1098.1]  Out: 65 [Urine:2000; Drains:130]    Physical Exam:    General:  alert, cooperative, no distress, appears stated age   Abdomen: soft, bowel sounds active, non-tender   Incision:   dressing C/D/I           Lab Results   Component Value Date/Time    WBC 8.7 2022 02:36 AM    HGB 7.9 (L) 2022 02:36 AM    HCT 25.2 (L) 2022 02:36 AM    PLATELET 002  02:36 AM    MCV 96.9 2022 02:36 AM     Lab Results   Component Value Date/Time    GFR est non-AA >60 2022 02:30 AM    GFR est AA >60 2022 02:30 AM    Creatinine 0.96 2022 02:30 AM    BUN 14 2022 02:30 AM    Sodium 133 (L) 2022 02:30 AM    Potassium 4.0 2022 02:30 AM    Chloride 105 2022 02:30 AM    CO2 23 2022 02:30 AM    Magnesium 2.0 2022 02:30 AM    Phosphorus 2.9 2022 02:30 AM       Assessment:     Active Problems: Morbid obesity ([address] (2013)      Thrombocytopenia ([address] (2022)      GI bleed (2022)      UGO (acute kidney injury) ([address] (2022)      Atrial fibrillation ([address] (7/3/2022)      Elevated troponin (7/3/2022)      Ileus ([address] (2022)      Intestinal adhesions with partial obstruction ([address] (2022)      Ventral hernia (2022)      Intestinal perforation ([address] (2022)    post-operative wound infection causing ileus. Abscess is adequately drained.   No intra-abdominal component.   Diarrhea after a 10 day ileus is not unexpected     Plan:     1) wean TPN  2) Diet as tolerated   3) likely wound vac tomorrow

## 2022-07-19 NOTE — PROGRESS NOTES
Problem: Mobility Impaired (Adult and Pediatric)  Goal: *Acute Goals and Plan of Care (Insert Text)  Description: FUNCTIONAL STATUS PRIOR TO ADMISSION: Patient was modified independent using a rollator for functional mobility. HOME SUPPORT PRIOR TO ADMISSION: The patient lived with wife at [address] but did not require assist.    Physical Therapy Goals  Initiated 7/2/2022 - remain appropriate 7/11/2022, remain appropriate 7/18/22  1. Patient will move from supine to sit and sit to supine , scoot up and down, and roll side to side in bed with modified independence within 7 day(s). 2.  Patient will transfer from bed to chair and chair to bed with modified independence using the least restrictive device within 7 day(s). 3.  Patient will perform sit to stand with modified independence within 7 day(s). 4.  Patient will ambulate with modified independence for 150 feet with the least restrictive device within 7 day(s). Outcome: Progressing Towards Goal   PHYSICAL THERAPY TREATMENT  Patient: Jina Irving (89 y.o. male)  Date: 7/19/2022  Diagnosis: Thrombocytopenia ([facility] [D69.6]  GI bleed [K92.2]  UGO (acute kidney injury) ([facility] [N17.9] <principal problem not specified>  Procedure(s) (LRB):  INFUSION CATHETER INSERTION (N/A) 6 Days Post-Op  Precautions:    Chart, physical therapy assessment, plan of care and goals were reviewed. ASSESSMENT  Patient continues with skilled PT services and is progressing towards goals. Pt received in supine on bed pan and agreeable to participate with therapy services. Pt overall SBA-CGA with bed mobility and able to log roll for removal of bed pan. Pt needing the height of bed elevated for transfers sit<>stand SBA-CGA from the bed and chair. Pt ambulating 5/15/15ft SBA-CGA w/RW and with seated rest breaks between ambulation trials due to quick fatigue and loose watery stool.  Pt requiring modAx2 with sit>stand transfer from toilet for incontinent care, likely due to fatigue. Pt overall continues to make steady progress in the acute setting with therapy and would continue to benefit from additional skilled PT services to maximize independence prior to discharge home as pt's wife is unable to physically assist. Continue to recommend SNF rehab upon discharge. Current Level of Function Impacting Discharge (mobility/balance): SBA-CGA w/bed mobility, CGA-modAx2 w/transfers, SBA-CGA w/RW for gait    Other factors to consider for discharge: decreased strength/endurance, from [employer], wife unable to fully assist, fall risk         PLAN :  Patient continues to benefit from skilled intervention to address the above impairments. Continue treatment per established plan of care. to address goals. Recommendation for discharge: (in order for the patient to meet his/her long term goals)  Therapy up to 5 days/week in SNF setting    This discharge recommendation:  Has been made in collaboration with the attending provider and/or case management    IF patient discharges home will need the following DME: to be determined (TBD)       SUBJECTIVE:   Patient stated i'm just tired from these last 48 hours of pooping.     OBJECTIVE DATA SUMMARY:   Critical Behavior:  Neurologic State: Alert  Orientation Level: Oriented X4  Cognition: Follows commands  Safety/Judgement: Awareness of environment,Fall prevention,Home safety,Insight into deficits  Functional Mobility Training:  Bed Mobility:  Rolling: Stand-by assistance  Supine to Sit: Contact guard assistance;Bed Modified     Scooting: Stand-by assistance    Transfers:  Sit to Stand: Contact guard assistance; Moderate assistance;Assist x2 (modAx2 from toilet)  Stand to Sit: Stand-by assistance        Bed to Chair: Contact guard assistance    Balance:  Sitting: Intact  Standing: Impaired  Standing - Static: Good;Constant support (RW)  Standing - Dynamic : Fair;Good;Constant support  Ambulation/Gait Training:  Distance (ft): 35 Feet (ft) (5/15/15ft w/seated breaks b/t ambulation trials)  Assistive Device: Gait belt;Walker, rolling  Ambulation - Level of Assistance: Stand-by assistance;Contact guard assistance     Gait Abnormalities: Decreased step clearance    Base of Support: Widened     Speed/Beatriz: Pace decreased (<100 feet/min)  Step Length: Left shortened;Right shortened    Pain Rating:  No pain rating received from pt    Activity Tolerance:   Fair    After treatment patient left in no apparent distress:   Sitting in chair, Call bell within reach, and Caregiver / family present    COMMUNICATION/COLLABORATION:   The patients plan of care was discussed with: Occupational therapist and Registered nurse.      Adali Schafer PTA   Time Calculation: 38 mins

## 2022-07-19 NOTE — PROGRESS NOTES
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 Received 3 doses IVIG, did not tolerate final IVIG infusion despite. PLT normalized  Hemo/onc on board     SBO, improving  Repeat CT a/p on  showed partial SBO pattern. CT enterography showed distal partial SBO  Went to OR on  found to have SMALL BOWEL OBSTRUCTION  SECONDARY TO ILEAL PERFORATION, AND ABSCESS AND ADHESION AND ABDOMINAL HERNIA   Continue with Zosyn    Off NGT  Diet advance again to regular diet today, patient has some nausea with assist tolerance  CT abdomen pelvis done yesterday and it was unremarkable  Weaning TPN down by surgery  Follow surgery recommendation      GI bleed  Was to due severe ITP  S/p blood transfusion  Continue with protonix 40 daily (can switch to famotidine on discharge)  GI on board, holding off on EGD for now, conservative management.     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 Estimated discharge date: >48hrs  Code status: Full  Prophylaxis: Eliquis   Recommended Disposition: [facility]/Rehab. Patient and wife requested PT eval prior to discharge, wife thinks the patient is too weak and need rehab     Subjective:     Chief Complaint / Reason for Physician Visit  Patient seen and examined today, doing okay some nausea, started on regular diet today    Review of Systems:  Symptom Y/N Comments  Symptom Y/N Comments   Fever/Chills    Chest Pain     Poor Appetite    Edema     Cough    Abdominal Pain     Sputum    Joint Pain     SOB/SHEARER    Pruritis/Rash     Nausea/vomit    Tolerating PT/OT     Diarrhea    Tolerating Diet     Constipation    Other       Could NOT obtain due to:      Objective:     VITALS:   Last 24hrs VS reviewed since prior progress note. Most recent are:  Patient Vitals for the past 24 hrs:   Temp Pulse Resp BP SpO2   07/19/22 1058 97.8 °F (36.6 °C) 62 18 137/60 94 %   07/19/22 0713 97.7 °F (36.5 °C) 65 18 131/81 95 %   07/19/22 0303 98.8 °F (37.1 °C) (!) 59 15 (!) 138/59 91 %   07/18/22 2253 99.2 °F (37.3 °C) 67 19 (!) 140/63 99 %   07/18/22 2000 98.7 °F (37.1 °C) (!) 59 18 (!) 138/51 96 %   07/18/22 1518 98.4 °F (36.9 °C) 60 15 (!) 154/54 96 %   07/18/22 1233 98.2 °F (36.8 °C) 65 16 129/69 95 %       Intake/Output Summary (Last 24 hours) at 7/19/2022 1156  Last data filed at 7/19/2022 1058  Gross per 24 hour   Intake 1906.4 ml   Output 1621 ml   Net 285.4 ml        I had a face to face encounter and independently examined this patient as outlined below:  PHYSICAL EXAM:  General: WD, WN. Alert, cooperative, no acute distress    EENT:  EOMI. Anicteric sclerae. MMM  Resp:  CTA bilaterally, no wheezing or rales. No accessory muscle use  CV:  Irregularly irregular rhythm,  No edema  GI/:  Soft, Non distended, Non tender. hypoactive Bowel sounds. NAHID drain. Midline dressing.  Lopez in place urine is starting to clear up today  Neurologic:  Alert and oriented X 3, normal speech   Psych:   Good insight. Not anxious nor agitated  Skin:    No jaundice      Reviewed most current lab test results and cultures  YES  Reviewed most current radiology test results   YES  Review and summation of old records today    NO  Reviewed patient's current orders and MAR    YES  PMH/SH reviewed - no change compared to H&P  ________________________________________________________________________  Care Plan discussed with:    Comments   Patient X    Family  X    RN X    Care Manager x    Consultant                       x Multidiciplinary team rounds were held today with , nursing, pharmacist and clinical coordinator. Patient's plan of care was discussed; medications were reviewed and discharge planning was addressed. ________________________________________________________________________  Total NON critical care TIME:  35   Minutes    Total CRITICAL CARE TIME Spent:   Minutes non procedure based      Comments   >50% of visit spent in counseling and coordination of care X    ________________________________________________________________________  Shannon Carlton MD     Procedures: see electronic medical records for all procedures/Xrays and details which were not copied into this note but were reviewed prior to creation of Plan. LABS:  I reviewed today's most current labs and imaging studies. Pertinent labs include:  Recent Labs     07/17/22  0236   WBC 8.7   HGB 7.9*   HCT 25.2*        Recent Labs     07/19/22  0230 07/18/22  0521 07/17/22  0236   * 135* 135*   K 4.0 4.2 3.9    107 107   CO2 23 22 22   * 216* 199*   BUN 14 14 15   CREA 0.96 0.97 0.93   CA 8.3* 7.9* 8.0*   MG 2.0 2.1 2.0   PHOS 2.9 3.2 3.1       Signed:  Shannon Carlton MD

## 2022-07-20 LAB
ANION GAP SERPL CALC-SCNC: 6 MMOL/L (ref 5–15)
BUN SERPL-MCNC: 16 MG/DL (ref 6–20)
BUN/CREAT SERPL: 18 (ref 12–20)
CALCIUM SERPL-MCNC: 7.8 MG/DL (ref 8.5–10.1)
CHLORIDE SERPL-SCNC: 108 MMOL/L (ref 97–108)
CO2 SERPL-SCNC: 22 MMOL/L (ref 21–32)
CREAT SERPL-MCNC: 0.87 MG/DL (ref 0.7–1.3)
GLUCOSE BLD STRIP.AUTO-MCNC: 105 MG/DL (ref 65–117)
GLUCOSE BLD STRIP.AUTO-MCNC: 182 MG/DL (ref 65–117)
GLUCOSE BLD STRIP.AUTO-MCNC: 210 MG/DL (ref 65–117)
GLUCOSE BLD STRIP.AUTO-MCNC: 210 MG/DL (ref 65–117)
GLUCOSE SERPL-MCNC: 170 MG/DL (ref 65–100)
MAGNESIUM SERPL-MCNC: 1.9 MG/DL (ref 1.6–2.4)
PHOSPHATE SERPL-MCNC: 2.8 MG/DL (ref 2.6–4.7)
POTASSIUM SERPL-SCNC: 3.8 MMOL/L (ref 3.5–5.1)
SERVICE CMNT-IMP: ABNORMAL
SERVICE CMNT-IMP: NORMAL
SODIUM SERPL-SCNC: 136 MMOL/L (ref 136–145)

## 2022-07-20 PROCEDURE — 65270000046 HC RM TELEMETRY

## 2022-07-20 PROCEDURE — C9113 INJ PANTOPRAZOLE SODIUM, VIA: HCPCS | Performed by: NURSE PRACTITIONER

## 2022-07-20 PROCEDURE — 97535 SELF CARE MNGMENT TRAINING: CPT

## 2022-07-20 PROCEDURE — 74011250637 HC RX REV CODE- 250/637: Performed by: SURGERY

## 2022-07-20 PROCEDURE — 74011000258 HC RX REV CODE- 258: Performed by: SURGERY

## 2022-07-20 PROCEDURE — 74011250637 HC RX REV CODE- 250/637: Performed by: GENERAL ACUTE CARE HOSPITAL

## 2022-07-20 PROCEDURE — 84100 ASSAY OF PHOSPHORUS: CPT

## 2022-07-20 PROCEDURE — 80048 BASIC METABOLIC PNL TOTAL CA: CPT

## 2022-07-20 PROCEDURE — 74011250636 HC RX REV CODE- 250/636: Performed by: NURSE PRACTITIONER

## 2022-07-20 PROCEDURE — 36415 COLL VENOUS BLD VENIPUNCTURE: CPT

## 2022-07-20 PROCEDURE — 74011636637 HC RX REV CODE- 636/637: Performed by: STUDENT IN AN ORGANIZED HEALTH CARE EDUCATION/TRAINING PROGRAM

## 2022-07-20 PROCEDURE — 97116 GAIT TRAINING THERAPY: CPT

## 2022-07-20 PROCEDURE — 82962 GLUCOSE BLOOD TEST: CPT

## 2022-07-20 PROCEDURE — 97530 THERAPEUTIC ACTIVITIES: CPT

## 2022-07-20 PROCEDURE — 74011000250 HC RX REV CODE- 250: Performed by: SURGERY

## 2022-07-20 PROCEDURE — 74011250637 HC RX REV CODE- 250/637: Performed by: INTERNAL MEDICINE

## 2022-07-20 PROCEDURE — 83735 ASSAY OF MAGNESIUM: CPT

## 2022-07-20 PROCEDURE — 74011250636 HC RX REV CODE- 250/636: Performed by: SURGERY

## 2022-07-20 PROCEDURE — 74011000250 HC RX REV CODE- 250: Performed by: NURSE PRACTITIONER

## 2022-07-20 RX ORDER — POTASSIUM CHLORIDE 20 MEQ/1
20 TABLET, EXTENDED RELEASE ORAL DAILY
Status: DISCONTINUED | OUTPATIENT
Start: 2022-07-20 | End: 2022-07-25 | Stop reason: HOSPADM

## 2022-07-20 RX ORDER — FUROSEMIDE 20 MG/1
20 TABLET ORAL
Status: DISCONTINUED | OUTPATIENT
Start: 2022-07-20 | End: 2022-07-23

## 2022-07-20 RX ADMIN — PIPERACILLIN AND TAZOBACTAM 3.38 G: 3; .375 INJECTION, POWDER, LYOPHILIZED, FOR SOLUTION INTRAVENOUS at 04:42

## 2022-07-20 RX ADMIN — SODIUM CHLORIDE, PRESERVATIVE FREE 10 ML: 5 INJECTION INTRAVENOUS at 13:26

## 2022-07-20 RX ADMIN — Medication 4 UNITS: at 16:35

## 2022-07-20 RX ADMIN — SODIUM CHLORIDE 40 MG: 9 INJECTION, SOLUTION INTRAMUSCULAR; INTRAVENOUS; SUBCUTANEOUS at 09:45

## 2022-07-20 RX ADMIN — FUROSEMIDE 20 MG: 20 TABLET ORAL at 19:41

## 2022-07-20 RX ADMIN — Medication 1 AMPULE: at 09:43

## 2022-07-20 RX ADMIN — PIPERACILLIN AND TAZOBACTAM 3.38 G: 3; .375 INJECTION, POWDER, LYOPHILIZED, FOR SOLUTION INTRAVENOUS at 21:14

## 2022-07-20 RX ADMIN — AMIODARONE HYDROCHLORIDE 400 MG: 200 TABLET ORAL at 13:21

## 2022-07-20 RX ADMIN — Medication 1 AMPULE: at 21:14

## 2022-07-20 RX ADMIN — APIXABAN 5 MG: 5 TABLET, FILM COATED ORAL at 18:00

## 2022-07-20 RX ADMIN — AMIODARONE HYDROCHLORIDE 400 MG: 200 TABLET ORAL at 05:47

## 2022-07-20 RX ADMIN — SODIUM CHLORIDE, PRESERVATIVE FREE 10 ML: 5 INJECTION INTRAVENOUS at 05:48

## 2022-07-20 RX ADMIN — SODIUM CHLORIDE, PRESERVATIVE FREE 10 ML: 5 INJECTION INTRAVENOUS at 21:16

## 2022-07-20 RX ADMIN — AMIODARONE HYDROCHLORIDE 400 MG: 200 TABLET ORAL at 21:14

## 2022-07-20 RX ADMIN — Medication 4 UNITS: at 11:19

## 2022-07-20 RX ADMIN — POTASSIUM CHLORIDE 20 MEQ: 20 TABLET, EXTENDED RELEASE ORAL at 19:41

## 2022-07-20 RX ADMIN — APIXABAN 5 MG: 5 TABLET, FILM COATED ORAL at 05:46

## 2022-07-20 RX ADMIN — PIPERACILLIN AND TAZOBACTAM 3.38 G: 3; .375 INJECTION, POWDER, LYOPHILIZED, FOR SOLUTION INTRAVENOUS at 13:21

## 2022-07-20 RX ADMIN — Medication 3 UNITS: at 09:42

## 2022-07-20 NOTE — PROGRESS NOTES
Problem: Mobility Impaired (Adult and Pediatric)  Goal: *Acute Goals and Plan of Care (Insert Text)  Description: FUNCTIONAL STATUS PRIOR TO ADMISSION: Patient was modified independent using a rollator for functional mobility. HOME SUPPORT PRIOR TO ADMISSION: The patient lived with wife at [address] but did not require assist.    Physical Therapy Goals  Initiated 7/2/2022 - remain appropriate 7/11/2022, remain appropriate 7/18/22  1. Patient will move from supine to sit and sit to supine , scoot up and down, and roll side to side in bed with modified independence within 7 day(s). 2.  Patient will transfer from bed to chair and chair to bed with modified independence using the least restrictive device within 7 day(s). 3.  Patient will perform sit to stand with modified independence within 7 day(s). 4.  Patient will ambulate with modified independence for 150 feet with the least restrictive device within 7 day(s). Outcome: Progressing Towards Goal   PHYSICAL THERAPY TREATMENT  Patient: John Mendoza (87 y.o. male)  Date: 7/20/2022  Diagnosis: Thrombocytopenia ([facility] [address] [D69.6]  GI bleed [K92.2]  UGO (acute kidney injury) ([facility] [address] [N17.9] <principal problem not specified>  Procedure(s) (LRB):  INFUSION CATHETER INSERTION (N/A) 7 Days Post-Op  Precautions:    Chart, physical therapy assessment, plan of care and goals were reviewed. ASSESSMENT  Patient continues with skilled PT services and is progressing towards goals, demonstrating continued improvements in strength, balance, and overall functional mobility. However, pt continues to exhibit impaired activity tolerance, requiring frequent seated rest breaks throughout mobility. Pt able to progress ambulation trial to a distance of 15ft, 30ft, and 30ft, respectively, with seated rest breaks b/t secondary to SHEARER and fatigue. Gait slow and shuffled however steady overall with RW support. O2 sats 90-95% throughout on RA.  Despite daily improvements in functional mobility, pt remains a falls risk and below his baseline mod I level, most appropriate for additional rehab at discharge. Current Level of Function Impacting Discharge (mobility/balance): CGAx1 with use of rolling walker    Other factors to consider for discharge: impaired activity tolerance, falls risk, caregiver burden         PLAN :  Patient continues to benefit from skilled intervention to address the above impairments. Continue treatment per established plan of care. to address goals. Recommendation for discharge: (in order for the patient to meet his/her long term goals)  Therapy up to 5 days/week in SNF setting    This discharge recommendation:  Has been made in collaboration with the attending provider and/or case management    IF patient discharges home will need the following DME: to be determined (TBD)       SUBJECTIVE:   Patient stated I am tired but I can't sleep.     OBJECTIVE DATA SUMMARY:   Critical Behavior:  Neurologic State: Alert  Orientation Level: Oriented X4  Cognition: Follows commands  Safety/Judgement: Awareness of environment, Decreased insight into deficits  Functional Mobility Training:  Bed Mobility:  Rolling: Other (comment) (received sitting in recliner chair)                 Transfers:  Sit to Stand: Contact guard assistance  Stand to Sit: Contact guard assistance                             Balance:  Sitting: Intact  Standing: Impaired; With support  Standing - Static: Good;Constant support  Standing - Dynamic : Fair;Constant support  Ambulation/Gait Training:  Distance (ft): 75 Feet (ft) (15ft, 30ft, and 30ft w/ seated rest breaks b/t)  Assistive Device: Walker, rolling;Gait belt  Ambulation - Level of Assistance: Stand-by assistance;Contact guard assistance        Gait Abnormalities: Decreased step clearance        Base of Support: Widened     Speed/Beatriz: Pace decreased (<100 feet/min)  Step Length: Left shortened;Right shortened Therapeutic Exercises:   5x sit<>stand transfer  1x 10 standing marches    Pain Rating:  Did not report pain    Activity Tolerance:   VSS on RA however pt fatigues quickly, requiring frequent seated rest breaks    After treatment patient left in no apparent distress:   Sitting in chair, Call bell within reach, and Caregiver / family present    COMMUNICATION/COLLABORATION:   The patients plan of care was discussed with: Occupational therapist and Registered nurse.      Atif Ann, PT, DPT   Time Calculation: 27 mins

## 2022-07-20 NOTE — PROGRESS NOTES
Transition of Care Plan:     RUR: 11%  Disposition:[facility] [employer] side vs Return to [facility] with  [address] ([place])  Follow up appointments: To be done prior to discharge if going home  DME needed:None  Transportation at Discharge: Family  [place] or means to access home:  Wife has keys      IM Medicare Letter: Needed at discharge   Is patient a BCPI-A Bundle:  No                    If yes, was Bundle Letter given?:  N/A  Is patient a  and connected with the [place]? No              If yes, was [employer] transfer form completed and [facility] notified? N/A   Caregiver Contact:Wife  Discharge Caregiver contacted prior to discharge? Caregiver to be contacted prior to discharge  Care Conference needed?: Not at this time    2:34pm-  called Consuelo with [facility] via phone. Consuelo needs documentation on wound. [facility] is unable to accept pt on TPN.     1:00pm-  sent clinical documention to [facility] for wound vac via fax    12:15pm- Consuelo with [employer] called  via phone to inform  that she needs clinical documentation for wound. 11:05am-  sent a message to [facility] admission as to whether they are able to order wound vac for pt.

## 2022-07-20 NOTE — PROGRESS NOTES
Progress Note      7/20/2022 9:12 AM  NAME: Frankie Underwood   MRN:  [id number]   Admit Diagnosis: Thrombocytopenia ([facility] [address] [D69.6]  GI bleed [K92.2]  UGO (acute kidney injury) ([facility] [address] [N17.9]      Problem List:     Idiopathic thrombocytopenia  Anemia  Rectal bleeding  Lactic acidosis  NSTEMI  Paroxysmal atrial fibrillation  Remote PE and DVT (separate events)  CAD w/ mild diffuse disease w/ patent stent in the RCA 7/17 cath. XOL  Morbid obesity  DM  Former smoker     Assessment/Plan:   PLT recovered    MPI w/ EF 55% and no definite ischemia. Recurrent afib 7/10. Back on dilt gtt. Converted to sinus. Recurrent Afib 7/12. Converted to sinus. Continue to require PRBC    Hematuria. Nausea, belching, reflux an issue. Continue oral amio 400mg; increased to TID, but discharge on 200mg daily  Continue amio gtt until discharge  Off ASA  Continue eliquis  Continue statin  Will need to be considered for a Watchman as an outpt  TPN per gen surg         [x]       High complexity decision making was performed in this patient at high risk for decompensation with multiple organ involvement. Subjective:     Frankie Underwood denies chest pain, dyspnea. Discussed with RN events overnight. Review of Systems:    Symptom Y/N Comments  Symptom Y/N Comments   Fever/Chills N   Chest Pain N    Poor Appetite N   Edema N    Cough N   Abdominal Pain N    Sputum N   Joint Pain N    SOB/SHEARER N   Pruritis/Rash N    Nausea/vomit N   Tolerating PT/OT Y    Diarrhea N   Tolerating Diet Y    Constipation N   Other       Could NOT obtain due to:      Objective:      Physical Exam:    Last 24hrs VS reviewed since prior progress note.  Most recent are:    Visit Vitals  BP (!) 118/50   Pulse 74   Temp 98 °F (36.7 °C)   Resp 15   Ht 6' 4\" (1.93 m)   Wt 138.2 kg (304 lb 10.8 oz)   SpO2 94%   BMI 37.09 kg/m²       Intake/Output Summary (Last 24 hours) at 7/20/2022 0809  Last data filed at 7/20/2022 2121  Gross per 24 hour   Intake 771.9 ml   Output 1521 ml   Net -749.1 ml          General Appearance: Well developed, well nourished, alert & oriented x 3,    no acute distress. Ears/Nose/Mouth/Throat: Hearing grossly normal.  Neck: Supple. Chest: Lungs clear to auscultation bilaterally. Cardiovascular: Regular rate and rhythm, S1S2 normal, no murmur. Abdomen: Soft, non-tender, bowel sounds are active. Extremities: No edema bilaterally. Skin: Warm and dry. []         Post-cath site without hematoma, bruit, tenderness, or thrill. Distal pulses intact. PMH/ reviewed - no change compared to H&P    Data Review    Telemetry: sr    EKG:   [x]  No new EKG for review    Lab Data Personally Reviewed:    No results for input(s): WBC, HGB, HCT, PLT, HGBEXT, HCTEXT, PLTEXT, HGBEXT, HCTEXT, PLTEXT in the last 72 hours. No results for input(s): INR, PTP, APTT, INREXT, INREXT in the last 72 hours. Recent Labs     07/20/22  0444 07/19/22  0230 07/18/22  0521    133* 135*   K 3.8 4.0 4.2    105 107   CO2 22 23 22   BUN 16 14 14   CREA 0.87 0.96 0.97   * 232* 216*   CA 7.8* 8.3* 7.9*   MG 1.9 2.0 2.1       No results for input(s): CPK, CKNDX, TROIQ in the last 72 hours. No lab exists for component: CPKMB  Lab Results   Component Value Date/Time    Cholesterol, total 133 06/07/2013 02:40 PM    HDL Cholesterol 43 06/07/2013 02:40 PM    LDL, calculated 73.4 06/07/2013 02:40 PM    Triglyceride 83 06/07/2013 02:40 PM    CHOL/HDL Ratio 3.1 06/07/2013 02:40 PM       No results for input(s): AP, TBIL, TP, ALB, GLOB, GGT, AML, LPSE in the last 72 hours. No lab exists for component: SGOT, GPT, AMYP, HLPSE  No results for input(s): PH, PCO2, PO2 in the last 72 hours.     Medications Personally Reviewed:    Current Facility-Administered Medications   Medication Dose Route Frequency    TPN ADULT - CENTRAL   IntraVENous CONTINUOUS    insulin lispro (HUMALOG) injection   SubCUTAneous AC&HS    0.9% sodium chloride infusion 250 mL  250 mL IntraVENous PRN    pantoprazole (PROTONIX) 40 mg in 0.9% sodium chloride 10 mL injection  40 mg IntraVENous DAILY    amiodarone (CORDARONE) tablet 400 mg  400 mg Oral Q8H    HYDROmorphone (DILAUDID) injection 0.5-1 mg  0.5-1 mg IntraVENous Q2H PRN    HYDROcodone-acetaminophen (NORCO) 5-325 mg per tablet 1-2 Tablet  1-2 Tablet Oral Q4H PRN    alcohol 62% (NOZIN) nasal  1 Ampule  1 Ampule Topical Q12H    piperacillin-tazobactam (ZOSYN) 3.375 g in 0.9% sodium chloride (MBP/ADV) 100 mL MBP  3.375 g IntraVENous Q8H    apixaban (ELIQUIS) tablet 5 mg  5 mg Oral BID    phenol throat spray (CHLORASEPTIC) 1 Spray  1 Spray Oral PRN    calcium carbonate (TUMS) chewable tablet 400 mg [elemental]  400 mg Oral TID PRN    glucose chewable tablet 16 g  4 Tablet Oral PRN    glucagon (GLUCAGEN) injection 1 mg  1 mg IntraMUSCular PRN    dextrose 10 % infusion 0-250 mL  0-250 mL IntraVENous PRN    nitroglycerin (NITROSTAT) tablet 0.4 mg  0.4 mg SubLINGual PRN    [Held by provider] atorvastatin (LIPITOR) tablet 20 mg  20 mg Oral QHS    [Held by provider] vitamin N-M-Y-lutein-minerals (OCUVITE) tablet 1 Tablet  1 Tablet Oral DAILY    sodium chloride (NS) flush 5-40 mL  5-40 mL IntraVENous Q8H    sodium chloride (NS) flush 5-40 mL  5-40 mL IntraVENous PRN    acetaminophen (TYLENOL) tablet 650 mg  650 mg Oral Q6H PRN    Or    acetaminophen (TYLENOL) suppository 650 mg  650 mg Rectal Q6H PRN    polyethylene glycol (MIRALAX) packet 17 g  17 g Oral DAILY PRN    ondansetron (ZOFRAN ODT) tablet 4 mg  4 mg Oral Q8H PRN    Or    ondansetron (ZOFRAN) injection 4 mg  4 mg IntraVENous Q6H PRN         Steedman Loots III, DO

## 2022-07-20 NOTE — PROGRESS NOTES
Problem: Self Care Deficits Care Plan (Adult)  Goal: *Acute Goals and Plan of Care (Insert Text)  Description:  FUNCTIONAL STATUS PRIOR TO ADMISSION: Patient was modified independent using a rollator for functional mobility. HOME SUPPORT PRIOR TO ADMISSION: The patient lived with wife at [address] but did not require assist. Assist with IADLs provided by hospitals as needed. Occupational Therapy Goals  Initiated 7/3/2022;  Weekly Reassessment 7/11/2022  1. Patient will perform lower body dressing with modified independence within 7 day(s). 2.  Patient will perform bathing with modified independence within 7 day(s). 3.  Patient will perform simple home management with modified independence within 7 day(s). 4.  Patient will perform toilet transfers with modified independence within 7 day(s). 5.  Patient will perform all aspects of toileting with modified independence within 7 day(s). 6.  Patient will utilize energy conservation techniques during functional activities with verbal cues within 7 day(s). Weekly assess, 7/19/2022   1. Patient will perform lower body dressing with modified independence within 7 day(s). 2.  Patient will perform bathing nadege area/ buttocks standing with CGA within 7 day(s). 3.  Patient will perform LB dressing with use of AE,  with min assist  within 7 day(s). 4.  Patient will perform toilet transfers with modified independence within 7 day(s). 5.  Patient will perform all aspects of toileting with min assist of 1 within 7 day(s). 6.  Patient will utilize energy conservation techniques during functional activities with verbal cues within 7 day(s). Outcome: Progressing Towards Goal   OCCUPATIONAL THERAPY TREATMENT  Patient: Leandro Martinez (89 y.o. male)  Date: 7/20/2022  Diagnosis:  Thrombocytopenia ([facility] [address] [D69.6]  GI bleed [K92.2]  UGO (acute kidney injury) ([facility] [address] [N17.9] <principal problem not specified>  Procedure(s) (LRB): INFUSION CATHETER INSERTION (N/A) 7 Days Post-Op  Precautions:    Chart, occupational therapy assessment, plan of care, and goals were reviewed. ASSESSMENT  Patient continues with skilled OT services and is progressing towards goals. Patient received sitting in recliner chair with family present in room and cleared for therapy by nursing. Patient completed sit > stand and walked into bathroom using rolling walker with contact guard assist. Cues provided for safety awareness and hand placement when transferring onto toilet for bowel movement. Patient educated on not bearing down and required additional time for toileting. Patient completed toileting/hygiene with stand-by to max assist and hand hygiene with contact guard assist. Patient walked to door and back to chair x2 times with rest break between walks. Patient returned to recliner chair and noted to have increased SOB with O2 sats >90%. Patient completed x2 additional sit <> stands while completing functional standing tasks. Patient was left sitting in recliner chair with all needs met, VSS, and family present in room. Patient would continue to benefit from skilled OT services during acute hospital stay. Recommend patient discharge to SNF rehab prior to returning home, pending progress. Current Level of Function Impacting Discharge (ADLs): contact guard to max assist for self-care, contact guard assist for functional mobility using rolling walker     Other factors to consider for discharge: fall risk, below baseline          PLAN :  Patient continues to benefit from skilled intervention to address the above impairments. Continue treatment per established plan of care to address goals.     Recommendation for discharge: (in order for the patient to meet his/her long term goals)  Therapy up to 5 days/week in SNF setting    This discharge recommendation:  Has been made in collaboration with the attending provider and/or case management    IF patient discharges home will need the following DME: TBD in SNF rehab       SUBJECTIVE:   Patient stated I use to work here.     OBJECTIVE DATA SUMMARY:   Cognitive/Behavioral Status:  Neurologic State: Alert  Orientation Level: Oriented X4  Cognition: Follows commands  Perception: Appears intact  Perseveration: No perseveration noted  Safety/Judgement: Awareness of environment;Decreased insight into deficits    Functional Mobility and Transfers for ADLs:  Bed Mobility:  Rolling: Other (comment) (received sitting in recliner chair)    Transfers:  Sit to Stand: Contact guard assistance  Stand to Sit: Contact guard assistance   Functional Transfers  Bathroom Mobility: Contact guard assistance  Toilet Transfer : Contact guard assistance  Cues: Tactile cues provided;Verbal cues provided    Balance:  Sitting: Intact  Standing: Impaired; With support  Standing - Static: Good;Constant support  Standing - Dynamic : Fair;Constant support    ADL Intervention:    Grooming  Position Performed: Standing  Washing Hands: Contact guard assistance  Cues: Tactile cues provided;Verbal cues provided    Toileting  Bladder Hygiene: Stand-by assistance  Bowel Hygiene: Maximum assistance (in standing)  Cues: Tactile cues provided;Verbal cues provided    Cognitive Retraining  Safety/Judgement: Awareness of environment;Decreased insight into deficits    Pain:  Patient did not c/o pain during session. Activity Tolerance:   Fair, SpO2 stable on RA, and observed SOB with activity    After treatment patient left in no apparent distress:   Sitting in chair, Call bell within reach, and Caregiver / family present    COMMUNICATION/COLLABORATION:   The patients plan of care was discussed with: Physical therapist and Registered nurse.      Sawyer Gaitan OTR/L  Time Calculation: 28 mins

## 2022-07-20 NOTE — PROGRESS NOTES
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 Received 3 doses IVIG, did not tolerate final IVIG infusion despite. PLT normalized  Hemo/onc on board     SBO, improving  Ileal perforation and abscess/adhesions  Repeat CT a/p on  showed partial SBO pattern. CT enterography showed distal partial SBO  Went to OR on  found to have SMALL BOWEL OBSTRUCTION  SECONDARY TO ILEAL PERFORATION, AND ABSCESS AND ADHESION AND ABDOMINAL HERNIA   CT abdomen pelvis repeated and it was unremarkable  Off NGT  Diet advance again to regular diet   DC  TPN on   Follow surgery recommendation  Continue with Zosyn, ? 14 days, will check w Sx  Wound vac today      GI bleed  Was to due severe ITP  S/p blood transfusion  Continue with protonix 40 daily (can switch to famotidine on discharge)  GI on board, holding off on EGD for now, conservative management.   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 Estimated discharge date: >48hrs  Code status: Full  Prophylaxis: Eliquis  Recommended Disposition: [facility]/Rehab. Patient and wife requested PT eval prior to discharge, wife thinks the patient is too weak and need rehab  Anticipated Discharge Date:  >48 hours        Subjective:     Discussed with RN events overnight. Good appetite  No abd pain  Afebrile   No SOB    Review of Systems:  Symptom Y/N Comments  Symptom Y/N Comments   Fever/Chills    Chest Pain     Poor Appetite    Edema     Cough    Abdominal Pain     Sputum    Joint Pain     SOB/SHEARER    Pruritis/Rash     Nausea/vomit    Tolerating PT/OT     Diarrhea    Tolerating Diet     Constipation    Other       PO intake: No data found. Wt Readings from Last 10 Encounters:   07/17/22 138.2 kg (304 lb 10.8 oz)   08/09/21 147.8 kg (325 lb 13.4 oz)   10/13/19 139.7 kg (308 lb)   11/27/18 138.5 kg (305 lb 5.4 oz)   10/28/18 137.9 kg (304 lb)   07/17/17 141.5 kg (312 lb)   02/01/14 138.8 kg (306 lb)   01/20/14 140.6 kg (310 lb)   01/17/14 138.3 kg (305 lb)   01/13/14 138.3 kg (305 lb)       Objective:     VITALS:   Last 24hrs VS reviewed since prior progress note.  Most recent are:  Patient Vitals for the past 24 hrs:   Temp Pulse Resp BP SpO2   07/20/22 1630 -- 64 21 -- 97 %   07/20/22 1600 98.4 °F (36.9 °C) 65 18 (!) 120/48 98 %   07/20/22 1530 -- 70 23 -- 96 %   07/20/22 1500 -- 67 19 -- 98 %   07/20/22 1430 -- 69 23 -- 99 %   07/20/22 1400 -- 68 18 (!) 127/48 --   07/20/22 1330 -- 63 19 -- --   07/20/22 1300 -- (!) 59 19 -- 94 %   07/20/22 1230 -- 62 17 -- 94 %   07/20/22 1200 98.8 °F (37.1 °C) 66 18 (!) 135/43 96 %   07/20/22 1130 -- 63 19 -- 93 %   07/20/22 1100 -- 63 19 -- 94 %   07/20/22 1045 97.9 °F (36.6 °C) (!) 58 18 (!) 115/48 95 %   07/20/22 1030 -- (!) 57 17 (!) 116/44 93 % 07/20/22 1000 -- 73 24 -- 95 %   07/20/22 0930 -- (!) 57 17 -- --   07/20/22 0900 -- 65 17 -- --   07/20/22 0830 -- 66 19 -- --   07/20/22 0800 98.4 °F (36.9 °C) (!) 59 17 (!) 117/46 --   07/20/22 0730 -- 62 15 -- --   07/20/22 0700 -- 62 16 -- --   07/20/22 0547 -- 74 -- (!) 118/50 --   07/20/22 0400 98 °F (36.7 °C) (!) 55 15 (!) 119/44 94 %   07/19/22 2328 97.9 °F (36.6 °C) 60 13 (!) 102/41 97 %   07/19/22 1931 97.6 °F (36.4 °C) 75 20 (!) 117/92 99 %       Intake/Output Summary (Last 24 hours) at 7/20/2022 1839  Last data filed at 7/20/2022 1300  Gross per 24 hour   Intake 1397 ml   Output 1175 ml   Net 222 ml        I had a face to face encounter, and independently examined this patient as outlined below:    PHYSICAL EXAM:  General:    No distress     HEENT: Atraumatic, anicteric sclerae, pink conjunctivae, MMM  Neck:  Supple, symmetrical  Lungs:   CTA. No Wheezing/Rhonchi. No rales. No tenderness. No Accessory muscle use. Heart:   Irregularly irregular rhythm, No murmur. No JVD   GI/:   Soft. NT. ND. BS normal. NAHID drain. Midline dressing  Extremities: +2 LE edema. No cyanosis. No clubbing. Skin:     Not pale. Not Jaundiced. No rashes   Psych:  Good insight. Not depressed. Not anxious or agitated. Neurologic: Alert and oriented X 4. EOMs intact. No facial asymmetry. No slurred speech. Symmetrical strength, Sensation grossly intact. Labs     I reviewed today's most current labs and imaging studies. Pertinent labs include:  No results for input(s): WBC, HGB, HCT, PLT, HGBEXT, HCTEXT, PLTEXT in the last 72 hours. Recent Labs     07/20/22  0444 07/19/22  0230 07/18/22  0521    133* 135*   K 3.8 4.0 4.2    105 107   CO2 22 23 22   * 232* 216*   BUN 16 14 14   CREA 0.87 0.96 0.97   CA 7.8* 8.3* 7.9*   MG 1.9 2.0 2.1   PHOS 2.8 2.9 3.2     XR ABD (KUB)    Result Date: 7/14/2022  1.  A gastric tube likely terminates in the stomach     XR ABD FLAT/ ERECT    Result Date: 7/15/2022  Persistent diffuse small bowel distention. XR ABD FLAT/ ERECT    Result Date: 7/14/2022  Moderate distention of several loops of upper and mid abdominal small bowel without colonic distention demonstrated. CT ABD PELV WO CONT    Result Date: 7/5/2022  1. Small bowel obstruction with a transition point in the right lower quadrant in the terminal ileum. The terminal ileum now appears more narrowed/collapsed than on the prior study. Oral contrast has made it to the colon however indicating this is likely a partial obstruction. 2. Fat-containing and local hernia containing fluid as previously seen. CT ABD PELV W CONT    Result Date: 7/18/2022  Distended small bowel. Distended stomach. Normal size colon No fluid collections. CT ENTEROGRAPHY W CONT    Result Date: 7/7/2022  Distal partial small bowel obstruction as above. .    XR CHEST PORT    Result Date: 7/13/2022  Satisfactory CVL placement. XR CHEST PORT    Result Date: 7/9/2022  Left basilar atelectasis. Nasogastric tube as above. NUCLEAR CARDIAC STRESS TEST    Result Date: 7/8/2022  : 1. No definite evidence of ischemia. There is slight diminished activity in the inferior wall which could represent nontransmural infarct versus attenuation artifact. 2. LVEF equals 55%. No regional wall motion abnormalities are identified. No results found. 08/07/21    ECHO ADULT COMPLETE 08/10/2021 8/10/2021    Interpretation Summary  · LV: Estimated LVEF is 60 - 65%. Visually measured ejection fraction. Normal cavity size and systolic function (ejection fraction normal). Upper normal wall thickness.  Wall motion: normal.    Signed by: Emily Diaz III, DO on 8/10/2021  7:18 AM     All Micro Results       Procedure Component Value Units Date/Time    CULTURE, RESPIRATORY/SPUTUM/BRONCH Zygmunt Liner [[id number]] Collected: 06/29/22 1412    Order Status: Completed Specimen: Sputum Updated: 07/01/22 0324     Special Requests: NO SPECIAL REQUESTS GRAM STAIN OCCASIONAL WBCS SEEN               RARE EPITHELIAL CELLS SEEN                  FEW GRAM POSITIVE COCCI IN PAIRS                  OCCASIONAL GRAM NEGATIVE RODS           Culture result:       HEAVY NORMAL RESPIRATORY JAVIER          RESPIRATORY VIRUS PANEL W/COVID-19, PCR [[id number]] Collected: 06/29/22 0906    Order Status: Completed Specimen: Nasopharyngeal Updated: 06/29/22 1556     Adenovirus Not detected        Coronavirus 229E Not detected        Coronavirus HKU1 Not detected        Coronavirus CVNL63 Not detected        Coronavirus OC43 Not detected        SARS-CoV-2, PCR Not detected        Metapneumovirus Not detected        Rhinovirus and Enterovirus Not detected        Influenza A Not detected        Influenza A, subtype H1 Not detected        Influenza A, subtype H3 Not detected        INFLUENZA A H1N1 PCR Not detected        Influenza B Not detected        Parainfluenza 1 Not detected        Parainfluenza 2 Not detected        Parainfluenza 3 Not detected        Parainfluenza virus 4 Not detected        RSV by PCR Not detected        B. parapertussis, PCR Not detected        Bordetella pertussis - PCR Not detected        Chlamydophila pneumoniae DNA, QL, PCR Not detected        Mycoplasma pneumoniae DNA, QL, PCR Not detected       COVID-19 RAPID TEST [[id number]] Collected: 06/29/22 0954    Order Status: Completed Specimen: Nasopharyngeal Updated: 06/29/22 1042     Specimen source Nasopharyngeal        COVID-19 rapid test Not detected        Comment: Rapid Abbott ID Now       Rapid NAAT:  The specimen is NEGATIVE for SARS-CoV-2, the novel coronavirus associated with COVID-19. Negative results should be treated as presumptive and, if inconsistent with clinical signs and symptoms or necessary for patient management, should be tested with an alternative molecular assay.   Negative results do not preclude SARS-CoV-2 infection and should not be used as the sole basis for patient management decisions. This test has been authorized by the FDA under an Emergency Use Authorization (EUA) for use by authorized laboratories.    Fact sheet for Healthcare Providers: ConventionUpdate.co.nz  Fact sheet for Patients: ConventionUpdate.co.nz       Methodology: Isothermal Nucleic Acid Amplification                   Current Medications:     Current Facility-Administered Medications:     insulin lispro (HUMALOG) injection, , SubCUTAneous, AC&HS, Zoey Stearns MD, 4 Units at 07/20/22 1635    0.9% sodium chloride infusion 250 mL, 250 mL, IntraVENous, PRN, Zoey Stearns MD    pantoprazole (PROTONIX) 40 mg in 0.9% sodium chloride 10 mL injection, 40 mg, IntraVENous, DAILY, Marcus Tang, NP, 40 mg at 07/20/22 0945    amiodarone (CORDARONE) tablet 400 mg, 400 mg, Oral, Q8H, Huang Alvarez III, DO, 400 mg at 07/20/22 1321    HYDROmorphone (DILAUDID) injection 0.5-1 mg, 0.5-1 mg, IntraVENous, Q2H PRN, Danny Dudley MD, 1 mg at 07/19/22 0804    HYDROcodone-acetaminophen (NORCO) 5-325 mg per tablet 1-2 Tablet, 1-2 Tablet, Oral, Q4H PRN, Danny Dudley MD, 1 Tablet at 07/12/22 0620    alcohol 62% (NOZIN) nasal  1 Ampule, 1 Ampule, Topical, Q12H, Danny Dudley MD, 1 Ampule at 07/20/22 0943    [COMPLETED] piperacillin-tazobactam (ZOSYN) 4.5 g in 0.9% sodium chloride (MBP/ADV) 100 mL MBP, 4.5 g, IntraVENous, NOW, Stopped at 07/08/22 1548 **FOLLOWED BY** piperacillin-tazobactam (ZOSYN) 3.375 g in 0.9% sodium chloride (MBP/ADV) 100 mL MBP, 3.375 g, IntraVENous, Q8H, Danny Dudley MD, Last Rate: 25 mL/hr at 07/20/22 1321, 3.375 g at 07/20/22 1321    apixaban (ELIQUIS) tablet 5 mg, 5 mg, Oral, BID, Danny Dudley MD, 5 mg at 07/20/22 1800    phenol throat spray (CHLORASEPTIC) 1 Spray, 1 Spray, Oral, PRN, Danny Dudley MD    calcium carbonate (TUMS) chewable tablet 400 mg [elemental], 400 mg, Oral, TID PRN, Danny Dudley MD, 400 mg at 07/01/22 5279 glucose chewable tablet 16 g, 4 Tablet, Oral, PRN, Sakshi Laird MD    glucagon (GLUCAGEN) injection 1 mg, 1 mg, IntraMUSCular, PRN, Sakshi Laird MD    dextrose 10 % infusion 0-250 mL, 0-250 mL, IntraVENous, PRN, Sakshi Laird MD    nitroglycerin (NITROSTAT) tablet 0.4 mg, 0.4 mg, SubLINGual, PRN, Sakshi Laird MD, 0.4 mg at 06/28/22 2322    [Held by provider] atorvastatin (LIPITOR) tablet 20 mg, 20 mg, Oral, QHS, Sakshi Laird MD, 20 mg at 07/13/22 2101    [Held by provider] vitamin W-X-E-lutein-minerals (OCUVITE) tablet 1 Tablet, 1 Tablet, Oral, DAILY, Sakshi Laird MD, 1 Tablet at 07/14/22 1011    sodium chloride (NS) flush 5-40 mL, 5-40 mL, IntraVENous, Q8H, Sakshi Laird MD, 10 mL at 07/20/22 1326    sodium chloride (NS) flush 5-40 mL, 5-40 mL, IntraVENous, PRN, Sakshi Laird MD    acetaminophen (TYLENOL) tablet 650 mg, 650 mg, Oral, Q6H PRN, 650 mg at 07/02/22 2246 **OR** acetaminophen (TYLENOL) suppository 650 mg, 650 mg, Rectal, Q6H PRN, Sakshi Laird MD    polyethylene glycol (MIRALAX) packet 17 g, 17 g, Oral, DAILY PRN, Sakshi Laird MD, 17 g at 07/17/22 0132    ondansetron (ZOFRAN ODT) tablet 4 mg, 4 mg, Oral, Q8H PRN **OR** ondansetron (ZOFRAN) injection 4 mg, 4 mg, IntraVENous, Q6H PRN, Sakshi Laird MD, 4 mg at 07/19/22 0254     Procedures: see electronic medical records for all procedures/Xrays and details which were not copied into this note but were reviewed prior to creation of Plan.     Reviewed most current lab test results and cultures  YES  Reviewed most current radiology test results   YES  Review and summation of old records today    NO  Reviewed patient's current orders and MAR    YES  PMH/SH reviewed - no change compared to H&P  ________________________________________________________________________  Care Plan discussed with:    Comments   Patient x    Family      RN x    Care Manager x    Consultant                       x Multidiciplinary team rounds were held today with , nursing, pharmacist and clinical coordinator. Patient's plan of care was discussed; medications were reviewed and discharge planning was addressed.      ________________________________________________________________________  Total NON critical care TIME:    33 Minutes    Total CRITICAL CARE TIME Spent:   Minutes non procedure based      Comments   >50% of visit spent in counseling and coordination of care x     This includes time during multidisciplinary rounds if indicated above   ________________________________________________________________________  Lily Allan MD

## 2022-07-20 NOTE — PROGRESS NOTES
Spiritual Care Assessment/Progress Note  [facility], [place]      NAME: Alfie Torrez      MRN: [id number]  AGE: 76 y.o.  SEX: male  Jewish Affiliation: Oriental orthodox   Language: English     7/20/2022     Total Time (in minutes): 12     Spiritual Assessment begun in [facility] PROGRESSIVE CARE through conversation with:         [x]Patient        [x] Family    [] Friend(s)        Reason for Consult: Initial/Spiritual assessment, patient floor     Spiritual beliefs: (Please include comment if needed)     [] Identifies with a adela tradition:         [x] Supported by a adela community:            [] Claims no spiritual orientation:           [] Seeking spiritual identity:                [] Adheres to an individual form of spirituality:           [] Not able to assess:                           Identified resources for coping:      [] Prayer                               [] Music                  [] Guided Imagery     [] Family/friends                 [] Pet visits     [] Devotional reading                         [x] Unknown     [] Other:                                                Interventions offered during this visit: (See comments for more details)    Patient Interventions: Initial visit     Family/Friend(s): Normalization of emotional/spiritual concerns, Prayer (assurance of), Iconic (affirming the presence of God/Higher Power), Coping skills reviewed/reinforced, Integration of medical assessment with existing values and beliefs, Catharsis/review of pertinent events in supportive environment, Affirmation of adela, Life review/legacy     Plan of Care:     [] Support spiritual and/or cultural needs    [] Support AMD and/or advance care planning process      [] Support grieving process   [] Coordinate Rites and/or Rituals    [] Coordination with community clergy   [] No spiritual needs identified at this time   [] Detailed Plan of Care below (See Comments)  [] Make referral to Music Therapy  [] Make referral to Pet Therapy     [] Make referral to Addiction services  [] Make referral to [facility]  [] Make referral to Spiritual Care Partner  [] No future visits requested        [x] Contact Spiritual Care for further referrals     Comments:   visited with Mr. Daiana Schreiber in 2268 for initial spiritual assessment. Patient was on the phone, his wife Ramiro Andrews was present. Ramiro Andrews received visit kindly and engaged in conversation. She shared about patient's long hospitalization and stated he was trying to catch up with things he hasn't attended to since being hospitalized, which are his stressors. She indicated that he was getting better and express her josh about progress in patient's health.  affirmed spouse thoughts and feelings, assurance them of prayers Spouse was advised of spiritual care availability upon request through staff. Patient and wife both thanked  for the support. Visited by: Stephanie Espinal.    Paging Service: Jenn-[phone] ([phone])

## 2022-07-20 NOTE — PROGRESS NOTES
End of Shift Note    Bedside shift change report given to Domenico Christopher RN (oncoming nurse) by Luster Libman (offgoing nurse).   Report included the following information SBAR, Kardex, ED Summary, Intake/Output, MAR, and Recent Results    Shift worked:  1461-0837     Shift summary and any significant changes:     Nothing overnight     Concerns for physician to address:       Zone phone for oncoming shift:                Luster Libman

## 2022-07-20 NOTE — PROGRESS NOTES
SURGERY PROGRESS NOTE      Admit Date: 2022    POD#12      Subjective:     Patient feels better. Denies nausea. Does not have appetite and does not like food he is being given. Wife is bring food from home. Having loose BMs and passing flatus        Objective:     Visit Vitals  BP (!) 135/43 (BP 1 Location: Left upper arm, BP Patient Position: At rest;Lying)   Pulse 66   Temp 98.8 °F (37.1 °C)   Resp 18   Ht 6' 4\" (1.93 m)   Wt 138.2 kg (304 lb 10.8 oz)   SpO2 96%   BMI 37.09 kg/m²        Temp (24hrs), Av °F (36.7 °C), Min:97.6 °F (36.4 °C), Max:98.8 °F (37.1 °C)      701 - 1900  In: 1687 [I.V.:1197]  Out: 655 [Urine:575; Drains:80]  1901 -  07  In: 2178.1 [P.O.:240; I.V.:1938.1]  Out: 2143 [Urine:2250; Drains:20]    Physical Exam:    General:  alert, cooperative, no distress, appears stated age   Abdomen: soft, bowel sounds active, non-tender   Incision:   Wound clean. No signs on going infection           Lab Results   Component Value Date/Time    WBC 8.7 2022 02:36 AM    HGB 7.9 (L) 2022 02:36 AM    HCT 25.2 (L) 2022 02:36 AM    PLATELET 301 3290 02:36 AM    MCV 96.9 2022 02:36 AM     Lab Results   Component Value Date/Time    GFR est non-AA >60 2022 04:44 AM    GFR est AA >60 2022 04:44 AM    Creatinine 0.87 2022 04:44 AM    BUN 16 2022 04:44 AM    Sodium 136 2022 04:44 AM    Potassium 3.8 2022 04:44 AM    Chloride 108 2022 04:44 AM    CO2 22 2022 04:44 AM    Magnesium 1.9 2022 04:44 AM    Phosphorus 2.8 2022 04:44 AM       Assessment:     Active Problems:     Morbid obesity ([address] (2013)      Thrombocytopenia ([address] (2022)      GI bleed (2022)      UGO (acute kidney injury) ([address] (2022)      Atrial fibrillation ([address] (7/3/2022)      Elevated troponin (7/3/2022)      Ileus ([address] (2022)      Intestinal adhesions with partial obstruction ([address] (2022)      Ventral hernia (7/8/2022)      Intestinal perforation ([facility] (7/8/2022)    Improving   Plan:     1) wound care consult for wound vac   2) PO as tolerated

## 2022-07-21 LAB
ANION GAP SERPL CALC-SCNC: 7 MMOL/L (ref 5–15)
BUN SERPL-MCNC: 16 MG/DL (ref 6–20)
BUN/CREAT SERPL: 16 (ref 12–20)
CALCIUM SERPL-MCNC: 8.1 MG/DL (ref 8.5–10.1)
CHLORIDE SERPL-SCNC: 109 MMOL/L (ref 97–108)
CO2 SERPL-SCNC: 23 MMOL/L (ref 21–32)
COVID-19 RAPID TEST, COVR: NOT DETECTED
CREAT SERPL-MCNC: 0.99 MG/DL (ref 0.7–1.3)
GLUCOSE BLD STRIP.AUTO-MCNC: 120 MG/DL (ref 65–117)
GLUCOSE BLD STRIP.AUTO-MCNC: 156 MG/DL (ref 65–117)
GLUCOSE BLD STRIP.AUTO-MCNC: 190 MG/DL (ref 65–117)
GLUCOSE BLD STRIP.AUTO-MCNC: 93 MG/DL (ref 65–117)
GLUCOSE SERPL-MCNC: 109 MG/DL (ref 65–100)
MAGNESIUM SERPL-MCNC: 1.8 MG/DL (ref 1.6–2.4)
PHOSPHATE SERPL-MCNC: 2.8 MG/DL (ref 2.6–4.7)
POTASSIUM SERPL-SCNC: 4 MMOL/L (ref 3.5–5.1)
SARS-COV-2, COV2: NORMAL
SERVICE CMNT-IMP: ABNORMAL
SERVICE CMNT-IMP: NORMAL
SODIUM SERPL-SCNC: 139 MMOL/L (ref 136–145)
SOURCE, COVRS: NORMAL

## 2022-07-21 PROCEDURE — 97530 THERAPEUTIC ACTIVITIES: CPT

## 2022-07-21 PROCEDURE — 82962 GLUCOSE BLOOD TEST: CPT

## 2022-07-21 PROCEDURE — 74011250637 HC RX REV CODE- 250/637: Performed by: GENERAL ACUTE CARE HOSPITAL

## 2022-07-21 PROCEDURE — 84100 ASSAY OF PHOSPHORUS: CPT

## 2022-07-21 PROCEDURE — 74011250637 HC RX REV CODE- 250/637: Performed by: INTERNAL MEDICINE

## 2022-07-21 PROCEDURE — 74011250637 HC RX REV CODE- 250/637: Performed by: SURGERY

## 2022-07-21 PROCEDURE — 83735 ASSAY OF MAGNESIUM: CPT

## 2022-07-21 PROCEDURE — 74011000258 HC RX REV CODE- 258: Performed by: SURGERY

## 2022-07-21 PROCEDURE — 36415 COLL VENOUS BLD VENIPUNCTURE: CPT

## 2022-07-21 PROCEDURE — 77030019934 HC DRSG VAC ASST KCON -B

## 2022-07-21 PROCEDURE — 87635 SARS-COV-2 COVID-19 AMP PRB: CPT

## 2022-07-21 PROCEDURE — 77010033678 HC OXYGEN DAILY

## 2022-07-21 PROCEDURE — 97535 SELF CARE MNGMENT TRAINING: CPT

## 2022-07-21 PROCEDURE — 80048 BASIC METABOLIC PNL TOTAL CA: CPT

## 2022-07-21 PROCEDURE — 74011250636 HC RX REV CODE- 250/636: Performed by: NURSE PRACTITIONER

## 2022-07-21 PROCEDURE — 74011636637 HC RX REV CODE- 636/637: Performed by: STUDENT IN AN ORGANIZED HEALTH CARE EDUCATION/TRAINING PROGRAM

## 2022-07-21 PROCEDURE — 97116 GAIT TRAINING THERAPY: CPT

## 2022-07-21 PROCEDURE — 74011000250 HC RX REV CODE- 250: Performed by: NURSE PRACTITIONER

## 2022-07-21 PROCEDURE — 94760 N-INVAS EAR/PLS OXIMETRY 1: CPT

## 2022-07-21 PROCEDURE — 74011250636 HC RX REV CODE- 250/636: Performed by: SURGERY

## 2022-07-21 PROCEDURE — C9113 INJ PANTOPRAZOLE SODIUM, VIA: HCPCS | Performed by: NURSE PRACTITIONER

## 2022-07-21 PROCEDURE — 74011000250 HC RX REV CODE- 250: Performed by: SURGERY

## 2022-07-21 PROCEDURE — 2709999900 HC NON-CHARGEABLE SUPPLY

## 2022-07-21 PROCEDURE — 65270000046 HC RM TELEMETRY

## 2022-07-21 PROCEDURE — 97605 NEG PRS WND THER DME<=50SQCM: CPT

## 2022-07-21 RX ORDER — ERGOCALCIFEROL 1.25 MG/1
50000 CAPSULE ORAL
Status: DISCONTINUED | OUTPATIENT
Start: 2022-07-21 | End: 2022-07-25 | Stop reason: HOSPADM

## 2022-07-21 RX ORDER — AMIODARONE HYDROCHLORIDE 200 MG/1
200 TABLET ORAL DAILY
Status: DISCONTINUED | OUTPATIENT
Start: 2022-07-22 | End: 2022-07-25 | Stop reason: HOSPADM

## 2022-07-21 RX ORDER — LANOLIN ALCOHOL/MO/W.PET/CERES
1 CREAM (GRAM) TOPICAL 2 TIMES DAILY WITH MEALS
Status: DISCONTINUED | OUTPATIENT
Start: 2022-07-21 | End: 2022-07-25 | Stop reason: HOSPADM

## 2022-07-21 RX ORDER — PANTOPRAZOLE SODIUM 40 MG/1
40 TABLET, DELAYED RELEASE ORAL
Status: DISCONTINUED | OUTPATIENT
Start: 2022-07-22 | End: 2022-07-25 | Stop reason: HOSPADM

## 2022-07-21 RX ADMIN — PIPERACILLIN AND TAZOBACTAM 3.38 G: 3; .375 INJECTION, POWDER, LYOPHILIZED, FOR SOLUTION INTRAVENOUS at 13:08

## 2022-07-21 RX ADMIN — ATORVASTATIN CALCIUM 20 MG: 20 TABLET, FILM COATED ORAL at 21:07

## 2022-07-21 RX ADMIN — FUROSEMIDE 20 MG: 20 TABLET ORAL at 08:24

## 2022-07-21 RX ADMIN — APIXABAN 5 MG: 5 TABLET, FILM COATED ORAL at 05:17

## 2022-07-21 RX ADMIN — Medication 3 UNITS: at 11:47

## 2022-07-21 RX ADMIN — PIPERACILLIN AND TAZOBACTAM 3.38 G: 3; .375 INJECTION, POWDER, LYOPHILIZED, FOR SOLUTION INTRAVENOUS at 05:17

## 2022-07-21 RX ADMIN — Medication 1 AMPULE: at 09:07

## 2022-07-21 RX ADMIN — Medication 1 AMPULE: at 21:09

## 2022-07-21 RX ADMIN — Medication 3 UNITS: at 16:47

## 2022-07-21 RX ADMIN — SODIUM CHLORIDE, PRESERVATIVE FREE 10 ML: 5 INJECTION INTRAVENOUS at 05:18

## 2022-07-21 RX ADMIN — PIPERACILLIN AND TAZOBACTAM 3.38 G: 3; .375 INJECTION, POWDER, LYOPHILIZED, FOR SOLUTION INTRAVENOUS at 21:09

## 2022-07-21 RX ADMIN — FERROUS SULFATE TAB 325 MG (65 MG ELEMENTAL FE) 325 MG: 325 (65 FE) TAB at 16:43

## 2022-07-21 RX ADMIN — HYDROMORPHONE HYDROCHLORIDE 0.5 MG: 1 INJECTION, SOLUTION INTRAMUSCULAR; INTRAVENOUS; SUBCUTANEOUS at 09:42

## 2022-07-21 RX ADMIN — POTASSIUM CHLORIDE 20 MEQ: 20 TABLET, EXTENDED RELEASE ORAL at 08:24

## 2022-07-21 RX ADMIN — SODIUM CHLORIDE, PRESERVATIVE FREE 10 ML: 5 INJECTION INTRAVENOUS at 13:08

## 2022-07-21 RX ADMIN — SODIUM CHLORIDE, PRESERVATIVE FREE 10 ML: 5 INJECTION INTRAVENOUS at 21:09

## 2022-07-21 RX ADMIN — AMIODARONE HYDROCHLORIDE 400 MG: 200 TABLET ORAL at 05:17

## 2022-07-21 RX ADMIN — SODIUM CHLORIDE 40 MG: 9 INJECTION, SOLUTION INTRAMUSCULAR; INTRAVENOUS; SUBCUTANEOUS at 08:24

## 2022-07-21 RX ADMIN — APIXABAN 5 MG: 5 TABLET, FILM COATED ORAL at 19:00

## 2022-07-21 RX ADMIN — FUROSEMIDE 20 MG: 20 TABLET ORAL at 16:43

## 2022-07-21 RX ADMIN — ERGOCALCIFEROL 50000 UNITS: 1.25 CAPSULE ORAL at 16:43

## 2022-07-21 NOTE — PROGRESS NOTES
End of Shift Note    Bedside shift change report given to Aleah Hawthorne (oncoming nurse) by Esdras Allison RN (offgoing nurse). Report included the following information SBAR, Kardex, MAR, and Recent Results    Shift worked:  [id number]     Shift summary and any significant changes:          Concerns for physician to address:       Zone phone for oncoming shift:          Activity:  Activity Level: Up with Assistance  Number times ambulated in hallways past shift: 0  Number of times OOB to chair past shift: 0    Cardiac:   Cardiac Monitoring: Yes      Cardiac Rhythm: Sinus Rhythm    Access:   Current line(s): central line     Genitourinary:   Urinary status: voiding    Respiratory:   O2 Device: None (Room air)  Chronic home O2 use?: NO  Incentive spirometer at bedside: YES  Actual Volume (ml): 1500 ml    GI:  Last Bowel Movement Date: 07/20/22  Current diet:  ADULT DIET Regular; 4 carb choices (60 gm/meal)  Passing flatus: YES  Tolerating current diet: YES       Pain Management:   Patient states pain is manageable on current regimen: YES    Skin:  Jose Guadalupe Score: 17  Interventions: turn team, increase time out of bed, and PT/OT consult    Patient Safety:  Fall Score:  Total Score: 3  Interventions: bed/chair alarm, assistive device (walker, cane, etc), gripper socks, and pt to call before getting OOB  High Fall Risk: Yes    Length of Stay:  Expected LOS: 8d 9h  Actual LOS: 24      Esdras Allison RN

## 2022-07-21 NOTE — PROGRESS NOTES
Comprehensive Nutrition Assessment    Type and Reason for Visit: Reassess    Nutrition Recommendations/Plan:   Continue current diet     Nutrition Assessment:    Chart reviewed; patient continues on a 4 carb choice diet. TPN weaned off. Patient reports his appetite is much better; no nausea at this time. Tolerating PO intake and mostly consuming foods from outside the hospital. Offered ONS but patient declined at this time. He reports working with the dietitian at [facility]. Discharge planning underway. Encouraged intake of meals and protein sources. Nutrition Related Findings:    [phone]  1+ edema  BM 7/20  Lasix, humalog, protonix, KCl   Wound Type: Surgical incision    Current Nutrition Intake & Therapies:  Average Meal Intake: 51-75%     ADULT DIET Regular; 4 carb choices (60 gm/meal)    Anthropometric Measures:  Height: 6' 4\" (193 cm)  Ideal Body Weight (IBW): 202 lbs (92 kg)     Current Body Wt:  129.8 kg (286 lb 2.5 oz), 137.2 % IBW. Current BMI (kg/m2): 34.8                          BMI Category: Obese class 1 (BMI 30.0-34. 9)    Estimated Daily Nutrient Needs:  Energy Requirements Based On: Formula  Weight Used for Energy Requirements: Current  Energy (kcal/day): 2290 kcal (BMR 2117 x 1. 2AF -250kcal)  Weight Used for Protein Requirements: Current  Protein (g/day): 104-130g (0.8-1.0 g/kg bw)  Method Used for Fluid Requirements: 1 ml/kcal  Fluid (ml/day): 2200 mL    Nutrition Diagnosis:   Increased nutrient needs related to  (wound healing) as evidenced by  (surgical incision/wound vac)    Nutrition Interventions:   Food and/or Nutrient Delivery: Continue current diet  Nutrition Education/Counseling: No recommendations at this time  Coordination of Nutrition Care: Continue to monitor while inpatient       Goals:  Previous Goal Met: Goal(s) achieved  Goals:  (PO intake >70% of meals next 4-6 days)       Nutrition Monitoring and Evaluation:   Behavioral-Environmental Outcomes: None identified  Food/Nutrient Intake Outcomes: Food and nutrient intake  Physical Signs/Symptoms Outcomes: Biochemical data, Skin, Weight    Discharge Planning:    Continue current diet    Elise Feliz RD  Contact: ext [phone]

## 2022-07-21 NOTE — PROGRESS NOTES
Problem: Discharge Planning  Goal: *Discharge to safe environment  Outcome: Progressing Towards Goal     Problem: Falls - Risk of  Goal: *Absence of Falls  Description: Document [place] Fall Risk and appropriate interventions in the flowsheet. Outcome: Progressing Towards Goal  Note: Fall Risk Interventions:  Mobility Interventions: Bed/chair exit alarm, Communicate number of staff needed for ambulation/transfer, Patient to call before getting OOB    Mentation Interventions: Adequate sleep, hydration, pain control, Bed/chair exit alarm, Door open when patient unattended    Medication Interventions: Assess postural VS orthostatic hypotension, Bed/chair exit alarm    Elimination Interventions: Bed/chair exit alarm, Call light in reach, Patient to call for help with toileting needs, Toileting schedule/hourly rounds, Urinal in reach    History of Falls Interventions: Bed/chair exit alarm         Problem: Patient Education: Go to Patient Education Activity  Goal: Patient/Family Education  Outcome: Progressing Towards Goal     Problem: Patient Education: Go to Patient Education Activity  Goal: Patient/Family Education  Outcome: Progressing Towards Goal     Problem: Nutrition Deficit  Goal: *Optimize nutritional status  Outcome: Progressing Towards Goal     Problem: Risk for Spread of Infection  Goal: Prevent transmission of infectious organism to others  Description: Prevent the transmission of infectious organisms to other patients, staff members, and visitors.   Outcome: Progressing Towards Goal     Problem: Patient Education:  Go to Education Activity  Goal: Patient/Family Education  Outcome: Progressing Towards Goal     Problem: Patient Education: Go to Patient Education Activity  Goal: Patient/Family Education  Outcome: Progressing Towards Goal     Problem: Upper and Lower GI Bleed: Day 1  Goal: Off Pathway (Use only if patient is Off Pathway)  Outcome: Progressing Towards Goal  Goal: Activity/Safety  Outcome: Progressing Towards Goal  Goal: Consults, if ordered  Outcome: Progressing Towards Goal  Goal: Diagnostic Test/Procedures  Outcome: Progressing Towards Goal  Goal: Nutrition/Diet  Outcome: Progressing Towards Goal  Goal: Discharge Planning  Outcome: Progressing Towards Goal  Goal: Medications  Outcome: Progressing Towards Goal  Goal: Respiratory  Outcome: Progressing Towards Goal  Goal: Treatments/Interventions/Procedures  Outcome: Progressing Towards Goal  Goal: Psychosocial  Outcome: Progressing Towards Goal  Goal: *Optimal pain control at patient's stated goal  Outcome: Progressing Towards Goal  Goal: *Hemodynamically stable  Outcome: Progressing Towards Goal  Goal: *Demonstrates progressive activity  Outcome: Progressing Towards Goal     Problem: Upper and Lower GI Bleed: Day 2  Goal: Off Pathway (Use only if patient is Off Pathway)  Outcome: Progressing Towards Goal  Goal: Activity/Safety  Outcome: Progressing Towards Goal  Goal: Consults, if ordered  Outcome: Progressing Towards Goal  Goal: Diagnostic Test/Procedures  Outcome: Progressing Towards Goal  Goal: Nutrition/Diet  Outcome: Progressing Towards Goal  Goal: Discharge Planning  Outcome: Progressing Towards Goal  Goal: Medications  Outcome: Progressing Towards Goal  Goal: Respiratory  Outcome: Progressing Towards Goal  Goal: Treatments/Interventions/Procedures  Outcome: Progressing Towards Goal  Goal: Psychosocial  Outcome: Progressing Towards Goal  Goal: *Optimal pain control at patient's stated goal  Outcome: Progressing Towards Goal  Goal: *Hemodynamically stable  Outcome: Progressing Towards Goal  Goal: *Tolerating diet  Outcome: Progressing Towards Goal  Goal: *Demonstrates progressive activity  Outcome: Progressing Towards Goal     Problem: Upper and Lower GI Bleed: Day 3  Goal: Off Pathway (Use only if patient is Off Pathway)  Outcome: Progressing Towards Goal  Goal: Activity/Safety  Outcome: Progressing Towards Goal  Goal: Diagnostic Test/Procedures  Outcome: Progressing Towards Goal  Goal: Nutrition/Diet  Outcome: Progressing Towards Goal  Goal: Discharge Planning  Outcome: Progressing Towards Goal  Goal: Medications  Outcome: Progressing Towards Goal  Goal: Treatments/Interventions/Procedures  Outcome: Progressing Towards Goal  Goal: Psychosocial  Outcome: Progressing Towards Goal     Problem: Upper and Lower GI Bleed:  Discharge Outcomes  Goal: *Hemodynamically stable  Outcome: Progressing Towards Goal  Goal: *Lungs clear or at baseline  Outcome: Progressing Towards Goal  Goal: *Demonstrates independent activity or return to baseline  Outcome: Progressing Towards Goal  Goal: *Pain is controlled to three or less  Outcome: Progressing Towards Goal  Goal: *Verbalizes understanding and describes prescribed diet  Outcome: Progressing Towards Goal  Goal: *Tolerating diet  Outcome: Progressing Towards Goal  Goal: *Verbalizes name, dosage, time, side effects, and number of days to continue medications  Outcome: Progressing Towards Goal  Goal: *Anxiety reduced or absent  Outcome: Progressing Towards Goal  Goal: *Understands and describes signs and symptoms to report to providers(Stroke Metric)  Outcome: Progressing Towards Goal  Goal: *Describes follow-up/return visits to physicians  Outcome: Progressing Towards Goal  Goal: *Describes available resources and support systems  Outcome: Progressing Towards Goal     Problem: Diabetes Self-Management  Goal: *Disease process and treatment process  Description: Define diabetes and identify own type of diabetes; list 3 options for treating diabetes. Outcome: Progressing Towards Goal  Goal: *Incorporating nutritional management into lifestyle  Description: Describe effect of type, amount and timing of food on blood glucose; list 3 methods for planning meals.   Outcome: Progressing Towards Goal  Goal: *Incorporating physical activity into lifestyle  Description: State effect of exercise on blood glucose levels. Outcome: Progressing Towards Goal  Goal: *Developing strategies to promote health/change behavior  Description: Define the ABC's of diabetes; identify appropriate screenings, schedule and personal plan for screenings. Outcome: Progressing Towards Goal  Goal: *Using medications safely  Description: State effect of diabetes medications on diabetes; name diabetes medication taking, action and side effects. Outcome: Progressing Towards Goal  Goal: *Monitoring blood glucose, interpreting and using results  Description: Identify recommended blood glucose targets  and personal targets. Outcome: Progressing Towards Goal  Goal: *Prevention, detection, treatment of acute complications  Description: List symptoms of hyper- and hypoglycemia; describe how to treat low blood sugar and actions for lowering  high blood glucose level. Outcome: Progressing Towards Goal  Goal: *Prevention, detection and treatment of chronic complications  Description: Define the natural course of diabetes and describe the relationship of blood glucose levels to long term complications of diabetes.   Outcome: Progressing Towards Goal  Goal: *Developing strategies to address psychosocial issues  Description: Describe feelings about living with diabetes; identify support needed and support network  Outcome: Progressing Towards Goal  Goal: *Insulin pump training  Outcome: Progressing Towards Goal  Goal: *Sick day guidelines  Outcome: Progressing Towards Goal  Goal: *Patient Specific Goal (EDIT GOAL, INSERT TEXT)  Outcome: Progressing Towards Goal

## 2022-07-21 NOTE — PROGRESS NOTES
Problem: Mobility Impaired (Adult and Pediatric)  Goal: *Acute Goals and Plan of Care (Insert Text)  Description: FUNCTIONAL STATUS PRIOR TO ADMISSION: Patient was modified independent using a rollator for functional mobility. HOME SUPPORT PRIOR TO ADMISSION: The patient lived with wife at [address] but did not require assist.    Physical Therapy Goals  Initiated 7/2/2022 - remain appropriate 7/11/2022, remain appropriate 7/18/22  1. Patient will move from supine to sit and sit to supine , scoot up and down, and roll side to side in bed with modified independence within 7 day(s). 2.  Patient will transfer from bed to chair and chair to bed with modified independence using the least restrictive device within 7 day(s). 3.  Patient will perform sit to stand with modified independence within 7 day(s). 4.  Patient will ambulate with modified independence for 150 feet with the least restrictive device within 7 day(s). Outcome: Progressing Towards Goal   PHYSICAL THERAPY TREATMENT  Patient: Noe Underwood (16 y.o. male)  Date: 7/21/2022  Diagnosis: Thrombocytopenia ([facility] [address] [D69.6]  GI bleed [K92.2]  UGO (acute kidney injury) ([facility] [address] [N17.9] <principal problem not specified>  Procedure(s) (LRB):  INFUSION CATHETER INSERTION (N/A) 8 Days Post-Op  Precautions:    Chart, physical therapy assessment, plan of care and goals were reviewed. ASSESSMENT  Patient continues with skilled PT services and is progressing towards goals. Patient limited in activity secondary to decreased activity endurance and weakness. Tolerated to/from bathroom ambulation with support of RW. He remains well below baseline and will benefit from continued skilled therapy. Continue to recommend SNF rehab.      Current Level of Function Impacting Discharge (mobility/balance): CGA    Other factors to consider for discharge: spouse unable to physically assist          PLAN :  Patient continues to benefit from skilled intervention to address the above impairments. Continue treatment per established plan of care. to address goals. Recommendation for discharge: (in order for the patient to meet his/her long term goals)  Therapy up to 5 days/week in SNF setting    This discharge recommendation:  Has been made in collaboration with the attending provider and/or case management    IF patient discharges home will need the following DME: patient owns DME required for discharge       SUBJECTIVE:   Patient stated I have done a lot today.     OBJECTIVE DATA SUMMARY:   Critical Behavior:  Neurologic State: Alert  Orientation Level: Oriented X4  Cognition: Appropriate decision making  Safety/Judgement: Awareness of environment, Decreased insight into deficits  Functional Mobility Training:  Bed Mobility:   Not assessed; patient in chair on arrival     Transfers:  Sit to Stand: Contact guard assistance; Adaptive equipment; Additional time (RW)  Stand to Sit: Contact guard assistance           Balance:  Sitting: Intact  Standing: Impaired; With support (RW)  Standing - Static: Good;Constant support  Standing - Dynamic : Fair;Constant support  Ambulation/Gait Training:  Distance (ft): 15 Feet (ft) (x 2 reps, to/from bathroom)  Assistive Device: Walker, rolling;Gait belt  Ambulation - Level of Assistance: Contact guard assistance        Gait Abnormalities: Decreased step clearance;Trunk sway increased (forward flexed posture)        Base of Support: Widened     Speed/Beatriz: Pace decreased (<100 feet/min)  Step Length: Left shortened;Right shortened                  Slow.  Requires cues for safety with RW    Pain Rating:  No c/o pain     Activity Tolerance:   Fair, requires rest breaks, and observed SOB with activity    After treatment patient left in no apparent distress:   Sitting in chair, Call bell within reach, and Caregiver / family present    COMMUNICATION/COLLABORATION:   The patients plan of care was discussed with: Occupational therapist and Registered nurse.      Yesenia Toure, PT, DPT   Time Calculation: 33 mins

## 2022-07-21 NOTE — WOUND CARE
Wound Care consult for the Abdominal Surgical wound:  Chart reviewed. Patient has been hospitalized for close to a month and is now feeling a lot better. Disposition to [facility] should be within the next few days once it is approved for [facility] to change the wound vac dressings. Pt. And his wife live in independent living at [facility] [place] [address]: Pt. Is alert and oriented x 4. He ambulates with assistance to the bathroom with a walker. Overall the wound is clean and granulating with epiboled edges. The nadege-wound skin has surface staple punctures but these are clean and stable. Treatment: The wound was cleansed with Vashe solution and wiped several times with gauze to remove the old drainage and wound debris. The wound was packed with the black Granufoam cut into one continuous pirce and a piece of Mepitel One silicon on the end that first goes into the deepest part of the wound. The periwound skin was prepped with the [place] skin protectant. The TRAC pad was applied to the vac and the setting for the Wound Vac was placed on 125 mmHg Negative pressure. Pt. Tolerated it well. Incision 07/08/22 Abdomen (Active)   Wound Image   07/21/22 1017   Dressing Status New dressing applied 07/21/22 1017   Dressing Change Due 07/25/22 07/21/22 1017   Cleansed Vashe 07/21/22 1017   Dressing/Treatment Negative Pressure Wound Therapy 07/21/22 1017   Wound Length (cm) 7 cm 07/21/22 1017   Wound Width (cm) 5.5 cm 07/21/22 1017   Wound Depth (cm) 8.6 cm 07/21/22 1017   Wound Volume (cm^3) 331.1 cm^3 07/21/22 1017   Closure Staples 07/21/22 0824   Drainage Amount Scant 07/21/22 0824   Drainage Description Serosanguinous 07/21/22 1017   Wound Odor None 07/21/22 1017   Nadege-Wound/Incision Assessment Intact 07/21/22 1017   Number of days: 13    PLAN: Getting patient ready for discharge. Need to fill out the Wound Vac form - Case management to call [phone] when it is ready to fill out. I will come do it.    Will need [facility] care to come to [facility] (most likely). Continue Pressure injury prevention. Staff to change the Wound Vac canisters as needed when full. The Wound Vac is registered in patient's name with [employer].    Pawel Gilbert RN, BSN, [employer]

## 2022-07-21 NOTE — PROGRESS NOTES
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 Received 3 doses IVIG, did not tolerate final IVIG infusion despite. PLT normalized  Hemo/onc on board     SBO, improving  Ileal perforation and abscess/adhesions  Repeat CT a/p on  showed partial SBO pattern. CT enterography showed distal partial SBO  Went to OR on  found to have SMALL BOWEL OBSTRUCTION  SECONDARY TO ILEAL PERFORATION, AND ABSCESS AND ADHESION AND ABDOMINAL HERNIA   CT abdomen pelvis repeated and it was unremarkable  Off NGT  Diet advance again to regular diet   Off TPN on   Follow surgery recommendation  Continue with Zosyn, ? 14 days, will check w Sx  Wound vac placed today      GI bleed  Was to due severe ITP  S/p blood transfusion  Continue with protonix 40 daily (can switch to famotidine on discharge)  GI on board, holding off on EGD for now, conservative management.   Start iron pills   7/15 again hemoglobin 6.8 but there is no blood in the stool has no hematuria we will transfuse 1 unit of blood     Paroxysmal atrial fibrillation w RVR  HR is controlled now  Cardiac stress test neg  Cont Eliquis   amiodarone 400 every 8 hours, decreased to 200mg daily  F/up with Dr Fiona Yanes in 2 weeks      Gross hematuria, improving  Patient with recurrent gross hematuria, Bernard inserted for urinary retention  Eliquis resumed, hemoglobin is stable  Remove bernard at rehab when pt is able to ambulate      History of DVT and PE  B/l LE edema, chronic   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 car trips x2  PE of unknown origin  DVT after hip replacement  Last DVT 2021  Patient reports his in immobile much of the time d/t debility  Has not had hypercoagulable work up  [facility]  On RA  Duplex done and neg  B/l LE edema , start lasix 20 mg BID  Start STEVE      Vit D deficiency  Start Ergocalciferol        Acute hypoxic resp failure/ resolved  CXR Left basilar atelectasis  Sating low 90 on RA now  IS     30.0 - 39.9 Obese / Body mass index is 37.09 kg/m². Code status: Full  Prophylaxis: Eliquis  Recommended Disposition: [facility]/Rehab. Anticipated Discharge Date:  stable for DC to rehab        Subjective:     Discussed with RN events overnight. Good appetite  No abd pain  No N/V  Afebrile   No SOB  Wound vac on  To remove central line    Review of Systems:  Symptom Y/N Comments  Symptom Y/N Comments   Fever/Chills    Chest Pain     Poor Appetite    Edema     Cough    Abdominal Pain     Sputum    Joint Pain     SOB/SHEARER    Pruritis/Rash     Nausea/vomit    Tolerating PT/OT     Diarrhea    Tolerating Diet     Constipation    Other       PO intake: No data found. Wt Readings from Last 10 Encounters:   07/21/22 129.8 kg (286 lb 2.5 oz)   08/09/21 147.8 kg (325 lb 13.4 oz)   10/13/19 139.7 kg (308 lb)   11/27/18 138.5 kg (305 lb 5.4 oz)   10/28/18 137.9 kg (304 lb)   07/17/17 141.5 kg (312 lb)   02/01/14 138.8 kg (306 lb)   01/20/14 140.6 kg (310 lb)   01/17/14 138.3 kg (305 lb)   01/13/14 138.3 kg (305 lb)       Objective:     VITALS:   Last 24hrs VS reviewed since prior progress note.  Most recent are:  Patient Vitals for the past 24 hrs:   Temp Pulse Resp BP SpO2   07/21/22 1200 98 °F (36.7 °C) 68 20 -- 97 %   07/21/22 1046 -- 66 18 (!) 119/44 94 %   07/21/22 0824 98 °F (36.7 °C) 65 24 (!) 116/43 97 %   07/21/22 0740 -- 62 19 (!) 116/43 96 %   07/21/22 0319 97.8 °F (36.6 °C) 74 23 (!) 103/49 96 %   07/20/22 2250 98.5 °F (36.9 °C) 71 23 (!) 121/41 94 %   07/20/22 1910 98.7 °F (37.1 °C) 66 20 (!) 120/40 96 %   07/20/22 1800 -- 67 23 (!) 125/41 96 %   07/20/22 1630 -- 64 21 -- 97 %   07/20/22 1600 98.4 °F (36.9 °C) 65 18 (!) 120/48 98 %   07/20/22 1530 -- 70 23 -- 96 %         Intake/Output Summary (Last 24 hours) at 7/21/2022 1507  Last data filed at 7/21/2022 1200  Gross per 24 hour   Intake 440 ml   Output 2025 ml   Net -1585 ml          I had a face to face encounter, and independently examined this patient as outlined below:    PHYSICAL EXAM:  General:    No distress     HEENT: Atraumatic, anicteric sclerae, pink conjunctivae, MMM  Neck:  Supple, symmetrical  Lungs:   CTA. No Wheezing/Rhonchi. No rales. No tenderness. No Accessory muscle use. Heart:   Irregularly irregular rhythm, No murmur. No JVD   GI/:   Lopez. Soft. NT. ND. BS normal. Midline wound vac. Extremities: +2 LE edema. No cyanosis. No clubbing. Skin:     Not pale. Not Jaundiced. No rashes   Psych:  Good insight. Not depressed. Not anxious or agitated. Neurologic: Alert and oriented X 4. EOMs intact. No facial asymmetry. No slurred speech. Symmetrical strength, Sensation grossly intact. Labs     I reviewed today's most current labs and imaging studies. Pertinent labs include:  No results for input(s): WBC, HGB, HCT, PLT, HGBEXT, HCTEXT, PLTEXT, HGBEXT, HCTEXT, PLTEXT in the last 72 hours. Recent Labs     07/21/22  0145 07/20/22  0444 07/19/22  0230    136 133*   K 4.0 3.8 4.0   * 108 105   CO2 23 22 23   * 170* 232*   BUN 16 16 14   CREA 0.99 0.87 0.96   CA 8.1* 7.8* 8.3*   MG 1.8 1.9 2.0   PHOS 2.8 2.8 2.9       XR ABD (KUB)    Result Date: 7/14/2022  1. A gastric tube likely terminates in the stomach     XR ABD FLAT/ ERECT    Result Date: 7/15/2022  Persistent diffuse small bowel distention. XR ABD FLAT/ ERECT    Result Date: 7/14/2022  Moderate distention of several loops of upper and mid abdominal small bowel without colonic distention demonstrated. CT ABD PELV WO CONT    Result Date: 7/5/2022  1. Small bowel obstruction with a transition point in the right lower quadrant in the terminal ileum. The terminal ileum now appears more narrowed/collapsed than on the prior study.  Oral contrast has made it to the colon however indicating this is likely a partial obstruction. 2. Fat-containing and local hernia containing fluid as previously seen. CT ABD PELV W CONT    Result Date: 7/18/2022  Distended small bowel. Distended stomach. Normal size colon No fluid collections. CT ENTEROGRAPHY W CONT    Result Date: 7/7/2022  Distal partial small bowel obstruction as above. .    XR CHEST PORT    Result Date: 7/13/2022  Satisfactory CVL placement. XR CHEST PORT    Result Date: 7/9/2022  Left basilar atelectasis. Nasogastric tube as above. NUCLEAR CARDIAC STRESS TEST    Result Date: 7/8/2022  : 1. No definite evidence of ischemia. There is slight diminished activity in the inferior wall which could represent nontransmural infarct versus attenuation artifact. 2. LVEF equals 55%. No regional wall motion abnormalities are identified. No results found. 08/07/21    ECHO ADULT COMPLETE 08/10/2021 8/10/2021    Interpretation Summary  · LV: Estimated LVEF is 60 - 65%. Visually measured ejection fraction. Normal cavity size and systolic function (ejection fraction normal). Upper normal wall thickness.  Wall motion: normal.    Signed by: Baldo Hunt DO on 8/10/2021  7:18 AM     All Micro Results       Procedure Component Value Units Date/Time    CULTURE, RESPIRATORY/SPUTUM/BRONCH Petra Erwin [[id number]] Collected: 06/29/22 1412    Order Status: Completed Specimen: Sputum Updated: 07/01/22 0945     Special Requests: NO SPECIAL REQUESTS        GRAM STAIN OCCASIONAL WBCS SEEN               RARE EPITHELIAL CELLS SEEN                  FEW GRAM POSITIVE COCCI IN PAIRS                  OCCASIONAL GRAM NEGATIVE RODS           Culture result:       HEAVY NORMAL RESPIRATORY JAVIER          RESPIRATORY VIRUS PANEL W/COVID-19, PCR [[id number]] Collected: 06/29/22 0906    Order Status: Completed Specimen: Nasopharyngeal Updated: 06/29/22 1556     Adenovirus Not detected        Coronavirus 229E Not detected        Coronavirus HKU1 Not detected        Coronavirus CVNL63 Not detected        Coronavirus OC43 Not detected        SARS-CoV-2, PCR Not detected        Metapneumovirus Not detected        Rhinovirus and Enterovirus Not detected        Influenza A Not detected        Influenza A, subtype H1 Not detected        Influenza A, subtype H3 Not detected        INFLUENZA A H1N1 PCR Not detected        Influenza B Not detected        Parainfluenza 1 Not detected        Parainfluenza 2 Not detected        Parainfluenza 3 Not detected        Parainfluenza virus 4 Not detected        RSV by PCR Not detected        B. parapertussis, PCR Not detected        Bordetella pertussis - PCR Not detected        Chlamydophila pneumoniae DNA, QL, PCR Not detected        Mycoplasma pneumoniae DNA, QL, PCR Not detected       COVID-19 RAPID TEST [[id number]] Collected: 06/29/22 0954    Order Status: Completed Specimen: Nasopharyngeal Updated: 06/29/22 1042     Specimen source Nasopharyngeal        COVID-19 rapid test Not detected        Comment: Rapid Abbott ID Now       Rapid NAAT:  The specimen is NEGATIVE for SARS-CoV-2, the novel coronavirus associated with COVID-19. Negative results should be treated as presumptive and, if inconsistent with clinical signs and symptoms or necessary for patient management, should be tested with an alternative molecular assay. Negative results do not preclude SARS-CoV-2 infection and should not be used as the sole basis for patient management decisions. This test has been authorized by the FDA under an Emergency Use Authorization (EUA) for use by authorized laboratories.    Fact sheet for Healthcare Providers: ConventionUpdate.co.nz  Fact sheet for Patients: ConventionUpdate.co.nz       Methodology: Isothermal Nucleic Acid Amplification                   Current Medications:     Current Facility-Administered Medications:     [START ON 7/22/2022] pantoprazole (PROTONIX) tablet 40 mg, 40 mg, Oral, ACB, Nestor Bardales MD    [START ON 7/22/2022] amiodarone (CORDARONE) tablet 200 mg, 200 mg, Oral, DAILY, Huang Alvarez III, DO    furosemide (LASIX) tablet 20 mg, 20 mg, Oral, ACB&D, Latonia Pierce MD, 20 mg at 07/21/22 0824    potassium chloride (K-DUR, KLOR-CON M20) SR tablet 20 mEq, 20 mEq, Oral, DAILY, Latonia Pierce MD, 20 mEq at 07/21/22 0824    insulin lispro (HUMALOG) injection, , SubCUTAneous, AC&HS, Zoey Stearns MD, 3 Units at 07/21/22 1147    0.9% sodium chloride infusion 250 mL, 250 mL, IntraVENous, PRN, Zoey Stearns MD    HYDROmorphone (DILAUDID) injection 0.5-1 mg, 0.5-1 mg, IntraVENous, Q2H PRN, Katy Mora MD, 0.5 mg at 07/21/22 0942    HYDROcodone-acetaminophen (NORCO) 5-325 mg per tablet 1-2 Tablet, 1-2 Tablet, Oral, Q4H PRN, Katy Mora MD, 1 Tablet at 07/12/22 0620    alcohol 62% (NOZIN) nasal  1 Ampule, 1 Ampule, Topical, Q12H, Katy Mora MD, 1 Ampule at 07/21/22 0907    [COMPLETED] piperacillin-tazobactam (ZOSYN) 4.5 g in 0.9% sodium chloride (MBP/ADV) 100 mL MBP, 4.5 g, IntraVENous, NOW, Stopped at 07/08/22 1548 **FOLLOWED BY** piperacillin-tazobactam (ZOSYN) 3.375 g in 0.9% sodium chloride (MBP/ADV) 100 mL MBP, 3.375 g, IntraVENous, Q8H, Katy Mora MD, Last Rate: 25 mL/hr at 07/21/22 1308, 3.375 g at 07/21/22 1308    apixaban (ELIQUIS) tablet 5 mg, 5 mg, Oral, BID, Katy Mora MD, 5 mg at 07/21/22 0517    phenol throat spray (CHLORASEPTIC) 1 Spray, 1 Spray, Oral, PRN, Katy Mora MD    calcium carbonate (TUMS) chewable tablet 400 mg [elemental], 400 mg, Oral, TID PRN, Katy Mora MD, 400 mg at 07/01/22 0133    glucose chewable tablet 16 g, 4 Tablet, Oral, PRN, Katy Mora MD    glucagon (GLUCAGEN) injection 1 mg, 1 mg, IntraMUSCular, PRN, Katy Mora MD    dextrose 10 % infusion 0-250 mL, 0-250 mL, IntraVENous, PRN, Katy Mora MD    nitroglycerin (NITROSTAT) tablet 0.4 mg, 0.4 mg, SubLINGual, PRN, Ángel Edwards MD, 0.4 mg at 06/28/22 2322    atorvastatin (LIPITOR) tablet 20 mg, 20 mg, Oral, QHS, Ángel Edwards MD, 20 mg at 07/13/22 2101    vitamin L-N-L-lutein-minerals (OCUVITE) tablet 1 Tablet, 1 Tablet, Oral, DAILY, Ángel Edwards MD, 1 Tablet at 07/14/22 1011    sodium chloride (NS) flush 5-40 mL, 5-40 mL, IntraVENous, Q8H, Ángel Edwards MD, 10 mL at 07/21/22 1308    sodium chloride (NS) flush 5-40 mL, 5-40 mL, IntraVENous, PRN, Ángel Edwards MD    acetaminophen (TYLENOL) tablet 650 mg, 650 mg, Oral, Q6H PRN, 650 mg at 07/02/22 2246 **OR** acetaminophen (TYLENOL) suppository 650 mg, 650 mg, Rectal, Q6H PRN, Ángel Edwards MD    polyethylene glycol (MIRALAX) packet 17 g, 17 g, Oral, DAILY PRN, Ángel Edwards MD, 17 g at 07/17/22 0132    ondansetron (ZOFRAN ODT) tablet 4 mg, 4 mg, Oral, Q8H PRN **OR** ondansetron (ZOFRAN) injection 4 mg, 4 mg, IntraVENous, Q6H PRN, Ángel Edwards MD, 4 mg at 07/19/22 1800     Procedures: see electronic medical records for all procedures/Xrays and details which were not copied into this note but were reviewed prior to creation of Plan. Reviewed most current lab test results and cultures  YES  Reviewed most current radiology test results   YES  Review and summation of old records today    NO  Reviewed patient's current orders and MAR    YES  PMH/SH reviewed - no change compared to H&P  ________________________________________________________________________  Care Plan discussed with:    Comments   Patient x    Family  x    RN x    Care Manager x    Consultant                       x Multidiciplinary team rounds were held today with , nursing, pharmacist and clinical coordinator. Patient's plan of care was discussed; medications were reviewed and discharge planning was addressed.      ________________________________________________________________________  Total NON critical care TIME:    33 Minutes    Total CRITICAL CARE TIME Spent:   Minutes non procedure based      Comments   >50% of visit spent in counseling and coordination of care x     This includes time during multidisciplinary rounds if indicated above   ________________________________________________________________________  Sherwin Chavez MD

## 2022-07-21 NOTE — PROGRESS NOTES
SURGERY PROGRESS NOTE      Admit Date: 2022    POD 8 Days Post-Op    Procedure: Procedure(s): INFUSION CATHETER INSERTION      Subjective:     Patient has no new complaints. Tolerating PO better. Passing flatus and having BMs. Objective:     Visit Vitals  BP (!) 116/43 (BP 1 Location: Left upper arm, BP Patient Position: Lying; At rest)   Pulse 65   Temp 98 °F (36.7 °C)   Resp 24   Ht 6' 4\" (1.93 m)   Wt 129.8 kg (286 lb 2.5 oz)   SpO2 97%   BMI 34.83 kg/m²        Temp (24hrs), Av.3 °F (36.8 °C), Min:97.8 °F (36.6 °C), Max:98.8 °F (37.1 °C)      701 -  190  In: 240 [P.O.:240]  Out: 700 [Urine:700]  1901 -  0700  In: 8113 [P.O.:200; I.V.:1397]  Out: 2200 [Urine:2100; Drains:100]    Physical Exam:    General:  alert, cooperative, no distress, appears stated age   Abdomen: soft, bowel sounds active, non-tender   Incision:   Wound clean, granulating. Wound vac applied with wound care nurse                   Lab Results   Component Value Date/Time    WBC 8.7 2022 02:36 AM    HGB 7.9 (L) 2022 02:36 AM    HCT 25.2 (L) 2022 02:36 AM    PLATELET 533  02:36 AM    MCV 96.9 2022 02:36 AM     Lab Results   Component Value Date/Time    GFR est non-AA >60 2022 01:45 AM    GFR est AA >60 2022 01:45 AM    Creatinine 0.99 2022 01:45 AM    BUN 16 2022 01:45 AM    Sodium 139 2022 01:45 AM    Potassium 4.0 2022 01:45 AM    Chloride 109 (H) 2022 01:45 AM    CO2 23 2022 01:45 AM    Magnesium 1.8 2022 01:45 AM    Phosphorus 2.8 2022 01:45 AM       Assessment:     Active Problems:     Morbid obesity ([address] (2013)      Thrombocytopenia ([address] (2022)      GI bleed (2022)      UGO (acute kidney injury) ([address] (2022)      Atrial fibrillation ([address] (7/3/2022)      Elevated troponin (7/3/2022)      Ileus ([address] (2022)      Intestinal adhesions with partial obstruction ([address] (2022) Ventral hernia (7/8/2022)      Intestinal perforation ([address] (7/8/2022)    Doing well. Ready to transition to lower level of care from surgery perspective.     Plan:   Continue present treatment   Dispo plannong

## 2022-07-21 NOTE — PROGRESS NOTES
Progress Note      7/21/2022 9:12 AM  NAME: Amy Hensley   MRN:  [id number]   Admit Diagnosis: Thrombocytopenia ([facility] [D69.6]  GI bleed [K92.2]  UGO (acute kidney injury) ([facility] [address] [N17.9]      Problem List:     Idiopathic thrombocytopenia  Anemia  Rectal bleeding  Lactic acidosis  NSTEMI  Paroxysmal atrial fibrillation  Remote PE and DVT (separate events)  CAD w/ mild diffuse disease w/ patent stent in the RCA 7/17 cath. XOL  Morbid obesity  DM  Former smoker     Assessment/Plan:   PLT recovered    MPI w/ EF 55% and no definite ischemia. Recurrent afib 7/10. Back on dilt gtt. Converted to sinus. Recurrent Afib 7/12. Converted to sinus. Continue to require PRBC    Hematuria. Nausea, belching, reflux an issue. Continue oral amio; changed to 200mg daily  Continue amio gtt until discharge  Off ASA  Continue eliquis  Continue statin  Will need to be considered for a Watchman as an outpt  Cardiology will be available as needed  Can see me in 2 weeks after discharge         [x]       High complexity decision making was performed in this patient at high risk for decompensation with multiple organ involvement. Subjective:     Amy Hensley denies chest pain, dyspnea. Discussed with RN events overnight. Review of Systems:    Symptom Y/N Comments  Symptom Y/N Comments   Fever/Chills N   Chest Pain N    Poor Appetite N   Edema N    Cough N   Abdominal Pain N    Sputum N   Joint Pain N    SOB/SHEARER N   Pruritis/Rash N    Nausea/vomit N   Tolerating PT/OT Y    Diarrhea N   Tolerating Diet Y    Constipation N   Other       Could NOT obtain due to:      Objective:      Physical Exam:    Last 24hrs VS reviewed since prior progress note. Most recent are:    Visit Vitals  BP (!) 116/43 (BP 1 Location: Left upper arm, BP Patient Position: Lying; At rest)   Pulse 65   Temp 98 °F (36.7 °C)   Resp 24   Ht 6' 4\" (1.93 m)   Wt 129.8 kg (286 lb 2.5 oz)   SpO2 97%   BMI 34.83 kg/m² Intake/Output Summary (Last 24 hours) at 7/21/2022 1129  Last data filed at 7/21/2022 0948  Gross per 24 hour   Intake 892 ml   Output 1825 ml   Net -933 ml          General Appearance: Well developed, well nourished, alert & oriented x 3,    no acute distress. Ears/Nose/Mouth/Throat: Hearing grossly normal.  Neck: Supple. Chest: Lungs clear to auscultation bilaterally. Cardiovascular: Regular rate and rhythm, S1S2 normal, no murmur. Abdomen: Soft, non-tender, bowel sounds are active. Extremities: No edema bilaterally. Skin: Warm and dry. []         Post-cath site without hematoma, bruit, tenderness, or thrill. Distal pulses intact. PMH/SH reviewed - no change compared to H&P    Data Review    Telemetry:     EKG:   [x]  No new EKG for review    Lab Data Personally Reviewed:    No results for input(s): WBC, HGB, HCT, PLT, HGBEXT, HCTEXT, PLTEXT, HGBEXT, HCTEXT, PLTEXT in the last 72 hours. No results for input(s): INR, PTP, APTT, INREXT, INREXT in the last 72 hours. Recent Labs     07/21/22  0145 07/20/22  0444 07/19/22  0230    136 133*   K 4.0 3.8 4.0   * 108 105   CO2 23 22 23   BUN 16 16 14   CREA 0.99 0.87 0.96   * 170* 232*   CA 8.1* 7.8* 8.3*   MG 1.8 1.9 2.0       No results for input(s): CPK, CKNDX, TROIQ in the last 72 hours. No lab exists for component: CPKMB  Lab Results   Component Value Date/Time    Cholesterol, total 133 06/07/2013 02:40 PM    HDL Cholesterol 43 06/07/2013 02:40 PM    LDL, calculated 73.4 06/07/2013 02:40 PM    Triglyceride 83 06/07/2013 02:40 PM    CHOL/HDL Ratio 3.1 06/07/2013 02:40 PM       No results for input(s): AP, TBIL, TP, ALB, GLOB, GGT, AML, LPSE in the last 72 hours. No lab exists for component: SGOT, GPT, AMYP, HLPSE  No results for input(s): PH, PCO2, PO2 in the last 72 hours.     Medications Personally Reviewed:    Current Facility-Administered Medications   Medication Dose Route Frequency    [START ON 7/22/2022] pantoprazole (PROTONIX) tablet 40 mg  40 mg Oral ACB    [START ON 7/22/2022] amiodarone (CORDARONE) tablet 200 mg  200 mg Oral DAILY    furosemide (LASIX) tablet 20 mg  20 mg Oral ACB&D    potassium chloride (K-DUR, KLOR-CON M20) SR tablet 20 mEq  20 mEq Oral DAILY    insulin lispro (HUMALOG) injection   SubCUTAneous AC&HS    0.9% sodium chloride infusion 250 mL  250 mL IntraVENous PRN    HYDROmorphone (DILAUDID) injection 0.5-1 mg  0.5-1 mg IntraVENous Q2H PRN    HYDROcodone-acetaminophen (NORCO) 5-325 mg per tablet 1-2 Tablet  1-2 Tablet Oral Q4H PRN    alcohol 62% (NOZIN) nasal  1 Ampule  1 Ampule Topical Q12H    piperacillin-tazobactam (ZOSYN) 3.375 g in 0.9% sodium chloride (MBP/ADV) 100 mL MBP  3.375 g IntraVENous Q8H    apixaban (ELIQUIS) tablet 5 mg  5 mg Oral BID    phenol throat spray (CHLORASEPTIC) 1 Spray  1 Spray Oral PRN    calcium carbonate (TUMS) chewable tablet 400 mg [elemental]  400 mg Oral TID PRN    glucose chewable tablet 16 g  4 Tablet Oral PRN    glucagon (GLUCAGEN) injection 1 mg  1 mg IntraMUSCular PRN    dextrose 10 % infusion 0-250 mL  0-250 mL IntraVENous PRN    nitroglycerin (NITROSTAT) tablet 0.4 mg  0.4 mg SubLINGual PRN    atorvastatin (LIPITOR) tablet 20 mg  20 mg Oral QHS    vitamin B-E-E-lutein-minerals (OCUVITE) tablet 1 Tablet  1 Tablet Oral DAILY    sodium chloride (NS) flush 5-40 mL  5-40 mL IntraVENous Q8H    sodium chloride (NS) flush 5-40 mL  5-40 mL IntraVENous PRN    acetaminophen (TYLENOL) tablet 650 mg  650 mg Oral Q6H PRN    Or    acetaminophen (TYLENOL) suppository 650 mg  650 mg Rectal Q6H PRN    polyethylene glycol (MIRALAX) packet 17 g  17 g Oral DAILY PRN    ondansetron (ZOFRAN ODT) tablet 4 mg  4 mg Oral Q8H PRN    Or    ondansetron (ZOFRAN) injection 4 mg  4 mg IntraVENous Q6H PRN         Pauline Lancaster III, DO

## 2022-07-21 NOTE — PROGRESS NOTES
0730 Received report from Kady Daily Dr at bedside. Pt is watching tv and resting comfortably. 0900 Pt had BM.    0930 Pt given dilaudid in preparation for wound vac administration. 1000 Wound Vac applied no issues. 1200 Pt up in the chair. 1650 [place] removed. PIV placed. 1900 Report given to [facility].

## 2022-07-21 NOTE — PROGRESS NOTES
Problem: Self Care Deficits Care Plan (Adult)  Goal: *Acute Goals and Plan of Care (Insert Text)  Description:  FUNCTIONAL STATUS PRIOR TO ADMISSION: Patient was modified independent using a rollator for functional mobility. HOME SUPPORT PRIOR TO ADMISSION: The patient lived with wife at [address] but did not require assist. Assist with IADLs provided by [facility] as needed. Occupational Therapy Goals  Initiated 7/3/2022;  Weekly Reassessment 7/11/2022  1. Patient will perform lower body dressing with modified independence within 7 day(s). 2.  Patient will perform bathing with modified independence within 7 day(s). 3.  Patient will perform simple home management with modified independence within 7 day(s). 4.  Patient will perform toilet transfers with modified independence within 7 day(s). 5.  Patient will perform all aspects of toileting with modified independence within 7 day(s). 6.  Patient will utilize energy conservation techniques during functional activities with verbal cues within 7 day(s). Weekly assess, 7/19/2022   1. Patient will perform lower body dressing with modified independence within 7 day(s). 2.  Patient will perform bathing nadege area/ buttocks standing with CGA within 7 day(s). 3.  Patient will perform LB dressing with use of AE,  with min assist  within 7 day(s). 4.  Patient will perform toilet transfers with modified independence within 7 day(s). 5.  Patient will perform all aspects of toileting with min assist of 1 within 7 day(s). 6.  Patient will utilize energy conservation techniques during functional activities with verbal cues within 7 day(s). Outcome: Progressing Towards Goal  OCCUPATIONAL THERAPY TREATMENT  Patient: Fredo Gerard (75 y.o. male)  Date: 7/21/2022  Diagnosis:  Thrombocytopenia ([facility] [D69.6]  GI bleed [K92.2]  UGO (acute kidney injury) ([facility] [N17.9] <principal problem not specified>  Procedure(s) (LRB):  INFUSION CATHETER INSERTION (N/A) 8 Days Post-Op  Precautions:    Chart, occupational therapy assessment, plan of care, and goals were reviewed. ASSESSMENT  Patient continues with skilled OT services and is progressing towards goals, limited from functional baseline by generalized weakness, decreased activity tolerance, and impaired functional mobility. Pt received seated in chair, agreeable to participate. Using RW pt stood with min A, ambulated to bathroom and transferred to toilet with CGA and cueing for transfer technique and RW management. He completed toilet hygiene in sitting with min A. Attempted to perform grooming ADLs at sink, however pt reported he was too fatigued and requested to return to chair. After returning to chair pt completed grooming ADLs with setup. He remained in chair at end of session with VSS on RA. Patient continues to benefit from skilled intervention to address the above impairments. Continue to recommend rehab at discharge. Current Level of Function Impacting Discharge (ADLs): CGA-min A transfers, setup-total A ADLs    Other factors to consider for discharge: fall risk, mod I baseline         PLAN :  Patient continues to benefit from skilled intervention to address the above impairments. Continue treatment per established plan of care to address goals. Recommendation for discharge: (in order for the patient to meet his/her long term goals)  Therapy up to 5 days/week in SNF setting    This discharge recommendation:  Has been made in collaboration with the attending provider and/or case management    IF patient discharges home will need the following DME: TBD       SUBJECTIVE:   Patient stated I think I need to go sit down.     OBJECTIVE DATA SUMMARY:   Cognitive/Behavioral Status:  Neurologic State: Alert  Orientation Level: Oriented X4  Cognition: Appropriate decision making             Functional Mobility and Transfers for ADLs:  Bed Mobility:   Received OOB in chair    Transfers:  Sit to Stand: Contact guard assistance; Adaptive equipment; Additional time (RW)  Functional Transfers  Toilet Transfer : Contact guard assistance; Adaptive equipment; Additional time  Cues: Tactile cues provided;Verbal cues provided       Balance:  Sitting: Intact  Standing: Impaired; With support (RW)  Standing - Static: Good;Constant support  Standing - Dynamic : Fair;Constant support    ADL Intervention:       Grooming  Position Performed: Seated in chair  Washing Face: Set-up  Washing Hands: Set-up  Brushing Teeth: Set-up     Toileting  Bladder Hygiene: Stand-by assistance  Bowel Hygiene: Minimum assistance (sitting)     Pain:  Pt reported no pain during session    Activity Tolerance:   Fair and requires rest breaks    After treatment patient left in no apparent distress:   Sitting in chair, Call bell within reach, and Caregiver / family present    COMMUNICATION/COLLABORATION:   The patients plan of care was discussed with: Physical therapist and Registered nurse.      Nikky Roper OT  Time Calculation: 33 mins

## 2022-07-22 LAB
ANION GAP SERPL CALC-SCNC: 6 MMOL/L (ref 5–15)
BUN SERPL-MCNC: 14 MG/DL (ref 6–20)
BUN/CREAT SERPL: 11 (ref 12–20)
CALCIUM SERPL-MCNC: 8.2 MG/DL (ref 8.5–10.1)
CHLORIDE SERPL-SCNC: 107 MMOL/L (ref 97–108)
CO2 SERPL-SCNC: 23 MMOL/L (ref 21–32)
COVID-19 RAPID TEST, COVR: NOT DETECTED
CREAT SERPL-MCNC: 1.24 MG/DL (ref 0.7–1.3)
FLUAV RNA SPEC QL NAA+PROBE: NOT DETECTED
FLUBV RNA SPEC QL NAA+PROBE: NOT DETECTED
GLUCOSE BLD STRIP.AUTO-MCNC: 122 MG/DL (ref 65–117)
GLUCOSE BLD STRIP.AUTO-MCNC: 138 MG/DL (ref 65–117)
GLUCOSE BLD STRIP.AUTO-MCNC: 149 MG/DL (ref 65–117)
GLUCOSE BLD STRIP.AUTO-MCNC: 155 MG/DL (ref 65–117)
GLUCOSE SERPL-MCNC: 118 MG/DL (ref 65–100)
MAGNESIUM SERPL-MCNC: 1.8 MG/DL (ref 1.6–2.4)
PHOSPHATE SERPL-MCNC: 3.6 MG/DL (ref 2.6–4.7)
POTASSIUM SERPL-SCNC: 4 MMOL/L (ref 3.5–5.1)
SARS-COV-2, COV2: NORMAL
SARS-COV-2, COV2: NOT DETECTED
SERVICE CMNT-IMP: ABNORMAL
SODIUM SERPL-SCNC: 136 MMOL/L (ref 136–145)
SOURCE, COVRS: NORMAL

## 2022-07-22 PROCEDURE — 80048 BASIC METABOLIC PNL TOTAL CA: CPT

## 2022-07-22 PROCEDURE — 84100 ASSAY OF PHOSPHORUS: CPT

## 2022-07-22 PROCEDURE — 74011000250 HC RX REV CODE- 250: Performed by: SURGERY

## 2022-07-22 PROCEDURE — 74011250637 HC RX REV CODE- 250/637: Performed by: SURGERY

## 2022-07-22 PROCEDURE — 83735 ASSAY OF MAGNESIUM: CPT

## 2022-07-22 PROCEDURE — 65270000046 HC RM TELEMETRY

## 2022-07-22 PROCEDURE — 74011250637 HC RX REV CODE- 250/637: Performed by: INTERNAL MEDICINE

## 2022-07-22 PROCEDURE — 82962 GLUCOSE BLOOD TEST: CPT

## 2022-07-22 PROCEDURE — 74011250636 HC RX REV CODE- 250/636: Performed by: SURGERY

## 2022-07-22 PROCEDURE — 74011250637 HC RX REV CODE- 250/637: Performed by: GENERAL ACUTE CARE HOSPITAL

## 2022-07-22 PROCEDURE — 87636 SARSCOV2 & INF A&B AMP PRB: CPT

## 2022-07-22 PROCEDURE — 74011000258 HC RX REV CODE- 258: Performed by: SURGERY

## 2022-07-22 PROCEDURE — 87635 SARS-COV-2 COVID-19 AMP PRB: CPT

## 2022-07-22 PROCEDURE — 97535 SELF CARE MNGMENT TRAINING: CPT

## 2022-07-22 PROCEDURE — 74011636637 HC RX REV CODE- 636/637: Performed by: STUDENT IN AN ORGANIZED HEALTH CARE EDUCATION/TRAINING PROGRAM

## 2022-07-22 PROCEDURE — 36415 COLL VENOUS BLD VENIPUNCTURE: CPT

## 2022-07-22 RX ADMIN — Medication 1 AMPULE: at 09:44

## 2022-07-22 RX ADMIN — FERROUS SULFATE TAB 325 MG (65 MG ELEMENTAL FE) 325 MG: 325 (65 FE) TAB at 08:28

## 2022-07-22 RX ADMIN — APIXABAN 5 MG: 5 TABLET, FILM COATED ORAL at 05:20

## 2022-07-22 RX ADMIN — FUROSEMIDE 20 MG: 20 TABLET ORAL at 07:25

## 2022-07-22 RX ADMIN — I-VITE, TAB 1000-60-2MG (60/BT) 1 TABLET: TAB at 09:44

## 2022-07-22 RX ADMIN — POTASSIUM CHLORIDE 20 MEQ: 20 TABLET, EXTENDED RELEASE ORAL at 08:28

## 2022-07-22 RX ADMIN — Medication 3 UNITS: at 12:12

## 2022-07-22 RX ADMIN — SODIUM CHLORIDE, PRESERVATIVE FREE 10 ML: 5 INJECTION INTRAVENOUS at 14:35

## 2022-07-22 RX ADMIN — ATORVASTATIN CALCIUM 20 MG: 20 TABLET, FILM COATED ORAL at 21:33

## 2022-07-22 RX ADMIN — FUROSEMIDE 20 MG: 20 TABLET ORAL at 17:29

## 2022-07-22 RX ADMIN — PANTOPRAZOLE SODIUM 40 MG: 40 TABLET, DELAYED RELEASE ORAL at 07:25

## 2022-07-22 RX ADMIN — SODIUM CHLORIDE, PRESERVATIVE FREE 10 ML: 5 INJECTION INTRAVENOUS at 21:33

## 2022-07-22 RX ADMIN — AMIODARONE HYDROCHLORIDE 200 MG: 200 TABLET ORAL at 08:28

## 2022-07-22 RX ADMIN — FERROUS SULFATE TAB 325 MG (65 MG ELEMENTAL FE) 325 MG: 325 (65 FE) TAB at 17:29

## 2022-07-22 RX ADMIN — APIXABAN 5 MG: 5 TABLET, FILM COATED ORAL at 17:29

## 2022-07-22 RX ADMIN — PIPERACILLIN AND TAZOBACTAM 3.38 G: 3; .375 INJECTION, POWDER, LYOPHILIZED, FOR SOLUTION INTRAVENOUS at 14:35

## 2022-07-22 RX ADMIN — Medication 1 AMPULE: at 21:33

## 2022-07-22 RX ADMIN — PIPERACILLIN AND TAZOBACTAM 3.38 G: 3; .375 INJECTION, POWDER, LYOPHILIZED, FOR SOLUTION INTRAVENOUS at 05:20

## 2022-07-22 RX ADMIN — SODIUM CHLORIDE, PRESERVATIVE FREE 10 ML: 5 INJECTION INTRAVENOUS at 05:22

## 2022-07-22 NOTE — PROGRESS NOTES
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 Received 3 doses IVIG, did not tolerate final IVIG infusion despite. PLT normalized  Hemo/onc on board     SBO, improving  Ileal perforation and abscess/adhesions  Repeat CT a/p on  showed partial SBO pattern. CT enterography showed distal partial SBO  Went to OR on  found to have SMALL BOWEL OBSTRUCTION  SECONDARY TO ILEAL PERFORATION, AND ABSCESS AND ADHESION AND ABDOMINAL HERNIA   CT abdomen pelvis repeated and it was unremarkable  Off NGT  Diet advance again to regular diet   Off TPN on   Follow surgery recommendation  Completed 14 days of Zosyn  Wound vac placed     GI bleed  Was to due severe ITP  S/p blood transfusion  Continue with protonix 40 daily (can switch to famotidine on discharge)  GI on board, holding off on EGD for now, conservative management.   Start iron pills   7/15 again hemoglobin 6.8 but there is no blood in the stool has no hematuria we will transfuse 1 unit of blood     Paroxysmal atrial fibrillation w RVR  HR is controlled now  Cardiac stress test neg  Cont Eliquis   amiodarone dose adjusted to 200mg daily  F/up with Dr Argenis Logan in 2 weeks      Gross hematuria, improving  Patient with recurrent gross hematuria, Lopez inserted for urinary retention  Eliquis resumed, hemoglobin is stable  Lopez removed and pt has no further issues      History of DVT and PE  B/l LE edema, chronic   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 car trips x2  PE of unknown origin  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 Code status: Full  Prophylaxis: Eliquis  Recommended Disposition: [facility]/Rehab. Anticipated Discharge Date:  stable for DC to rehab        Subjective:     Discussed with RN events overnight. Good appetite  No abd pain  No N/V  Afebrile   No SOB  Wound vac on    Review of Systems:  Symptom Y/N Comments  Symptom Y/N Comments   Fever/Chills    Chest Pain     Poor Appetite    Edema     Cough    Abdominal Pain     Sputum    Joint Pain     SOB/SHEARER    Pruritis/Rash     Nausea/vomit    Tolerating PT/OT     Diarrhea    Tolerating Diet     Constipation    Other       PO intake: No data found. Wt Readings from Last 10 Encounters:   07/21/22 129.8 kg (286 lb 2.5 oz)   08/09/21 147.8 kg (325 lb 13.4 oz)   10/13/19 139.7 kg (308 lb)   11/27/18 138.5 kg (305 lb 5.4 oz)   10/28/18 137.9 kg (304 lb)   07/17/17 141.5 kg (312 lb)   02/01/14 138.8 kg (306 lb)   01/20/14 140.6 kg (310 lb)   01/17/14 138.3 kg (305 lb)   01/13/14 138.3 kg (305 lb)       Objective:     VITALS:   Last 24hrs VS reviewed since prior progress note.  Most recent are:  Patient Vitals for the past 24 hrs:   Temp Pulse Resp BP SpO2   07/22/22 1200 -- 63 24 -- 94 %   07/22/22 1159 -- 63 21 -- 97 %   07/22/22 1033 98.7 °F (37.1 °C) 61 16 (!) 118/39 94 %   07/22/22 0802 98 °F (36.7 °C) 70 18 (!) 125/59 93 %   07/22/22 0725 -- -- -- (!) 103/40 --   07/22/22 0336 99.4 °F (37.4 °C) 66 22 (!) 118/44 93 %   07/21/22 2300 98.8 °F (37.1 °C) 63 18 (!) 110/47 96 %   07/21/22 1912 98.9 °F (37.2 °C) 67 19 (!) 120/45 93 %   07/21/22 1711 -- 94 25 (!) 162/64 --   07/21/22 1600 98 °F (36.7 °C) 69 21 -- 93 %         Intake/Output Summary (Last 24 hours) at 7/22/2022 1443  Last data filed at 7/22/2022 1200  Gross per 24 hour   Intake 400 ml   Output 1725 ml   Net -1325 ml          I had a face to face encounter, and independently examined this patient as outlined below:    PHYSICAL EXAM:  General:    No distress     HEENT: Atraumatic, anicteric sclerae, pink conjunctivae, MMM  Neck:  Supple, symmetrical  Lungs:   CTA. No Wheezing/Rhonchi. No rales. No tenderness. No Accessory muscle use. Heart:   Irregularly irregular rhythm, No murmur. No JVD   GI/:            Soft. NT. ND. BS normal. Midline wound vac. Extremities: +2 LE edema. No cyanosis. No clubbing. Skin:     Not pale. Not Jaundiced. No rashes   Psych:  Good insight. Not depressed. Not anxious or agitated. Neurologic: Alert and oriented X 4. EOMs intact. No facial asymmetry. No slurred speech. Symmetrical strength, Sensation grossly intact. Labs     I reviewed today's most current labs and imaging studies. Pertinent labs include:  No results for input(s): WBC, HGB, HCT, PLT, HGBEXT, HCTEXT, PLTEXT, HGBEXT, HCTEXT, PLTEXT in the last 72 hours. Recent Labs     07/22/22  0530 07/21/22  0145 07/20/22  0444    139 136   K 4.0 4.0 3.8    109* 108   CO2 23 23 22   * 109* 170*   BUN 14 16 16   CREA 1.24 0.99 0.87   CA 8.2* 8.1* 7.8*   MG 1.8 1.8 1.9   PHOS 3.6 2.8 2.8       XR ABD (KUB)    Result Date: 7/14/2022  1. A gastric tube likely terminates in the stomach     XR ABD FLAT/ ERECT    Result Date: 7/15/2022  Persistent diffuse small bowel distention. XR ABD FLAT/ ERECT    Result Date: 7/14/2022  Moderate distention of several loops of upper and mid abdominal small bowel without colonic distention demonstrated. CT ABD PELV WO CONT    Result Date: 7/5/2022  1. Small bowel obstruction with a transition point in the right lower quadrant in the terminal ileum. The terminal ileum now appears more narrowed/collapsed than on the prior study. Oral contrast has made it to the colon however indicating this is likely a partial obstruction. 2. Fat-containing and local hernia containing fluid as previously seen.     CT ABD PELV W CONT    Result Date: 7/18/2022  Distended small bowel. Distended stomach. Normal size colon No fluid collections. CT ENTEROGRAPHY W CONT    Result Date: 7/7/2022  Distal partial small bowel obstruction as above. .    XR CHEST PORT    Result Date: 7/13/2022  Satisfactory CVL placement. XR CHEST PORT    Result Date: 7/9/2022  Left basilar atelectasis. Nasogastric tube as above. NUCLEAR CARDIAC STRESS TEST    Result Date: 7/8/2022  : 1. No definite evidence of ischemia. There is slight diminished activity in the inferior wall which could represent nontransmural infarct versus attenuation artifact. 2. LVEF equals 55%. No regional wall motion abnormalities are identified. No results found. 08/07/21    ECHO ADULT COMPLETE 08/10/2021 8/10/2021    Interpretation Summary  · LV: Estimated LVEF is 60 - 65%. Visually measured ejection fraction. Normal cavity size and systolic function (ejection fraction normal). Upper normal wall thickness. Wall motion: normal.    Signed by: Elizabet Martinez III, DO on 8/10/2021  7:18 AM     All Micro Results       Procedure Component Value Units Date/Time    COVID-19 WITH INFLUENZA A/B [[id number]] Collected: 07/22/22 1108    Order Status: Completed Specimen: Nasopharyngeal Updated: 07/22/22 1225     SARS-CoV-2 by PCR Not detected        Comment: Not Detected results do not preclude SARS-CoV-2 infection and should not be used as the sole basis for patient management decisions. Results must be combined with clinical observations, patient history, and epidemiological information. Influenza A by PCR Not detected        Influenza B by PCR Not detected        Comment: Testing was performed using [employer] SARS-CoV-2 and Influenza A/B nucleic acid assay.   This test is a multiplex Real-Time Reverse Transcriptase Polymerase Chain Reaction (RT-PCR) based in vitro diagnostic test intended for the qualitative detection of nucleic acids from SARS-CoV-2, Influenza A, and Influenza B in nasopharyngeal and nasal swab specimens for use under the FDA's Emergency Use Authorization (EUA) only. Fact sheet for Patients: FindDrives.pl  Fact sheet for Healthcare Providers: FindDrives.pl         COVID-19 RAPID TEST [[id number]] Collected: 07/22/22 1108    Order Status: Completed Specimen: Nasopharyngeal Updated: 07/22/22 1204     Specimen source Nasopharyngeal        COVID-19 rapid test Not detected        Comment: Rapid Abbott ID Now       Rapid NAAT:  The specimen is NEGATIVE for SARS-CoV-2, the novel coronavirus associated with COVID-19. Negative results should be treated as presumptive and, if inconsistent with clinical signs and symptoms or necessary for patient management, should be tested with an alternative molecular assay. Negative results do not preclude SARS-CoV-2 infection and should not be used as the sole basis for patient management decisions. This test has been authorized by the FDA under an Emergency Use Authorization (EUA) for use by authorized laboratories. Fact sheet for Healthcare Providers: Monsciergedate.co.nz  Fact sheet for Patients: Monsciergedate.co.nz       Methodology: Isothermal Nucleic Acid Amplification         COVID-19 RAPID TEST [[id number]] Collected: 07/22/22 1036    Order Status: Canceled     COVID-19 RAPID TEST [[id number]] Collected: 07/21/22 1652    Order Status: Completed Specimen: Nasopharyngeal Updated: 07/21/22 1720     Specimen source Nasopharyngeal        COVID-19 rapid test Not detected        Comment: Rapid Abbott ID Now       Rapid NAAT:  The specimen is NEGATIVE for SARS-CoV-2, the novel coronavirus associated with COVID-19. Negative results should be treated as presumptive and, if inconsistent with clinical signs and symptoms or necessary for patient management, should be tested with an alternative molecular assay.   Negative results do not preclude SARS-CoV-2 infection and should not be used as the sole basis for patient management decisions. This test has been authorized by the FDA under an Emergency Use Authorization (EUA) for use by authorized laboratories.    Fact sheet for Healthcare Providers: ConventionUpdate.co.nz  Fact sheet for Patients: ConventionUpdate.co.nz       Methodology: Isothermal Nucleic Acid Amplification         CULTURE, RESPIRATORY/SPUTUM/BRONCH [place] [[id number]] Collected: 06/29/22 1412    Order Status: Completed Specimen: Sputum Updated: 07/01/22 0945     Special Requests: NO SPECIAL REQUESTS        GRAM STAIN OCCASIONAL WBCS SEEN               RARE EPITHELIAL CELLS SEEN                  FEW GRAM POSITIVE COCCI IN PAIRS                  OCCASIONAL GRAM NEGATIVE RODS           Culture result:       HEAVY NORMAL RESPIRATORY JAVIER          RESPIRATORY VIRUS PANEL W/COVID-19, PCR [[id number]] Collected: 06/29/22 0906    Order Status: Completed Specimen: Nasopharyngeal Updated: 06/29/22 1556     Adenovirus Not detected        Coronavirus 229E Not detected        Coronavirus HKU1 Not detected        Coronavirus CVNL63 Not detected        Coronavirus OC43 Not detected        SARS-CoV-2, PCR Not detected        Metapneumovirus Not detected        Rhinovirus and Enterovirus Not detected        Influenza A Not detected        Influenza A, subtype H1 Not detected        Influenza A, subtype H3 Not detected        INFLUENZA A H1N1 PCR Not detected        Influenza B Not detected        Parainfluenza 1 Not detected        Parainfluenza 2 Not detected        Parainfluenza 3 Not detected        Parainfluenza virus 4 Not detected        RSV by PCR Not detected        B. parapertussis, PCR Not detected        Bordetella pertussis - PCR Not detected        Chlamydophila pneumoniae DNA, QL, PCR Not detected        Mycoplasma pneumoniae DNA, QL, PCR Not detected       COVID-19 RAPID TEST [[id number]] Collected: 06/29/22 0954    Order Status: Completed Specimen: Nasopharyngeal Updated: 06/29/22 1042     Specimen source Nasopharyngeal        COVID-19 rapid test Not detected        Comment: Rapid Abbott ID Now       Rapid NAAT:  The specimen is NEGATIVE for SARS-CoV-2, the novel coronavirus associated with COVID-19. Negative results should be treated as presumptive and, if inconsistent with clinical signs and symptoms or necessary for patient management, should be tested with an alternative molecular assay. Negative results do not preclude SARS-CoV-2 infection and should not be used as the sole basis for patient management decisions. This test has been authorized by the FDA under an Emergency Use Authorization (EUA) for use by authorized laboratories.    Fact sheet for Healthcare Providers: ConventionUpdate.co.nz  Fact sheet for Patients: ConventionUpdate.co.nz       Methodology: Isothermal Nucleic Acid Amplification                   Current Medications:     Current Facility-Administered Medications:     pantoprazole (PROTONIX) tablet 40 mg, 40 mg, Oral, ACB, Latonia Pierce MD, 40 mg at 07/22/22 0725    amiodarone (CORDARONE) tablet 200 mg, 200 mg, Oral, DAILY, Huang Alvarez III, DO, 200 mg at 07/22/22 8809    ergocalciferol capsule 50,000 Units, 50,000 Units, Oral, Q7D, Karina Pierce MD, 50,000 Units at 07/21/22 1643    ferrous sulfate tablet 325 mg, 1 Tablet, Oral, BID WITH MEALS, Latonia Pierce MD, 325 mg at 07/22/22 6215    furosemide (LASIX) tablet 20 mg, 20 mg, Oral, ACB&D, Latonia Pierce MD, 20 mg at 07/22/22 0725    potassium chloride (K-DUR, KLOR-CON M20) SR tablet 20 mEq, 20 mEq, Oral, DAILY, Latonia Pierce MD, 20 mEq at 07/22/22 0828    insulin lispro (HUMALOG) injection, , SubCUTAneous, AC&HS, Zoey Stearns MD, 3 Units at 07/22/22 1212    0.9% sodium chloride infusion 250 mL, 250 mL, IntraVENous, PRN, Melida Squires MD    HYDROmorphone (DILAUDID) injection 0.5-1 mg, 0.5-1 mg, IntraVENous, Q2H PRN, Ha Quach MD, 0.5 mg at 07/21/22 0942    HYDROcodone-acetaminophen (NORCO) 5-325 mg per tablet 1-2 Tablet, 1-2 Tablet, Oral, Q4H PRN, Ha Quach MD, 1 Tablet at 07/12/22 0620    alcohol 62% (NOZIN) nasal  1 Ampule, 1 Ampule, Topical, Q12H, Ha Quach MD, 1 Ampule at 07/22/22 0944    [COMPLETED] piperacillin-tazobactam (ZOSYN) 4.5 g in 0.9% sodium chloride (MBP/ADV) 100 mL MBP, 4.5 g, IntraVENous, NOW, Stopped at 07/08/22 1548 **FOLLOWED BY** piperacillin-tazobactam (ZOSYN) 3.375 g in 0.9% sodium chloride (MBP/ADV) 100 mL MBP, 3.375 g, IntraVENous, Q8H, Ha Quach MD, Last Rate: 25 mL/hr at 07/22/22 1435, 3.375 g at 07/22/22 1435    apixaban (ELIQUIS) tablet 5 mg, 5 mg, Oral, BID, Ha Quach MD, 5 mg at 07/22/22 0520    phenol throat spray (CHLORASEPTIC) 1 Spray, 1 Spray, Oral, PRN, Ha Quach MD    calcium carbonate (TUMS) chewable tablet 400 mg [elemental], 400 mg, Oral, TID PRN, Ha Quach MD, 400 mg at 07/01/22 0133    glucose chewable tablet 16 g, 4 Tablet, Oral, PRN, Ha Quach MD    glucagon (GLUCAGEN) injection 1 mg, 1 mg, IntraMUSCular, PRN, Ha Quach MD    dextrose 10 % infusion 0-250 mL, 0-250 mL, IntraVENous, PRN, Ha Quach MD    nitroglycerin (NITROSTAT) tablet 0.4 mg, 0.4 mg, SubLINGual, PRN, Ha Quach MD, 0.4 mg at 06/28/22 2322    atorvastatin (LIPITOR) tablet 20 mg, 20 mg, Oral, QHS, Ha Quach MD, 20 mg at 07/21/22 2107    vitamin F-Y-R-lutein-minerals (OCUVITE) tablet 1 Tablet, 1 Tablet, Oral, DAILY, Ha Quach MD, 1 Tablet at 07/22/22 0944    sodium chloride (NS) flush 5-40 mL, 5-40 mL, IntraVENous, Q8H, Ha Quach MD, 10 mL at 07/22/22 1435    sodium chloride (NS) flush 5-40 mL, 5-40 mL, IntraVENous, PRN, Ha Quach MD    acetaminophen (TYLENOL) tablet 650 mg, 650 mg, Oral, Q6H PRN, 650 mg at 07/02/22 2246 **OR** acetaminophen (TYLENOL) suppository 650 mg, 650 mg, Rectal, Q6H PRN, Jiles Lennox, MD    polyethylene glycol (MIRALAX) packet 17 g, 17 g, Oral, DAILY PRN, Jiles Lennox, MD, 17 g at 07/17/22 0132    ondansetron (ZOFRAN ODT) tablet 4 mg, 4 mg, Oral, Q8H PRN **OR** ondansetron (ZOFRAN) injection 4 mg, 4 mg, IntraVENous, Q6H PRN, Jiles Lennox, MD, 4 mg at 07/19/22 7204     Procedures: see electronic medical records for all procedures/Xrays and details which were not copied into this note but were reviewed prior to creation of Plan. Reviewed most current lab test results and cultures  YES  Reviewed most current radiology test results   YES  Review and summation of old records today    NO  Reviewed patient's current orders and MAR    YES  PMH/ reviewed - no change compared to H&P  ________________________________________________________________________  Care Plan discussed with:    Comments   Patient x    Family  x    RN x    Care Manager x    Consultant                       x Multidiciplinary team rounds were held today with , nursing, pharmacist and clinical coordinator. Patient's plan of care was discussed; medications were reviewed and discharge planning was addressed.      ________________________________________________________________________  Total NON critical care TIME:    30 Minutes    Total CRITICAL CARE TIME Spent:   Minutes non procedure based      Comments   >50% of visit spent in counseling and coordination of care x     This includes time during multidisciplinary rounds if indicated above   ________________________________________________________________________  Iraida Gonzalez MD

## 2022-07-22 NOTE — WOUND CARE
Wound Care Follow up for the Wound Vac dressing:  Chart reviewed. Case management is arranging for patient to go to the healthcare part of [address] [place] [address]. They will obtain a Wound Vac but cannot take the patient until Monday. The canister in the wound vac has 150 ml of serosanguinous drainage in it with some wound debris. Plan: Keep working on arrangements for [facility]. Wound Care nurse to change the dressing on Monday prior to transport out. Will remove the Vac dressing and place a 24 hour dressing to remain on until the Wound Vac can be done. This Hospital's Wound Vac does not leave the hospital. See the orders.    Juanito Nuno RN, BSN, [facility]

## 2022-07-22 NOTE — PROGRESS NOTES
Transition of Care Plan:     RUR: 11%  Disposition:[facility] [employer] [facility]   Follow up appointments: To be done prior to discharge if going home  DME needed: Wound vac  Transportation at Discharge: [facility] or means to access home:  Wife has keys      IM Medicare Letter: Needed at discharge   Is patient a BCPI-A Bundle:  No                    If yes, was Bundle Letter given?:  N/A  Is patient a [place] and connected with the [place]? No              If yes, was [employer] transfer form completed and VA notified? N/A   Caregiver Contact:Wife  Discharge Caregiver contacted prior to discharge? Caregiver to be contacted prior to discharge  Care Conference needed?: Not at this time    11:55am- CM met with pt and pt's wife at bedside to discuss d/c plan. CM informed pt and pt's wife that [facility] unable [facility] until Monday. 11:52am-Consuelo in admissions with [facility] called CM via phone to inform CM that pt is not able to come to facility until Monday due to nursing wanting to wait due to pt being on wound vac    11:29am- CM called Consuelo in admissions with [facility] [phone] to inquired if wound vac would be delivered to day and if pt is able to come to facility. Consuelo informed CM that wound vac will be delivered today and that it's possibility that pt could come to facility today. CM informed pt that CM will arrange transport for 3:00pm.     CM will continue to follow patient for discharge planning needs and arrange for services as deemed necessary.     Steve Tompkins [address]  [phone]

## 2022-07-22 NOTE — PROGRESS NOTES
Problem: Self Care Deficits Care Plan (Adult)  Goal: *Acute Goals and Plan of Care (Insert Text)  Description:  FUNCTIONAL STATUS PRIOR TO ADMISSION: Patient was modified independent using a rollator for functional mobility. HOME SUPPORT PRIOR TO ADMISSION: The patient lived with wife at [address] but did not require assist. Assist with IADLs provided by [facility] as needed. Occupational Therapy Goals  Initiated 7/3/2022;  Weekly Reassessment 7/11/2022  1. Patient will perform lower body dressing with modified independence within 7 day(s). 2.  Patient will perform bathing with modified independence within 7 day(s). 3.  Patient will perform simple home management with modified independence within 7 day(s). 4.  Patient will perform toilet transfers with modified independence within 7 day(s). 5.  Patient will perform all aspects of toileting with modified independence within 7 day(s). 6.  Patient will utilize energy conservation techniques during functional activities with verbal cues within 7 day(s). Weekly assess, 7/19/2022   1. Patient will perform lower body dressing with modified independence within 7 day(s). 2.  Patient will perform bathing nadeeg area/ buttocks standing with CGA within 7 day(s). 3.  Patient will perform LB dressing with use of AE,  with min assist  within 7 day(s). 4.  Patient will perform toilet transfers with modified independence within 7 day(s). 5.  Patient will perform all aspects of toileting with min assist of 1 within 7 day(s). 6.  Patient will utilize energy conservation techniques during functional activities with verbal cues within 7 day(s). Outcome: Progressing Towards Goal     OCCUPATIONAL THERAPY TREATMENT  Patient: Yani Olivera (35 y.o. male)  Date: 7/22/2022  Diagnosis:  Thrombocytopenia ([facility] [address] [D69.6]  GI bleed [K92.2]  UGO (acute kidney injury) ([facility] [address] [N17.9] <principal problem not specified>  Procedure(s) (LRB):  INFUSION CATHETER INSERTION (N/A) 9 Days Post-Op  Precautions:    Chart, occupational therapy assessment, plan of care, and goals were reviewed. ASSESSMENT  Patient continues with skilled OT services and is progressing towards goals. Pt noted with progressive mobility/balance and activity tolerance, functionally evidenced by good engagement with full body bathing/dressing at EOB, as well as, RW level toileting. Pt benefits from Mod A for LE bathing and Min A for toileting, with good understanding of PLB noted and pt's RA reading 97% at completion of ADL routine. Pt educated on activity pacing and implementing frequent rest breaks during ADL/IADLs, with good understanding verbalized. Pt continues to benefit from skilled OT to address functional deficits during acute hospitalization, with reporting therapist believing pt would benefit from healthcare aspects of [facility] rehab upon discharge. Current Level of Function Impacting Discharge (ADLs): Mod A    Other factors to consider for discharge: wound vac         PLAN :  Patient continues to benefit from skilled intervention to address the above impairments. Continue treatment per established plan of care to address goals. Recommend with staff: OOB meals, Active ADL engagement    Recommend next OT session: POC progression    Recommendation for discharge: (in order for the patient to meet his/her long term goals)  To be determined: healthcare side of [facility]    This discharge recommendation:  Has been made in collaboration with the attending provider and/or case management    IF patient discharges home will need the following DME: TBD       SUBJECTIVE:   Patient stated Thanks for your help, it feels good to be up.     OBJECTIVE DATA SUMMARY:   Cognitive/Behavioral Status:  Neurologic State: Alert  Orientation Level: Oriented X4  Cognition: Appropriate decision making; Appropriate for age attention/concentration; Appropriate safety awareness  Perception: Appears intact  Perseveration: No perseveration noted  Safety/Judgement: Awareness of environment    Functional Mobility and Transfers for ADLs:  Bed Mobility:  Supine to Sit: Contact guard assistance; Additional time;Bed Modified    Transfers:  Sit to Stand: Contact guard assistance  Functional Transfers  Bathroom Mobility: Contact guard assistance  Toilet Transfer : Contact guard assistance  Bed to Chair: Contact guard assistance    Pt educated on safe transfer techniques, with specific emphasis on proper hand placement to push up from seated surface rather than attempt to pull self up, fully positioning self in-front of desired seated location, feeling chair on back of legs and reaching back with 1-2 UE to slowly lower self to seated position. Balance:  Sitting: Intact  Standing: Impaired; With support  Standing - Static: Good;Constant support  Standing - Dynamic : Good;Fair;Constant support    ADL Intervention:  Upper Body Bathing  Bathing Assistance: Set-up  Position Performed: Seated edge of bed    Type of Bath: Chlorhexidine (CHG); Basin/Soap/Water    Lower Body Bathing  Bathing Assistance: Moderate assistance  Perineal  : Moderate assistance  Position Performed:  (seated EOB/standing RW)  Lower Body : Moderate assistance  Position Performed: Seated edge of bed    Upper Body Dressing Assistance  Dressing Assistance: [address]: Minimum assistance  Bladder Hygiene: Supervision  Bowel Hygiene: Minimum assistance (standing at RW)  Adaptive Equipment: Walker    Cognitive Retraining  Safety/Judgement: Awareness of environment    Provided verbal cuing for education for breathing techniques including pursed lip breathing techniques (PLB) and circulatory breathing. Additionally, patient was educated on energy conservation techniques, including how they specifically apply to functional activities; This includes pacing, rest breaks, and planning.  Patient with good verbal understanding however needing additional cuing through out tasks to use techniques. Additional time needed during tasks. Pain:  C/o abdominal pain at wound vac site, did not quantify; RN aware and following    Activity Tolerance:   Fair and requires rest breaks    After treatment patient left in no apparent distress:   Sitting in chair, Call bell within reach, Caregiver / family present, and RN aware and following    COMMUNICATION/COLLABORATION:   The patients plan of care was discussed with: Registered nurse and Case management.      Kaylene Beebe OT  Time Calculation: 39 mins

## 2022-07-22 NOTE — PROGRESS NOTES
0345: pt refused labs. 0530: pt consented to a one time attempt for labs. End of Shift Note    Bedside shift change report given to Rodney Pappas RN (oncoming nurse) by Aly Randall RN (offgoing nurse). Report included the following information SBAR, Kardex, and Recent Results    Shift worked:  7396-4121     Shift summary and any significant changes:          Concerns for physician to address:       Zone phone for oncoming shift:   1150       Activity:  Activity Level: Up with Assistance  Number times ambulated in hallways past shift: 0  Number of times OOB to chair past shift: 0    Cardiac:   Cardiac Monitoring: Yes      Cardiac Rhythm: Sinus Rhythm    Access:   Current line(s): PIV     Genitourinary:   Urinary status: voiding    Respiratory:   O2 Device: None (Room air)  Chronic home O2 use?: YES  Incentive spirometer at bedside: YES  Actual Volume (ml): 1500 ml    GI:  Last Bowel Movement Date: 07/21/22  Current diet:  ADULT DIET Regular; 4 carb choices (60 gm/meal)  Passing flatus: YES  Tolerating current diet: YES       Pain Management:   Patient states pain is manageable on current regimen: YES    Skin:  Jose Guadalupe Score: 17  Interventions: increase time out of bed and PT/OT consult    Patient Safety:  Fall Score:  Total Score: 3  Interventions: bed/chair alarm, assistive device (walker, cane, etc), gripper socks, and pt to call before getting OOB  High Fall Risk: Yes    Length of Stay:  Expected LOS: 8d 9h  Actual LOS: 25      Aly Randall RN

## 2022-07-22 NOTE — PROGRESS NOTES
0700 Bedside report received. Pt is resting comfortably. 0830 OT bathes and gets pt up to chair. 1000 Pt has a BM. 1900 Report given to Tommy Chaudhry RN at the bedside.

## 2022-07-23 LAB
GLUCOSE BLD STRIP.AUTO-MCNC: 108 MG/DL (ref 65–117)
GLUCOSE BLD STRIP.AUTO-MCNC: 109 MG/DL (ref 65–117)
GLUCOSE BLD STRIP.AUTO-MCNC: 127 MG/DL (ref 65–117)
GLUCOSE BLD STRIP.AUTO-MCNC: 141 MG/DL (ref 65–117)
SERVICE CMNT-IMP: ABNORMAL
SERVICE CMNT-IMP: ABNORMAL
SERVICE CMNT-IMP: NORMAL
SERVICE CMNT-IMP: NORMAL

## 2022-07-23 PROCEDURE — 74011250637 HC RX REV CODE- 250/637: Performed by: SURGERY

## 2022-07-23 PROCEDURE — 74011250637 HC RX REV CODE- 250/637: Performed by: GENERAL ACUTE CARE HOSPITAL

## 2022-07-23 PROCEDURE — 74011250637 HC RX REV CODE- 250/637: Performed by: INTERNAL MEDICINE

## 2022-07-23 PROCEDURE — 65270000046 HC RM TELEMETRY

## 2022-07-23 PROCEDURE — 74011636637 HC RX REV CODE- 636/637: Performed by: STUDENT IN AN ORGANIZED HEALTH CARE EDUCATION/TRAINING PROGRAM

## 2022-07-23 PROCEDURE — 74011000250 HC RX REV CODE- 250: Performed by: SURGERY

## 2022-07-23 PROCEDURE — 82962 GLUCOSE BLOOD TEST: CPT

## 2022-07-23 RX ORDER — FUROSEMIDE 20 MG/1
20 TABLET ORAL
Status: DISCONTINUED | OUTPATIENT
Start: 2022-07-23 | End: 2022-07-25 | Stop reason: HOSPADM

## 2022-07-23 RX ORDER — LOPERAMIDE HYDROCHLORIDE 2 MG/1
2 CAPSULE ORAL
Status: DISCONTINUED | OUTPATIENT
Start: 2022-07-23 | End: 2022-07-25 | Stop reason: HOSPADM

## 2022-07-23 RX ADMIN — AMIODARONE HYDROCHLORIDE 200 MG: 200 TABLET ORAL at 08:28

## 2022-07-23 RX ADMIN — POTASSIUM CHLORIDE 20 MEQ: 20 TABLET, EXTENDED RELEASE ORAL at 08:28

## 2022-07-23 RX ADMIN — ATORVASTATIN CALCIUM 20 MG: 20 TABLET, FILM COATED ORAL at 21:00

## 2022-07-23 RX ADMIN — FERROUS SULFATE TAB 325 MG (65 MG ELEMENTAL FE) 325 MG: 325 (65 FE) TAB at 08:28

## 2022-07-23 RX ADMIN — FERROUS SULFATE TAB 325 MG (65 MG ELEMENTAL FE) 325 MG: 325 (65 FE) TAB at 17:12

## 2022-07-23 RX ADMIN — LOPERAMIDE HYDROCHLORIDE 2 MG: 2 CAPSULE ORAL at 13:55

## 2022-07-23 RX ADMIN — Medication 3 UNITS: at 12:09

## 2022-07-23 RX ADMIN — SODIUM CHLORIDE, PRESERVATIVE FREE 10 ML: 5 INJECTION INTRAVENOUS at 05:21

## 2022-07-23 RX ADMIN — SODIUM CHLORIDE, PRESERVATIVE FREE 10 ML: 5 INJECTION INTRAVENOUS at 21:00

## 2022-07-23 RX ADMIN — LOPERAMIDE HYDROCHLORIDE 2 MG: 2 CAPSULE ORAL at 17:12

## 2022-07-23 RX ADMIN — I-VITE, TAB 1000-60-2MG (60/BT) 1 TABLET: TAB at 08:38

## 2022-07-23 RX ADMIN — FUROSEMIDE 20 MG: 20 TABLET ORAL at 08:28

## 2022-07-23 RX ADMIN — APIXABAN 5 MG: 5 TABLET, FILM COATED ORAL at 17:13

## 2022-07-23 RX ADMIN — PANTOPRAZOLE SODIUM 40 MG: 40 TABLET, DELAYED RELEASE ORAL at 08:31

## 2022-07-23 RX ADMIN — Medication 1 AMPULE: at 08:28

## 2022-07-23 RX ADMIN — FUROSEMIDE 20 MG: 20 TABLET ORAL at 13:55

## 2022-07-23 RX ADMIN — APIXABAN 5 MG: 5 TABLET, FILM COATED ORAL at 05:21

## 2022-07-23 RX ADMIN — SODIUM CHLORIDE, PRESERVATIVE FREE 10 ML: 5 INJECTION INTRAVENOUS at 17:12

## 2022-07-23 NOTE — PROGRESS NOTES
End of Shift Note    Bedside shift change report given to Rishi Ornelas RN (oncoming nurse) by Kalee Thao (offgoing nurse). Report included the following information SBAR, Kardex, ED Summary, Intake/Output, MAR, and Recent Results    Shift worked:  4708-6018     Shift summary and any significant changes:     Pt refused morning labs/ uneventful night      Concerns for physician to address:       Zone phone for oncoming shift:          Activity:  Activity Level: Up with Assistance  Number times ambulated in hallways past shift: 0  Number of times OOB to chair past shift: 1    Cardiac:   Cardiac Monitoring: Yes      Cardiac Rhythm: Sinus Rhythm    Access:   Current line(s): PIV     Genitourinary:   Urinary status: voiding    Respiratory:   O2 Device: None (Room air)  Chronic home O2 use?: NO  Incentive spirometer at bedside: YES  Actual Volume (ml): 1500 ml    GI:  Last Bowel Movement Date: 07/22/22  Current diet:  ADULT DIET Regular; 4 carb choices (60 gm/meal)  Passing flatus: YES  Tolerating current diet: YES       Pain Management:   Patient states pain is manageable on current regimen: YES    Skin:  Jose Guadalupe Score: 19  Interventions: float heels, increase time out of bed, and PT/OT consult    Patient Safety:  Fall Score:  Total Score: 3  Interventions: bed/chair alarm, assistive device (walker, cane, etc), gripper socks, pt to call before getting OOB, and stay with me (per policy)  High Fall Risk: Yes    Length of Stay:  Expected LOS: 8d 9h  Actual LOS: 1400 [facility]

## 2022-07-23 NOTE — PROGRESS NOTES
Problem: Discharge Planning  Goal: *Discharge to safe environment  Outcome: Progressing Towards Goal     Problem: Falls - Risk of  Goal: *Absence of Falls  Description: Document [place] Flow Fall Risk and appropriate interventions in the flowsheet. Outcome: Progressing Towards Goal  Note: Fall Risk Interventions:  Mobility Interventions: Assess mobility with egress test, Bed/chair exit alarm    Mentation Interventions: Adequate sleep, hydration, pain control, Bed/chair exit alarm, Door open when patient unattended, Eyeglasses and hearing aids    Medication Interventions: Bed/chair exit alarm, Patient to call before getting OOB, Teach patient to arise slowly    Elimination Interventions: Bed/chair exit alarm, Call light in reach, Patient to call for help with toileting needs    History of Falls Interventions: Bed/chair exit alarm, Utilize gait belt for transfer/ambulation         Problem: Patient Education: Go to Patient Education Activity  Goal: Patient/Family Education  Outcome: Progressing Towards Goal     Problem: Patient Education: Go to Patient Education Activity  Goal: Patient/Family Education  Outcome: Progressing Towards Goal     Problem: Nutrition Deficit  Goal: *Optimize nutritional status  Outcome: Progressing Towards Goal     Problem: Risk for Spread of Infection  Goal: Prevent transmission of infectious organism to others  Description: Prevent the transmission of infectious organisms to other patients, staff members, and visitors.   Outcome: Progressing Towards Goal     Problem: Patient Education:  Go to Education Activity  Goal: Patient/Family Education  Outcome: Progressing Towards Goal     Problem: Patient Education: Go to Patient Education Activity  Goal: Patient/Family Education  Outcome: Progressing Towards Goal     Problem: Upper and Lower GI Bleed: Day 1  Goal: Off Pathway (Use only if patient is Off Pathway)  Outcome: Progressing Towards Goal  Goal: Activity/Safety  Outcome: Progressing Towards Goal  Goal: Consults, if ordered  Outcome: Progressing Towards Goal  Goal: Diagnostic Test/Procedures  Outcome: Progressing Towards Goal  Goal: Nutrition/Diet  Outcome: Progressing Towards Goal  Goal: Discharge Planning  Outcome: Progressing Towards Goal  Goal: Medications  Outcome: Progressing Towards Goal  Goal: Respiratory  Outcome: Progressing Towards Goal  Goal: Treatments/Interventions/Procedures  Outcome: Progressing Towards Goal  Goal: Psychosocial  Outcome: Progressing Towards Goal  Goal: *Optimal pain control at patient's stated goal  Outcome: Progressing Towards Goal  Goal: *Hemodynamically stable  Outcome: Progressing Towards Goal  Goal: *Demonstrates progressive activity  Outcome: Progressing Towards Goal     Problem: Upper and Lower GI Bleed: Day 2  Goal: Off Pathway (Use only if patient is Off Pathway)  Outcome: Progressing Towards Goal  Goal: Activity/Safety  Outcome: Progressing Towards Goal  Goal: Consults, if ordered  Outcome: Progressing Towards Goal  Goal: Diagnostic Test/Procedures  Outcome: Progressing Towards Goal  Goal: Nutrition/Diet  Outcome: Progressing Towards Goal  Goal: Discharge Planning  Outcome: Progressing Towards Goal  Goal: Medications  Outcome: Progressing Towards Goal  Goal: Respiratory  Outcome: Progressing Towards Goal  Goal: Treatments/Interventions/Procedures  Outcome: Progressing Towards Goal  Goal: Psychosocial  Outcome: Progressing Towards Goal  Goal: *Optimal pain control at patient's stated goal  Outcome: Progressing Towards Goal  Goal: *Hemodynamically stable  Outcome: Progressing Towards Goal  Goal: *Tolerating diet  Outcome: Progressing Towards Goal  Goal: *Demonstrates progressive activity  Outcome: Progressing Towards Goal     Problem: Upper and Lower GI Bleed: Day 3  Goal: Off Pathway (Use only if patient is Off Pathway)  Outcome: Progressing Towards Goal  Goal: Activity/Safety  Outcome: Progressing Towards Goal  Goal: Diagnostic Test/Procedures  Outcome: Progressing Towards Goal  Goal: Nutrition/Diet  Outcome: Progressing Towards Goal  Goal: Discharge Planning  Outcome: Progressing Towards Goal  Goal: Medications  Outcome: Progressing Towards Goal  Goal: Treatments/Interventions/Procedures  Outcome: Progressing Towards Goal  Goal: Psychosocial  Outcome: Progressing Towards Goal     Problem: Upper and Lower GI Bleed:  Discharge Outcomes  Goal: *Hemodynamically stable  Outcome: Progressing Towards Goal  Goal: *Lungs clear or at baseline  Outcome: Progressing Towards Goal  Goal: *Demonstrates independent activity or return to baseline  Outcome: Progressing Towards Goal  Goal: *Pain is controlled to three or less  Outcome: Progressing Towards Goal  Goal: *Verbalizes understanding and describes prescribed diet  Outcome: Progressing Towards Goal  Goal: *Tolerating diet  Outcome: Progressing Towards Goal  Goal: *Verbalizes name, dosage, time, side effects, and number of days to continue medications  Outcome: Progressing Towards Goal  Goal: *Anxiety reduced or absent  Outcome: Progressing Towards Goal  Goal: *Understands and describes signs and symptoms to report to providers(Stroke Metric)  Outcome: Progressing Towards Goal  Goal: *Describes follow-up/return visits to physicians  Outcome: Progressing Towards Goal  Goal: *Describes available resources and support systems  Outcome: Progressing Towards Goal     Problem: Diabetes Self-Management  Goal: *Disease process and treatment process  Description: Define diabetes and identify own type of diabetes; list 3 options for treating diabetes. Outcome: Progressing Towards Goal  Goal: *Incorporating nutritional management into lifestyle  Description: Describe effect of type, amount and timing of food on blood glucose; list 3 methods for planning meals. Outcome: Progressing Towards Goal  Goal: *Incorporating physical activity into lifestyle  Description: State effect of exercise on blood glucose levels.   Outcome: Progressing Towards Goal  Goal: *Developing strategies to promote health/change behavior  Description: Define the ABC's of diabetes; identify appropriate screenings, schedule and personal plan for screenings. Outcome: Progressing Towards Goal  Goal: *Using medications safely  Description: State effect of diabetes medications on diabetes; name diabetes medication taking, action and side effects. Outcome: Progressing Towards Goal  Goal: *Monitoring blood glucose, interpreting and using results  Description: Identify recommended blood glucose targets  and personal targets. Outcome: Progressing Towards Goal  Goal: *Prevention, detection, treatment of acute complications  Description: List symptoms of hyper- and hypoglycemia; describe how to treat low blood sugar and actions for lowering  high blood glucose level. Outcome: Progressing Towards Goal  Goal: *Prevention, detection and treatment of chronic complications  Description: Define the natural course of diabetes and describe the relationship of blood glucose levels to long term complications of diabetes.   Outcome: Progressing Towards Goal  Goal: *Developing strategies to address psychosocial issues  Description: Describe feelings about living with diabetes; identify support needed and support network  Outcome: Progressing Towards Goal  Goal: *Insulin pump training  Outcome: Progressing Towards Goal  Goal: *Sick day guidelines  Outcome: Progressing Towards Goal  Goal: *Patient Specific Goal (EDIT GOAL, INSERT TEXT)  Outcome: Progressing Towards Goal

## 2022-07-23 NOTE — PROGRESS NOTES
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 Received 3 doses IVIG, did not tolerate final IVIG infusion despite. PLT normalized  Hemo/onc on board     SBO, improving  Ileal perforation and abscess/adhesions  Repeat CT a/p on  showed partial SBO pattern. CT enterography showed distal partial SBO  Went to OR on  found to have SMALL BOWEL OBSTRUCTION  SECONDARY TO ILEAL PERFORATION, AND ABSCESS AND ADHESION AND ABDOMINAL HERNIA   CT abdomen pelvis repeated and it was unremarkable  Off NGT  Diet advance again to regular diet   Off TPN on   Follow surgery recommendation  Completed 14 days of Zosyn, no need for more Abx as per d/w Dr Tiny Hennessy placed     GI bleed  Was to due severe ITP  S/p blood transfusion  Continue with protonix 40 daily (can switch to famotidine on discharge)  GI on board, holding off on EGD for now, conservative management.   Start iron pills   7/15 again hemoglobin 6.8 but there is no blood in the stool has no hematuria we will transfuse 1 unit of blood     Paroxysmal atrial fibrillation w RVR  HR is controlled now  Cardiac stress test neg  Cont Eliquis   amiodarone dose adjusted to 200mg daily  F/up with Dr Fiona Yanes in 2 weeks      Gross hematuria, improving  Patient with recurrent gross hematuria, Lopez inserted for urinary retention  Eliquis resumed, hemoglobin is stable  Lopez removed and pt has no further issues      History of DVT and PE  B/l LE edema, chronic   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 car trips x2  PE of unknown origin  DVT after hip replacement  Last DVT 2021  Patient reports his in immobile much of the time d/t debility  Has not had hypercoagulable work up  [facility] resumed  On RA  Duplex done and neg  B/l LE edema , start lasix 20 mg BID  Start STEVE      Vit D deficiency  Start Ergocalciferol        Acute hypoxic resp failure/ resolved  CXR Left basilar atelectasis  Resolved, on RA now  IS    ?CARLOS ENRIQUE  Sleep study as outpt     30.0 - 39.9 Obese / Body mass index is 37.09 kg/m². Code status: Full  Prophylaxis: Eliquis  Recommended Disposition: [facility]/Rehab. Anticipated Discharge Date:  stable for DC to rehab        Subjective:     Discussed with RN events overnight. Good appetite  No abd pain  No N/V  Afebrile   No SOB  Wound vac on  Diarrhea, 4 episodes yesterday, 1 so far. We will try Imodium    Review of Systems:  Symptom Y/N Comments  Symptom Y/N Comments   Fever/Chills    Chest Pain     Poor Appetite    Edema     Cough    Abdominal Pain     Sputum    Joint Pain     SOB/SHEARER    Pruritis/Rash     Nausea/vomit    Tolerating PT/OT     Diarrhea    Tolerating Diet     Constipation    Other       PO intake: No data found. Wt Readings from Last 10 Encounters:   07/22/22 129.8 kg (286 lb 2.5 oz)   08/09/21 147.8 kg (325 lb 13.4 oz)   10/13/19 139.7 kg (308 lb)   11/27/18 138.5 kg (305 lb 5.4 oz)   10/28/18 137.9 kg (304 lb)   07/17/17 141.5 kg (312 lb)   02/01/14 138.8 kg (306 lb)   01/20/14 140.6 kg (310 lb)   01/17/14 138.3 kg (305 lb)   01/13/14 138.3 kg (305 lb)       Objective:     VITALS:   Last 24hrs VS reviewed since prior progress note.  Most recent are:  Patient Vitals for the past 24 hrs:   Temp Pulse Resp BP SpO2   07/23/22 0835 98.2 °F (36.8 °C) 70 20 (!) 123/46 91 %   07/23/22 0828 -- 71 -- -- --   07/23/22 0328 98.4 °F (36.9 °C) 61 19 (!) 120/44 94 %   07/22/22 2309 98.1 °F (36.7 °C) 70 21 (!) 124/43 95 %   07/22/22 1913 98.9 °F (37.2 °C) 72 18 139/72 96 %   07/22/22 1600 98.4 °F (36.9 °C) 83 24 (!) 120/45 91 %   07/22/22 1200 98.2 °F (36.8 °C) 63 24 (!) 118/44 94 %   07/22/22 1159 -- 63 21 -- 97 %         Intake/Output Summary (Last 24 hours) at 7/23/2022 1101  Last data filed at 7/23/2022 0835  Gross per 24 hour   Intake --   Output 970 ml   Net -970 ml          I had a face to face encounter, and independently examined this patient as outlined below:    PHYSICAL EXAM:  General:    No distress     HEENT: Atraumatic, anicteric sclerae, pink conjunctivae, MMM  Neck:  Supple, symmetrical  Lungs:   CTA. No Wheezing/Rhonchi. No rales. No tenderness. No Accessory muscle use. Heart:   Irregularly irregular rhythm, No murmur. No JVD   GI/:            Soft. NT. ND. BS normal. Midline wound vac. Extremities: +2 LE edema. No cyanosis. No clubbing. Skin:     Not pale. Not Jaundiced. No rashes   Psych:  Good insight. Not depressed. Not anxious or agitated. Neurologic: Alert and oriented X 4. EOMs intact. No facial asymmetry. No slurred speech. Symmetrical strength, Sensation grossly intact. Labs     I reviewed today's most current labs and imaging studies. Pertinent labs include:  No results for input(s): WBC, HGB, HCT, PLT, HGBEXT, HCTEXT, PLTEXT, HGBEXT, HCTEXT, PLTEXT in the last 72 hours. Recent Labs     07/22/22  0530 07/21/22  0145    139   K 4.0 4.0    109*   CO2 23 23   * 109*   BUN 14 16   CREA 1.24 0.99   CA 8.2* 8.1*   MG 1.8 1.8   PHOS 3.6 2.8       XR ABD (KUB)    Result Date: 7/14/2022  1. A gastric tube likely terminates in the stomach     XR ABD FLAT/ ERECT    Result Date: 7/15/2022  Persistent diffuse small bowel distention. XR ABD FLAT/ ERECT    Result Date: 7/14/2022  Moderate distention of several loops of upper and mid abdominal small bowel without colonic distention demonstrated. CT ABD PELV WO CONT    Result Date: 7/5/2022  1. Small bowel obstruction with a transition point in the right lower quadrant in the terminal ileum. The terminal ileum now appears more narrowed/collapsed than on the prior study. Oral contrast has made it to the colon however indicating this is likely a partial obstruction. 2. Fat-containing and local hernia containing fluid as previously seen.     CT ABD PELV W CONT    Result Date: 7/18/2022  Distended small bowel. Distended stomach. Normal size colon No fluid collections. CT ENTEROGRAPHY W CONT    Result Date: 7/7/2022  Distal partial small bowel obstruction as above. .    XR CHEST PORT    Result Date: 7/13/2022  Satisfactory CVL placement. XR CHEST PORT    Result Date: 7/9/2022  Left basilar atelectasis. Nasogastric tube as above. NUCLEAR CARDIAC STRESS TEST    Result Date: 7/8/2022  : 1. No definite evidence of ischemia. There is slight diminished activity in the inferior wall which could represent nontransmural infarct versus attenuation artifact. 2. LVEF equals 55%. No regional wall motion abnormalities are identified. No results found. 08/07/21    ECHO ADULT COMPLETE 08/10/2021 8/10/2021    Interpretation Summary  · LV: Estimated LVEF is 60 - 65%. Visually measured ejection fraction. Normal cavity size and systolic function (ejection fraction normal). Upper normal wall thickness. Wall motion: normal.    Signed by: J Luis Carrillo III, DO on 8/10/2021  7:18 AM     All Micro Results       Procedure Component Value Units Date/Time    COVID-19 WITH INFLUENZA A/B [[id number]] Collected: 07/22/22 1108    Order Status: Completed Specimen: Nasopharyngeal Updated: 07/22/22 1225     SARS-CoV-2 by PCR Not detected        Comment: Not Detected results do not preclude SARS-CoV-2 infection and should not be used as the sole basis for patient management decisions. Results must be combined with clinical observations, patient history, and epidemiological information. Influenza A by PCR Not detected        Influenza B by PCR Not detected        Comment: Testing was performed using [employer] SARS-CoV-2 and Influenza A/B nucleic acid assay.   This test is a multiplex Real-Time Reverse Transcriptase Polymerase Chain Reaction (RT-PCR) based in vitro diagnostic test intended for the qualitative detection of nucleic acids from SARS-CoV-2, Influenza A, and Influenza B in nasopharyngeal and nasal swab specimens for use under the FDA's Emergency Use Authorization (EUA) only. Fact sheet for Patients: FindDrives.pl  Fact sheet for Healthcare Providers: FindDrives.pl         COVID-19 RAPID TEST [[id number]] Collected: 07/22/22 1108    Order Status: Completed Specimen: Nasopharyngeal Updated: 07/22/22 1204     Specimen source Nasopharyngeal        COVID-19 rapid test Not detected        Comment: Rapid Abbott ID Now       Rapid NAAT:  The specimen is NEGATIVE for SARS-CoV-2, the novel coronavirus associated with COVID-19. Negative results should be treated as presumptive and, if inconsistent with clinical signs and symptoms or necessary for patient management, should be tested with an alternative molecular assay. Negative results do not preclude SARS-CoV-2 infection and should not be used as the sole basis for patient management decisions. This test has been authorized by the FDA under an Emergency Use Authorization (EUA) for use by authorized laboratories. Fact sheet for Healthcare Providers: TapInfluencedate.co.nz  Fact sheet for Patients: TapInfluencedate.co.nz       Methodology: Isothermal Nucleic Acid Amplification         COVID-19 RAPID TEST [[id number]] Collected: 07/22/22 1036    Order Status: Canceled     COVID-19 RAPID TEST [[id number]] Collected: 07/21/22 1652    Order Status: Completed Specimen: Nasopharyngeal Updated: 07/21/22 1720     Specimen source Nasopharyngeal        COVID-19 rapid test Not detected        Comment: Rapid Abbott ID Now       Rapid NAAT:  The specimen is NEGATIVE for SARS-CoV-2, the novel coronavirus associated with COVID-19. Negative results should be treated as presumptive and, if inconsistent with clinical signs and symptoms or necessary for patient management, should be tested with an alternative molecular assay.   Negative results do not preclude SARS-CoV-2 infection and should not be used as the sole basis for patient management decisions. This test has been authorized by the FDA under an Emergency Use Authorization (EUA) for use by authorized laboratories.    Fact sheet for Healthcare Providers: ConventionUpdate.co.nz  Fact sheet for Patients: ConventionUpdate.co.nz       Methodology: Isothermal Nucleic Acid Amplification         CULTURE, RESPIRATORY/SPUTUM/BRONCH Valere [employer] [[id number]] Collected: 06/29/22 1412    Order Status: Completed Specimen: Sputum Updated: 07/01/22 0945     Special Requests: NO SPECIAL REQUESTS        GRAM STAIN OCCASIONAL WBCS SEEN               RARE EPITHELIAL CELLS SEEN                  FEW GRAM POSITIVE COCCI IN PAIRS                  OCCASIONAL GRAM NEGATIVE RODS           Culture result:       HEAVY NORMAL RESPIRATORY JAVIER          RESPIRATORY VIRUS PANEL W/COVID-19, PCR [[id number]] Collected: 06/29/22 0906    Order Status: Completed Specimen: Nasopharyngeal Updated: 06/29/22 1556     Adenovirus Not detected        Coronavirus 229E Not detected        Coronavirus HKU1 Not detected        Coronavirus CVNL63 Not detected        Coronavirus OC43 Not detected        SARS-CoV-2, PCR Not detected        Metapneumovirus Not detected        Rhinovirus and Enterovirus Not detected        Influenza A Not detected        Influenza A, subtype H1 Not detected        Influenza A, subtype H3 Not detected        INFLUENZA A H1N1 PCR Not detected        Influenza B Not detected        Parainfluenza 1 Not detected        Parainfluenza 2 Not detected        Parainfluenza 3 Not detected        Parainfluenza virus 4 Not detected        RSV by PCR Not detected        B. parapertussis, PCR Not detected        Bordetella pertussis - PCR Not detected        Chlamydophila pneumoniae DNA, QL, PCR Not detected        Mycoplasma pneumoniae DNA, QL, PCR Not detected       COVID-19 RAPID TEST [[id number]] Collected: 06/29/22 0954    Order Status: Completed Specimen: Nasopharyngeal Updated: 06/29/22 1042     Specimen source Nasopharyngeal        COVID-19 rapid test Not detected        Comment: Rapid Abbott ID Now       Rapid NAAT:  The specimen is NEGATIVE for SARS-CoV-2, the novel coronavirus associated with COVID-19. Negative results should be treated as presumptive and, if inconsistent with clinical signs and symptoms or necessary for patient management, should be tested with an alternative molecular assay. Negative results do not preclude SARS-CoV-2 infection and should not be used as the sole basis for patient management decisions. This test has been authorized by the FDA under an Emergency Use Authorization (EUA) for use by authorized laboratories.    Fact sheet for Healthcare Providers: ConventionUpdate.co.nz  Fact sheet for Patients: ConventionUpdate.co.nz       Methodology: Isothermal Nucleic Acid Amplification                   Current Medications:     Current Facility-Administered Medications:     pantoprazole (PROTONIX) tablet 40 mg, 40 mg, Oral, ACB, Latonia Pierce MD, 40 mg at 07/23/22 0831    amiodarone (CORDARONE) tablet 200 mg, 200 mg, Oral, DAILY, Huang Alvarez III, DO, 200 mg at 07/23/22 5017    ergocalciferol capsule 50,000 Units, 50,000 Units, Oral, Q7D, Al-Dabbagh, Valla Gosselin, MD, 50,000 Units at 07/21/22 1643    ferrous sulfate tablet 325 mg, 1 Tablet, Oral, BID WITH MEALS, Latonia Pierce MD, 325 mg at 07/23/22 6601    furosemide (LASIX) tablet 20 mg, 20 mg, Oral, ACB&D, Latonia Pierce MD, 20 mg at 07/23/22 0828    potassium chloride (K-DUR, KLOR-CON M20) SR tablet 20 mEq, 20 mEq, Oral, DAILY, Latonia Pierce MD, 20 mEq at 07/23/22 0828    insulin lispro (HUMALOG) injection, , SubCUTAneous, AC&HS, Zoey Stearns MD, 3 Units at 07/22/22 1212    0.9% sodium chloride infusion 250 mL, 250 mL, IntraVENous, PRN, Coleman Pollack MD    HYDROmorphone (DILAUDID) injection 0.5-1 mg, 0.5-1 mg, IntraVENous, Q2H PRN, Ángel Edwards MD, 0.5 mg at 07/21/22 0942    HYDROcodone-acetaminophen (NORCO) 5-325 mg per tablet 1-2 Tablet, 1-2 Tablet, Oral, Q4H PRN, Ángel Edwards MD, 1 Tablet at 07/12/22 0620    alcohol 62% (NOZIN) nasal  1 Ampule, 1 Ampule, Topical, Q12H, Ángel Edwards MD, 1 Ampule at 07/23/22 1343    apixaban (ELIQUIS) tablet 5 mg, 5 mg, Oral, BID, Ángel Edwards MD, 5 mg at 07/23/22 0521    phenol throat spray (CHLORASEPTIC) 1 Spray, 1 Spray, Oral, PRN, Ángel Edwards MD    calcium carbonate (TUMS) chewable tablet 400 mg [elemental], 400 mg, Oral, TID PRN, Ángel Edwards MD, 400 mg at 07/01/22 0133    glucose chewable tablet 16 g, 4 Tablet, Oral, PRN, Ángel Edwards MD    glucagon (GLUCAGEN) injection 1 mg, 1 mg, IntraMUSCular, PRN, Ángel Edwards MD    dextrose 10 % infusion 0-250 mL, 0-250 mL, IntraVENous, PRN, Ángel Edwards MD    nitroglycerin (NITROSTAT) tablet 0.4 mg, 0.4 mg, SubLINGual, PRN, Ángel Edwards MD, 0.4 mg at 06/28/22 2322    atorvastatin (LIPITOR) tablet 20 mg, 20 mg, Oral, QHS, Ángel Edwards MD, 20 mg at 07/22/22 2133    vitamin U-V-Q-lutein-minerals (OCUVITE) tablet 1 Tablet, 1 Tablet, Oral, DAILY, Ángel Edwards MD, 1 Tablet at 07/23/22 2633    sodium chloride (NS) flush 5-40 mL, 5-40 mL, IntraVENous, Q8H, Ángel Edwards MD, 10 mL at 07/23/22 0521    sodium chloride (NS) flush 5-40 mL, 5-40 mL, IntraVENous, PRN, Ángel Edwards MD    acetaminophen (TYLENOL) tablet 650 mg, 650 mg, Oral, Q6H PRN, 650 mg at 07/02/22 2246 **OR** acetaminophen (TYLENOL) suppository 650 mg, 650 mg, Rectal, Q6H PRN, Ángel Edwards MD    polyethylene glycol (MIRALAX) packet 17 g, 17 g, Oral, DAILY PRN, Ángel Edwards MD, 17 g at 07/17/22 0132    ondansetron (ZOFRAN ODT) tablet 4 mg, 4 mg, Oral, Q8H PRN **OR** ondansetron (ZOFRAN) injection 4 mg, 4 mg, IntraVENous, Q6H PRN, Cross, Malinda Tay MD, 4 mg at 07/19/22 7785     Procedures: see electronic medical records for all procedures/Xrays and details which were not copied into this note but were reviewed prior to creation of Plan. Reviewed most current lab test results and cultures  YES  Reviewed most current radiology test results   YES  Review and summation of old records today    NO  Reviewed patient's current orders and MAR    YES  PMH/SH reviewed - no change compared to H&P  ________________________________________________________________________  Care Plan discussed with:    Comments   Patient x    Family      RN x    Care Manager     Consultant                        Multidiciplinary team rounds were held today with , nursing, pharmacist and clinical coordinator. Patient's plan of care was discussed; medications were reviewed and discharge planning was addressed.      ________________________________________________________________________  Total NON critical care TIME:    30 Minutes    Total CRITICAL CARE TIME Spent:   Minutes non procedure based      Comments   >50% of visit spent in counseling and coordination of care x     This includes time during multidisciplinary rounds if indicated above   ________________________________________________________________________  Burak Cooper MD

## 2022-07-24 LAB
GLUCOSE BLD STRIP.AUTO-MCNC: 108 MG/DL (ref 65–117)
GLUCOSE BLD STRIP.AUTO-MCNC: 110 MG/DL (ref 65–117)
GLUCOSE BLD STRIP.AUTO-MCNC: 89 MG/DL (ref 65–117)
GLUCOSE BLD STRIP.AUTO-MCNC: 99 MG/DL (ref 65–117)
SARS-COV-2, COV2: NORMAL
SERVICE CMNT-IMP: NORMAL

## 2022-07-24 PROCEDURE — 74011250637 HC RX REV CODE- 250/637: Performed by: INTERNAL MEDICINE

## 2022-07-24 PROCEDURE — 74011250637 HC RX REV CODE- 250/637: Performed by: SURGERY

## 2022-07-24 PROCEDURE — 65270000046 HC RM TELEMETRY

## 2022-07-24 PROCEDURE — U0005 INFEC AGEN DETEC AMPLI PROBE: HCPCS

## 2022-07-24 PROCEDURE — 74011000250 HC RX REV CODE- 250: Performed by: SURGERY

## 2022-07-24 PROCEDURE — 82962 GLUCOSE BLOOD TEST: CPT

## 2022-07-24 PROCEDURE — 74011250637 HC RX REV CODE- 250/637: Performed by: GENERAL ACUTE CARE HOSPITAL

## 2022-07-24 PROCEDURE — 77010033678 HC OXYGEN DAILY

## 2022-07-24 RX ADMIN — FERROUS SULFATE TAB 325 MG (65 MG ELEMENTAL FE) 325 MG: 325 (65 FE) TAB at 08:04

## 2022-07-24 RX ADMIN — AMIODARONE HYDROCHLORIDE 200 MG: 200 TABLET ORAL at 08:04

## 2022-07-24 RX ADMIN — APIXABAN 5 MG: 5 TABLET, FILM COATED ORAL at 17:18

## 2022-07-24 RX ADMIN — ATORVASTATIN CALCIUM 20 MG: 20 TABLET, FILM COATED ORAL at 21:46

## 2022-07-24 RX ADMIN — FUROSEMIDE 20 MG: 20 TABLET ORAL at 06:11

## 2022-07-24 RX ADMIN — Medication 1 AMPULE: at 21:53

## 2022-07-24 RX ADMIN — POTASSIUM CHLORIDE 20 MEQ: 20 TABLET, EXTENDED RELEASE ORAL at 08:04

## 2022-07-24 RX ADMIN — Medication 1 AMPULE: at 08:03

## 2022-07-24 RX ADMIN — SODIUM CHLORIDE, PRESERVATIVE FREE 10 ML: 5 INJECTION INTRAVENOUS at 21:46

## 2022-07-24 RX ADMIN — FUROSEMIDE 20 MG: 20 TABLET ORAL at 17:21

## 2022-07-24 RX ADMIN — APIXABAN 5 MG: 5 TABLET, FILM COATED ORAL at 06:11

## 2022-07-24 RX ADMIN — FERROUS SULFATE TAB 325 MG (65 MG ELEMENTAL FE) 325 MG: 325 (65 FE) TAB at 17:18

## 2022-07-24 RX ADMIN — PANTOPRAZOLE SODIUM 40 MG: 40 TABLET, DELAYED RELEASE ORAL at 06:11

## 2022-07-24 RX ADMIN — I-VITE, TAB 1000-60-2MG (60/BT) 1 TABLET: TAB at 08:04

## 2022-07-24 RX ADMIN — SODIUM CHLORIDE, PRESERVATIVE FREE 10 ML: 5 INJECTION INTRAVENOUS at 06:13

## 2022-07-24 RX ADMIN — SODIUM CHLORIDE, PRESERVATIVE FREE 10 ML: 5 INJECTION INTRAVENOUS at 17:19

## 2022-07-24 NOTE — PROGRESS NOTES
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 Received 3 doses IVIG, did not tolerate final IVIG infusion despite. PLT normalized  Hemo/onc on board     SBO, improving  Ileal perforation and abscess/adhesions  Repeat CT a/p on  showed partial SBO pattern. CT enterography showed distal partial SBO  Went to OR on  found to have SMALL BOWEL OBSTRUCTION  SECONDARY TO ILEAL PERFORATION, AND ABSCESS AND ADHESION AND ABDOMINAL HERNIA   CT abdomen pelvis repeated and it was unremarkable  Off NGT  Diet advance again to regular diet   Off TPN on   Follow surgery recommendation  Completed 14 days of Zosyn, no need for more Abx as per d/w Dr Duncan Toro placed     GI bleed  Was to due severe ITP  S/p blood transfusion  Continue with protonix 40 daily (can switch to famotidine on discharge)  GI on board, holding off on EGD for now, conservative management.   Start iron pills   7/15 again hemoglobin 6.8 but there is no blood in the stool has no hematuria we will transfuse 1 unit of blood     Paroxysmal atrial fibrillation w RVR  HR is controlled now  Cardiac stress test neg  Cont Eliquis   amiodarone dose adjusted to 200mg daily  F/up with Dr Leonard Pastrana in 2 weeks      Gross hematuria, improving  Patient with recurrent gross hematuria, Lopez inserted for urinary retention  Eliquis resumed, hemoglobin is stable  Lopez removed and pt has no further issues      History of DVT and PE  B/l LE edema, chronic   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 car trips x2  PE of unknown origin  DVT after hip replacement  Last DVT 2021  Patient reports his in immobile much of the time d/t debility  Has not had hypercoagulable work up  [facility], operated by [facility] resumed  On RA  Duplex done and neg  B/l LE edema , start lasix 20 mg BID  Start STEVE      Vit D deficiency  Start Ergocalciferol        Acute hypoxic resp failure/ resolved  CXR Left basilar atelectasis  Resolved, on RA now  IS    ?CARLOS ENRIQUE  Sleep study as outpt  Desats when fall asleep     30.0 - 39.9 Obese / Body mass index is 37.09 kg/m². Code status: Full  Prophylaxis: Eliquis  Recommended Disposition: [facility]/Rehab. Anticipated Discharge Date:  stable for DC to rehab        Subjective:     Discussed with RN events overnight. Good appetite  No abd pain  No N/V  Afebrile   No SOB  Wound vac on  Diarrhea resovled     Review of Systems:  Symptom Y/N Comments  Symptom Y/N Comments   Fever/Chills    Chest Pain     Poor Appetite    Edema     Cough    Abdominal Pain     Sputum    Joint Pain     SOB/SHEARER    Pruritis/Rash     Nausea/vomit    Tolerating PT/OT     Diarrhea    Tolerating Diet     Constipation    Other       PO intake: No data found. Wt Readings from Last 10 Encounters:   07/22/22 129.8 kg (286 lb 2.5 oz)   08/09/21 147.8 kg (325 lb 13.4 oz)   10/13/19 139.7 kg (308 lb)   11/27/18 138.5 kg (305 lb 5.4 oz)   10/28/18 137.9 kg (304 lb)   07/17/17 141.5 kg (312 lb)   02/01/14 138.8 kg (306 lb)   01/20/14 140.6 kg (310 lb)   01/17/14 138.3 kg (305 lb)   01/13/14 138.3 kg (305 lb)       Objective:     VITALS:   Last 24hrs VS reviewed since prior progress note.  Most recent are:  Patient Vitals for the past 24 hrs:   Temp Pulse Resp BP SpO2   07/24/22 0611 -- 67 -- (!) 125/56 --   07/24/22 0333 98.4 °F (36.9 °C) 65 16 (!) 117/48 95 %   07/23/22 2311 98.9 °F (37.2 °C) 68 17 (!) 116/52 97 %   07/23/22 1938 99.3 °F (37.4 °C) 63 15 (!) 130/50 93 %   07/23/22 1718 98 °F (36.7 °C) 67 13 (!) 129/50 97 %   07/23/22 1128 98.1 °F (36.7 °C) 61 20 (!) 127/47 91 %         Intake/Output Summary (Last 24 hours) at 7/24/2022 1010  Last data filed at 7/24/2022 1374  Gross per 24 hour   Intake --   Output 1425 ml   Net -1425 ml          I had a face to face encounter, and independently examined this patient as outlined below:    PHYSICAL EXAM:  General:    No distress     HEENT: Atraumatic, anicteric sclerae, pink conjunctivae, MMM  Neck:  Supple, symmetrical  Lungs:   CTA. No Wheezing/Rhonchi. No rales. No tenderness. No Accessory muscle use. Heart:   Irregularly irregular rhythm, No murmur. No JVD   GI/:            Soft. NT. ND. BS normal. Midline wound vac. Extremities: +2 LE edema. No cyanosis. No clubbing. Skin:     Not pale. Not Jaundiced. No rashes   Psych:  Good insight. Not depressed. Not anxious or agitated. Neurologic: Alert and oriented X 4. EOMs intact. No facial asymmetry. No slurred speech. Symmetrical strength, Sensation grossly intact. Labs     I reviewed today's most current labs and imaging studies. Pertinent labs include:  No results for input(s): WBC, HGB, HCT, PLT, HGBEXT, HCTEXT, PLTEXT, HGBEXT, HCTEXT, PLTEXT in the last 72 hours. Recent Labs     07/22/22  0530      K 4.0      CO2 23   *   BUN 14   CREA 1.24   CA 8.2*   MG 1.8   PHOS 3.6       XR ABD (KUB)    Result Date: 7/14/2022  1. A gastric tube likely terminates in the stomach     XR ABD FLAT/ ERECT    Result Date: 7/15/2022  Persistent diffuse small bowel distention. XR ABD FLAT/ ERECT    Result Date: 7/14/2022  Moderate distention of several loops of upper and mid abdominal small bowel without colonic distention demonstrated. CT ABD PELV WO CONT    Result Date: 7/5/2022  1. Small bowel obstruction with a transition point in the right lower quadrant in the terminal ileum. The terminal ileum now appears more narrowed/collapsed than on the prior study. Oral contrast has made it to the colon however indicating this is likely a partial obstruction. 2. Fat-containing and local hernia containing fluid as previously seen. CT ABD PELV W CONT    Result Date: 7/18/2022  Distended small bowel. Distended stomach. Normal size colon No fluid collections.     CT ENTEROGRAPHY W CONT    Result Date: 7/7/2022  Distal partial small bowel obstruction as above. .    XR CHEST PORT    Result Date: 7/13/2022  Satisfactory CVL placement. XR CHEST PORT    Result Date: 7/9/2022  Left basilar atelectasis. Nasogastric tube as above. NUCLEAR CARDIAC STRESS TEST    Result Date: 7/8/2022  : 1. No definite evidence of ischemia. There is slight diminished activity in the inferior wall which could represent nontransmural infarct versus attenuation artifact. 2. LVEF equals 55%. No regional wall motion abnormalities are identified. No results found. 08/07/21    ECHO ADULT COMPLETE 08/10/2021 8/10/2021    Interpretation Summary  · LV: Estimated LVEF is 60 - 65%. Visually measured ejection fraction. Normal cavity size and systolic function (ejection fraction normal). Upper normal wall thickness. Wall motion: normal.    Signed by: Mariola Andrew III, DO on 8/10/2021  7:18 AM     All Micro Results       Procedure Component Value Units Date/Time    COVID-19 WITH INFLUENZA A/B [[id number]] Collected: 07/22/22 1108    Order Status: Completed Specimen: Nasopharyngeal Updated: 07/22/22 1225     SARS-CoV-2 by PCR Not detected        Comment: Not Detected results do not preclude SARS-CoV-2 infection and should not be used as the sole basis for patient management decisions. Results must be combined with clinical observations, patient history, and epidemiological information. Influenza A by PCR Not detected        Influenza B by PCR Not detected        Comment: Testing was performed using [employer] Fele[employer] SARS-CoV-2 and Influenza A/B nucleic acid assay. This test is a multiplex Real-Time Reverse Transcriptase Polymerase Chain Reaction (RT-PCR) based in vitro diagnostic test intended for the qualitative detection of nucleic acids from SARS-CoV-2, Influenza A, and Influenza B in nasopharyngeal and nasal swab specimens for use under the FDA's Emergency Use Authorization (EUA) only.        Fact sheet for Patients: FindDrives.pl  Fact sheet for Healthcare Providers: FindDrives.pl         COVID-19 RAPID TEST [[id number]] Collected: 07/22/22 1108    Order Status: Completed Specimen: Nasopharyngeal Updated: 07/22/22 1204     Specimen source Nasopharyngeal        COVID-19 rapid test Not detected        Comment: Rapid Abbott ID Now       Rapid NAAT:  The specimen is NEGATIVE for SARS-CoV-2, the novel coronavirus associated with COVID-19. Negative results should be treated as presumptive and, if inconsistent with clinical signs and symptoms or necessary for patient management, should be tested with an alternative molecular assay. Negative results do not preclude SARS-CoV-2 infection and should not be used as the sole basis for patient management decisions. This test has been authorized by the FDA under an Emergency Use Authorization (EUA) for use by authorized laboratories. Fact sheet for Healthcare Providers: ConventionUpdate.co.nz  Fact sheet for Patients: ConventionUpdate.co.nz       Methodology: Isothermal Nucleic Acid Amplification         COVID-19 RAPID TEST [[id number]] Collected: 07/22/22 1036    Order Status: Canceled     COVID-19 RAPID TEST [[id number]] Collected: 07/21/22 1652    Order Status: Completed Specimen: Nasopharyngeal Updated: 07/21/22 1720     Specimen source Nasopharyngeal        COVID-19 rapid test Not detected        Comment: Rapid Abbott ID Now       Rapid NAAT:  The specimen is NEGATIVE for SARS-CoV-2, the novel coronavirus associated with COVID-19. Negative results should be treated as presumptive and, if inconsistent with clinical signs and symptoms or necessary for patient management, should be tested with an alternative molecular assay. Negative results do not preclude SARS-CoV-2 infection and should not be used as the sole basis for patient management decisions.        This test has been authorized by the FDA under an Emergency Use Authorization (EUA) for use by authorized laboratories.    Fact sheet for Healthcare Providers: ConventionUpdate.co.nz  Fact sheet for Patients: ConventionUpdate.co.nz       Methodology: Isothermal Nucleic Acid Amplification         CULTURE, RESPIRATORY/SPUTUM/BRONCH Danyell Chavarria [[id number]] Collected: 06/29/22 1412    Order Status: Completed Specimen: Sputum Updated: 07/01/22 0945     Special Requests: NO SPECIAL REQUESTS        GRAM STAIN OCCASIONAL WBCS SEEN               RARE EPITHELIAL CELLS SEEN                  FEW GRAM POSITIVE COCCI IN PAIRS                  OCCASIONAL GRAM NEGATIVE RODS           Culture result:       HEAVY NORMAL RESPIRATORY JAVIER          RESPIRATORY VIRUS PANEL W/COVID-19, PCR [[id number]] Collected: 06/29/22 0906    Order Status: Completed Specimen: Nasopharyngeal Updated: 06/29/22 1556     Adenovirus Not detected        Coronavirus 229E Not detected        Coronavirus HKU1 Not detected        Coronavirus CVNL63 Not detected        Coronavirus OC43 Not detected        SARS-CoV-2, PCR Not detected        Metapneumovirus Not detected        Rhinovirus and Enterovirus Not detected        Influenza A Not detected        Influenza A, subtype H1 Not detected        Influenza A, subtype H3 Not detected        INFLUENZA A H1N1 PCR Not detected        Influenza B Not detected        Parainfluenza 1 Not detected        Parainfluenza 2 Not detected        Parainfluenza 3 Not detected        Parainfluenza virus 4 Not detected        RSV by PCR Not detected        B. parapertussis, PCR Not detected        Bordetella pertussis - PCR Not detected        Chlamydophila pneumoniae DNA, QL, PCR Not detected        Mycoplasma pneumoniae DNA, QL, PCR Not detected       COVID-19 RAPID TEST [[id number]] Collected: 06/29/22 0954    Order Status: Completed Specimen: Nasopharyngeal Updated: 06/29/22 1042     Specimen source Nasopharyngeal        COVID-19 rapid test Not detected        Comment: Rapid Abbott ID Now       Rapid NAAT:  The specimen is NEGATIVE for SARS-CoV-2, the novel coronavirus associated with COVID-19. Negative results should be treated as presumptive and, if inconsistent with clinical signs and symptoms or necessary for patient management, should be tested with an alternative molecular assay. Negative results do not preclude SARS-CoV-2 infection and should not be used as the sole basis for patient management decisions. This test has been authorized by the FDA under an Emergency Use Authorization (EUA) for use by authorized laboratories.    Fact sheet for Healthcare Providers: BagFit.fi  Fact sheet for Patients: BagFit.       Methodology: Isothermal Nucleic Acid Amplification                   Current Medications:     Current Facility-Administered Medications:     furosemide (LASIX) tablet 20 mg, 20 mg, Oral, ACB&D, Santos Pierce MD, 20 mg at 07/24/22 5538    loperamide (IMODIUM) capsule 2 mg, 2 mg, Oral, Q4H PRN, Latonia Pierce MD, 2 mg at 07/23/22 1712    pantoprazole (PROTONIX) tablet 40 mg, 40 mg, Oral, ACB, Santos Pierce MD, 40 mg at 07/24/22 8299    amiodarone (CORDARONE) tablet 200 mg, 200 mg, Oral, DAILY, Huang Alvarez III, DO, 200 mg at 07/24/22 0804    ergocalciferol capsule 50,000 Units, 50,000 Units, Oral, Q7D, Santos Pierce MD, 50,000 Units at 07/21/22 1643    ferrous sulfate tablet 325 mg, 1 Tablet, Oral, BID WITH MEALS, Latonia Pierce MD, 325 mg at 07/24/22 0804    potassium chloride (K-DUR, KLOR-CON M20) SR tablet 20 mEq, 20 mEq, Oral, DAILY, Latonia Pierce MD, 20 mEq at 07/24/22 0804    insulin lispro (HUMALOG) injection, , SubCUTAneous, AC&HS, Zoey Stearns MD, 3 Units at 07/23/22 1209    0.9% sodium chloride infusion 250 mL, 250 mL, IntraVENous, PRN, Lizbeth Starring, MD HYDROmorphone (DILAUDID) injection 0.5-1 mg, 0.5-1 mg, IntraVENous, Q2H PRN, Kumar Martinez MD, 0.5 mg at 07/21/22 5873    HYDROcodone-acetaminophen (NORCO) 5-325 mg per tablet 1-2 Tablet, 1-2 Tablet, Oral, Q4H PRN, Kumar Martinez MD, 1 Tablet at 07/12/22 0620    alcohol 62% (NOZIN) nasal  1 Ampule, 1 Ampule, Topical, Q12H, Kumar Martinez MD, 1 Ampule at 07/24/22 0803    apixaban (ELIQUIS) tablet 5 mg, 5 mg, Oral, BID, Kumar Martinez MD, 5 mg at 07/24/22 4232    phenol throat spray (CHLORASEPTIC) 1 Spray, 1 Spray, Oral, PRN, Kumar Martinez MD    calcium carbonate (TUMS) chewable tablet 400 mg [elemental], 400 mg, Oral, TID PRN, Kumar Martinez MD, 400 mg at 07/01/22 0133    glucose chewable tablet 16 g, 4 Tablet, Oral, PRN, Kumar Martinez MD    glucagon (GLUCAGEN) injection 1 mg, 1 mg, IntraMUSCular, PRN, Kumar Martinez MD    dextrose 10 % infusion 0-250 mL, 0-250 mL, IntraVENous, PRN, Kumar Martinez MD    nitroglycerin (NITROSTAT) tablet 0.4 mg, 0.4 mg, SubLINGual, PRN, Kumar Martinez MD, 0.4 mg at 06/28/22 2322    atorvastatin (LIPITOR) tablet 20 mg, 20 mg, Oral, QHS, Kumar Martinez MD, 20 mg at 07/23/22 2100    vitamin A-S-J-lutein-minerals (OCUVITE) tablet 1 Tablet, 1 Tablet, Oral, DAILY, Kumar Martinez MD, 1 Tablet at 07/24/22 0804    sodium chloride (NS) flush 5-40 mL, 5-40 mL, IntraVENous, Q8H, Kumar Martinez MD, 10 mL at 07/24/22 9177    sodium chloride (NS) flush 5-40 mL, 5-40 mL, IntraVENous, PRN, Kumar Martinez MD    acetaminophen (TYLENOL) tablet 650 mg, 650 mg, Oral, Q6H PRN, 650 mg at 07/02/22 2246 **OR** acetaminophen (TYLENOL) suppository 650 mg, 650 mg, Rectal, Q6H PRN, Kumar Martinez MD    polyethylene glycol (MIRALAX) packet 17 g, 17 g, Oral, DAILY PRN, Kumar Martinez MD, 17 g at 07/17/22 0132    ondansetron (ZOFRAN ODT) tablet 4 mg, 4 mg, Oral, Q8H PRN **OR** ondansetron (ZOFRAN) injection 4 mg, 4 mg, IntraVENous, Q6H PRN, Kumar Martinez MD, 4 mg at 07/19/22 0804     Procedures: see electronic medical records for all procedures/Xrays and details which were not copied into this note but were reviewed prior to creation of Plan. Reviewed most current lab test results and cultures  YES  Reviewed most current radiology test results   YES  Review and summation of old records today    NO  Reviewed patient's current orders and MAR    YES  PMH/SH reviewed - no change compared to H&P  ________________________________________________________________________  Care Plan discussed with:    Comments   Patient x    Family      RN     Care Manager     Consultant                        Multidiciplinary team rounds were held today with , nursing, pharmacist and clinical coordinator. Patient's plan of care was discussed; medications were reviewed and discharge planning was addressed.      ________________________________________________________________________  Total NON critical care TIME:   25   Minutes    Total CRITICAL CARE TIME Spent:   Minutes non procedure based      Comments   >50% of visit spent in counseling and coordination of care x     This includes time during multidisciplinary rounds if indicated above   ________________________________________________________________________  Mariano Webster MD

## 2022-07-24 NOTE — PROGRESS NOTES
Pt refused lab draws. RN educated pt on the importance of daily lab draws. Pt understand but stated, \"I don't want to, and I will be leaving soon. \"

## 2022-07-24 NOTE — PROGRESS NOTES
Admit Date: 2022    POD 11 Days Post-Op    Procedure:  Procedure(s): INFUSION CATHETER INSERTION    Subjective:     Patient has no new complaints. Objective:     Blood pressure (!) 115/46, pulse 60, temperature 98.6 °F (37 °C), resp. rate 16, height 6' 4\" (1.93 m), weight 286 lb 2.5 oz (129.8 kg), SpO2 91 %. Temp (24hrs), Av.6 °F (37 °C), Min:98 °F (36.7 °C), Max:99.3 °F (37.4 °C)      Physical Exam:  GENERAL: alert, cooperative, no distress, appears stated age, LUNG: clear to auscultation bilaterally, HEART: regular rate and rhythm, ABDOMEN: soft, NT, VAC site intact, EXTREMITIES:  extremities normal, atraumatic, no cyanosis or edema    Labs:   Recent Results (from the past 24 hour(s))   GLUCOSE, POC    Collection Time: 22 11:25 AM   Result Value Ref Range    Glucose (POC) 141 (H) 65 - 117 mg/dL    Performed by [employer] PCT    GLUCOSE, POC    Collection Time: 22  4:20 PM   Result Value Ref Range    Glucose (POC) 127 (H) 65 - 117 mg/dL    Performed by [employer] PCT    GLUCOSE, POC    Collection Time: 22  8:26 PM   Result Value Ref Range    Glucose (POC) 109 65 - 117 mg/dL    Performed by Jennifer [facility]    GLUCOSE, POC    Collection Time: 22  7:14 AM   Result Value Ref Range    Glucose (POC) 89 65 - 117 mg/dL    Performed by Erick Raymundo PCT        Data Review images and reports reviewed    Assessment:     Active Problems:     Morbid obesity ([address] (2013)      Thrombocytopenia ([address] (2022)      GI bleed (2022)      UGO (acute kidney injury) ([address] (2022)      Atrial fibrillation ([address] (7/3/2022)      Elevated troponin (7/3/2022)      Ileus ([address] (2022)      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 [facility] (2022)        Plan/Recommendations/Medical Decision Making:     Continue present treatment  Anticipate transfer to rehab after [facility] change tomorrow    [facility], MD  ED [facility]

## 2022-07-25 VITALS
TEMPERATURE: 98.1 F | WEIGHT: 286.16 LBS | HEART RATE: 64 BPM | OXYGEN SATURATION: 94 % | DIASTOLIC BLOOD PRESSURE: 54 MMHG | RESPIRATION RATE: 16 BRPM | BODY MASS INDEX: 34.85 KG/M2 | HEIGHT: 76 IN | SYSTOLIC BLOOD PRESSURE: 121 MMHG

## 2022-07-25 LAB
ANION GAP SERPL CALC-SCNC: 8 MMOL/L (ref 5–15)
BUN SERPL-MCNC: 13 MG/DL (ref 6–20)
BUN/CREAT SERPL: 11 (ref 12–20)
CALCIUM SERPL-MCNC: 8.3 MG/DL (ref 8.5–10.1)
CHLORIDE SERPL-SCNC: 104 MMOL/L (ref 97–108)
CO2 SERPL-SCNC: 25 MMOL/L (ref 21–32)
CREAT SERPL-MCNC: 1.19 MG/DL (ref 0.7–1.3)
ERYTHROCYTE [DISTWIDTH] IN BLOOD BY AUTOMATED COUNT: 15.7 % (ref 11.5–14.5)
GLUCOSE BLD STRIP.AUTO-MCNC: 102 MG/DL (ref 65–117)
GLUCOSE BLD STRIP.AUTO-MCNC: 106 MG/DL (ref 65–117)
GLUCOSE SERPL-MCNC: 114 MG/DL (ref 65–100)
HCT VFR BLD AUTO: 28.1 % (ref 36.6–50.3)
HEMOCCULT STL QL: POSITIVE
HGB BLD-MCNC: 8.8 G/DL (ref 12.1–17)
MCH RBC QN AUTO: 29.8 PG (ref 26–34)
MCHC RBC AUTO-ENTMCNC: 31.3 G/DL (ref 30–36.5)
MCV RBC AUTO: 95.3 FL (ref 80–99)
NRBC # BLD: 0 K/UL (ref 0–0.01)
NRBC BLD-RTO: 0 PER 100 WBC
PLATELET # BLD AUTO: 368 K/UL (ref 150–400)
PMV BLD AUTO: 9.1 FL (ref 8.9–12.9)
POTASSIUM SERPL-SCNC: 3.5 MMOL/L (ref 3.5–5.1)
RBC # BLD AUTO: 2.95 M/UL (ref 4.1–5.7)
SARS-COV-2, XPLCVT: DETECTED
SERVICE CMNT-IMP: NORMAL
SERVICE CMNT-IMP: NORMAL
SODIUM SERPL-SCNC: 137 MMOL/L (ref 136–145)
SOURCE, COVRS: ABNORMAL
WBC # BLD AUTO: 5.4 K/UL (ref 4.1–11.1)

## 2022-07-25 PROCEDURE — 74011250637 HC RX REV CODE- 250/637: Performed by: GENERAL ACUTE CARE HOSPITAL

## 2022-07-25 PROCEDURE — 77010033678 HC OXYGEN DAILY

## 2022-07-25 PROCEDURE — 80048 BASIC METABOLIC PNL TOTAL CA: CPT

## 2022-07-25 PROCEDURE — 74011250637 HC RX REV CODE- 250/637: Performed by: SURGERY

## 2022-07-25 PROCEDURE — 85027 COMPLETE CBC AUTOMATED: CPT

## 2022-07-25 PROCEDURE — 82272 OCCULT BLD FECES 1-3 TESTS: CPT

## 2022-07-25 PROCEDURE — 77030013575 HC DRSG HYDROFERA HOLL -B

## 2022-07-25 PROCEDURE — 74011250637 HC RX REV CODE- 250/637: Performed by: INTERNAL MEDICINE

## 2022-07-25 PROCEDURE — 36415 COLL VENOUS BLD VENIPUNCTURE: CPT

## 2022-07-25 PROCEDURE — 74011000250 HC RX REV CODE- 250: Performed by: SURGERY

## 2022-07-25 PROCEDURE — 82962 GLUCOSE BLOOD TEST: CPT

## 2022-07-25 RX ORDER — AMIODARONE HYDROCHLORIDE 200 MG/1
200 TABLET ORAL DAILY
Qty: 30 TABLET | Refills: 1 | Status: SHIPPED | OUTPATIENT
Start: 2022-07-26 | End: 2022-08-25

## 2022-07-25 RX ORDER — LANOLIN ALCOHOL/MO/W.PET/CERES
325 CREAM (GRAM) TOPICAL 2 TIMES DAILY WITH MEALS
Qty: 60 TABLET | Refills: 0 | Status: SHIPPED | OUTPATIENT
Start: 2022-07-25 | End: 2022-08-24

## 2022-07-25 RX ORDER — ERGOCALCIFEROL 1.25 MG/1
50000 CAPSULE ORAL
Qty: 5 CAPSULE | Refills: 0 | Status: SHIPPED | OUTPATIENT
Start: 2022-07-28 | End: 2022-08-26

## 2022-07-25 RX ORDER — POLYETHYLENE GLYCOL 3350 17 G/17G
17 POWDER, FOR SOLUTION ORAL
Qty: 25 EACH | Refills: 0 | Status: SHIPPED | OUTPATIENT
Start: 2022-07-25

## 2022-07-25 RX ORDER — POTASSIUM CHLORIDE 20 MEQ/1
20 TABLET, EXTENDED RELEASE ORAL DAILY
Qty: 30 TABLET | Refills: 1 | Status: SHIPPED | OUTPATIENT
Start: 2022-07-26 | End: 2022-08-25

## 2022-07-25 RX ORDER — FAMOTIDINE 40 MG/1
40 TABLET, FILM COATED ORAL DAILY
Qty: 30 TABLET | Refills: 1 | Status: SHIPPED | OUTPATIENT
Start: 2022-07-25 | End: 2022-08-24

## 2022-07-25 RX ORDER — FUROSEMIDE 20 MG/1
20 TABLET ORAL 2 TIMES DAILY
Qty: 60 TABLET | Refills: 1 | Status: SHIPPED | OUTPATIENT
Start: 2022-07-25 | End: 2022-08-24

## 2022-07-25 RX ADMIN — SODIUM CHLORIDE, PRESERVATIVE FREE 10 ML: 5 INJECTION INTRAVENOUS at 06:52

## 2022-07-25 RX ADMIN — PANTOPRAZOLE SODIUM 40 MG: 40 TABLET, DELAYED RELEASE ORAL at 06:52

## 2022-07-25 RX ADMIN — I-VITE, TAB 1000-60-2MG (60/BT) 1 TABLET: TAB at 09:03

## 2022-07-25 RX ADMIN — FUROSEMIDE 20 MG: 20 TABLET ORAL at 06:52

## 2022-07-25 RX ADMIN — APIXABAN 5 MG: 5 TABLET, FILM COATED ORAL at 06:52

## 2022-07-25 RX ADMIN — FERROUS SULFATE TAB 325 MG (65 MG ELEMENTAL FE) 325 MG: 325 (65 FE) TAB at 09:03

## 2022-07-25 RX ADMIN — POTASSIUM CHLORIDE 20 MEQ: 20 TABLET, EXTENDED RELEASE ORAL at 09:03

## 2022-07-25 RX ADMIN — AMIODARONE HYDROCHLORIDE 200 MG: 200 TABLET ORAL at 09:03

## 2022-07-25 RX ADMIN — FUROSEMIDE 20 MG: 20 TABLET ORAL at 12:10

## 2022-07-25 NOTE — DISCHARGE SUMMARY
Hospitalist Discharge Summary     Patient ID:  Andrzej Rosales  [id number]  76 y.o.  1945 6/27/2022    PCP on record: Aris Zepeda MD    Admit date: 6/27/2022  Discharge date and time: 7/25/2022    DISCHARGE DIAGNOSIS:  As below     CONSULTATIONS:  IP CONSULT TO HEMATOLOGY  IP CONSULT TO GASTROENTEROLOGY  IP CONSULT TO INTENSIVIST  IP CONSULT TO UROLOGY  IP CONSULT TO INTERVENTIONAL RADIOLOGY  IP CONSULT TO ANESTHESIOLOGY  IP CONSULT TO CARDIOLOGY  IP CONSULT TO GENERAL SURGERY    Excerpted HPI from H&P of Clovis Eugene MD:  CHIEF COMPLAINT:  GI bleeding, oral bleeding     HISTORY OF PRESENT ILLNESS:     Andrzej Rosales is a 76 y.o. male from [facility] presents with GI bleed and oral bleeding noted today. History from patient: had hematuria x 3 weeks with urinary retention from clots. On Bactrim DS x 8 days, hematuria resolved. Noticed petechiae yesterday, thought from hot shower. This AM had rectal bleeding with very dark stool from rectum that was diarrhea like x 3. Also woke up with blood on pillow and noticed blood blisters on tongue and inside bottom of mouth.  +mild SOB and lightheaded. No chest pain, cough. In ER, platelets <3. No hx cancer, not on chemo. No fever, chills, abdominal pain. Noted to have diffuse petechiae. Rectal exam by Dr. Julio Negrete with melena. Chart reviewed. Last admission  Admit date: 8/7/2021  Discharge date and time: 8/13/2021   DISCHARGE DIAGNOSIS:  Acute PE  CAD  Acute pulmonary PE  Hx of DVT x 2  Right knee pain and swelling status post aspiration on 08/11  Insurance not covering NOAC  So on lovenox bridging with coumadin  INR 1.7 today, will d/c home today with lovenox until tomorrow (will complete 5 day of lovenox). Will send on coumadin 7.5 daily until 8/17 when he will f/u with pmd and have INR checked.   Orthostatic hypotension on 8/11, resolved today     Right knee effusion:  S/p arthrocentesis and injection by Ortho  F/u ortho as outpatient as needed     CAD s/p stents POA  -LHC done: Adelita Coelho Moderate CAD with 50% diagonal and 50% mid LCX. No intervention.  -Continue ASA, Lipitor, and BB     Thrombocytopenia  Improving, lowest platelet count 582.  ____________________________________________________________________  DISCHARGE SUMMARY/HOSPITAL COURSE:  for full details see H&P, daily progress notes, labs, consult notes. 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 Received 3 doses IVIG, did not tolerate final IVIG infusion despite. PLT normalized  Hemo/onc on board     SBO, improving  Ileal perforation and abscess/adhesions  Repeat CT a/p on 7/5 showed partial SBO pattern. CT enterography showed distal partial SBO  Went to OR on 7/8 found to have SMALL BOWEL OBSTRUCTION  SECONDARY TO ILEAL PERFORATION, AND ABSCESS AND ADHESION AND ABDOMINAL HERNIA   CT abdomen pelvis repeated and it was unremarkable  Off NGT  Diet advance again to regular diet  Off TPN on 7/20  Follow surgery recommendation  Completed 14 days of Zosyn, no need for more Abx as per d/w Dr Allen Power placed     GI bleed  Was to due severe ITP  S/p blood transfusion  Continue with protonix 40 daily (can switch to famotidine on discharge)  GI on board, holding off on EGD for now, conservative management.   Start iron pills  7/15 again hemoglobin 6.8 but there is no blood in the stool has no hematuria we will transfuse 1 unit of blood     Paroxysmal atrial fibrillation w RVR  HR is controlled now  Cardiac stress test neg  Cont Eliquis   amiodarone dose adjusted to 200mg daily  F/up with Dr Luke Ellis in 2 weeks     Gross hematuria, improving  Patient with recurrent gross hematuria, Lopez inserted for urinary retention  Eliquis resumed, hemoglobin is stable  Lopez removed and pt has no further issues     History of DVT and PE  B/l LE edema, chronic  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 car trips x2  PE of unknown origin  DVT after hip replacement  Last DVT 8/2021  Patient reports his in immobile much of the time d/t debility  Has not had hypercoagulable work up  [facility] resumed  On RA  Duplex done and neg  B/l LE edema , start lasix 20 mg BID  Start STEVE     Vit D deficiency  Start Ergocalciferol        Acute hypoxic resp failure/ resolved  Chronic LE edema  ? CARLOS ENRIQUE  Sleep study as outpt  Desats when fall asleep, benefit from nasal cannula 2 L/M HS till get sleep study done  CXR Left basilar atelectasis  Resolved, on RA now  IS     _______________________________________________________________________  Patient seen and examined by me on discharge day. POC d/w pt/family, all question/concerns addressed and they verbalized understanding. Pertinent Findings:  General:          No distress     HEENT:           Atraumatic, anicteric sclerae, pink conjunctivae, MMM  Neck:               Supple, symmetrical  Lungs:             CTA. No Wheezing/Rhonchi. No rales. No tenderness. No Accessory muscle use. Heart:              Irregularly irregular rhythm, No murmur. No JVD  GI/:            Soft. NT. ND. BS normal. Midline wound vac. Extremities:     +2 LE edema. No cyanosis. No clubbing. Skin:                Not pale. Not Jaundiced. No rashes  Psych:             Good insight. Not depressed. Not anxious or agitated. Neurologic:      Alert and oriented X 4. EOMs intact. No facial asymmetry. No slurred speech. Symmetrical strength, Sensation grossly intact.  _______________________________________________________________________  DISCHARGE MEDICATIONS:   Current Discharge Medication List        START taking these medications    Details   potassium chloride (K-DUR, KLOR-CON M20) 20 mEq tablet Take 1 Tablet by mouth in the morning for 30 days. Qty: 30 Tablet, Refills: 1  Start date: 7/26/2022, End date: 8/25/2022      furosemide (LASIX) 20 mg tablet Take 1 Tablet by mouth two (2) times a day for 30 days.   Qty: 60 Tablet, Refills: 1  Start date: 7/25/2022, End date: 8/24/2022      amiodarone (CORDARONE) 200 mg tablet Take 1 Tablet by mouth in the morning for 30 days. Qty: 30 Tablet, Refills: 1  Start date: 7/26/2022, End date: 8/25/2022      ergocalciferol (ERGOCALCIFEROL) 1,250 mcg (50,000 unit) capsule Take 1 Capsule by mouth every seven (7) days for 5 doses. Qty: 5 Capsule, Refills: 0  Start date: 7/28/2022, End date: 8/26/2022      ferrous sulfate 325 mg (65 mg iron) tablet Take 1 Tablet by mouth two (2) times daily (with meals) for 30 days. Qty: 60 Tablet, Refills: 0  Start date: 7/25/2022, End date: 8/24/2022      polyethylene glycol (MIRALAX) 17 gram packet Take 1 Packet by mouth daily as needed for Constipation for up to 25 doses. Qty: 25 Each, Refills: 0  Start date: 7/25/2022      famotidine (Pepcid) 40 mg tablet Take 1 Tablet by mouth in the morning for 30 days. Qty: 30 Tablet, Refills: 1  Start date: 7/25/2022, End date: 8/24/2022           CONTINUE these medications which have NOT CHANGED    Details   apixaban (Eliquis) 5 mg tablet Take 5 mg by mouth two (2) times a day. acetaminophen (Tylenol Extra Strength) 500 mg tablet Take 500 mg by mouth every six (6) hours as needed for Pain.      vit A/vit C/vit E/zinc/copper (ICAPS AREDS PO) Take 1 Tablet by mouth two (2) times a day. omega 3-dha-epa-fish oil 100-160-1,000 mg cap Take 1 Capsule by mouth daily. glucosamine-chondroitin (ELATION) 1,500-1,200 mg/30 mL liqd Take 1 Tablet by mouth two (2) times a day. simvastatin (ZOCOR) 40 mg tablet Take 40 mg by mouth nightly. metFORMIN (GLUCOPHAGE) 500 mg tablet Take 500 mg by mouth two (2) times daily (with meals). multivitamin (ONE A DAY) tablet Take 1 Tab by mouth daily.            STOP taking these medications       trimethoprim-sulfamethoxazole (Bactrim DS) 160-800 mg per tablet Comments:   Reason for Stopping:         amoxicillin (AMOXIL) 500 mg capsule Comments:   Reason for Stopping:         COVID-19 mRNA Vacc (MODERNA) 100 mcg/0.5 mL susp injection Comments:   Reason for Stopping:         aspirin 81 mg chewable tablet Comments:   Reason for Stopping:                 Patient Follow Up Instructions: Activity: Activity as tolerated  Diet: ADULT DIET Regular; 4 carb choices (60 gm/meal)  Wound Care: As directed/Wound vac  Follow-up with PCP in  1 week.   Follow-up tests/labs none       Follow-up Information       Follow up With Specialties Details Why [address] [place] [address]  This is your home health agency  [address] [place] [address] Dr Wendi Galvez, [address] [facility] [address]  [address] [address] [phone]      Dalia Taylor MD General Surgery Follow up in 2 week(s)  [address]      Joshua Nguyen MD [address] [place] [address] Vascular Surgery, Interventional Cardiology Physician, Cardiovascular Disease Physician Follow up in 2 week(s)  [address]  [id number]  [address] [phone]            ________________________________________________________________    Risk of deterioration: Moderate    Condition at Discharge:  Stable  __________________________________________________________________    Disposition  IP Rehab    ____________________________________________________________________    Code Status: Full Code  ___________________________________________________________________      Total time in minutes spent coordinating this discharge (includes going over instructions, follow-up, prescriptions, and preparing report for sign off to her PCP) :  >30 minutes    Signed:  Lonnie Wilkerson MD

## 2022-07-25 NOTE — PROGRESS NOTES
2000 Teds stockings - knee high placed - pt. Tolerated. Explained purpose and acknowledged understanding.    2374 Pt due for blood test this am but he is not happy to have it. Refused. End of Shift Note    Bedside shift change report given to RN (oncoming nurse) by Christina Pang. Kayla Gregorio RN (offgoing nurse). Report included the following information SBAR, Kardex, ED Summary, Procedure Summary, Intake/Output, MAR, Recent Results, and Cardiac Rhythm Sinus Rhythm    Shift worked:  7p-7a     Shift summary and any significant changes:    Stephen stockings applied. Pt. Expected to be discharged today. Concerns for physician to address:  None     Zone phone for oncoming shift:   0000       Activity:  Activity Level: Up with Assistance  Number times ambulated in hallways past shift: 0  Number of times OOB to chair past shift: 0    Cardiac:   Cardiac Monitoring: Yes      Cardiac Rhythm: Sinus Rhythm    Access:   Current line(s): PIV     Genitourinary:   Urinary status: voiding    Respiratory:   O2 Device: Nasal cannula  Chronic home O2 use?: NO  Incentive spirometer at bedside: NO  Actual Volume (ml): 1500 ml    GI:  Last Bowel Movement Date: 07/23/22  Current diet:  ADULT DIET Regular; 4 carb choices (60 gm/meal)  Passing flatus: YES  Tolerating current diet: YES       Pain Management:   Patient states pain is manageable on current regimen: YES    Skin:  Jose Guadalupe Score: 19  Interventions: speciality bed, float heels, limit briefs, and nutritional support     Patient Safety:  Fall Score: Total Score: 3  Interventions: bed/chair alarm, assistive device (walker, cane, etc), gripper socks, and pt to call before getting OOB  High Fall Risk: Yes    Length of Stay:  Expected LOS: 8d 9h  Actual LOS: 29      Ma.  Sin Galan RN                          RN

## 2022-07-25 NOTE — PROGRESS NOTES
Transition of Care Plan:     RUR: 10%  [address] side  Follow up appointments: PCP  DME needed: Wound vac  Transportation at Discharge: Pt's family will transport at d/c.   [address] or means to access home:  Wife has keys      IM Medicare Letter: Given 7/25/2022  Is patient a BCPI-A Bundle:  No                    If yes, was Bundle Letter given?:  N/A  Is patient a  and connected with the [facility]? No              If yes, was [employer] transfer form completed and VA notified? N/A   Caregiver Contact:Wife  Discharge Caregiver contacted prior to discharge? Yes  Care Conference needed?: No    2:16pm-No further CM needs identified. CM notified pt's nurse of d/c.    2:15pm- CM faxed Consuelo the d/c summary via fax.     2:00pm- Consuelo with [facility] called CM via phone to inquired about transportation time and if CM could fax d/c summary     Transition of Care Plan to SNF/Rehab    SNF/Rehab Transition:  Patient has been accepted to [facility] and meets criteria for admission. Patient will transported by [facility] and expected to leave at 2:00pm.    Communication to Patient/Family:  Met with patient and pt's wife (identified care giver) and they are agreeable to the transition plan. Communication to SNF/Rehab:  Bedside RN, Janay Yanez, has been notified to update the transition plan to the facility and call report (phone number [phone]). Discharge information has been updated on the AVS.     Discharge instructions to be fax'd to facility at [facility] # [phone]). Nursing Please include all hard scripts for controlled substances, med rec and dc summary, and AVS in packet.      Reviewed and confirmed with facility, [facility] can manage the patient care needs for the following:     [place] Fetch with (X) only those applicable:    Medication:  [x]  Medications will be available at the facility  []  IV Antibiotics  []  Controlled Substance - hard copy to be sent with patient   []  Weekly Labs Documents:  [x] Hard RX  [x] MAR  [] Kardex  [x] AVS  []Transfer Summary  [x]Discharge   Equipment:  []  CPAP/BiPAP  [x]  Wound Vacuum  []  Lopez or Urinary Device  []  PICC/Central Line  []  Nebulizer  []  Ventilator   Treatment:  []Isolation (for MRSA, VRE, etc.)  []Surgical Drain Management  []Tracheostomy Care  []Dressing Changes  []Dialysis with transportation and chair time   []PEG Care  []Oxygen  []Daily Weights for Heart Failure   Dietary:  []Any diet limitations  []Tube Feedings   []Total Parenteral Management (TPN)   Eligible for Medicaid Long Term Services and Supports  Yes:  [] Eligible for medical assistance or will become eligible within 180 days and UAI completed. [] Provider/Patient and/or support system has requested screening. [] UAI copy provided to patient or responsible party,   [] UAI unavailable at discharge will send once processed to SNF provider. [] UAI unavailable at discharged mailed to patient  No:   [] Private pay and is not financially eligible for Medicaid within the next 180 days. [] Reside out-of-state. [] A residents of a state owned/operated facility that is licensed  by [place] [address] [facility] or [facility]  [] Enrollment in [facility] hospice services  [] [address]  [] Patient /Family declines to have screening completed or provide financial information for screening     Financial Resources:  Medicaid    [] Initiated and application pending   [] Full coverage     Advanced Care Plan:  []Surrogate Decision Maker of Care  []POA  [x]Communicated Code Status FULL   Other       11:40am- met with pt and pt's wife at bedside to discuss d/c plan. CM informed pt of transportation time to [facility]. Pt received and signed 2nd IM letter. 11:30am- set up medical transport with [facility] via [employer].  time 2:00pm. [facility] paperwork placed in pt's chart.       10:58am- Consuelo with [employer] called [facility] via phone to inquired it pt is being discharge today and to let her know of transportation time for pt. CM will continue to follow patient for discharge planning needs and arrange for services as deemed necessary.     Steve Shabazz [address], [facility]  [phone]

## 2022-07-25 NOTE — WOUND CARE
Wound Care follow up for the Wound Vac dressing removal:  Pt. Being discharged today and will go very close to home at [facility] to the healthcare section for dressing changes and Re-hab. The wound Vac dressing gets changed on Monday and Thursdays using the black Granufoam sponge. Currently the patient has some fat necrosis in the drainage but other wise the wound is Granulating well with red wound bed. Wound vac drainage:  Mild odor   but red with White fat:    Treatment today the old dressing was remove with ease and the wound cleansed with the Vashe solution and wiped with gauze. The wound was packed with NS moist Hydrofera Blue large dressing cut into a spiral dressing. Covered with an absorbent dressing and then a foam dressing. Dated for today and the instructions for dressing change is in the discharge instructions. Plan: Transfering to [facility] today.    Miles Jarrett RN, BSN, [facility]

## 2022-07-25 NOTE — DISCHARGE INSTRUCTIONS
Wound care for the Abdominal wound: The wound is deep and requires deep packing with the wound Vac sponge below the level of the wound base. Instructions:  Cleanse the wound with Vashe (Normal saline when the Vashe is gone) and wipe with gauze to remove the old drainage and wound debris. Pack the medium black Granufoam sponge into the wound base and fill in the wound. Occlusive seal it with the Vac drape and then cut a half dollar sized hole in the dressing, attach the TRAC pad for suction and then set the Wound Vac on 125 mmHg for negative pressure wound therapy. Change the dressing on Monday and Thursdays.

## 2022-07-25 NOTE — PROGRESS NOTES
46 Pt had large stool incontinence episode x2. Stool appears loose and dark. Dr. Manohar Begum made aware    2812 Telephone orders received for stat CBC, GI consult, and occult stool collection. 56 Dr. Penny Ibarra office made aware of stat reconsult request.    1039 Spoke with GI NP. Pt to follow up outpatient. Report given to [facility]. [facility] with [facility] and transferred to nursing station for rm B604. Call directed to [employer], mailbox full. [phone] Second attempt made to call report to Yaa Rhodes. Unable to reaching nursing staff at this time.

## 2022-07-25 NOTE — PROGRESS NOTES
1900 Bedside and Verbal shift change report given to [facility], [facility] (oncoming nurse) by Wagner Jensen RN (offgoing nurse). Report included the following information SBAR, Kardex, Intake/Output, MAR, Recent Results, and Cardiac Rhythm NSR/SB . GILBERT Garza will be charting on this pt. I have reviewed and agree with all charting by Nagi Arteaga RN.     Yue Mendez RN

## 2022-07-25 NOTE — PROGRESS NOTES
Admit Date: 2022    POD 12 Days Post-Op    Procedure:  Procedure(s): INFUSION CATHETER INSERTION    Subjective:     Patient has no new complaints. Objective:     Blood pressure (!) 114/49, pulse (!) 58, temperature 97.8 °F (36.6 °C), resp. rate 11, height 6' 4\" (1.93 m), weight 286 lb 2.5 oz (129.8 kg), SpO2 93 %. Temp (24hrs), Av.2 °F (36.8 °C), Min:97.8 °F (36.6 °C), Max:98.6 °F (37 °C)      Physical Exam:  GENERAL: alert, cooperative, no distress, appears stated age, LUNG: clear to auscultation bilaterally, HEART: regular rate and rhythm, ABDOMEN: soft, NT, VAC site intact, EXTREMITIES:  extremities normal, atraumatic, no cyanosis or edema    Labs:   Recent Results (from the past 24 hour(s))   GLUCOSE, POC    Collection Time: 22 11:16 AM   Result Value Ref Range    Glucose (POC) 110 65 - 117 mg/dL    Performed by Minutta    SARS-COV-2    Collection Time: 22 11:43 AM   Result Value Ref Range    SARS-CoV-2 by PCR Please find results under separate order     GLUCOSE, POC    Collection Time: 22  4:30 PM   Result Value Ref Range    Glucose (POC) 108 65 - 117 mg/dL    Performed by Minutta    GLUCOSE, POC    Collection Time: 22  8:27 PM   Result Value Ref Range    Glucose (POC) 99 65 - 117 mg/dL    Performed by Bull Rogers    GLUCOSE, POC    Collection Time: 22  7:18 AM   Result Value Ref Range    Glucose (POC) 106 65 - 117 mg/dL    Performed by Rodney Ball [facility]        Data Review images and reports reviewed    Assessment:     Active Problems:     Morbid obesity ([address] (2013)      Thrombocytopenia ([facility] (2022)      GI bleed (2022)      UGO (acute kidney injury) ([facility] (2022)      Atrial fibrillation ([facility] (7/3/2022)      Elevated troponin (7/3/2022)      Ileus ([address] (2022)      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

## 2022-08-02 PROBLEM — R77.8 ELEVATED TROPONIN: Status: RESOLVED | Noted: 2022-07-03 | Resolved: 2022-08-02

## 2022-08-02 PROBLEM — R79.89 ELEVATED TROPONIN: Status: RESOLVED | Noted: 2022-07-03 | Resolved: 2022-08-02

## 2022-08-21 NOTE — PROGRESS NOTES
Postop Progress Note    Subjective    Nighat Munguia presents to the office for postop follow up. Pt is a 75 yo male pt admitted with thrombocytopenia and medical issues, and sp July 8, 2022 laparotomy by me Dr Walt Blackwell, for SBO and finding small bowel distal ileal perforation and erosions sp resection and pathology benign perforation. Since wound VAC being changed regularly at his skilled nursing facility, apparently the size of a quarter now. Objective    There were no vitals taken for this visit. General: alert, cooperative and no distress  Incision: In place, patient preferred on not undressed this since the [facility] was just put on yesterday and will be changed again for 2 days and they did not have any VAC dressings with them. There is no evidence of cellulitis or or tenderness and the patient and caregiver with him assured me it was only the size of a quarter or 2. Assessment  Doing well postoperatively. Plan  1. Continue any current medications  2. Wound care discussed  3. Pt is to increase activities as tolerated  4. Usual diet  5. Follow up: as needed pain  Seems to have good wound care follow-up at the skilled nursing facility, long-term the small bowel resection should not cause him any problems, will defer to the wound care team at his facility and we can be available as needed if they have any further concerns.     Electronically signed by Queenie Askew MD on 8/25/2022 at 1:33 PM

## 2022-08-25 ENCOUNTER — OFFICE VISIT (OUTPATIENT)
Dept: SURGERY | Age: 77
End: 2022-08-25
Payer: MEDICARE

## 2022-08-25 VITALS
HEART RATE: 82 BPM | SYSTOLIC BLOOD PRESSURE: 107 MMHG | RESPIRATION RATE: 28 BRPM | OXYGEN SATURATION: 98 % | DIASTOLIC BLOOD PRESSURE: 66 MMHG | WEIGHT: 250 LBS | HEIGHT: 76 IN | BODY MASS INDEX: 30.44 KG/M2 | TEMPERATURE: 97.3 F

## 2022-08-25 DIAGNOSIS — Z09 POSTOPERATIVE EXAMINATION: Primary | ICD-10-CM

## 2022-08-25 PROCEDURE — 99024 POSTOP FOLLOW-UP VISIT: CPT | Performed by: SURGERY

## 2022-08-25 NOTE — PROGRESS NOTES
Identified pt with two pt identifiers(name and ). Reviewed record in preparation for visit and have obtained necessary documentation. All patient medications has been reviewed. Chief Complaint   Patient presents with    Surgical Follow-up     LAPAROSCOPY, LAPAROTOMY, SMALL BOWEL RESECTION, ADHESION LYSIS, REPAIR VENTRAL HERNIA (Abdomen) 22       Health Maintenance Due   Topic    Hepatitis C Screening     Depression Screen     COVID-19 Vaccine (1)    DTaP/Tdap/Td series (1 - Tdap)    Shingrix Vaccine Age 50> (1 of 2)    Pneumococcal 65+ years (1 - PCV)    Lipid Screen     Medicare Yearly Exam        Vitals:    22 1337   BP: 107/66   Pulse: 82   Resp: 28   Temp: 97.3 °F (36.3 °C)   TempSrc: Temporal   SpO2: 98%   Weight: 113.4 kg (250 lb)   Height: 6' 4\" (1.93 m)   PainSc:   0 - No pain       4. Have you been to the ER, urgent care clinic since your last visit? Hospitalized since your last visit? No    5. Have you seen or consulted any other health care providers outside of the [address] [place] [address] since your last visit? Include any pap smears or colon screening. No      Patient is accompanied by spouse I have received verbal consent from Vee Wilson to discuss any/all medical information while they are present in the room.

## 2022-08-25 NOTE — PROGRESS NOTES
Jason Zhong is a 76 y.o. male here for new patient appt for ITP. Hospital follow up per Sean Veras  Pt here with his wife. Pt presents in wheelchair today. He is still at [facility] getting PT for his strength. Pt states he has come along way since hospital visit. He can get himself in and out of wheelchair and use a walker. He can do a lot of things for himself now. His petechia is gone now. He has not heart or breathing issues now. He did test positive for COVID when he went to [facility]. 1. Have you been to the ER, urgent care clinic since your last visit? Hospitalized since your last visit? New Pt    2. Have you seen or consulted any other health care providers outside of the [address] [place] [address] since your last visit? Include any pap smears or colon screening.  New Pt

## 2022-08-29 ENCOUNTER — OFFICE VISIT (OUTPATIENT)
Dept: ONCOLOGY | Age: 77
End: 2022-08-29
Payer: MEDICARE

## 2022-08-29 VITALS
BODY MASS INDEX: 30.43 KG/M2 | SYSTOLIC BLOOD PRESSURE: 104 MMHG | HEART RATE: 78 BPM | DIASTOLIC BLOOD PRESSURE: 67 MMHG | HEIGHT: 76 IN | TEMPERATURE: 97.9 F | OXYGEN SATURATION: 95 %

## 2022-08-29 DIAGNOSIS — D69.3 CHRONIC ITP (IDIOPATHIC THROMBOCYTOPENIA) (HCC): Primary | ICD-10-CM

## 2022-08-29 PROCEDURE — G8752 SYS BP LESS 140: HCPCS | Performed by: INTERNAL MEDICINE

## 2022-08-29 PROCEDURE — G0463 HOSPITAL OUTPT CLINIC VISIT: HCPCS | Performed by: INTERNAL MEDICINE

## 2022-08-29 PROCEDURE — G8427 DOCREV CUR MEDS BY ELIG CLIN: HCPCS | Performed by: INTERNAL MEDICINE

## 2022-08-29 PROCEDURE — G8432 DEP SCR NOT DOC, RNG: HCPCS | Performed by: INTERNAL MEDICINE

## 2022-08-29 PROCEDURE — G8417 CALC BMI ABV UP PARAM F/U: HCPCS | Performed by: INTERNAL MEDICINE

## 2022-08-29 PROCEDURE — 1101F PT FALLS ASSESS-DOCD LE1/YR: CPT | Performed by: INTERNAL MEDICINE

## 2022-08-29 PROCEDURE — 1123F ACP DISCUSS/DSCN MKR DOCD: CPT | Performed by: INTERNAL MEDICINE

## 2022-08-29 PROCEDURE — G8536 NO DOC ELDER MAL SCRN: HCPCS | Performed by: INTERNAL MEDICINE

## 2022-08-29 PROCEDURE — G8754 DIAS BP LESS 90: HCPCS | Performed by: INTERNAL MEDICINE

## 2022-08-29 PROCEDURE — 99213 OFFICE O/P EST LOW 20 MIN: CPT | Performed by: INTERNAL MEDICINE

## 2022-08-29 RX ORDER — FAMOTIDINE 20 MG/1
20 TABLET, FILM COATED ORAL 2 TIMES DAILY
COMMUNITY

## 2022-08-29 RX ORDER — ASCORBIC ACID 500 MG
TABLET ORAL
COMMUNITY

## 2022-08-29 RX ORDER — ERGOCALCIFEROL 1.25 MG/1
50000 CAPSULE ORAL
COMMUNITY

## 2022-08-29 RX ORDER — SIMETHICONE 125 MG
125 CAPSULE ORAL
COMMUNITY

## 2022-08-29 RX ORDER — POTASSIUM CHLORIDE 750 MG/1
TABLET, FILM COATED, EXTENDED RELEASE ORAL
COMMUNITY

## 2022-08-29 RX ORDER — AMIODARONE HYDROCHLORIDE 200 MG/1
TABLET ORAL
COMMUNITY

## 2022-08-29 RX ORDER — FUROSEMIDE 20 MG/1
TABLET ORAL DAILY
COMMUNITY

## 2022-08-29 RX ORDER — LIDOCAINE, MENTHOL 4; 1 G/100G; G/100G
PATCH TOPICAL
COMMUNITY

## 2022-08-29 RX ORDER — LANOLIN ALCOHOL/MO/W.PET/CERES
CREAM (GRAM) TOPICAL
COMMUNITY

## 2022-08-29 NOTE — PROGRESS NOTES
[address] [facility] [address], [facility] [place], [facility], [address] [place] [address]  [phone]      Hematology/ Oncology Progress Note      Diagnosis     Chronic ITP    Subjective:     Melvina Aguilera is a 76year old male who presented to the ED at [facility] for fatigue, scattered petechiae to all extremities, purpura, hematuria and rectal bleeding. Platelet count was low. He has a PMH of several DVT and PE since 2008 and has been on eliquis OP. He also has a PMH of CAD with PCI, diabetes type II, obesity and bilateral hip repair. \He is resident of [facility]. ITP resolved with a short course of steroids. Review of Systems:  Pertinent items are noted in the History of Present Illness. Past Medical History:   Diagnosis Date    Arthritis     bilateral hips/knees    CAD (coronary artery disease) 6/7/13    PCI    Chronic pain     DJD; SILVIO KNEES    Diabetes ([address]     \"BORDERLINE\" PER MD    GERD (gastroesophageal reflux disease)     Macular degeneration     Morbid obesity ([address]     Other and unspecified symptoms and signs involving general sensations and perceptions     Bilat. legs \"give out\" intermittently; Bilat leg cramps; macular degenration (left). Other ill-defined conditions(799.89)     HYPERLIPIDEMIA; LT LE DVT (2008);  MORBID OBESITY    Pulmonary embolism ([address] 08/2021    Thromboembolus ([address] 2008    left LE     Past Surgical History:   Procedure Laterality Date    HX HERNIA REPAIR  07/08/2022    LAPAROSCOPY, LAPAROTOMY, SMALL BOWEL RESECTION, ADHESION LYSIS, REPAIR VENTRAL HERNIA (Abdomen)    HX SMALL BOWEL RESECTION  07/08/2022    LAPAROSCOPY, LAPAROTOMY, SMALL BOWEL RESECTION, ADHESION LYSIS, REPAIR VENTRAL HERNIA (Abdomen)    VT CARDIAC SURG PROCEDURE UNLIST  6/713    STENTS TO RCA    VT TOTAL HIP ARTHROPLASTY  06/01/2007    right    VT TOTAL HIP ARTHROPLASTY  12/01/2007    left      Family History   Problem Relation Age of Onset    Heart Disease Mother     Arrhythmia Mother     Heart Disease Father     Cancer Sister     Lung Disease Sister      Social History     Tobacco Use    Smoking status: Former     Packs/day: 1.00     Types: Cigarettes     Quit date: 1983     Years since quittin.5    Smokeless tobacco: Never   Substance Use Topics    Alcohol use: Yes     Alcohol/week: 2.5 standard drinks     Types: 2 Glasses of wine, 1 Cans of beer per week     Comment: 0-2 X PER WK WITH MEALS OR AFTER PLAYING GOLF      Current Outpatient Medications   Medication Sig Dispense Refill    docosahexaenoic acid/epa (FISH OIL PO) Take  by mouth.      lidocaine-menthol 4-1 % ptmd by Apply Externally route. furosemide (LASIX) 20 mg tablet Take  by mouth daily. potassium chloride SR (Klor-Con 10) 10 mEq tablet Take  by mouth. simethicone (GAS-X) 125 mg capsule Take 125 mg by mouth four (4) times daily as needed for Flatulence. ascorbic acid, vitamin C, (Vitamin C) 500 mg tablet Take  by mouth. famotidine (PEPCID) 20 mg tablet Take 20 mg by mouth two (2) times a day. amiodarone (CORDARONE) 200 mg tablet Take  by mouth.      ergocalciferol (Vitamin D2) 1,250 mcg (50,000 unit) capsule Take 50,000 Units by mouth. Once weekly      ferrous sulfate 325 mg (65 mg iron) tablet Take  by mouth Daily (before breakfast). polyethylene glycol (MIRALAX) 17 gram packet Take 1 Packet by mouth daily as needed for Constipation for up to 25 doses. 25 Each 0    apixaban (ELIQUIS) 5 mg tablet Take 5 mg by mouth two (2) times a day. acetaminophen (TYLENOL) 500 mg tablet Take 500 mg by mouth every six (6) hours as needed for Pain.      vit A/vit C/vit E/zinc/copper (ICAPS AREDS PO) Take 1 Tablet by mouth two (2) times a day. omega 3-dha-epa-fish oil 100-160-1,000 mg cap Take 1 Capsule by mouth daily. glucosamine-chondroitin (ELATION) 1,500-1,200 mg/30 mL liqd Take 1 Tablet by mouth two (2) times a day.       simvastatin (ZOCOR) 40 mg tablet Take 40 mg by mouth nightly. metFORMIN (GLUCOPHAGE) 500 mg tablet Take 500 mg by mouth two (2) times daily (with meals). multivitamin (ONE A DAY) tablet Take 1 Tab by mouth daily. potassium chloride (K-DUR, KLOR-CON M20) 20 mEq tablet Take 1 Tablet by mouth in the morning for 30 days. 30 Tablet 1    furosemide (LASIX) 20 mg tablet Take 1 Tablet by mouth two (2) times a day for 30 days. 60 Tablet 1    amiodarone (CORDARONE) 200 mg tablet Take 1 Tablet by mouth in the morning for 30 days. 30 Tablet 1    famotidine (Pepcid) 40 mg tablet Take 1 Tablet by mouth in the morning for 30 days. 30 Tablet 1        Allergies   Allergen Reactions    Adhesive Other (comments)     Blisters     Advil [Ibuprofen] Rash     Rash all over, itching, trouble breathing     Celebrex [Celecoxib] Other (comments)     Swelling in legs, no rash, no itching, no trouble breathing    Cleocin [Clindamycin Hcl] Rash    Other Medication Unknown (comments)     \"Phosphate\"          Objective:     Visit Vitals  /67   Pulse 78   Temp 97.9 °F (36.6 °C)   Ht 6' 4\" (1.93 m)   SpO2 95%   BMI 30.43 kg/m²     Physical Exam:     General appearance: alert, cooperative, no distress, appears stated age, moderately obese  Head: Normocephalic, without obvious abnormality, atraumatic  Throat: abnormal findings: multiple purpura  - now resolved    Abdomen: soft, non-tender.  Bowel sounds normal. No masses,  no organomegaly  Extremities: extremities normal, atraumatic, no cyanosis or edema  Skin: petechiae - scattered to all extremities which are beginning to resolve   Neurologic: Grossly normal          Assessment:     cITP    Resolved with Dex 40 X 4 days      Plan:     CBC @ SNF  Return as needed      Signed by: Alessio Bobo MD                     September 5, 2022

## 2022-08-29 NOTE — LETTER
9/5/2022    Patient: Vee Wilson   YOB: 1945   Date of Visit: 8/29/2022     Sri Ellis MD  [id number]  [address] [id number]  Via Fax: [phone]    Dear Sri Ellis MD,      Thank you for referring Mr. Vee Wilson to [address] [place] [address] for evaluation. My notes for this consultation are attached. If you have questions, please do not hesitate to call me. I look forward to following your patient along with you.       Sincerely,    Maxim Mata MD

## 2022-09-08 ENCOUNTER — TRANSCRIBE ORDER (OUTPATIENT)
Dept: SCHEDULING | Age: 77
End: 2022-09-08

## 2022-09-08 DIAGNOSIS — R13.14 SUCK-SWALLOW INCOORDINATION: ICD-10-CM

## 2022-09-08 DIAGNOSIS — R13.12 DYSPHAGIA, OROPHARYNGEAL PHASE: Primary | ICD-10-CM

## 2022-09-14 ENCOUNTER — HOSPITAL ENCOUNTER (OUTPATIENT)
Dept: GENERAL RADIOLOGY | Age: 77
Discharge: HOME | End: 2022-09-14
Attending: FAMILY MEDICINE
Payer: MEDICARE

## 2022-09-14 DIAGNOSIS — R13.14 SUCK-SWALLOW INCOORDINATION: ICD-10-CM

## 2022-09-14 DIAGNOSIS — R13.12 DYSPHAGIA, OROPHARYNGEAL PHASE: ICD-10-CM

## 2022-09-14 PROCEDURE — 92611 MOTION FLUOROSCOPY/SWALLOW: CPT

## 2022-09-14 PROCEDURE — 74230 X-RAY XM SWLNG FUNCJ C+: CPT

## 2022-09-14 NOTE — PROGRESS NOTES
[address] [facility], [address] [facility] STUDY  Patient: Kayla Daniel (56 y.o. male)  Date: 9/14/2022  Referring Provider:      SUBJECTIVE:   The patient had spent several weeks at [facility] ER for small bowel obstruction. He also had COVID when discharged. He has been in SNF for rehab since discharge. He has reduced appetite and prefers to drink liquids vs solids. He did report eating food like pizza with no difficulty but sometimes he vomits after a few bites of food. OBJECTIVE:   Past Medical History:   Past Medical History:   Diagnosis Date    Arthritis     bilateral hips/knees    CAD (coronary artery disease) 6/7/13    PCI    Chronic pain     DJD; SILVIO KNEES    Diabetes ([address]     \"BORDERLINE\" PER MD    GERD (gastroesophageal reflux disease)     Macular degeneration     Morbid obesity ([address]     Other and unspecified symptoms and signs involving general sensations and perceptions     Bilat. legs \"give out\" intermittently; Bilat leg cramps; macular degenration (left). Other ill-defined conditions(799.89)     HYPERLIPIDEMIA; LT LE DVT (2008); MORBID OBESITY    Pulmonary embolism ([address] 08/2021    Thromboembolus ([address] 2008    left LE     Past Surgical History:   Procedure Laterality Date    HX HERNIA REPAIR  07/08/2022    LAPAROSCOPY, LAPAROTOMY, SMALL BOWEL RESECTION, ADHESION LYSIS, REPAIR VENTRAL HERNIA (Abdomen)    HX SMALL BOWEL RESECTION  07/08/2022    LAPAROSCOPY, LAPAROTOMY, SMALL BOWEL RESECTION, ADHESION LYSIS, REPAIR VENTRAL HERNIA (Abdomen)    IL CARDIAC SURG PROCEDURE UNLIST  6/713    STENTS TO RCA    IL TOTAL HIP ARTHROPLASTY  06/01/2007    right    IL TOTAL HIP ARTHROPLASTY  12/01/2007    left     Current Dietary Status:  mostly liquids; some solids   Radiologist: Dr. Tona Acuña Views: Lateral;Fluoro  Patient Position: upright in bed    Trial 1:   Consistency Presented:  Thin liquid;Puree;Mechanical soft   How Presented: Self-fed/presented;Cup/gulp;Straw       Bolus Acceptance: No impairment   Bolus Formation/Control: Impaired: Delayed; Posterior   Propulsion: Delayed (# of seconds)   Oral Residue: Lingual   Initiation of Swallow: No impairment;Triggered at vallecula;Triggered at pyriform sinus(es)   Timing: No impairment;Pooling 1-5 sec   Penetration: Trace;During swallow; To laryngeal vestibule (cleared with throat clearing)   Aspiration/Timing: No evidence of aspiration   Pharyngeal Clearance: Vallecular residue; Less than 10%   Attempted Modifications: Alternate liquids/solids                   Decreased Tongue Base Retraction?: No  Laryngeal Elevation: Reduced excursion with laryngeal vestibule gap  Aspiration/Penetration Score: 2 (Penetration/No residue-Contrast enters the airway penetrates, remains above the folds/cords, and is cleared)  Pharyngeal Symmetry: Not assessed  Pharyngeal-Esophageal Segment: No impairment  Pharyngeal Dysfunction: Decreased elevation/closure    Oral Phase Severity: Mild-moderate  Pharyngeal Phase Severity: Mild moderate    ASSESSMENT :  Based on the objective data described above, the patient presents with mild to mod oropharyngeal dysphagia. Orally he has premature spillage of thins due to reduced oral control and slow posterior propulsion of purees and solids due to lingual discoordination. He required 2 swallows to clear the liquids due to oral residue. There is oral residue with purees and solids. Pharyngeal swallow is characterized by mild to mod swallow delay and mild vallecular residue. PLAN/RECOMMENDATIONS :  Continue with reg/thins  May want to alternate liquids with solids to help clear his oral cavity. May have dry mouth although he did not report it. He needs to drink as he eats to help clear his mouth. May benefit from GI consult since he complains of vomiting food at times. He also complains of food not being appealing. COMMUNICATION/EDUCATION:   The above findings and recommendations were discussed with: the patient and his wife who verbalized understanding.     Thank you for this referral.  Debi Mckoy, SLP  Time Calculation: 35 mins

## 2022-11-09 RX ORDER — CEPHALEXIN 500 MG/1
500 CAPSULE ORAL 2 TIMES DAILY
COMMUNITY
Start: 2022-11-04

## 2022-11-09 RX ORDER — FAMOTIDINE 40 MG/1
40 TABLET, FILM COATED ORAL DAILY
COMMUNITY

## 2022-11-10 ENCOUNTER — ANESTHESIA (OUTPATIENT)
Dept: ENDOSCOPY | Age: 77
End: 2022-11-10
Payer: MEDICARE

## 2022-11-10 ENCOUNTER — ANESTHESIA EVENT (OUTPATIENT)
Dept: ENDOSCOPY | Age: 77
End: 2022-11-10
Payer: MEDICARE

## 2022-11-10 ENCOUNTER — HOSPITAL ENCOUNTER (OUTPATIENT)
Age: 77
Setting detail: OUTPATIENT SURGERY
Discharge: HOME | End: 2022-11-10
Attending: INTERNAL MEDICINE | Admitting: INTERNAL MEDICINE
Payer: MEDICARE

## 2022-11-10 VITALS
HEIGHT: 74 IN | BODY MASS INDEX: 28.89 KG/M2 | RESPIRATION RATE: 17 BRPM | HEART RATE: 78 BPM | SYSTOLIC BLOOD PRESSURE: 119 MMHG | DIASTOLIC BLOOD PRESSURE: 56 MMHG | TEMPERATURE: 97.8 F | WEIGHT: 225.1 LBS | OXYGEN SATURATION: 99 %

## 2022-11-10 PROCEDURE — 74011250636 HC RX REV CODE- 250/636: Performed by: REGISTERED NURSE

## 2022-11-10 PROCEDURE — 88342 IMHCHEM/IMCYTCHM 1ST ANTB: CPT

## 2022-11-10 PROCEDURE — 76040000019: Performed by: INTERNAL MEDICINE

## 2022-11-10 PROCEDURE — 74011250636 HC RX REV CODE- 250/636: Performed by: INTERNAL MEDICINE

## 2022-11-10 PROCEDURE — 76060000031 HC ANESTHESIA FIRST 0.5 HR: Performed by: INTERNAL MEDICINE

## 2022-11-10 PROCEDURE — 88305 TISSUE EXAM BY PATHOLOGIST: CPT

## 2022-11-10 PROCEDURE — 74011000250 HC RX REV CODE- 250: Performed by: REGISTERED NURSE

## 2022-11-10 PROCEDURE — 2709999900 HC NON-CHARGEABLE SUPPLY: Performed by: INTERNAL MEDICINE

## 2022-11-10 PROCEDURE — 77030019988 HC FCPS ENDOSC DISP BSC -B: Performed by: INTERNAL MEDICINE

## 2022-11-10 RX ORDER — LIDOCAINE HYDROCHLORIDE 20 MG/ML
INJECTION, SOLUTION EPIDURAL; INFILTRATION; INTRACAUDAL; PERINEURAL AS NEEDED
Status: DISCONTINUED | OUTPATIENT
Start: 2022-11-10 | End: 2022-11-10 | Stop reason: HOSPADM

## 2022-11-10 RX ORDER — SODIUM CHLORIDE 9 MG/ML
75 INJECTION, SOLUTION INTRAVENOUS CONTINUOUS
Status: DISCONTINUED | OUTPATIENT
Start: 2022-11-10 | End: 2022-11-10 | Stop reason: HOSPADM

## 2022-11-10 RX ORDER — NALOXONE HYDROCHLORIDE 0.4 MG/ML
0.4 INJECTION, SOLUTION INTRAMUSCULAR; INTRAVENOUS; SUBCUTANEOUS
Status: DISCONTINUED | OUTPATIENT
Start: 2022-11-10 | End: 2022-11-10 | Stop reason: HOSPADM

## 2022-11-10 RX ORDER — PROPOFOL 10 MG/ML
INJECTION, EMULSION INTRAVENOUS AS NEEDED
Status: DISCONTINUED | OUTPATIENT
Start: 2022-11-10 | End: 2022-11-10 | Stop reason: HOSPADM

## 2022-11-10 RX ORDER — FLUMAZENIL 0.1 MG/ML
0.2 INJECTION INTRAVENOUS
Status: DISCONTINUED | OUTPATIENT
Start: 2022-11-10 | End: 2022-11-10 | Stop reason: HOSPADM

## 2022-11-10 RX ORDER — SODIUM CHLORIDE 0.9 % (FLUSH) 0.9 %
5-40 SYRINGE (ML) INJECTION AS NEEDED
Status: DISCONTINUED | OUTPATIENT
Start: 2022-11-10 | End: 2022-11-10 | Stop reason: HOSPADM

## 2022-11-10 RX ORDER — DEXTROMETHORPHAN/PSEUDOEPHED 2.5-7.5/.8
1.2 DROPS ORAL
Status: DISCONTINUED | OUTPATIENT
Start: 2022-11-10 | End: 2022-11-10 | Stop reason: HOSPADM

## 2022-11-10 RX ORDER — ATROPINE SULFATE 0.1 MG/ML
0.5 INJECTION INTRAVENOUS
Status: DISCONTINUED | OUTPATIENT
Start: 2022-11-10 | End: 2022-11-10 | Stop reason: HOSPADM

## 2022-11-10 RX ORDER — EPINEPHRINE 0.1 MG/ML
1 INJECTION INTRACARDIAC; INTRAVENOUS
Status: DISCONTINUED | OUTPATIENT
Start: 2022-11-10 | End: 2022-11-10 | Stop reason: HOSPADM

## 2022-11-10 RX ORDER — GLYCOPYRROLATE 0.2 MG/ML
INJECTION INTRAMUSCULAR; INTRAVENOUS AS NEEDED
Status: DISCONTINUED | OUTPATIENT
Start: 2022-11-10 | End: 2022-11-10 | Stop reason: HOSPADM

## 2022-11-10 RX ORDER — SODIUM CHLORIDE 0.9 % (FLUSH) 0.9 %
5-40 SYRINGE (ML) INJECTION EVERY 8 HOURS
Status: DISCONTINUED | OUTPATIENT
Start: 2022-11-10 | End: 2022-11-10 | Stop reason: HOSPADM

## 2022-11-10 RX ADMIN — LIDOCAINE HYDROCHLORIDE 100 MG: 20 INJECTION, SOLUTION EPIDURAL; INFILTRATION; INTRACAUDAL; PERINEURAL at 10:35

## 2022-11-10 RX ADMIN — GLYCOPYRROLATE 0.2 MG: 0.2 INJECTION, SOLUTION INTRAMUSCULAR; INTRAVENOUS at 10:35

## 2022-11-10 RX ADMIN — PROPOFOL 50 MG: 10 INJECTION, EMULSION INTRAVENOUS at 10:37

## 2022-11-10 RX ADMIN — PROPOFOL 30 MG: 10 INJECTION, EMULSION INTRAVENOUS at 10:39

## 2022-11-10 RX ADMIN — PROPOFOL 20 MG: 10 INJECTION, EMULSION INTRAVENOUS at 10:41

## 2022-11-10 RX ADMIN — PROPOFOL 100 MG: 10 INJECTION, EMULSION INTRAVENOUS at 10:35

## 2022-11-10 RX ADMIN — SODIUM CHLORIDE: 0.9 INJECTION, SOLUTION INTRAVENOUS at 10:36

## 2022-11-10 NOTE — PROGRESS NOTES
The risks and benefits of the bite block have been explained to patient. Patient verbalizes understanding.     Patient states that he has a temporary tooth on the upper front

## 2022-11-10 NOTE — PROCEDURES
NAME:  Kathi Bains   :   1945   MRN:   [id number]     Date/Time:  11/10/2022 10:46 AM    Esophagogastroduodenoscopy (EGD) Procedure Note    Procedure: Esophagogastroduodenoscopy with biopsy    Indication:  Dysphagia/odynophagia, Vomiting, persitent of unclear etilogy  Pre-operative Diagnosis: see indication above  Post-operative Diagnosis: see findings below  :  Lobo Shabazz MD  Referring Provider:   --Ginette Decker NP    Exam:  Airway: clear, no airway problems anticipated  Heart: RRR, without gallops or rubs  Lungs: clear bilaterally without wheezes, crackles, or rhonchi  Abdomen: soft, nontender, nondistended, bowel sounds present  Mental Status: awake, alert and oriented to person, place and time     Anethesia/Sedation:  MAC anesthesia Propofol  Procedure Details   After informed consent was obtained for the procedure, with all risks and benefits of procedure explained the patient was taken to the endoscopy suite and placed in the left lateral decubitus position. Following sequential administration of sedation as per above, the [id number] gastroscope was inserted into the mouth and advanced under direct vision to third portion of the duodenum. A careful inspection was made as the gastroscope was withdrawn, including a retroflexed view of the proximal stomach; findings and interventions are described below. Findings:   Normal proximal and mid esophagus. Biopsies taken of mid esophagus to evaluate for Eosinophilic esophagitis  Z-line slightly irregular. Biopsied  Mild, patchy, non-erosive antral gastropathy. Biopsied  Stomach otherwise normal, including retroflexion  Normal duodenal bulb and 2nd/3rd portion of the duodenum     Therapies:  1. Biopsies    Specimens: 1. Duodenum 2. Gastric 3. Distal esophagus 4. Mid esophagus    EBL:  None. Complications:   None; patient tolerated the procedure well.            Impression:    Normal proximal and mid esophagus. Biopsies taken of mid esophagus to evaluate for Eosinophilic esophagitis  Z-line slightly irregular. Biopsied  Mild, patchy, non-erosive antral gastropathy. Biopsied  Stomach otherwise normal, including retroflexion  Normal duodenal bulb and 2nd/3rd portion of the duodenum     Patient's dysphagia is clearly oropharyngeal dysphagia    Recommendations:   Follow up pathology  Gastric emptying scan if biopsies are unremarkable    Discharge disposition:  Home in the company of  when able to ambulate    Anna Hutchinson MD

## 2022-11-10 NOTE — PROGRESS NOTES
Endoscopy Case End Note:     Procedure scope was pre-cleaned, per protocol, at bedside by BRIA Lino. Report received from anesthesia. See anesthesia flowsheet for intra-procedure vital signs and events. Belongings remain under stretcher with patient.

## 2022-11-10 NOTE — ANESTHESIA PREPROCEDURE EVALUATION
Relevant Problems   CARDIOVASCULAR   (+) Atrial fibrillation (HCC)   (+) HTN (hypertension)   (+) Unstable angina (HCC)      RENAL FAILURE   (+) UGO (acute kidney injury) ([facility]      ENDOCRINE   (+) Morbid obesity (HCC)       Anesthetic History   No history of anesthetic complications            Review of Systems / Medical History  Patient summary reviewed, nursing notes reviewed and pertinent labs reviewed    Pulmonary                Comments: Former smoker - Quit 1983   Neuro/Psych             Comments: Macular degeneration  Cardiovascular    Hypertension        Dysrhythmias : atrial fibrillation  Past MI (NSTEMI), CAD, cardiac stents (RCA stent (6/7/13)) and hyperlipidemia    Exercise tolerance: <4 METS  Comments: Recent Negative stress test.    Hx PE    Patient taking Eliquis    TTE (8/9/21):  LV: Estimated LVEF is 60 - 65%. Visually measured ejection fraction. Normal cavity size and systolic function (ejection fraction normal). Upper normal wall thickness. Wall motion: normal.    Cath (8/8/21):  ·Moderate CAD in a right dominant system with normal EF, mild AS. ·LVEDP 22, with mild AS with a peak gradient of 10 mm Hg. ·EF 55%, no sig MR. ·Right dominant system with a 50% diagonal stenosis. 50% mid LCx stenosis. Diffuse moderate disease. ·5Fr Radial hemostasis via TR band. ·Cont preventative care. No interventions documented.        GI/Hepatic/Renal     GERD          Comments: SBO  GI Bleed Endo/Other    Diabetes: type 2    Obesity, arthritis and anemia (Hgb = 8.0 on 7/7/22)    Comments: Idiopathic Thrombocytopenic Purpura   Other Findings   Comments: Hx of PE         Physical Exam    Airway  Mallampati: II  TM Distance: 4 - 6 cm  Neck ROM: normal range of motion   Mouth opening: Normal     Cardiovascular    Rhythm: regular  Rate: normal         Dental    Dentition: Poor dentition and Caps/crowns     Pulmonary  Breath sounds clear to auscultation               Abdominal  Abdominal exam normal       Other Findings            Anesthetic Plan    ASA: 3  Anesthesia type: MAC          Induction: Intravenous  Anesthetic plan and risks discussed with: Patient

## 2022-11-10 NOTE — H&P
Gastroenterology Outpatient History and Physical    Patient: Jocelin Peña    Physician: Freda Whitehead MD    Chief Complaint: Dysphagia  History of Present Illness: N/v    History:  Past Medical History:   Diagnosis Date    Arthritis     Atrial fibrillation [facility])     CAD (coronary artery disease)     3 stents    Diabetes ([facility]     GERD (gastroesophageal reflux disease)     Hyperlipidemia     Hypertension     Macular degeneration     Morbid obesity ([facility]     Pulmonary embolism ([facility] 2021    Thromboembolus ([facility]     LLE    UTI (urinary tract infection) 2022      Past Surgical History:   Procedure Laterality Date    HX SMALL BOWEL RESECTION  2022    LAPAROSCOPY, LAPAROTOMY, SMALL BOWEL RESECTION, ADHESION LYSIS, REPAIR VENTRAL HERNIA (Abdomen)    GA CARDIAC SURG PROCEDURE UNLIST      STENTS TO RCA    GA TOTAL HIP ARTHROPLASTY  2007    right    GA TOTAL HIP ARTHROPLASTY  2007    left      Social History     Socioeconomic History    Marital status:    Tobacco Use    Smoking status: Former     Types: Cigarettes     Quit date: 1983     Years since quittin.7    Smokeless tobacco: Never   Vaping Use    Vaping Use: Never used   Substance and Sexual Activity    Alcohol use:  Yes     Alcohol/week: 2.0 standard drinks     Types: 1 Glasses of wine, 1 Cans of beer per week    Drug use: Never      Family History   Problem Relation Age of Onset    Heart Disease Mother     Arrhythmia Mother     Heart Disease Father     Lung Disease Sister     Cancer Sister         \"female\"    COPD Sister       Patient Active Problem List   Diagnosis Code    Unstable angina ([facility] I20.0    Morbid obesity ([facility] E66.01    Abnormal stress test R94.39    Chest pain R07.9    HTN (hypertension) I10    Hyperlipidemia E78.5    Thrombocytopenia (HCC) D69.6    GI bleed K92.2    UGO (acute kidney injury) ([facility] N17.9    Atrial fibrillation (HCC) I48.91    Elevated troponin R77.8    Ileus (HCC) K56.7 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 Authorizing Provider   famotidine (PEPCID) 40 mg tablet Take 40 mg by mouth daily. Yes Provider, Historical   vit C/E/Zn/coppr/lutein/zeaxan (PRESERVISION AREDS-2 PO) Take  by mouth. Yes Provider, Historical   cephALEXin (KEFLEX) 500 mg capsule Take 500 mg by mouth two (2) times a day. For UTI   ** Has not started as of 11/9/22 11/4/22  Yes Provider, Historical   simvastatin (ZOCOR) 40 mg tablet Take 40 mg by mouth nightly. 7/26/21  Yes Provider, Historical   metFORMIN (GLUCOPHAGE) 500 mg tablet Take 500 mg by mouth two (2) times daily (with meals). Yes Provider, Historical   potassium chloride (K-DUR, KLOR-CON M20) 20 mEq tablet Take 1 Tablet by mouth in the morning for 30 days. 7/26/22 8/25/22  Cait Juarez MD   furosemide (LASIX) 20 mg tablet Take 1 Tablet by mouth two (2) times a day for 30 days. 7/25/22 8/24/22  Cait Juarez MD   amiodarone (CORDARONE) 200 mg tablet Take 1 Tablet by mouth in the morning for 30 days. 7/26/22 8/25/22  Cait Juarez MD   famotidine (Pepcid) 40 mg tablet Take 1 Tablet by mouth in the morning for 30 days. 7/25/22 8/24/22  Cait Juarez MD   apixaban (ELIQUIS) 5 mg tablet Take 5 mg by mouth two (2) times a day. Provider, Historical   acetaminophen (TYLENOL) 500 mg tablet Take 500 mg by mouth every six (6) hours as needed for Pain.     Provider, Historical     Physical Exam:   Vital Signs: Blood pressure (!) 113/48, pulse 67, temperature 98 °F (36.7 °C), resp. rate 18, height 6' 2\" (1.88 m), weight 102.1 kg (225 lb 1.6 oz), SpO2 98 %.   General: well developed, well nourished   HEENT: unremarkable   Heart: regular rhythm no mumur    Lungs: clear   Abdominal:  benign   Neurological: unremarkable   Extremities: no edema     Findings/Diagnosis: Dysphagia/n/v  Plan of Care/Planned Procedure: EGD with conscious/deep sedation    Signed:  Aiden Feng MD 11/10/2022

## 2022-11-10 NOTE — DISCHARGE INSTRUCTIONS
Ammy Laws  [id number]  1945    EGD DISCHARGE INSTRUCTIONS  Discomfort:  Sore throat- throat lozenges or warm salt water gargle  redness at IV site- apply warm compress to area; if redness or soreness persist- contact your physician  Gaseous discomfort- walking, belching will help relieve any discomfort  You may not operate a vehicle for 12 hours  You may not engage in an occupation involving machinery or appliances for rest of today  You may not drink alcoholic beverages for at least 12 hours  Avoid making any critical decisions for at least 24 hour  DIET  You may resume your regular diet - however -  remember your colon is empty and a heavy meal will produce gas. Avoid these foods:  vegetables, fried / greasy foods, carbonated drinks    MEDICATIONS:  Per Medication Reconciliation  Resume Eliquis on 11/12      ACTIVITY  You may resume your normal daily activities until tomorrow AM;  Spend the remainder of the day resting -  avoid any strenuous activity. CALL M.D. ANY SIGN OF   Increasing pain, nausea, vomiting  Abdominal distension (swelling)  New increased bleeding (oral or rectal)  Fever (chills)  Pain in chest area  Bloody discharge from nose or mouth  Shortness of breath    You may not take any Advil, Aspirin, Ibuprofen, Motrin, Aleve, or Goodys for 10 days, ONLY  Tylenol as needed for pain. IMPRESSION:  Impression:    Normal proximal and mid esophagus. Biopsies taken of mid esophagus to evaluate for Eosinophilic esophagitis  Z-line slightly irregular. Biopsied  Mild, patchy, non-erosive antral gastropathy. Biopsied  Stomach otherwise normal, including retroflexion  Normal duodenal bulb and 2nd/3rd portion of the duodenum     Patient's dysphagia is clearly oropharyngeal dysphagia    Recommendations:   Follow up pathology  Gastric emptying scan if biopsies are unremarkable    Follow-up Instructions:   Call Dr. Eddie Ortiz for the results of procedure / biopsy in 7-10 days   Telephone # [phone]    Anna Hutchinson MD      Patient Education on Sedation / Analgesia Administered for Procedure      For 24 hours after general anesthesia or intravenous analgesia / sedation:  Have someone responsible help you with your care  Limit your activities  Do not drive and operate hazardous machinery  Do not make important personal, legal or business decisions  Do not drink alcoholic beverages  If you have not urinated within 8 hours after discharge, please contact your physician  Resume your medications unless otherwise instructed    For 24 hours after general anesthesia or intravenous analgesia / sedation  you may experience:  Drowsiness, dizziness, sleepiness, or confusion  Difficulty remembering or delayed reaction times  Difficulty with your balance, especially while walking, move slowly and carefully, do not make sudden position changes  Difficulty focusing or blurred vision    You may not be aware of slight changes in your behavior and/or your reaction time because of the medication used during and after your procedure.     Report the following to your physician:  Excessive pain, swelling, redness or odor of or around the surgical area  Temperature over 100.5  Nausea and vomiting lasting longer than 4 hours or if unable to take medications  Any signs of decreased circulation or nerve impairment to extremity: change in color, persistent numbness, tingling, coldness or increase pain  Any questions or concerns    IF YOU REPORT TO AN EMERGENCY ROOM, DOCTOR'S OFFICE OR HOSPITAL WITHIN 24 HOURS AFTER YOUR PROCEDURE, BRING THIS SHEET AND YOUR AFTER VISIT SUMMARY WITH YOU AND GIVE IT TO THE PHYSICIAN OR NURSE ATTENDING YOU.

## 2022-11-10 NOTE — ROUTINE PROCESS
TRANSFER - IN REPORT:    Verbal report received from Marla(name) on Danny Monroy  being received from endo(unit) for routine progression of care      Report consisted of patients Situation, Background, Assessment and   Recommendations(SBAR). Information from the following report(s) SBAR, Procedure Summary, and MAR was reviewed with the receiving nurse. Opportunity for questions and clarification was provided. Assessment completed upon patients arrival to unit and care assumed.

## 2022-11-29 ENCOUNTER — TRANSCRIBE ORDER (OUTPATIENT)
Dept: SCHEDULING | Age: 77
End: 2022-11-29

## 2022-11-29 DIAGNOSIS — N18.31 CHRONIC KIDNEY DISEASE, STAGE 3A (HCC): Primary | ICD-10-CM

## 2022-12-02 ENCOUNTER — HOSPITAL ENCOUNTER (OUTPATIENT)
Dept: ULTRASOUND IMAGING | Age: 77
Discharge: HOME | End: 2022-12-02
Attending: INTERNAL MEDICINE
Payer: MEDICARE

## 2022-12-02 DIAGNOSIS — N18.31 CHRONIC KIDNEY DISEASE, STAGE 3A (HCC): ICD-10-CM

## 2022-12-02 PROCEDURE — 76770 US EXAM ABDO BACK WALL COMP: CPT

## 2023-06-28 ENCOUNTER — HOSPITAL ENCOUNTER (EMERGENCY)
Facility: HOSPITAL | Age: 78
Discharge: HOME | End: 2023-06-28
Attending: STUDENT IN AN ORGANIZED HEALTH CARE EDUCATION/TRAINING PROGRAM
Payer: MEDICARE

## 2023-06-28 ENCOUNTER — APPOINTMENT (OUTPATIENT)
Facility: HOSPITAL | Age: 78
End: 2023-06-28
Payer: MEDICARE

## 2023-06-28 VITALS
HEART RATE: 59 BPM | DIASTOLIC BLOOD PRESSURE: 66 MMHG | HEIGHT: 71 IN | TEMPERATURE: 98 F | SYSTOLIC BLOOD PRESSURE: 126 MMHG | RESPIRATION RATE: 16 BRPM | WEIGHT: 260 LBS | BODY MASS INDEX: 36.4 KG/M2 | OXYGEN SATURATION: 98 %

## 2023-06-28 DIAGNOSIS — I48.91 ATRIAL FIBRILLATION WITH RVR (HCC): Primary | ICD-10-CM

## 2023-06-28 LAB
APPEARANCE UR: CLEAR
BACTERIA URNS QL MICRO: NEGATIVE /HPF
BASOPHILS # BLD: 0.1 K/UL (ref 0–0.1)
BASOPHILS NFR BLD: 1 % (ref 0–1)
BILIRUB UR QL: NEGATIVE
COLOR UR: ABNORMAL
COMMENT:: NORMAL
DIFFERENTIAL METHOD BLD: ABNORMAL
EOSINOPHIL # BLD: 0.2 K/UL (ref 0–0.4)
EOSINOPHIL NFR BLD: 3 % (ref 0–7)
EPITH CASTS URNS QL MICRO: ABNORMAL /LPF
ERYTHROCYTE [DISTWIDTH] IN BLOOD BY AUTOMATED COUNT: 13.3 % (ref 11.5–14.5)
GLUCOSE UR STRIP.AUTO-MCNC: NEGATIVE MG/DL
HCT VFR BLD AUTO: 35.6 % (ref 36.6–50.3)
HGB BLD-MCNC: 11.5 G/DL (ref 12.1–17)
HGB UR QL STRIP: NEGATIVE
HYALINE CASTS URNS QL MICRO: ABNORMAL /LPF (ref 0–2)
IMM GRANULOCYTES # BLD AUTO: 0 K/UL (ref 0–0.04)
IMM GRANULOCYTES NFR BLD AUTO: 0 % (ref 0–0.5)
KETONES UR QL STRIP.AUTO: NEGATIVE MG/DL
LEUKOCYTE ESTERASE UR QL STRIP.AUTO: ABNORMAL
LYMPHOCYTES # BLD: 2.4 K/UL (ref 0.8–3.5)
LYMPHOCYTES NFR BLD: 32 % (ref 12–49)
MCH RBC QN AUTO: 32.5 PG (ref 26–34)
MCHC RBC AUTO-ENTMCNC: 32.3 G/DL (ref 30–36.5)
MCV RBC AUTO: 100.6 FL (ref 80–99)
MONOCYTES # BLD: 0.7 K/UL (ref 0–1)
MONOCYTES NFR BLD: 9 % (ref 5–13)
NEUTS SEG # BLD: 4.3 K/UL (ref 1.8–8)
NEUTS SEG NFR BLD: 55 % (ref 32–75)
NITRITE UR QL STRIP.AUTO: NEGATIVE
NRBC # BLD: 0 K/UL (ref 0–0.01)
NRBC BLD-RTO: 0 PER 100 WBC
PH UR STRIP: 6 (ref 5–8)
PLATELET # BLD AUTO: 255 K/UL (ref 150–400)
PMV BLD AUTO: 10.5 FL (ref 8.9–12.9)
PROT UR STRIP-MCNC: NEGATIVE MG/DL
RBC # BLD AUTO: 3.54 M/UL (ref 4.1–5.7)
RBC #/AREA URNS HPF: ABNORMAL /HPF (ref 0–5)
SP GR UR REFRACTOMETRY: <1.005
SPECIMEN HOLD: NORMAL
TROPONIN I SERPL HS-MCNC: 9 NG/L (ref 0–76)
TSH SERPL DL<=0.05 MIU/L-ACNC: 2.29 UIU/ML (ref 0.36–3.74)
URINE CULTURE IF INDICATED: ABNORMAL
UROBILINOGEN UR QL STRIP.AUTO: 0.2 EU/DL (ref 0.2–1)
WBC # BLD AUTO: 7.7 K/UL (ref 4.1–11.1)
WBC URNS QL MICRO: ABNORMAL /HPF (ref 0–4)

## 2023-06-28 PROCEDURE — 36415 COLL VENOUS BLD VENIPUNCTURE: CPT

## 2023-06-28 PROCEDURE — 99285 EMERGENCY DEPT VISIT HI MDM: CPT

## 2023-06-28 PROCEDURE — 93005 ELECTROCARDIOGRAM TRACING: CPT | Performed by: STUDENT IN AN ORGANIZED HEALTH CARE EDUCATION/TRAINING PROGRAM

## 2023-06-28 PROCEDURE — 84443 ASSAY THYROID STIM HORMONE: CPT

## 2023-06-28 PROCEDURE — 2580000003 HC RX 258: Performed by: STUDENT IN AN ORGANIZED HEALTH CARE EDUCATION/TRAINING PROGRAM

## 2023-06-28 PROCEDURE — 85025 COMPLETE CBC W/AUTO DIFF WBC: CPT

## 2023-06-28 PROCEDURE — 84484 ASSAY OF TROPONIN QUANT: CPT

## 2023-06-28 PROCEDURE — 71045 X-RAY EXAM CHEST 1 VIEW: CPT

## 2023-06-28 PROCEDURE — 96374 THER/PROPH/DIAG INJ IV PUSH: CPT

## 2023-06-28 PROCEDURE — 2500000003 HC RX 250 WO HCPCS: Performed by: STUDENT IN AN ORGANIZED HEALTH CARE EDUCATION/TRAINING PROGRAM

## 2023-06-28 PROCEDURE — 96361 HYDRATE IV INFUSION ADD-ON: CPT

## 2023-06-28 PROCEDURE — 81001 URINALYSIS AUTO W/SCOPE: CPT

## 2023-06-28 RX ORDER — PANTOPRAZOLE SODIUM 20 MG/1
20 TABLET, DELAYED RELEASE ORAL DAILY
COMMUNITY

## 2023-06-28 RX ORDER — 0.9 % SODIUM CHLORIDE 0.9 %
1000 INTRAVENOUS SOLUTION INTRAVENOUS ONCE
Status: COMPLETED | OUTPATIENT
Start: 2023-06-28 | End: 2023-06-28

## 2023-06-28 RX ORDER — DILTIAZEM HYDROCHLORIDE 5 MG/ML
10 INJECTION INTRAVENOUS
Status: COMPLETED | OUTPATIENT
Start: 2023-06-28 | End: 2023-06-28

## 2023-06-28 RX ADMIN — DILTIAZEM HYDROCHLORIDE 10 MG: 5 INJECTION INTRAVENOUS at 16:23

## 2023-06-28 RX ADMIN — SODIUM CHLORIDE 1000 ML: 9 INJECTION, SOLUTION INTRAVENOUS at 16:21

## 2023-06-28 ASSESSMENT — LIFESTYLE VARIABLES
HOW OFTEN DO YOU HAVE A DRINK CONTAINING ALCOHOL: MONTHLY OR LESS
HOW MANY STANDARD DRINKS CONTAINING ALCOHOL DO YOU HAVE ON A TYPICAL DAY: 1 OR 2

## 2023-06-29 LAB
EKG ATRIAL RATE: 72 BPM
EKG ATRIAL RATE: 80 BPM
EKG DIAGNOSIS: NORMAL
EKG DIAGNOSIS: NORMAL
EKG P AXIS: 40 DEGREES
EKG P-R INTERVAL: 180 MS
EKG Q-T INTERVAL: 358 MS
EKG Q-T INTERVAL: 386 MS
EKG QRS DURATION: 108 MS
EKG QRS DURATION: 132 MS
EKG QTC CALCULATION (BAZETT): 445 MS
EKG QTC CALCULATION (BAZETT): 538 MS
EKG R AXIS: -49 DEGREES
EKG R AXIS: -58 DEGREES
EKG T AXIS: 103 DEGREES
EKG T AXIS: 62 DEGREES
EKG VENTRICULAR RATE: 136 BPM
EKG VENTRICULAR RATE: 80 BPM

## 2023-09-21 ENCOUNTER — ANESTHESIA EVENT (OUTPATIENT)
Facility: HOSPITAL | Age: 78
End: 2023-09-21
Payer: MEDICARE

## 2023-09-21 ENCOUNTER — APPOINTMENT (OUTPATIENT)
Facility: HOSPITAL | Age: 78
End: 2023-09-21
Attending: INTERNAL MEDICINE
Payer: MEDICARE

## 2023-09-21 ENCOUNTER — HOSPITAL ENCOUNTER (OUTPATIENT)
Facility: HOSPITAL | Age: 78
Discharge: HOME | End: 2023-09-22
Attending: INTERNAL MEDICINE | Admitting: INTERNAL MEDICINE
Payer: MEDICARE

## 2023-09-21 ENCOUNTER — ANESTHESIA (OUTPATIENT)
Facility: HOSPITAL | Age: 78
End: 2023-09-21
Payer: MEDICARE

## 2023-09-21 DIAGNOSIS — I48.91 ATRIAL FIBRILLATION (HCC): ICD-10-CM

## 2023-09-21 DIAGNOSIS — I48.91 A-FIB (HCC): ICD-10-CM

## 2023-09-21 PROBLEM — I48.0 PAROXYSMAL ATRIAL FIBRILLATION (HCC): Status: ACTIVE | Noted: 2023-09-21

## 2023-09-21 LAB
ACT BLD: 251 SECS (ref 79–138)
ACT BLD: 281 SECS (ref 79–138)
ACT BLD: 281 SECS (ref 79–138)
ECHO BSA: 2.46 M2
GLUCOSE BLD STRIP.AUTO-MCNC: 111 MG/DL (ref 65–117)
GLUCOSE BLD STRIP.AUTO-MCNC: 124 MG/DL (ref 65–117)
GLUCOSE BLD STRIP.AUTO-MCNC: 126 MG/DL (ref 65–117)
GLUCOSE BLD STRIP.AUTO-MCNC: 136 MG/DL (ref 65–117)
SERVICE CMNT-IMP: ABNORMAL
SERVICE CMNT-IMP: NORMAL

## 2023-09-21 PROCEDURE — 2580000003 HC RX 258: Performed by: INTERNAL MEDICINE

## 2023-09-21 PROCEDURE — C1766 INTRO/SHEATH,STRBLE,NON-PEEL: HCPCS | Performed by: INTERNAL MEDICINE

## 2023-09-21 PROCEDURE — 85347 COAGULATION TIME ACTIVATED: CPT

## 2023-09-21 PROCEDURE — 7100000000 HC PACU RECOVERY - FIRST 15 MIN

## 2023-09-21 PROCEDURE — C1731 CATH, EP, 20 OR MORE ELEC: HCPCS | Performed by: INTERNAL MEDICINE

## 2023-09-21 PROCEDURE — 2580000003 HC RX 258: Performed by: NURSE ANESTHETIST, CERTIFIED REGISTERED

## 2023-09-21 PROCEDURE — 3700000000 HC ANESTHESIA ATTENDED CARE: Performed by: INTERNAL MEDICINE

## 2023-09-21 PROCEDURE — 2500000003 HC RX 250 WO HCPCS: Performed by: INTERNAL MEDICINE

## 2023-09-21 PROCEDURE — 2709999900 HC NON-CHARGEABLE SUPPLY: Performed by: INTERNAL MEDICINE

## 2023-09-21 PROCEDURE — 7100000001 HC PACU RECOVERY - ADDTL 15 MIN

## 2023-09-21 PROCEDURE — 82962 GLUCOSE BLOOD TEST: CPT

## 2023-09-21 PROCEDURE — 6360000002 HC RX W HCPCS: Performed by: NURSE ANESTHETIST, CERTIFIED REGISTERED

## 2023-09-21 PROCEDURE — 2720000010 HC SURG SUPPLY STERILE: Performed by: INTERNAL MEDICINE

## 2023-09-21 PROCEDURE — C1893 INTRO/SHEATH, FIXED,NON-PEEL: HCPCS | Performed by: INTERNAL MEDICINE

## 2023-09-21 PROCEDURE — C1894 INTRO/SHEATH, NON-LASER: HCPCS | Performed by: INTERNAL MEDICINE

## 2023-09-21 PROCEDURE — 93656 COMPRE EP EVAL ABLTJ ATR FIB: CPT | Performed by: INTERNAL MEDICINE

## 2023-09-21 PROCEDURE — 6370000000 HC RX 637 (ALT 250 FOR IP): Performed by: INTERNAL MEDICINE

## 2023-09-21 PROCEDURE — C1732 CATH, EP, DIAG/ABL, 3D/VECT: HCPCS | Performed by: INTERNAL MEDICINE

## 2023-09-21 PROCEDURE — 2500000003 HC RX 250 WO HCPCS: Performed by: NURSE ANESTHETIST, CERTIFIED REGISTERED

## 2023-09-21 PROCEDURE — 3700000001 HC ADD 15 MINUTES (ANESTHESIA): Performed by: INTERNAL MEDICINE

## 2023-09-21 RX ORDER — ATORVASTATIN CALCIUM 20 MG/1
20 TABLET, FILM COATED ORAL DAILY
Status: DISCONTINUED | OUTPATIENT
Start: 2023-09-22 | End: 2023-09-22 | Stop reason: HOSPADM

## 2023-09-21 RX ORDER — CALCIUM CARBONATE 500 MG/1
500 TABLET, CHEWABLE ORAL DAILY
Status: DISCONTINUED | OUTPATIENT
Start: 2023-09-22 | End: 2023-09-22 | Stop reason: HOSPADM

## 2023-09-21 RX ORDER — PHENOL 1.4 %
1 AEROSOL, SPRAY (ML) MUCOUS MEMBRANE DAILY
COMMUNITY

## 2023-09-21 RX ORDER — SODIUM CHLORIDE 0.9 % (FLUSH) 0.9 %
5-40 SYRINGE (ML) INJECTION EVERY 12 HOURS SCHEDULED
Status: DISCONTINUED | OUTPATIENT
Start: 2023-09-21 | End: 2023-09-22 | Stop reason: HOSPADM

## 2023-09-21 RX ORDER — ACETAMINOPHEN 500 MG
500 TABLET ORAL EVERY 6 HOURS PRN
Status: DISCONTINUED | OUTPATIENT
Start: 2023-09-21 | End: 2023-09-22 | Stop reason: HOSPADM

## 2023-09-21 RX ORDER — SUCCINYLCHOLINE CHLORIDE 20 MG/ML
INJECTION INTRAMUSCULAR; INTRAVENOUS PRN
Status: DISCONTINUED | OUTPATIENT
Start: 2023-09-21 | End: 2023-09-21 | Stop reason: SDUPTHER

## 2023-09-21 RX ORDER — FENTANYL CITRATE 50 UG/ML
INJECTION, SOLUTION INTRAMUSCULAR; INTRAVENOUS PRN
Status: DISCONTINUED | OUTPATIENT
Start: 2023-09-21 | End: 2023-09-21 | Stop reason: SDUPTHER

## 2023-09-21 RX ORDER — PANTOPRAZOLE SODIUM 40 MG/1
40 TABLET, DELAYED RELEASE ORAL DAILY
Status: DISCONTINUED | OUTPATIENT
Start: 2023-09-22 | End: 2023-09-22 | Stop reason: HOSPADM

## 2023-09-21 RX ORDER — LIDOCAINE HYDROCHLORIDE 20 MG/ML
INJECTION, SOLUTION EPIDURAL; INFILTRATION; INTRACAUDAL; PERINEURAL PRN
Status: DISCONTINUED | OUTPATIENT
Start: 2023-09-21 | End: 2023-09-21 | Stop reason: SDUPTHER

## 2023-09-21 RX ORDER — SODIUM CHLORIDE 9 MG/ML
INJECTION, SOLUTION INTRAVENOUS PRN
Status: DISCONTINUED | OUTPATIENT
Start: 2023-09-21 | End: 2023-09-22 | Stop reason: HOSPADM

## 2023-09-21 RX ORDER — SODIUM CHLORIDE 0.9 % (FLUSH) 0.9 %
5-40 SYRINGE (ML) INJECTION PRN
Status: DISCONTINUED | OUTPATIENT
Start: 2023-09-21 | End: 2023-09-22 | Stop reason: HOSPADM

## 2023-09-21 RX ORDER — ACETAMINOPHEN 325 MG/1
650 TABLET ORAL EVERY 4 HOURS PRN
Status: DISCONTINUED | OUTPATIENT
Start: 2023-09-21 | End: 2023-09-22 | Stop reason: HOSPADM

## 2023-09-21 RX ORDER — CHLORAL HYDRATE 500 MG
CAPSULE ORAL DAILY
COMMUNITY

## 2023-09-21 RX ORDER — PROTAMINE SULFATE 10 MG/ML
INJECTION, SOLUTION INTRAVENOUS PRN
Status: DISCONTINUED | OUTPATIENT
Start: 2023-09-21 | End: 2023-09-21 | Stop reason: SDUPTHER

## 2023-09-21 RX ORDER — TAMSULOSIN HYDROCHLORIDE 0.4 MG/1
0.4 CAPSULE ORAL DAILY
Status: DISCONTINUED | OUTPATIENT
Start: 2023-09-22 | End: 2023-09-22 | Stop reason: HOSPADM

## 2023-09-21 RX ORDER — HEPARIN SODIUM 1000 [USP'U]/ML
INJECTION, SOLUTION INTRAVENOUS; SUBCUTANEOUS PRN
Status: DISCONTINUED | OUTPATIENT
Start: 2023-09-21 | End: 2023-09-21 | Stop reason: SDUPTHER

## 2023-09-21 RX ORDER — SOLIFENACIN SUCCINATE 10 MG/1
5 TABLET, FILM COATED ORAL DAILY
COMMUNITY

## 2023-09-21 RX ORDER — ONDANSETRON 2 MG/ML
INJECTION INTRAMUSCULAR; INTRAVENOUS PRN
Status: DISCONTINUED | OUTPATIENT
Start: 2023-09-21 | End: 2023-09-21 | Stop reason: SDUPTHER

## 2023-09-21 RX ORDER — 0.9 % SODIUM CHLORIDE 0.9 %
INTRAVENOUS SOLUTION INTRAVENOUS PRN
Status: DISCONTINUED | OUTPATIENT
Start: 2023-09-21 | End: 2023-09-21 | Stop reason: SDUPTHER

## 2023-09-21 RX ADMIN — FENTANYL CITRATE 50 MCG: 50 INJECTION, SOLUTION INTRAMUSCULAR; INTRAVENOUS at 12:49

## 2023-09-21 RX ADMIN — PROTAMINE SULFATE 70 MG: 10 INJECTION, SOLUTION INTRAVENOUS at 14:17

## 2023-09-21 RX ADMIN — PROPOFOL 50 MG: 10 INJECTION, EMULSION INTRAVENOUS at 12:50

## 2023-09-21 RX ADMIN — LIDOCAINE HYDROCHLORIDE 40 MG: 20 INJECTION, SOLUTION EPIDURAL; INFILTRATION; INTRACAUDAL; PERINEURAL at 12:40

## 2023-09-21 RX ADMIN — PROPOFOL 50 MG: 10 INJECTION, EMULSION INTRAVENOUS at 13:12

## 2023-09-21 RX ADMIN — SODIUM CHLORIDE, PRESERVATIVE FREE 10 ML: 5 INJECTION INTRAVENOUS at 22:46

## 2023-09-21 RX ADMIN — PROPOFOL 50 MG: 10 INJECTION, EMULSION INTRAVENOUS at 13:26

## 2023-09-21 RX ADMIN — APIXABAN 5 MG: 5 TABLET, FILM COATED ORAL at 22:45

## 2023-09-21 RX ADMIN — PHENYLEPHRINE HYDROCHLORIDE 100 MCG/MIN: 10 INJECTION INTRAVENOUS at 12:48

## 2023-09-21 RX ADMIN — SUCCINYLCHOLINE CHLORIDE 140 MG: 20 INJECTION, SOLUTION INTRAMUSCULAR; INTRAVENOUS at 12:41

## 2023-09-21 RX ADMIN — SODIUM CHLORIDE 250 ML: 900 INJECTION, SOLUTION INTRAVENOUS at 13:30

## 2023-09-21 RX ADMIN — SODIUM CHLORIDE, PRESERVATIVE FREE 10 ML: 5 INJECTION INTRAVENOUS at 22:45

## 2023-09-21 RX ADMIN — HEPARIN SODIUM 12000 UNITS: 1000 INJECTION, SOLUTION INTRAVENOUS; SUBCUTANEOUS at 13:07

## 2023-09-21 RX ADMIN — PROPOFOL 100 MG: 10 INJECTION, EMULSION INTRAVENOUS at 12:41

## 2023-09-21 RX ADMIN — PROPOFOL 50 MG: 10 INJECTION, EMULSION INTRAVENOUS at 13:31

## 2023-09-21 RX ADMIN — HEPARIN SODIUM 2000 UNITS: 1000 INJECTION, SOLUTION INTRAVENOUS; SUBCUTANEOUS at 13:50

## 2023-09-21 RX ADMIN — FENTANYL CITRATE 50 MCG: 50 INJECTION, SOLUTION INTRAMUSCULAR; INTRAVENOUS at 12:40

## 2023-09-21 RX ADMIN — SODIUM CHLORIDE 250 ML: 900 INJECTION, SOLUTION INTRAVENOUS at 12:59

## 2023-09-21 RX ADMIN — ONDANSETRON HYDROCHLORIDE 4 MG: 2 INJECTION, SOLUTION INTRAMUSCULAR; INTRAVENOUS at 12:46

## 2023-09-21 RX ADMIN — PROPOFOL 50 MG: 10 INJECTION, EMULSION INTRAVENOUS at 12:55

## 2023-09-21 RX ADMIN — HEPARIN SODIUM 5000 UNITS: 1000 INJECTION, SOLUTION INTRAVENOUS; SUBCUTANEOUS at 13:23

## 2023-09-21 NOTE — PROGRESS NOTES
Dual Skin preformed with GEMA song  Cardiac Cath Lab Recovery Arrival Note:      Brennen Cleveland arrived to Cardiac Cath Lab, Recovery Area. Staff introduced to patient. Patient identifiers verified with NAME and DATE OF BIRTH. Procedure verified with patient. Consent forms reviewed and signed by patient or authorized representative and verified. Allergies verified. Patient and family oriented to department. Patient and family informed of procedure and plan of care. Questions answered with review. Patient prepped for procedure, per orders from physician, prior to arrival.    Patient on cardiac monitor, non-invasive blood pressure, SPO2 monitor. On RA. Patient is A&Ox 4. Patient reports no pain. Patient in stretcher, in low position, with side rails up, call bell within reach, patient instructed to call if assistance as needed. Patient prep in: [address] [id number]. Patient family has pager #   Family in: aissatou.    Prep by: Omer Acevedo and Kelsy Liang

## 2023-09-21 NOTE — FLOWSHEET NOTE
09/21/23 1430   Handoff   Communication Given Transfer Handoff   Handoff phase Phase I transferring   Handoff Given To Prince Landen RIBEIRO   Handoff Received From [facility] Communication Telephone   Time Handoff Given [id number]

## 2023-09-22 VITALS
OXYGEN SATURATION: 93 % | TEMPERATURE: 98.2 F | HEIGHT: 71 IN | SYSTOLIC BLOOD PRESSURE: 107 MMHG | BODY MASS INDEX: 37.38 KG/M2 | WEIGHT: 267 LBS | HEART RATE: 64 BPM | RESPIRATION RATE: 22 BRPM | DIASTOLIC BLOOD PRESSURE: 49 MMHG

## 2023-09-22 LAB
GLUCOSE BLD STRIP.AUTO-MCNC: 128 MG/DL (ref 65–117)
SERVICE CMNT-IMP: ABNORMAL

## 2023-09-22 PROCEDURE — 6370000000 HC RX 637 (ALT 250 FOR IP): Performed by: INTERNAL MEDICINE

## 2023-09-22 PROCEDURE — 82962 GLUCOSE BLOOD TEST: CPT

## 2023-09-22 PROCEDURE — 2580000003 HC RX 258: Performed by: INTERNAL MEDICINE

## 2023-09-22 RX ADMIN — PANTOPRAZOLE SODIUM 40 MG: 40 TABLET, DELAYED RELEASE ORAL at 08:53

## 2023-09-22 RX ADMIN — ATORVASTATIN CALCIUM 20 MG: 20 TABLET, FILM COATED ORAL at 08:53

## 2023-09-22 RX ADMIN — SODIUM CHLORIDE, PRESERVATIVE FREE 10 ML: 5 INJECTION INTRAVENOUS at 08:55

## 2023-09-22 RX ADMIN — APIXABAN 5 MG: 5 TABLET, FILM COATED ORAL at 08:53

## 2023-09-22 RX ADMIN — CALCIUM CARBONATE (ANTACID) CHEW TAB 500 MG 500 MG: 500 CHEW TAB at 08:53

## 2023-09-22 RX ADMIN — TAMSULOSIN HYDROCHLORIDE 0.4 MG: 0.4 CAPSULE ORAL at 08:53

## 2023-09-22 NOTE — PLAN OF CARE
Problem: Chronic Conditions and Co-morbidities  Goal: Patient's chronic conditions and co-morbidity symptoms are monitored and maintained or improved  9/22/2023 1115 by Ira Syed, 911 [facility]  Outcome: Completed  9/22/2023 0030 by Luis Chavez RN  Outcome: Progressing     Problem: Discharge Planning  Goal: Discharge to home or other facility with appropriate resources  9/22/2023 1115 by Lorraine Harp  Outcome: Completed  9/22/2023 0030 by Luis Chavez RN  Outcome: Progressing     Problem: Safety - Adult  Goal: Free from fall injury  9/22/2023 1115 by Lorraine Harp  Outcome: Completed  9/22/2023 0030 by Luis Chavez RN  Outcome: Progressing  Flowsheets (Taken 9/21/2023 1944)  Free From Fall Injury: Instruct family/caregiver on patient safety

## 2023-09-22 NOTE — ANESTHESIA POSTPROCEDURE EVALUATION
Department of Anesthesiology  Postprocedure Note    Patient: Eduar Atwood  MRN: [id number]  [facility] 1945  Date of evaluation: 9/22/2023      Procedure Summary     Date: 09/21/23 Room / Location: hospitals EP LAB / hospitals CARDIAC CATH LAB    Anesthesia Start: 2074 Anesthesia Stop: 2925    Procedure: Ablation A-fib w complete ep study Diagnosis: Atrial fibrillation ([address])    Providers: Asya Carrington MD Responsible Provider: Jad Coyne MD    Anesthesia Type: General ASA Status: 3          Anesthesia Type: General    Peg Phase I: Peg Score: 9    Peg Phase II:        Anesthesia Post Evaluation    Patient location during evaluation: PACU  Patient participation: complete - patient participated  Level of consciousness: sleepy but conscious  Airway patency: patent  Nausea & Vomiting: no nausea and no vomiting  Complications: no  Cardiovascular status: hemodynamically stable  Respiratory status: acceptable and nasal cannula  Hydration status: stable  Multimodal analgesia pain management approach

## 2023-09-22 NOTE — PLAN OF CARE
Problem: Chronic Conditions and Co-morbidities  Goal: Patient's chronic conditions and co-morbidity symptoms are monitored and maintained or improved  Outcome: Progressing     Problem: Discharge Planning  Goal: Discharge to home or other facility with appropriate resources  Outcome: Progressing     Problem: Safety - Adult  Goal: Free from fall injury  Outcome: Progressing  Flowsheets (Taken 9/21/2023 1944)  Free From Fall Injury: Instruct family/caregiver on patient safety

## 2023-09-22 NOTE — PROGRESS NOTES
Doing well. Bilateral groin sites OK. SINUS RHYTHM on tele. All questions answered for him. He is aware of s/sx warranting urgent med F/U or calling 911.     Erna Freed MD

## 2023-09-22 NOTE — DISCHARGE INSTRUCTIONS
POST-ABLATION DISCHARGE INSTRUCTIONS:    You had an atrial fibrillation ablation with Dr. Moises Castillo yesterday. Continue your usual medications. Call the office to make a follow-up appointment with the nurse practitioner in 2-4 weeks [phone]. Do not drive, operate any machinery, or sign any legal documents for 24 hours after your procedure. You must have someone to drive you home. You may take a shower 24 hours after your cardiac procedure. Be sure to get the dressing wet and then remove it; gently wash the area with warm soapy water. Pat dry and leave open to air. To help prevent infections, be sure to keep the cath site clean and dry. No lotions, creams, powders, ointments, etc. in the cath site for approximately 1 week. Do not take a tub bath, get in a hot tub or swimming pool for approximately 5 days or until the cath site is completely healed. No strenuous activity or heavy lifting over 20 lbs. for 7 days. After your procedure, some bruising or discomfort is common during the healing process. Tylenol, 1-2 tablets every 6 hours as needed, is recommended if you experience any discomfort. If you experience any signs or symptoms of infection such as fever, chills, or poorly healing incision, persistent tenderness or swelling in the groin, redness and/or warmth to the touch, numbness, significant tingling or pain at the groin site or affected extremity, rash, drainage from the site, or if the leg feels tight or swollen, call your physician right away. If bleeding at the site occurs, take a clean gauze pad and apply direct pressure to the groin just above the puncture site, and call your physician right away. If your procedure involved ablation therapy, you may feel some mild or vague chest discomfort due to delivery of heat therapy to the heart muscle. This should resolve in 1-2 days.   If it gets worse or is associated with shortness of breath, dizziness, loss of

## 2023-09-26 LAB — ECHO BSA: 2.46 M2

## (undated) DEVICE — KIT ACCS INTRO 4FR L10CM NDL 21GA L7CM GWIRE L40CM

## (undated) DEVICE — SUTURE PROL SZ 1 L30IN NONABSORBABLE BLU L40MM CT 1/2 CIR 8435H

## (undated) DEVICE — Device

## (undated) DEVICE — TUBING PRSS MON L6IN PVC M FEM CONN

## (undated) DEVICE — PICO 7 10CM X 20CM: Brand: PICO™ 7

## (undated) DEVICE — SYRINGE ANGIO 10 CC BRL STD PRNT POLYCARB LT BLU MEDALLION

## (undated) DEVICE — SUTURE VCRL SZ 2-0 L36IN ABSRB UD L36MM CT-1 1/2 CIR J945H

## (undated) DEVICE — SOLIDIFIER FLD 2OZ 1500CC N DISINF IN BTL DISP SAFESORB

## (undated) DEVICE — CABLE CATH L10FT YEL CONN 12-12 PIN ELECTROGRAM CONDUCTION

## (undated) DEVICE — CABLE EP L150CM BLK HEXAPOLAR OCTAPOLAR DECAPOLAR EXTN CONN

## (undated) DEVICE — YANKAUER,TAPERED BULBOUS TIP,W/O VENT: Brand: MEDLINE

## (undated) DEVICE — SPLINT WR POS F/ARTERIAL ACC -- BX/10

## (undated) DEVICE — NEONATAL-ADULT SPO2 SENSOR: Brand: NELLCOR

## (undated) DEVICE — SPONGE LAP 18X18IN STRL -- 5/PK

## (undated) DEVICE — 3M™ TEGADERM™ TRANSPARENT FILM DRESSING FRAME STYLE, 1626W, 4 IN X 4-3/4 IN (10 CM X 12 CM), 50/CT 4CT/CASE: Brand: 3M™ TEGADERM™

## (undated) DEVICE — BLOCK BITE ENDOSCP AD 21 MM W/ DIL BLU LF DISP

## (undated) DEVICE — SYR 10ML LUER LOK 1/5ML GRAD --

## (undated) DEVICE — YANKAUER,POOLE TIP,STERILE,50/CS: Brand: MEDLINE

## (undated) DEVICE — 450 ML BOTTLE OF 0.05% CHLORHEXIDINE GLUCONATE IN 99.95% STERILE WATER FOR IRRIGATION, USP AND APPLICATOR.: Brand: IRRISEPT ANTIMICROBIAL WOUND LAVAGE

## (undated) DEVICE — 1200 GUARD II KIT W/5MM TUBE W/O VAC TUBE: Brand: GUARDIAN

## (undated) DEVICE — PATCH REF EXT FOR CARTO 3 SYS (EA = 6 PACKS)

## (undated) DEVICE — STAPLER INT L34CM 60MM LNG ENDOSCP ARTC PWR + ECHELON FLX

## (undated) DEVICE — PACK PROCEDURE SURG HRT CATH

## (undated) DEVICE — VISUALIZATION SYSTEM: Brand: CLEARIFY

## (undated) DEVICE — TUBING INSUF 0.3UM FLTR W/ LUERLOCK CONN

## (undated) DEVICE — HEART CATH-MRMC: Brand: MEDLINE INDUSTRIES, INC.

## (undated) DEVICE — GENERAL LAPAROSCOPY-MRMC: Brand: MEDLINE INDUSTRIES, INC.

## (undated) DEVICE — CABLE EP L150CM RED HEXAPOLAR OCTAPOLAR DECAPOLAR EXTN CONN

## (undated) DEVICE — CATHETER ANGIO JR4 AD 5 FRX100 CM 25 CM PERFORMA

## (undated) DEVICE — PINNACLE INTRODUCER SHEATH: Brand: PINNACLE

## (undated) DEVICE — GLIDESHEATH SLENDER ACCESS KIT: Brand: GLIDESHEATH SLENDER

## (undated) DEVICE — TOWEL 4 PLY TISS 19X30 SUE WHT

## (undated) DEVICE — GLOVE ORTHO 7 1/2   MSG9475

## (undated) DEVICE — SYR 3ML LL TIP 1/10ML GRAD --

## (undated) DEVICE — FORCEPS BX L160CM DIA8MM GRSP DISECT CUP TIP NONLOCKING ROT

## (undated) DEVICE — HI-TORQUE VERSACORE FLOPPY GUIDE WIRE SYSTEM 145 CM: Brand: HI-TORQUE VERSACORE

## (undated) DEVICE — SYR ART 700 CLEAR MARK 7 -- ARTERION

## (undated) DEVICE — STAPLER INT L60MM REG TISS BLU B FRM 8 FIRING 2 ROW AUTO

## (undated) DEVICE — TROCAR: Brand: KII FIOS FIRST ENTRY

## (undated) DEVICE — GUIDEWIRE VASC L260CM 0.035IN J TIP L3MM PTFE FIX COR NAMIC

## (undated) DEVICE — CATHETER ANGIO JL3.5 AD 5 FRX100 CM PERFORMA

## (undated) DEVICE — TUBING PMP FOR CARTO SYS SMARTABLATE

## (undated) DEVICE — BASIN EMSIS 16OZ GRAPHITE PLAS KID SHP MOLD GRAD FOR ORAL

## (undated) DEVICE — ELECTRODE PT RET AD L9FT HI MOIST COND ADH HYDRGEL CORDED

## (undated) DEVICE — 1 X VERSACROSS TRANSSEPTAL SHEATH (INCLUDING  1 X J-TIP GUIDEWIRE); 1 X VERSACROSS RF WIRE (INCLUDING 1 X CONNECTOR CABLE (SINGLE USE)); 1 X DISPERSIVE ELECTRODE: Brand: VERSACROSS ACCESS SOLUTION

## (undated) DEVICE — HYPODERMIC SAFETY NEEDLE: Brand: MAGELLAN

## (undated) DEVICE — DRAPE PRB US TRNSDCR 6X96IN --

## (undated) DEVICE — CATHETER SNDSTR ECO 3D DGNSTC ULTRSND USE SMNS IMGNG SSTM 10

## (undated) DEVICE — CATHETER MAP 7FR L115CM 2-6-2 SPC D CRV 22 ELECTRD PENTARAY

## (undated) DEVICE — SET ADMIN 16ML TBNG L100IN 2 Y INJ SITE IV PIGGY BK DISP (ORDER IN MULIPLES OF 48)

## (undated) DEVICE — GLOVE ORANGE PI 7 1/2   MSG9075

## (undated) DEVICE — SHEATH GUID 11.5X8.5FR L71MM M CRV L22MM BIDIR STEER CARTO

## (undated) DEVICE — CATH IV AUTOGRD BC PNK 20GA 25 -- INSYTE

## (undated) DEVICE — ELECTRODE,RADIOTRANSLUCENT,FOAM,5PK: Brand: MEDLINE

## (undated) DEVICE — BAG SPEC BIOHZRD 10 X 10 IN --

## (undated) DEVICE — Device: Brand: S-CATH INTERCONNECT CABLE

## (undated) DEVICE — SOL IRRIGATION INJ NACL 0.9% 500ML BTL

## (undated) DEVICE — TR BAND RADIAL ARTERY COMPRESSION DEVICE: Brand: TR BAND

## (undated) DEVICE — PRESSURE MONITORING SET: Brand: TRUWAVE

## (undated) DEVICE — DRESSING HEMSTAT W12XL12IN 3 PLY QUIKCLOT CONTROL+

## (undated) DEVICE — CONTAINER SPEC 20 ML LID NEUT BUFF FORMALIN 10 % POLYPR STS

## (undated) DEVICE — TRANSFER SET 3": Brand: MEDLINE INDUSTRIES, INC.

## (undated) DEVICE — LARGE, DISPOSABLE ALEXIS O C-SECTION PROTECTOR - RETRACTOR: Brand: ALEXIS ® O C-SECTION PROTECTOR - RETRACTOR

## (undated) DEVICE — CATHETER ABLAT 8FR L115CM 1-6-2MM SPC TIP 3.5MM FJ CRV

## (undated) DEVICE — SUTURE PERMAHAND SZ 3-0 L30IN NONABSORBABLE BLK L26MM SH C017D

## (undated) DEVICE — STAPLER SKIN H3.9MM WIRE DIA0.58MM CRWN 6.9MM 35 STPL ROT

## (undated) DEVICE — TUBING, SUCTION, 1/4" X 10', STRAIGHT: Brand: MEDLINE

## (undated) DEVICE — KIT ANGIOGRAPHY CUST MRMC

## (undated) DEVICE — SEALER ENDOSCP NANO COAT OPN DIV CRV L JAW LIGASURE IMPACT

## (undated) DEVICE — STAPLER INT L75MM CUT LN L73MM STPL LN L77MM BLU B FRM 8

## (undated) DEVICE — CATHETER ELECTROPHYSIOLOGY LG 2-8-2 MM 7 FRX95 CM LIVEWIRE

## (undated) DEVICE — 1LYRTR 14FR10ML100%SIL UMS SNP: Brand: MEDLINE INDUSTRIES, INC.

## (undated) DEVICE — MEDI-TRACE CADENCE ADULT, DEFIBRILLATION ELECTRODE -RTS  (10 PR/PK) - PHYSIO-CONTROL: Brand: MEDI-TRACE CADENCE

## (undated) DEVICE — Z DISCONTINUED PER MEDLINE LINE GAS SAMPLING O2/CO2 LNG AD 13 FT NSL W/ TBNG FILTERLINE

## (undated) DEVICE — CATHETER ETER ANGIO L110CM OD5FR ID046IN L75CM 038IN 145DEG CARD

## (undated) DEVICE — CABLE CATH L10FT RED PIN CONN 34-34 FOR THERMOCOOL

## (undated) DEVICE — RELOAD STPL L75MM OPN H3.8MM CLS 1.5MM WIRE DIA0.2MM REG

## (undated) DEVICE — CABLE CATH L2.7M CONNECTS TO CARTO 3 SYS PIU FOR LASSO ECO